# Patient Record
Sex: MALE | Race: BLACK OR AFRICAN AMERICAN | NOT HISPANIC OR LATINO | Employment: OTHER | URBAN - METROPOLITAN AREA
[De-identification: names, ages, dates, MRNs, and addresses within clinical notes are randomized per-mention and may not be internally consistent; named-entity substitution may affect disease eponyms.]

---

## 2017-01-18 ENCOUNTER — OFFICE VISIT (OUTPATIENT)
Dept: INTERNAL MEDICINE | Facility: CLINIC | Age: 67
End: 2017-01-18
Payer: MEDICARE

## 2017-01-18 ENCOUNTER — HOSPITAL ENCOUNTER (OUTPATIENT)
Dept: RADIOLOGY | Facility: HOSPITAL | Age: 67
Discharge: HOME | End: 2017-01-18
Attending: NURSE PRACTITIONER
Payer: MEDICARE

## 2017-01-18 VITALS
BODY MASS INDEX: 24.44 KG/M2 | TEMPERATURE: 97 F | WEIGHT: 195.56 LBS | SYSTOLIC BLOOD PRESSURE: 152 MMHG | DIASTOLIC BLOOD PRESSURE: 72 MMHG

## 2017-01-18 DIAGNOSIS — R05.9 COUGH: ICD-10-CM

## 2017-01-18 DIAGNOSIS — R06.2 WHEEZING: ICD-10-CM

## 2017-01-18 DIAGNOSIS — R63.0 DECREASED APPETITE: ICD-10-CM

## 2017-01-18 DIAGNOSIS — R21 RASH: ICD-10-CM

## 2017-01-18 DIAGNOSIS — R41.9 UNSPECIFIED SYMPTOMS AND SIGNS INVOLVING COGNITIVE FUNCTIONS AND AWARENESS: ICD-10-CM

## 2017-01-18 DIAGNOSIS — J45.20 MILD INTERMITTENT ASTHMA WITHOUT COMPLICATION: ICD-10-CM

## 2017-01-18 DIAGNOSIS — G47.00 INSOMNIA, UNSPECIFIED TYPE: ICD-10-CM

## 2017-01-18 DIAGNOSIS — R53.83 FATIGUE, UNSPECIFIED TYPE: ICD-10-CM

## 2017-01-18 DIAGNOSIS — R05.9 COUGH: Primary | ICD-10-CM

## 2017-01-18 DIAGNOSIS — J06.9 URI, ACUTE: ICD-10-CM

## 2017-01-18 PROCEDURE — 71020 XR CHEST PA AND LATERAL: CPT | Mod: TC,PO

## 2017-01-18 PROCEDURE — 99999 PR PBB SHADOW E&M-EST. PATIENT-LVL III: CPT | Mod: PBBFAC,,, | Performed by: NURSE PRACTITIONER

## 2017-01-18 PROCEDURE — 71020 XR CHEST PA AND LATERAL: CPT | Mod: 26,,, | Performed by: RADIOLOGY

## 2017-01-18 PROCEDURE — 99214 OFFICE O/P EST MOD 30 MIN: CPT | Mod: S$PBB,,, | Performed by: NURSE PRACTITIONER

## 2017-01-18 RX ORDER — METHYLPREDNISOLONE 4 MG/1
TABLET ORAL
Qty: 1 PACKAGE | Refills: 0 | Status: SHIPPED | OUTPATIENT
Start: 2017-01-18 | End: 2017-03-09

## 2017-01-18 RX ORDER — AZITHROMYCIN 250 MG/1
TABLET, FILM COATED ORAL
Qty: 6 TABLET | Refills: 0 | Status: SHIPPED | OUTPATIENT
Start: 2017-01-18 | End: 2017-03-09

## 2017-01-18 RX ORDER — TRAZODONE HYDROCHLORIDE 50 MG/1
50 TABLET ORAL NIGHTLY PRN
Qty: 15 TABLET | Refills: 0 | Status: SHIPPED | OUTPATIENT
Start: 2017-01-18 | End: 2017-03-09

## 2017-01-18 RX ORDER — IPRATROPIUM BROMIDE AND ALBUTEROL SULFATE 2.5; .5 MG/3ML; MG/3ML
3 SOLUTION RESPIRATORY (INHALATION)
Status: COMPLETED | OUTPATIENT
Start: 2017-01-18 | End: 2017-01-18

## 2017-01-18 RX ADMIN — IPRATROPIUM BROMIDE AND ALBUTEROL SULFATE 3 ML: 2.5; .5 SOLUTION RESPIRATORY (INHALATION) at 02:01

## 2017-01-18 NOTE — MR AVS SNAPSHOT
[facility] Internal Medicine  [address] [facility] [place] [address]  Phone: [phone]  Fax: [phone]                  Edin Green   2017 1:40 PM   Office Visit    Description:  Male : 1950   Provider:  LAZARA Elias   Department:  [facility] - Internal Medicine           Reason for Visit     Cough     Sinusitis           Diagnoses this Visit        Comments    Cough    -  Primary     Wheezing         Fatigue, unspecified type         Decreased appetite         Insomnia, unspecified type         Rash         Unspecified symptoms and signs involving cognitive functions and awareness         Mild intermittent asthma without complication                To Do List           Future Appointments        Provider Department Dept Phone    2017 4:30 PM LAB, SAME DAY [facility] [facility] [phone]    2017 10:30 AM Mere Fitzgerald MD [facility] Dermatology [phone]    2017 2:20 PM Carter Paris MD [facility] Internal Medicine [phone]    2017 8:40 AM LABORATORY, [facility] [phone]    2017 9:45 AM Sina Pa MD [facility] Ophthalmology [phone]      Goals (5 Years of Data)     None      Follow-Up and Disposition     Return in about 2 weeks (around 2017).       These Medications        Disp Refills Start End    methylPREDNISolone (MEDROL DOSEPACK) 4 mg tablet 1 Package 0 2017     use as directed    Pharmacy: [address] Ph #: [phone]       azithromycin (Z-JULIA) 250 MG tablet 6 tablet 0 2017     As per packet instructions    Pharmacy: [address] Ph #: [phone]       trazodone (DESYREL) 50 MG tablet 15 tablet 0 2017    Take 1 tablet (50 mg total) by mouth nightly as needed for Insomnia. - Oral    Pharmacy: [address]  ANTHONY CHACON  [address] [facility] #: [phone]         [facility]     [facility] Care [facility] - 24/7 Assistance  Registered nurses in the [facility] provide clinical advisement, health education, appointment booking, and other advisory services.  Call for this free service at [phone].             Medications           Message regarding Medications     Verify the changes and/or additions to your medication regime listed below are the same as discussed with your clinician today.  If any of these changes or additions are incorrect, please notify your healthcare provider.        START taking these NEW medications        Refills    methylPREDNISolone (MEDROL DOSEPACK) 4 mg tablet 0    Sig: use as directed    Class: Normal    azithromycin (Z-JULIA) 250 MG tablet 0    Sig: As per packet instructions    Class: Normal      These medications were administered today        Dose Freq    albuterol-ipratropium 2.5mg-0.5mg/3mL nebulizer solution 3 mL 3 mL Clinic/HOD 1 time    Sig: Take 3 mLs by nebulization one time.    Class: Normal    Route: Nebulization      CHANGE how you are taking these medications     Start Taking Instead of    trazodone (DESYREL) 50 MG tablet trazodone (DESYREL) 50 MG tablet    Dosage:  Take 1 tablet (50 mg total) by mouth nightly as needed for Insomnia. Dosage:  Take 1 tablet (50 mg total) by mouth every evening.    Reason for Change:  Reorder            Verify that the below list of medications is an accurate representation of the medications you are currently taking.  If none reported, the list may be blank. If incorrect, please contact your healthcare provider. Carry this list with you in case of emergency.           Current Medications     albuterol 90 mcg/actuation inhaler Inhale 2 puffs into the lungs every 6 (six) hours as needed for Wheezing.    amlodipine (NORVASC) 5 MG tablet TAKE ONE TABLET BY MOUTH ONCE DAILY    aspirin (ECOTRIN) 81 MG EC tablet Take 81 mg by  mouth once daily.    azelastine (ASTELIN) 137 mcg (0.1 %) nasal spray 2 sprays (274 mcg total) by Nasal route 2 (two) times daily.    losartan-hydrochlorothiazide 100-25 mg (HYZAAR) 100-25 mg per tablet TAKE ONE TABLET BY MOUTH ONCE DAILY    montelukast (SINGULAIR) 10 mg tablet TAKE ONE TABLET BY MOUTH ONCE DAILY IN THE EVENING    MULTIVIT-IRON-MIN-FOLIC ACID 3,500-18-0.4 UNIT-MG-MG ORAL CHEW Take by mouth.    pravastatin (PRAVACHOL) 40 MG tablet TAKE ONE TABLET BY MOUTH ONCE DAILY    sertraline (ZOLOFT) 100 MG tablet TAKE ONE TABLET BY MOUTH ONCE DAILY    tadalafil (CIALIS) 20 MG Tab Use one tablet every 36 hours    tamsulosin (FLOMAX) 0.4 mg Cp24 Take 0.4 mg by mouth once daily.    azithromycin (Z-JULIA) 250 MG tablet As per packet instructions    fluticasone-vilanterol (BREO ELLIPTA) 200-25 mcg/dose DsDv diskus inhaler Inhale 1 puff into the lungs once daily.    ibuprofen (ADVIL,MOTRIN) 200 MG tablet Take 200 mg by mouth every 6 (six) hours as needed for Pain.    methylPREDNISolone (MEDROL DOSEPACK) 4 mg tablet use as directed    trazodone (DESYREL) 50 MG tablet Take 1 tablet (50 mg total) by mouth nightly as needed for Insomnia.    VIAGRA 100 mg tablet TAKE AS DIRECTED           Clinical Reference Information           Vital Signs - Last Recorded  Most recent update: 1/18/2017  1:31 PM by Kenna Kowalski LPN    BP Temp Wt BMI       (!) 152/72 (BP Location: Right arm, Patient Position: Sitting, BP Method: Manual) 96.5 °F (35.8 °C) (Tympanic) 88.7 kg (195 lb 8.8 oz) 24.44 kg/m2       Blood Pressure          Most Recent Value    BP  (!)  152/72      Allergies as of 1/18/2017     Celebrex [Celecoxib]    Venom-wasp      Immunizations Administered on Date of Encounter - 1/18/2017     None      Orders Placed During Today's Visit     Future Labs/Procedures Expected by Expires    CBC auto differential  1/18/2017 3/19/2018    VARICELLA ZOSTER ANTIBODY, IGM  1/18/2017 3/19/2018    Vitamin B12  1/18/2017 4/18/2017    TSH   4/18/2017 1/18/2018    Basic metabolic panel  7/17/2017 1/18/2018      Administrations This Visit     albuterol-ipratropium 2.5mg-0.5mg/3mL nebulizer solution 3 mL     Admin Date Action Dose Route Administered By             01/18/2017 Given 3 mL Nebulization Kenna Kowalski LPN

## 2017-01-18 NOTE — PROGRESS NOTES
"Subjective:   Patient ID: Edin Green is a 66 y.o. male.    Chief Complaint:     HPI Comments: Pt. Presents today for several complaints today. First he reports mild coughing and wheezing for last several days. Cough is dry. No SOB, fever, or chills. Associated symptoms of post nasal drip and nasal stuffiness. Has hx of Asthma - f/u with pulm regularly.    Also c/o insomnia - cant fall or stay asleep. Requesting refill on his Trazodone today. Watches TV for hours at night.     Reports concern over weight loss - unintentionally. Has decreased appetite, low energy level, and fatigue over the last few months. Cant sleep at night, and takes frequent naps during the day. No palpitations or hair loss. No excess drying of skin. Requesting to have labs done today.     Next, he reports an "itchy" rash to right arm. States he just noticed it while being here in the clinic. Not painful. No new soaps, foods, meds, or detergents.     Next, requesting to move his appt up with derm for a skin lesion to left leg - concerned about Melanoma    Review of Systems   Constitutional: Positive for activity change, appetite change, fatigue and unexpected weight change. Negative for chills and fever.   HENT: Positive for congestion (nasal congestion) and postnasal drip. Negative for ear pain, rhinorrhea, sinus pressure, sneezing, sore throat, tinnitus and trouble swallowing.    Eyes: Negative for visual disturbance.   Respiratory: Positive for cough and chest tightness. Negative for shortness of breath and wheezing.    Cardiovascular: Negative for chest pain, palpitations and leg swelling.   Gastrointestinal: Negative for abdominal pain, diarrhea, nausea and vomiting.   Musculoskeletal: Negative for myalgias.   Skin: Positive for rash.   Neurological: Negative for dizziness, syncope, weakness, light-headedness, numbness and headaches.   Hematological: Negative for adenopathy.   Psychiatric/Behavioral: Positive for sleep disturbance. " Negative for agitation, behavioral problems, confusion, decreased concentration, dysphoric mood, hallucinations, self-injury and suicidal ideas. The patient is nervous/anxious (reports anxiety at times). The patient is not hyperactive.        Objective:      Physical Exam   Constitutional: He is oriented to person, place, and time. He appears well-developed and well-nourished. No distress.   HENT:   Head: Normocephalic and atraumatic.   Right Ear: Tympanic membrane, external ear and ear canal normal.   Left Ear: Tympanic membrane, external ear and ear canal normal.   Nose: Nose normal. Right sinus exhibits no maxillary sinus tenderness and no frontal sinus tenderness. Left sinus exhibits no maxillary sinus tenderness and no frontal sinus tenderness.   Mouth/Throat: Uvula is midline and mucous membranes are normal. Posterior oropharyngeal erythema (mild erythema) present. No oropharyngeal exudate or posterior oropharyngeal edema.   Neck: No thyromegaly present.   Cardiovascular: Normal rate, regular rhythm and normal heart sounds.    Pulmonary/Chest: Effort normal. No accessory muscle usage. No apnea, no tachypnea and no bradypnea. No respiratory distress. He has wheezes (exp wheezing ) in the right lower field. He has no rales. He exhibits no tenderness.   Musculoskeletal: Normal range of motion.   Lymphadenopathy:     He has no cervical adenopathy.   Neurological: He is alert and oriented to person, place, and time.   Skin: Skin is warm and dry. He is not diaphoretic.        Psychiatric: He has a normal mood and affect. His behavior is normal. Judgment and thought content normal.   Nursing note and vitals reviewed.      Assessment:       1. Cough    2. Wheezing    3. Fatigue, unspecified type    4. Decreased appetite    5. Insomnia, unspecified type    6. Rash    7. Unspecified symptoms and signs involving cognitive functions and awareness     8. Mild intermittent asthma without complication    9. URI, acute         Plan:   URI  Cough  Wheezing  Mild intermittent asthma without complication  -    In clinic today -  albuterol-ipratropium 2.5mg-0.5mg/3mL nebulizer solution 3 mL; Take 3 mLs by nebulization one time.  -     methylPREDNISolone (MEDROL DOSEPACK) 4 mg tablet; use as directed  Dispense: 1 Package; Refill: 0  -     azithromycin (Z-JULIA) 250 MG tablet; As per packet instructions  Dispense: 6 tablet; Refill: 0  -     Continue albuterol inhaler at home prn wheezing  -     Continue astelin nasal spray and Singulair at home  -     F/u with pulm as instructed  -     CXR    Fatigue, unspecified type  Decreased appetite/weight loss  Insomnia, unspecified type  -     CBC auto differential; Future; Expected date: 1/18/17  -     Basic metabolic panel; Future; Expected date: 7/17/17  -     TSH; Future; Expected date: 4/18/17  -     Vitamin B12; Future; Expected date: 1/18/17  -     CXR      Rash - likely contact dermatitis       - Varicella zoster IgM       - medrol dose pack as noted above     Other orders  - Refill on Trazodone per pt request -  trazodone (DESYREL) 50 MG tablet; Take 1 tablet (50 mg total) by mouth nightly as needed for Insomnia.  Dispense: 15 tablet; Refill: 0    F/u in 1-2 weeks regarding weight, appetite, and fatigue    Return in about 2 weeks (around 2/1/2017).

## 2017-01-19 ENCOUNTER — TELEPHONE (OUTPATIENT)
Dept: INTERNAL MEDICINE | Facility: CLINIC | Age: 67
End: 2017-01-19

## 2017-01-19 ENCOUNTER — LAB VISIT (OUTPATIENT)
Dept: LAB | Facility: HOSPITAL | Age: 67
End: 2017-01-19
Attending: NURSE PRACTITIONER
Payer: MEDICARE

## 2017-01-19 ENCOUNTER — OFFICE VISIT (OUTPATIENT)
Dept: DERMATOLOGY | Facility: CLINIC | Age: 67
End: 2017-01-19
Payer: MEDICARE

## 2017-01-19 DIAGNOSIS — I87.2 STASIS DERMATITIS OF BOTH LEGS: Primary | ICD-10-CM

## 2017-01-19 DIAGNOSIS — E05.90 HYPERTHYROIDISM: Primary | ICD-10-CM

## 2017-01-19 DIAGNOSIS — D64.9 ANEMIA, UNSPECIFIED TYPE: ICD-10-CM

## 2017-01-19 LAB
FERRITIN SERPL-MCNC: 83 NG/ML
IRON SERPL-MCNC: 37 UG/DL
RETICS/RBC NFR AUTO: 0.8 %
SATURATED IRON: 11 %
TOTAL IRON BINDING CAPACITY: 326 UG/DL
TRANSFERRIN SERPL-MCNC: 220 MG/DL
TRANSFERRIN SERPL-MCNC: 220 MG/DL

## 2017-01-19 PROCEDURE — 83540 ASSAY OF IRON: CPT

## 2017-01-19 PROCEDURE — 36415 COLL VENOUS BLD VENIPUNCTURE: CPT | Mod: PO

## 2017-01-19 PROCEDURE — 85045 AUTOMATED RETICULOCYTE COUNT: CPT

## 2017-01-19 PROCEDURE — 82728 ASSAY OF FERRITIN: CPT

## 2017-01-19 PROCEDURE — 99999 PR PBB SHADOW E&M-EST. PATIENT-LVL II: CPT | Mod: PBBFAC,,, | Performed by: DERMATOLOGY

## 2017-01-19 PROCEDURE — 99203 OFFICE O/P NEW LOW 30 MIN: CPT | Mod: S$PBB,,, | Performed by: DERMATOLOGY

## 2017-01-19 RX ORDER — TRIAMCINOLONE ACETONIDE 1 MG/G
OINTMENT TOPICAL
Qty: 80 G | Refills: 1 | Status: SHIPPED | OUTPATIENT
Start: 2017-01-19 | End: 2019-07-15

## 2017-01-19 NOTE — TELEPHONE ENCOUNTER
Called [facility], first available endo is in [place] on 04/07/17. He has appt with Dr Smith here in [place] on 04/04/17.

## 2017-01-19 NOTE — PATIENT INSTRUCTIONS
[facility] Instructions:  1) Recommend triamcinolone ointment once to twice daily as needed for itch of rash   2) Recommend daily compression stockings (15-20 mm Hg)  3) Recommend frequent leg elevation to increase venous return

## 2017-01-19 NOTE — TELEPHONE ENCOUNTER
----- Message from LAZARA Elias sent at 1/19/2017 12:31 PM CST -----  Regarding: add test to other orders  Please add a [place] thyroid scan to his previous test to be scheduled and notify pt. Orders placed for the scan. Thanks

## 2017-01-19 NOTE — TELEPHONE ENCOUNTER
Called pt to discuss lab results - needs referral to endocrinologist for hyperthyroidism and further labs for anemia. No answer. Left voice mail to return phone call to clinic.

## 2017-01-19 NOTE — TELEPHONE ENCOUNTER
----- Message from LAZARA Elias sent at 1/19/2017 10:19 AM CST -----  External referral placed for endocrinologist w/ Dr. Smith at [facility] for hyperthyroidism - please schedule and notify pt of appt.    Also needs labs for TIBC, ferritin, transferrin, and retic count w/ stool for occult blood scheduled for anemia - please schedule and notify pt.   thanks

## 2017-01-19 NOTE — TELEPHONE ENCOUNTER
----- Message from LAZARA Elias sent at 1/19/2017 10:19 AM CST -----  External referral placed for endocrinologist w/ Dr. Smith at [facility] for hyperthyroidism - please schedule and notify pt of appt.    Also needs labs for TIBC, ferritin, transferrin, and retic count w/ stool for occult blood scheduled for anemia - please schedule and notify pt.   thanks

## 2017-01-19 NOTE — TELEPHONE ENCOUNTER
Pt informed that Ms Holland has ordered lab and stool specimen. Pt agrees to go to lab after derm appt, instructed to then stop on first floor to  container for stool specimen. Trevon'd appt with Dr Smith for first available on 4/4/17 @  11:20 am.

## 2017-01-19 NOTE — TELEPHONE ENCOUNTER
Pt informed that first available in [place] is not until 04/07/17, so he should keep appt with Dr Smith on 04/04/17. Pt agrees.  Pt states there was no label in bag with container to collect stool specimen. Advised him when he brings specimen in, to stop on first floor and ask for Ms Patrick's nurse, she will give him label to put on container. Pt verbalizes understanding.

## 2017-01-19 NOTE — TELEPHONE ENCOUNTER
----- Message from LAZARA Elias sent at 1/19/2017 10:19 AM CST -----  External referral placed for endocrinologist w/ Dr. Smith at [facility] for hyperthyroidism - please schedule and notify pt of appt.    Also needs labs for TIBC, ferritin, transferrin, and retic count w/ stool for occult blood scheduled for anemia - please schedule and notify pt.   thanks

## 2017-01-19 NOTE — MR AVS SNAPSHOT
[facility] Dermatology  [address]  [place] [address]  Phone: [phone]  Fax: [phone]                  Edin Green   2017 10:30 AM   Office Visit    Description:  Male : 1950   Provider:  Mere Fitzgerald MD   Department:  [facility] - Dermatology           Reason for Visit     Spot           Diagnoses this Visit        Comments    Stasis dermatitis of both legs    -  Primary            To Do List           Future Appointments        Provider Department Dept Phone    2017 2:20 PM Carter Paris MD [facility] Internal Medicine [phone]    2017 8:40 AM LABORATORY, [facility] [facility] [phone]    2017 9:45 AM Sina Pa MD [facility] Ophthalmology [phone]    2017 10:40 AM Carter Paris MD [facility] Internal Medicine [phone]    2017 2:00 PM PULMONARY LAB, [facility]- Pulmonary Function cs [phone]      Goals (5 Years of Data)     None      Follow-Up and Disposition     Return if symptoms worsen or fail to improve.       These Medications        Disp Refills Start End    triamcinolone acetonide 0.1% (KENALOG) 0.1 % ointment 80 g 1 2017     AAA 1-2 times daily as needed for rash    Pharmacy: [address] #: [phone]         [facility]     [facility] On Call Nurse Care Line -  Assistance  Registered nurses in the [facility] provide clinical advisement, health education, appointment booking, and other advisory services.  Call for this free service at [phone].             Medications           Message regarding Medications     Verify the changes and/or additions to your medication regime listed below are the same as discussed with your clinician today.  If any of these changes or additions are incorrect, please notify your healthcare provider.        START taking these NEW medications        Refills    triamcinolone  acetonide 0.1% (KENALOG) 0.1 % ointment 1    Sig: AAA 1-2 times daily as needed for rash    Class: Normal           Verify that the below list of medications is an accurate representation of the medications you are currently taking.  If none reported, the list may be blank. If incorrect, please contact your healthcare provider. Carry this list with you in case of emergency.           Current Medications     albuterol 90 mcg/actuation inhaler Inhale 2 puffs into the lungs every 6 (six) hours as needed for Wheezing.    amlodipine (NORVASC) 5 MG tablet TAKE ONE TABLET BY MOUTH ONCE DAILY    aspirin (ECOTRIN) 81 MG EC tablet Take 81 mg by mouth once daily.    azelastine (ASTELIN) 137 mcg (0.1 %) nasal spray 2 sprays (274 mcg total) by Nasal route 2 (two) times daily.    azithromycin (Z-JULIA) 250 MG tablet As per packet instructions    ibuprofen (ADVIL,MOTRIN) 200 MG tablet Take 200 mg by mouth every 6 (six) hours as needed for Pain.    losartan-hydrochlorothiazide 100-25 mg (HYZAAR) 100-25 mg per tablet TAKE ONE TABLET BY MOUTH ONCE DAILY    methylPREDNISolone (MEDROL DOSEPACK) 4 mg tablet use as directed    montelukast (SINGULAIR) 10 mg tablet TAKE ONE TABLET BY MOUTH ONCE DAILY IN THE EVENING    MULTIVIT-IRON-MIN-FOLIC ACID 3,500-18-0.4 UNIT-MG-MG ORAL CHEW Take by mouth.    pravastatin (PRAVACHOL) 40 MG tablet TAKE ONE TABLET BY MOUTH ONCE DAILY    sertraline (ZOLOFT) 100 MG tablet TAKE ONE TABLET BY MOUTH ONCE DAILY    tadalafil (CIALIS) 20 MG Tab Use one tablet every 36 hours    tamsulosin (FLOMAX) 0.4 mg Cp24 Take 0.4 mg by mouth once daily.    trazodone (DESYREL) 50 MG tablet Take 1 tablet (50 mg total) by mouth nightly as needed for Insomnia.    fluticasone-vilanterol (BREO ELLIPTA) 200-25 mcg/dose DsDv diskus inhaler Inhale 1 puff into the lungs once daily.    triamcinolone acetonide 0.1% (KENALOG) 0.1 % ointment AAA 1-2 times daily as needed for rash    VIAGRA 100 mg tablet TAKE AS DIRECTED           Clinical  Reference Information           Allergies as of 1/19/2017     Celebrex [Celecoxib]    Venom-wasp      Immunizations Administered on Date of Encounter - 1/19/2017     None      Instructions    [facility] Instructions:  1) Recommend triamcinolone ointment once to twice daily as needed for itch of rash   2) Recommend daily compression stockings (15-20 mm Hg)  3) Recommend frequent leg elevation to increase venous return

## 2017-01-19 NOTE — TELEPHONE ENCOUNTER
S/w pt, informed him of thyroid scan date and instructed him of fasting 10 hrs prior to first scan on 02/06/17 at 8 am, taking a capsule, returning at 1pm for scan and uptake, returning nonfasting next morning at 8 am. Pt verbalized understanding.   Pt said his wife picked up container for stool specimen. Ask him if there was label for container, states he will look and see. Advised him if there is no label to call me back because if there is no label when he takes it to lab, they will throw it away.

## 2017-01-19 NOTE — TELEPHONE ENCOUNTER
----- Message from LAZARA Elias sent at 1/19/2017 10:19 AM CST -----  External referral placed for endocrinologist w/ Dr. Smith at [facility] for hyperthyroidism - please schedule and notify pt of appt.    Also needs labs for TIBC, ferritin, transferrin, and retic count w/ stool for occult blood scheduled for anemia - please schedule and notify pt.   thanks

## 2017-01-19 NOTE — TELEPHONE ENCOUNTER
Pt was notified of elevated TSH and mild anemia. Referral placed to endocrinologist for hyperthyroidism w/ a thyroid scan. Also needs anemia workup. Orders placed and msg sent to staff to schedule and notify pt of appt. He verbalizes an understanding.

## 2017-01-19 NOTE — TELEPHONE ENCOUNTER
S/w Ayaan in [place], states pt needs thyroid scan with uptake. He yvette'd him for 02/06 & 07. Pt arrives 8am on 02/06/17 fasting, takes one capsule, returns at 1pm for scan and uptake. Returns 8 am on 02/07 nonfasting.

## 2017-01-19 NOTE — PROGRESS NOTES
Subjective:       Patient ID:  Edin Green is a 66 y.o. male who presents for   Chief Complaint   Patient presents with    Spot     on legs x one month denies pain to sites     HPI Comments: History of Present Illness: The patient presents with chief complaint of hyperpigmentation.  Location: bilateral lower legs  Duration: several years, worsening x 1 month  Signs/Symptoms: hyperpigmentation, occasional pruritus    Prior treatments: none      Spot         Review of Systems   Constitutional: Negative for fever, chills, weight loss, fatigue, night sweats and malaise.   Gastrointestinal: Negative for indigestion.   Skin: Positive for itching and rash. Negative for daily sunscreen use and activity-related sunscreen use.   Hematologic/Lymphatic: Negative for adenopathy. Does not bruise/bleed easily.        Objective:    Physical Exam   Constitutional: He appears well-developed and well-nourished. No distress.   Neurological: He is alert and oriented to person, place, and time. He is not disoriented.   Psychiatric: He has a normal mood and affect.   Skin:   Areas Examined (abnormalities noted in diagram):   Head / Face Inspection Performed  Neck Inspection Performed  Chest / Axilla Inspection Performed  Abdomen Inspection Performed  Back Inspection Performed  RUE Inspected  LUE Inspection Performed  RLE Inspected  LLE Inspection Performed  Nails and Digits Inspection Performed              Assessment / Plan:        Stasis dermatitis of both legs  -     triamcinolone acetonide 0.1% (KENALOG) 0.1 % ointment; AAA 1-2 times daily as needed for rash  Dispense: 80 g; Refill: 1  -     Discussed diagnosis with patient and pathogenesis.  Recommend TAC oint BID prn pruritus of rash.  Recommend daily compression stockings and frequent leg elevation to increase venous return.            Return if symptoms worsen or fail to improve.

## 2017-01-20 ENCOUNTER — LAB VISIT (OUTPATIENT)
Dept: LAB | Facility: HOSPITAL | Age: 67
End: 2017-01-20
Attending: NURSE PRACTITIONER
Payer: MEDICARE

## 2017-01-20 DIAGNOSIS — D64.9 ANEMIA, UNSPECIFIED TYPE: ICD-10-CM

## 2017-01-20 LAB — OB PNL STL: NEGATIVE

## 2017-01-20 PROCEDURE — 82272 OCCULT BLD FECES 1-3 TESTS: CPT | Mod: PO

## 2017-01-23 ENCOUNTER — TELEPHONE (OUTPATIENT)
Dept: INTERNAL MEDICINE | Facility: CLINIC | Age: 67
End: 2017-01-23

## 2017-01-23 NOTE — TELEPHONE ENCOUNTER
Spoke with pt to let him know that the thyroid scan is scheduled for 1/31/17 at 8 am and 1 pm and 2/1/17 at 8 am. Will need to be fasting for 10 hours on 1/31/17. Pt verbalized understanding.

## 2017-01-26 ENCOUNTER — TELEPHONE (OUTPATIENT)
Dept: INTERNAL MEDICINE | Facility: CLINIC | Age: 67
End: 2017-01-26

## 2017-01-26 NOTE — TELEPHONE ENCOUNTER
Called pt to discuss stool results with patient and CBC. No answer. Left voicemail to return phone call

## 2017-01-31 ENCOUNTER — HOSPITAL ENCOUNTER (OUTPATIENT)
Dept: RADIOLOGY | Facility: HOSPITAL | Age: 67
Discharge: HOME | End: 2017-01-31
Attending: NURSE PRACTITIONER
Payer: MEDICARE

## 2017-01-31 DIAGNOSIS — E05.90 HYPERTHYROIDISM: ICD-10-CM

## 2017-01-31 PROCEDURE — 78014 THYROID IMAGING W/BLOOD FLOW: CPT | Mod: 26,,, | Performed by: RADIOLOGY

## 2017-02-01 ENCOUNTER — HOSPITAL ENCOUNTER (OUTPATIENT)
Dept: RADIOLOGY | Facility: HOSPITAL | Age: 67
Discharge: HOME | End: 2017-02-01
Attending: NURSE PRACTITIONER
Payer: MEDICARE

## 2017-02-01 PROCEDURE — 78014 THYROID IMAGING W/BLOOD FLOW: CPT | Mod: TC,PO

## 2017-02-02 ENCOUNTER — TELEPHONE (OUTPATIENT)
Dept: INTERNAL MEDICINE | Facility: CLINIC | Age: 67
End: 2017-02-02

## 2017-02-02 DIAGNOSIS — D64.9 ANEMIA, UNSPECIFIED TYPE: Primary | ICD-10-CM

## 2017-02-02 NOTE — TELEPHONE ENCOUNTER
----- Message from LAZARA Elias sent at 2/2/2017  1:27 PM CST -----  Cbc scheduled to re-check in 1 month on 3/1/17 - please schedule and notify pt

## 2017-02-09 ENCOUNTER — TELEPHONE (OUTPATIENT)
Dept: INTERNAL MEDICINE | Facility: CLINIC | Age: 67
End: 2017-02-09

## 2017-02-09 NOTE — TELEPHONE ENCOUNTER
----- Message from Twila Serrano sent at 2/9/2017  2:33 PM CST -----  Contact: pt  Pt called to inform Dr Paris that he should arrive at 2:45 pm. Pt can be reached at [phone]

## 2017-03-01 ENCOUNTER — LAB VISIT (OUTPATIENT)
Dept: LAB | Facility: HOSPITAL | Age: 67
End: 2017-03-01
Attending: NURSE PRACTITIONER
Payer: MEDICARE

## 2017-03-01 DIAGNOSIS — D64.9 ANEMIA, UNSPECIFIED TYPE: ICD-10-CM

## 2017-03-01 LAB
BASOPHILS # BLD AUTO: 0.02 K/UL
BASOPHILS NFR BLD: 0.4 %
DIFFERENTIAL METHOD: ABNORMAL
EOSINOPHIL # BLD AUTO: 0.4 K/UL
EOSINOPHIL NFR BLD: 8.2 %
ERYTHROCYTE [DISTWIDTH] IN BLOOD BY AUTOMATED COUNT: 14.9 %
HCT VFR BLD AUTO: 35.8 %
HGB BLD-MCNC: 11.6 G/DL
LYMPHOCYTES # BLD AUTO: 1.5 K/UL
LYMPHOCYTES NFR BLD: 30.7 %
MCH RBC QN AUTO: 25.5 PG
MCHC RBC AUTO-ENTMCNC: 32.4 %
MCV RBC AUTO: 79 FL
MONOCYTES # BLD AUTO: 0.5 K/UL
MONOCYTES NFR BLD: 9.8 %
NEUTROPHILS # BLD AUTO: 2.6 K/UL
NEUTROPHILS NFR BLD: 50.7 %
PLATELET # BLD AUTO: 283 K/UL
PMV BLD AUTO: 10.7 FL
RBC # BLD AUTO: 4.55 M/UL
WBC # BLD AUTO: 5.02 K/UL

## 2017-03-01 PROCEDURE — 85025 COMPLETE CBC W/AUTO DIFF WBC: CPT

## 2017-03-01 PROCEDURE — 36415 COLL VENOUS BLD VENIPUNCTURE: CPT | Mod: PO

## 2017-03-02 ENCOUNTER — TELEPHONE (OUTPATIENT)
Dept: INTERNAL MEDICINE | Facility: CLINIC | Age: 67
End: 2017-03-02

## 2017-03-02 NOTE — TELEPHONE ENCOUNTER
----- Message from LAZARA Elias sent at 3/2/2017  7:33 AM CST -----  Please notify his patient that his hemoglobin and hematocrit - following up on the anemia has not worsened.

## 2017-03-09 ENCOUNTER — LAB VISIT (OUTPATIENT)
Dept: LAB | Facility: HOSPITAL | Age: 67
End: 2017-03-09
Attending: FAMILY MEDICINE
Payer: MEDICARE

## 2017-03-09 ENCOUNTER — OFFICE VISIT (OUTPATIENT)
Dept: INTERNAL MEDICINE | Facility: CLINIC | Age: 67
End: 2017-03-09
Payer: MEDICARE

## 2017-03-09 VITALS
OXYGEN SATURATION: 98 % | SYSTOLIC BLOOD PRESSURE: 124 MMHG | WEIGHT: 198.88 LBS | HEART RATE: 84 BPM | DIASTOLIC BLOOD PRESSURE: 60 MMHG | BODY MASS INDEX: 24.73 KG/M2 | HEIGHT: 75 IN | TEMPERATURE: 98 F

## 2017-03-09 DIAGNOSIS — I10 ESSENTIAL HYPERTENSION: ICD-10-CM

## 2017-03-09 DIAGNOSIS — D64.9 ANEMIA, UNSPECIFIED TYPE: Primary | ICD-10-CM

## 2017-03-09 DIAGNOSIS — D64.9 ANEMIA, UNSPECIFIED TYPE: ICD-10-CM

## 2017-03-09 DIAGNOSIS — E05.00 GRAVES DISEASE: ICD-10-CM

## 2017-03-09 DIAGNOSIS — E05.90 HYPERTHYROIDISM: ICD-10-CM

## 2017-03-09 PROCEDURE — 36415 COLL VENOUS BLD VENIPUNCTURE: CPT | Mod: PO

## 2017-03-09 PROCEDURE — 99212 OFFICE O/P EST SF 10 MIN: CPT | Mod: PBBFAC,PO | Performed by: FAMILY MEDICINE

## 2017-03-09 PROCEDURE — 99999 PR PBB SHADOW E&M-EST. PATIENT-LVL II: CPT | Mod: PBBFAC,,, | Performed by: FAMILY MEDICINE

## 2017-03-09 PROCEDURE — 99214 OFFICE O/P EST MOD 30 MIN: CPT | Mod: S$PBB,,, | Performed by: FAMILY MEDICINE

## 2017-03-09 PROCEDURE — 85025 COMPLETE CBC W/AUTO DIFF WBC: CPT

## 2017-03-09 RX ORDER — MULTIVITAMIN
1 TABLET ORAL DAILY
COMMUNITY
End: 2021-07-01

## 2017-03-09 RX ORDER — METHIMAZOLE 10 MG/1
20 TABLET ORAL
COMMUNITY
Start: 2017-02-16 | End: 2017-05-25 | Stop reason: SDUPTHER

## 2017-03-10 LAB
BASOPHILS # BLD AUTO: 0.02 K/UL
BASOPHILS NFR BLD: 0.3 %
DIFFERENTIAL METHOD: ABNORMAL
EOSINOPHIL # BLD AUTO: 0.3 K/UL
EOSINOPHIL NFR BLD: 4.7 %
ERYTHROCYTE [DISTWIDTH] IN BLOOD BY AUTOMATED COUNT: 15 %
HCT VFR BLD AUTO: 38.8 %
HGB BLD-MCNC: 12.3 G/DL
LYMPHOCYTES # BLD AUTO: 1.9 K/UL
LYMPHOCYTES NFR BLD: 31.5 %
MCH RBC QN AUTO: 25.6 PG
MCHC RBC AUTO-ENTMCNC: 31.7 %
MCV RBC AUTO: 81 FL
MONOCYTES # BLD AUTO: 0.6 K/UL
MONOCYTES NFR BLD: 9.4 %
NEUTROPHILS # BLD AUTO: 3.2 K/UL
NEUTROPHILS NFR BLD: 53.9 %
PLATELET # BLD AUTO: 286 K/UL
PMV BLD AUTO: 11.1 FL
RBC # BLD AUTO: 4.8 M/UL
WBC # BLD AUTO: 5.93 K/UL

## 2017-03-20 NOTE — PROGRESS NOTES
Subjective:       Patient ID: Edin Green is a 66 y.o. male.    Chief Complaint: Multiple issues see below    HPId/wdplan w graves, anemia and htn  Feeling better overall  Past Medical History:   Diagnosis Date    Benign hematuria     Cataract OU    Colon polyp     dr strauss    Depression     ED (erectile dysfunction)     Graves disease     aydee    HTN (hypertension)     Hypercholesteremia     Hypertensive retinopathy of both eyes     Hypogonadism     dr quintero    LAURENCE (obstructive sleep apnea)      History reviewed. No pertinent surgical history.  Family History   Problem Relation Age of Onset    Hypertension      Heart defect Mother     Strabismus Neg Hx     Retinal detachment Neg Hx     Macular degeneration Neg Hx     Glaucoma Neg Hx     Blindness Neg Hx     Amblyopia Neg Hx      Social History     Social History    Marital status:      Spouse name: CHUCHO    Number of children: 3    Years of education: N/A     Social History Main Topics    Smoking status: Never Smoker    Smokeless tobacco: Never Used    Alcohol use 0.6 oz/week     1 Cans of beer per week    Drug use: No    Sexual activity: Yes     Partners: Female     Other Topics Concern    None     Social History Narrative       Review of Systems  no cp palpit  Objective:      Physical Exam   Constitutional: He appears well-developed and well-nourished.   Cardiovascular: Normal rate and regular rhythm.    Pulmonary/Chest: Effort normal and breath sounds normal.       Assessment:       1. Anemia, unspecified type    2. Hyperthyroidism    3. Graves disease    4. Essential hypertension      hemocc neg  Plan:     consider taper off zoloft when ready  F/u per lab  *d/wd anemia likely related to thyroid   Fu endocr as planned  Anemia, unspecified type  -     CBC auto differential; Future; Expected date: 3/9/17;eduardo or refer per     Hyperthyroidism    Graves disease    Essential hypertension    **

## 2017-03-26 ENCOUNTER — PATIENT MESSAGE (OUTPATIENT)
Dept: INTERNAL MEDICINE | Facility: CLINIC | Age: 67
End: 2017-03-26

## 2017-03-26 DIAGNOSIS — D64.9 ANEMIA, UNSPECIFIED TYPE: Primary | ICD-10-CM

## 2017-03-27 ENCOUNTER — PATIENT MESSAGE (OUTPATIENT)
Dept: INTERNAL MEDICINE | Facility: CLINIC | Age: 67
End: 2017-03-27

## 2017-03-28 ENCOUNTER — PATIENT MESSAGE (OUTPATIENT)
Dept: INTERNAL MEDICINE | Facility: CLINIC | Age: 67
End: 2017-03-28

## 2017-04-01 DIAGNOSIS — J20.9 ACUTE BRONCHITIS, UNSPECIFIED ORGANISM: ICD-10-CM

## 2017-04-01 RX ORDER — LOSARTAN POTASSIUM AND HYDROCHLOROTHIAZIDE 25; 100 MG/1; MG/1
TABLET ORAL
Qty: 30 TABLET | Refills: 11 | Status: SHIPPED | OUTPATIENT
Start: 2017-04-01 | End: 2017-11-02 | Stop reason: SDUPTHER

## 2017-04-01 RX ORDER — SERTRALINE HYDROCHLORIDE 100 MG/1
TABLET, FILM COATED ORAL
Qty: 30 TABLET | Refills: 11 | Status: SHIPPED | OUTPATIENT
Start: 2017-04-01 | End: 2018-12-18 | Stop reason: SDUPTHER

## 2017-04-03 RX ORDER — MONTELUKAST SODIUM 10 MG/1
TABLET ORAL
Qty: 30 TABLET | Refills: 11 | Status: SHIPPED | OUTPATIENT
Start: 2017-04-03 | End: 2018-04-16 | Stop reason: SDUPTHER

## 2017-05-19 ENCOUNTER — OFFICE VISIT (OUTPATIENT)
Dept: OPHTHALMOLOGY | Facility: CLINIC | Age: 67
End: 2017-05-19
Payer: MEDICARE

## 2017-05-19 DIAGNOSIS — H25.13 NUCLEAR SCLEROSIS, BILATERAL: Primary | ICD-10-CM

## 2017-05-19 DIAGNOSIS — H52.7 REFRACTIVE ERROR: ICD-10-CM

## 2017-05-19 PROCEDURE — 99212 OFFICE O/P EST SF 10 MIN: CPT | Mod: PBBFAC,PO | Performed by: OPHTHALMOLOGY

## 2017-05-19 PROCEDURE — 92012 INTRM OPH EXAM EST PATIENT: CPT | Mod: S$PBB,,, | Performed by: OPHTHALMOLOGY

## 2017-05-19 PROCEDURE — 99999 PR PBB SHADOW E&M-EST. PATIENT-LVL II: CPT | Mod: PBBFAC,,, | Performed by: OPHTHALMOLOGY

## 2017-05-19 PROCEDURE — 92015 DETERMINE REFRACTIVE STATE: CPT | Mod: ,,, | Performed by: OPHTHALMOLOGY

## 2017-05-19 NOTE — PROGRESS NOTES
SUBJECTIVE:   Edin Green is a 66 y.o. male   Corrected distance visual acuity was 20/25 +1 in the right eye and 20/30 in the left eye.   Chief Complaint   Patient presents with    Cataract     6 month recheck cataracts        HPI:  HPI     Cataract    Additional comments: 6 month recheck cataracts           Comments   Pt states no changes in his vision since his last visit. Got new glasses 4   months ago but not happy with the vision with them. Not using any drops.   No pain or irritation.    1. Hypertensive Retinopathy  2. Ns ou  3. RE  4. Borderline dm       Last edited by Noble Du on 5/19/2017 10:29 AM. (History)        Assessment /Plan :  1. Nuclear sclerosis, bilateral monitor for now   2. Refractive error bifocal rx       RTC in 1 year or prn any changes

## 2017-05-19 NOTE — MR AVS SNAPSHOT
[facility]  [address] [facility] [address] [facility] [place] [address]  Phone: [phone]  Fax: [phone]                  Edin Green   2017 9:45 AM   Office Visit    Description:  Male : 1950   Provider:  Sina Pa MD   Department:  [facility]           Reason for Visit     Cataract           Diagnoses this Visit        Comments    Nuclear sclerosis, bilateral    -  Primary     Refractive error                To Do List           Future Appointments        Provider Department Dept Phone    2017 10:40 AM Carter Paris MD [facility] - Internal Medicine [phone]    2017 2:00 PM PULMONARY LAB, [facility]- Pulmonary Function [facility] [phone]    2017 2:20 PM Elizabeth Lejeune, NP [facility] [phone]      Goals (5 Years of Data)     None      [facility] On [facility]     [facility] Nurse Care Line - / Assistance  Unless otherwise directed by your provider, please contact [facility], our nurse care line that is available for  assistance.     Registered nurses in the [facility] Center provide: appointment scheduling, clinical advisement, health education, and other advisory services.  Call: [phone] (toll free)               Medications           Message regarding Medications     Verify the changes and/or additions to your medication regime listed below are the same as discussed with your clinician today.  If any of these changes or additions are incorrect, please notify your healthcare provider.             Verify that the below list of medications is an accurate representation of the medications you are currently taking.  If none reported, the list may be blank. If incorrect, please contact your healthcare provider. Carry this list with you in case of emergency.           Current Medications     albuterol 90 mcg/actuation inhaler Inhale 2 puffs into the lungs every 6 (six) hours as needed for Wheezing.    amlodipine  (NORVASC) 5 MG tablet TAKE ONE TABLET BY MOUTH ONCE DAILY    aspirin (ECOTRIN) 81 MG EC tablet Take 81 mg by mouth once daily.    azelastine (ASTELIN) 137 mcg (0.1 %) nasal spray 2 sprays (274 mcg total) by Nasal route 2 (two) times daily.    fluticasone-vilanterol (BREO ELLIPTA) 200-25 mcg/dose DsDv diskus inhaler Inhale 1 puff into the lungs once daily.    ibuprofen (ADVIL,MOTRIN) 200 MG tablet Take 200 mg by mouth every 6 (six) hours as needed for Pain.    losartan-hydrochlorothiazide 100-25 mg (HYZAAR) 100-25 mg per tablet TAKE ONE TABLET BY MOUTH ONCE DAILY    methimazole (TAPAZOLE) 10 MG Tab Take 20 mg by mouth.    montelukast (SINGULAIR) 10 mg tablet TAKE ONE TABLET BY MOUTH ONCE DAILY IN THE EVENING    multivitamin (THERAGRAN) per tablet Take 1 tablet by mouth.    pravastatin (PRAVACHOL) 40 MG tablet TAKE ONE TABLET BY MOUTH ONCE DAILY    sertraline (ZOLOFT) 100 MG tablet TAKE ONE TABLET BY MOUTH ONCE DAILY    tamsulosin (FLOMAX) 0.4 mg Cp24 Take 0.4 mg by mouth once daily.    triamcinolone acetonide 0.1% (KENALOG) 0.1 % ointment AAA 1-2 times daily as needed for rash    VIAGRA 100 mg tablet TAKE AS DIRECTED           Clinical Reference Information           Allergies as of 5/19/2017     Celebrex [Celecoxib]    Venom-wasp      Immunizations Administered on Date of Encounter - 5/19/2017     None      Language Assistance Services     ATTENTION: Language assistance services are available, free of charge. Please call [phone].      ATENCIÓN: Si med chacon, tiene a mathew disposición servicios gratuitos de asistencia lingüística. Llame al [phone].     TESS Ý: N?u b?n nói Ti?ng Vi?t, có các d?ch v? h? tr? ngôn ng? mi?n phí dành cho b?n. G?i s? [phone].         [facility] complies with applicable Federal civil rights laws and does not discriminate on the basis of race, color, national origin, age, disability, or sex.

## 2017-05-22 ENCOUNTER — OFFICE VISIT (OUTPATIENT)
Dept: INTERNAL MEDICINE | Facility: CLINIC | Age: 67
End: 2017-05-22
Payer: MEDICARE

## 2017-05-22 ENCOUNTER — LAB VISIT (OUTPATIENT)
Dept: LAB | Facility: HOSPITAL | Age: 67
End: 2017-05-22
Attending: FAMILY MEDICINE
Payer: MEDICARE

## 2017-05-22 VITALS
DIASTOLIC BLOOD PRESSURE: 70 MMHG | HEART RATE: 79 BPM | SYSTOLIC BLOOD PRESSURE: 128 MMHG | OXYGEN SATURATION: 98 % | HEIGHT: 75 IN | WEIGHT: 205.94 LBS | TEMPERATURE: 98 F | BODY MASS INDEX: 25.61 KG/M2

## 2017-05-22 DIAGNOSIS — Z12.5 SCREENING FOR PROSTATE CANCER: ICD-10-CM

## 2017-05-22 DIAGNOSIS — D64.9 ANEMIA, UNSPECIFIED TYPE: ICD-10-CM

## 2017-05-22 DIAGNOSIS — I10 ESSENTIAL HYPERTENSION: ICD-10-CM

## 2017-05-22 DIAGNOSIS — E05.00 GRAVES DISEASE: ICD-10-CM

## 2017-05-22 DIAGNOSIS — E05.00 GRAVES DISEASE: Primary | ICD-10-CM

## 2017-05-22 LAB
ANION GAP SERPL CALC-SCNC: 9 MMOL/L
BASOPHILS # BLD AUTO: 0.02 K/UL
BASOPHILS NFR BLD: 0.4 %
BUN SERPL-MCNC: 16 MG/DL
CALCIUM SERPL-MCNC: 9.6 MG/DL
CHLORIDE SERPL-SCNC: 103 MMOL/L
CHOLEST/HDLC SERPL: 2.5 {RATIO}
CO2 SERPL-SCNC: 27 MMOL/L
COMPLEXED PSA SERPL-MCNC: 3.2 NG/ML
CREAT SERPL-MCNC: 1 MG/DL
DIFFERENTIAL METHOD: ABNORMAL
EOSINOPHIL # BLD AUTO: 0.4 K/UL
EOSINOPHIL NFR BLD: 7.7 %
ERYTHROCYTE [DISTWIDTH] IN BLOOD BY AUTOMATED COUNT: 14.2 %
EST. GFR  (AFRICAN AMERICAN): >60 ML/MIN/1.73 M^2
EST. GFR  (NON AFRICAN AMERICAN): >60 ML/MIN/1.73 M^2
GLUCOSE SERPL-MCNC: 107 MG/DL
HCT VFR BLD AUTO: 41.2 %
HDL/CHOLESTEROL RATIO: 40.8 %
HDLC SERPL-MCNC: 130 MG/DL
HDLC SERPL-MCNC: 53 MG/DL
HGB BLD-MCNC: 13.5 G/DL
LDLC SERPL CALC-MCNC: 67.8 MG/DL
LYMPHOCYTES # BLD AUTO: 1.6 K/UL
LYMPHOCYTES NFR BLD: 33.2 %
MCH RBC QN AUTO: 26.4 PG
MCHC RBC AUTO-ENTMCNC: 32.8 %
MCV RBC AUTO: 81 FL
MONOCYTES # BLD AUTO: 0.4 K/UL
MONOCYTES NFR BLD: 8.7 %
NEUTROPHILS # BLD AUTO: 2.4 K/UL
NEUTROPHILS NFR BLD: 49.8 %
NONHDLC SERPL-MCNC: 77 MG/DL
PLATELET # BLD AUTO: 251 K/UL
PMV BLD AUTO: 10.5 FL
POTASSIUM SERPL-SCNC: 3.7 MMOL/L
RBC # BLD AUTO: 5.12 M/UL
SODIUM SERPL-SCNC: 139 MMOL/L
T4 FREE SERPL-MCNC: 1.24 NG/DL
TRIGL SERPL-MCNC: 46 MG/DL
TSH SERPL DL<=0.005 MIU/L-ACNC: <0.01 UIU/ML
WBC # BLD AUTO: 4.82 K/UL

## 2017-05-22 PROCEDURE — 99214 OFFICE O/P EST MOD 30 MIN: CPT | Mod: S$PBB,,, | Performed by: FAMILY MEDICINE

## 2017-05-22 PROCEDURE — 36415 COLL VENOUS BLD VENIPUNCTURE: CPT | Mod: PO

## 2017-05-22 PROCEDURE — 80061 LIPID PANEL: CPT

## 2017-05-22 PROCEDURE — 84153 ASSAY OF PSA TOTAL: CPT

## 2017-05-22 PROCEDURE — 84439 ASSAY OF FREE THYROXINE: CPT

## 2017-05-22 PROCEDURE — 99999 PR PBB SHADOW E&M-EST. PATIENT-LVL III: CPT | Mod: PBBFAC,,, | Performed by: FAMILY MEDICINE

## 2017-05-22 PROCEDURE — 84443 ASSAY THYROID STIM HORMONE: CPT

## 2017-05-22 PROCEDURE — 80048 BASIC METABOLIC PNL TOTAL CA: CPT

## 2017-05-22 PROCEDURE — 85025 COMPLETE CBC W/AUTO DIFF WBC: CPT

## 2017-05-22 RX ORDER — SILDENAFIL 100 MG/1
TABLET, FILM COATED ORAL
Qty: 10 TABLET | Refills: 11 | Status: SHIPPED | OUTPATIENT
Start: 2017-05-22 | End: 2020-04-30

## 2017-05-22 RX ORDER — TADALAFIL 20 MG/1
TABLET ORAL
Qty: 10 TABLET | Refills: 11 | Status: SHIPPED | OUTPATIENT
Start: 2017-05-22 | End: 2020-05-18

## 2017-05-22 NOTE — PROGRESS NOTES
Subjective:       Patient ID: Edin Green is a 66 y.o. male.    Chief Complaint: Multiple issues see below    HPIf/u chronic issues prob list below: anemia thought related to Graves. Overdue endocrin. Interested estab here  Patient Active Problem List   Diagnosis    Hypertensive retinopathy of both eyes    Refractive error - Both Eyes    Nuclear sclerosis    HTN (hypertension)    Depression    Hypogonadism in male    ED (erectile dysfunction)    LAURENCE (obstructive sleep apnea)    Hypercholesteremia    Mild intermittent asthma without complication    Allergic rhinitis    Cortical senile cataract of both eyes    Posterior vitreous detachment of both eyes    Borderline diabetes mellitus    Hyperthyroidism    Graves disease     Past Medical History:   Diagnosis Date    Benign hematuria     Cataract OU    Colon polyp     dr strauss    Depression     ED (erectile dysfunction)     Graves disease     aydee    HTN (hypertension)     Hypercholesteremia     Hypertensive retinopathy of both eyes     Hypogonadism     dr quintero    LAURENCE (obstructive sleep apnea)      History reviewed. No pertinent surgical history.  Family History   Problem Relation Age of Onset    Hypertension      Heart defect Mother     Strabismus Neg Hx     Retinal detachment Neg Hx     Macular degeneration Neg Hx     Glaucoma Neg Hx     Blindness Neg Hx     Amblyopia Neg Hx      Social History     Social History    Marital status:      Spouse name: CHUCHO    Number of children: 3    Years of education: N/A     Social History Main Topics    Smoking status: Never Smoker    Smokeless tobacco: Never Used    Alcohol use 0.6 oz/week     1 Cans of beer per week    Drug use: No    Sexual activity: Yes     Partners: Female     Other Topics Concern    None     Social History Narrative    None       Review of Systems   Respiratory: Negative for shortness of breath.    Cardiovascular: Negative for chest pain.        Objective:      Physical Exam   Constitutional: He is oriented to person, place, and time. He appears well-developed and well-nourished.   HENT:   Head: Normocephalic and atraumatic.   Neck: Normal range of motion. Neck supple. Carotid bruit is not present.   Cardiovascular: Normal rate and regular rhythm.    Pulmonary/Chest: Effort normal and breath sounds normal.   Abdominal: Soft. Bowel sounds are normal. He exhibits no distension and no mass. There is no tenderness. There is no rebound and no guarding.   Lymphadenopathy:     He has no cervical adenopathy.   Neurological: He is alert and oriented to person, place, and time.   Skin: Skin is warm and dry.   Psychiatric: He has a normal mood and affect. His behavior is normal. Judgment normal.   Nursing note and vitals reviewed.      Assessment:       1. Graves disease    2. Essential hypertension    3. Anemia, unspecified type    4. Screening for prostate cancer        Plan:     f/u per lab   *Graves disease  -     TSH; Future; Expected date: 05/22/2017  -     Ambulatory referral to Endocrinology    Essential hypertension  -     Lipid panel; Future; Expected date: 05/22/2017  -     Basic metabolic panel; Future; Expected date: 05/22/2017    Anemia, unspecified type  -     CBC auto differential; Future; Expected date: 05/22/2017    Screening for prostate cancer  -     PSA, Screening; Future; Expected date: 05/22/2017    Other orders  -     sildenafil (VIAGRA) 100 MG tablet; TAKE AS DIRECTED  Dispense: 10 tablet; Refill: 11  -     tadalafil (CIALIS) 20 MG Tab; Use one tablet every 36 hours  Dispense: 10 tablet; Refill: 11    **

## 2017-05-23 ENCOUNTER — PATIENT MESSAGE (OUTPATIENT)
Dept: INTERNAL MEDICINE | Facility: CLINIC | Age: 67
End: 2017-05-23

## 2017-05-23 DIAGNOSIS — I10 ESSENTIAL HYPERTENSION: Primary | ICD-10-CM

## 2017-05-23 DIAGNOSIS — Z12.5 SCREENING FOR PROSTATE CANCER: ICD-10-CM

## 2017-05-25 ENCOUNTER — OFFICE VISIT (OUTPATIENT)
Dept: ENDOCRINOLOGY | Facility: CLINIC | Age: 67
End: 2017-05-25
Payer: MEDICARE

## 2017-05-25 VITALS
SYSTOLIC BLOOD PRESSURE: 122 MMHG | HEIGHT: 75 IN | DIASTOLIC BLOOD PRESSURE: 60 MMHG | HEART RATE: 71 BPM | WEIGHT: 209.88 LBS | BODY MASS INDEX: 26.1 KG/M2

## 2017-05-25 DIAGNOSIS — E05.00 GRAVES DISEASE: Primary | ICD-10-CM

## 2017-05-25 PROCEDURE — 99999 PR PBB SHADOW E&M-EST. PATIENT-LVL III: CPT | Mod: PBBFAC,,, | Performed by: INTERNAL MEDICINE

## 2017-05-25 PROCEDURE — 99204 OFFICE O/P NEW MOD 45 MIN: CPT | Mod: S$PBB,,, | Performed by: INTERNAL MEDICINE

## 2017-05-25 PROCEDURE — 99213 OFFICE O/P EST LOW 20 MIN: CPT | Mod: PBBFAC,PN | Performed by: INTERNAL MEDICINE

## 2017-05-25 RX ORDER — METHIMAZOLE 10 MG/1
20 TABLET ORAL DAILY
Qty: 60 TABLET | Refills: 3 | Status: SHIPPED | OUTPATIENT
Start: 2017-05-25 | End: 2018-01-16 | Stop reason: SDUPTHER

## 2017-05-25 NOTE — PROGRESS NOTES
Subjective:       Patient ID: Edin Green is a 66 y.o. male.    Chief Complaint: Graves' Disease    HPI    Consultation was requested by Dr. Carter Paris      Diagnosed: 1/2017- had weight loss and sweats    Previous radiology tests:  Thyroid ultrasound : No   NM uptake and scan: Yes - 1/2017 markedly increased uptake    Previous thyroid surgery: No      Thyroid symptoms:fatigue      Thyroid medications: methimazole 20mg      Takes medication appropriately: Yes    Review of Systems   Constitutional: Positive for diaphoresis and fatigue. Negative for appetite change, fever and unexpected weight change.   HENT: Negative for sore throat and trouble swallowing.    Eyes: Negative for visual disturbance.   Respiratory: Negative for shortness of breath and wheezing.    Cardiovascular: Negative for chest pain, palpitations and leg swelling.   Gastrointestinal: Positive for diarrhea. Negative for abdominal pain, nausea and vomiting.   Endocrine: Negative for cold intolerance, heat intolerance, polydipsia, polyphagia and polyuria.   Genitourinary: Negative for difficulty urinating and dysuria.   Musculoskeletal: Negative for arthralgias and joint swelling.   Skin: Negative for color change and rash.   Neurological: Negative for dizziness, tremors, numbness and headaches.   Hematological: Negative for adenopathy.   Psychiatric/Behavioral: Positive for sleep disturbance. Negative for confusion and dysphoric mood. The patient is not nervous/anxious.        Objective:      Physical Exam   Constitutional: He is oriented to person, place, and time. He appears well-developed and well-nourished. No distress.   HENT:   Head: Normocephalic and atraumatic.   Mouth/Throat: No oropharyngeal exudate.   Eyes: Conjunctivae and EOM are normal. Pupils are equal, round, and reactive to light. No scleral icterus.   + stare and lid lag   Neck: No tracheal deviation present. No thyromegaly present.   Cardiovascular: Normal rate, regular  rhythm, normal heart sounds and intact distal pulses.  Exam reveals no gallop and no friction rub.    No murmur heard.  Pulmonary/Chest: Effort normal and breath sounds normal. No respiratory distress. He has no wheezes. He has no rales.   Abdominal: Soft. Bowel sounds are normal. He exhibits no distension and no mass. There is no tenderness. There is no rebound and no guarding. No hernia.   Musculoskeletal: He exhibits no edema or deformity.   Lymphadenopathy:     He has no cervical adenopathy.   Neurological: He is alert and oriented to person, place, and time. He has normal reflexes. No cranial nerve deficit.   Skin: Skin is warm and dry. No rash noted. He is not diaphoretic. No erythema.   Psychiatric: He has a normal mood and affect. His behavior is normal.   Vitals reviewed.        Lab Review:   Lab Results   Component Value Date    TSH <0.010 (L) 05/22/2017    TSH <0.010 (L) 01/18/2017    TSH 0.943 03/24/2016     Lab Results   Component Value Date    FREET4 1.24 05/22/2017    FREET4 2.59 (H) 01/18/2017     No components found for: FREET3      Assessment:     1. Graves disease      Discussed pathophysiology, signs and symptoms, complications and management of disease.  I reviewed treatment options for hyperthyroidism including antithyroid medications(ATD), or radioactive iodine(I-131) treatment or surgery(surgery isusually reserved for severe disease or allergy to ATD or if reason not to get I 131).     Discussed risks of worsening Graves orpitopathy with I131 especially in smokers and association of smoking with Grave's disease    Discussed the chances of Graves remission after prolonged use(12 to 18 months or more) of ATD therapy(50% remission if thyroid antibodies are normal )    Counseled patient that if on ATD therapy should call if fever, sore throat, rash, anorexia, nausea or vomiitng.  Reviewed side effects of ATDs are rare (agranulocytosis and liver failure) but can be very serious.     Discussed  complications of radioactive iodine including nausea, local pain and thyroid storm(rare) and need for short term contact precautions      Pt would like to continue oral medications    Labs improving  Plan:   Graves disease    Other orders  -     methimazole (TAPAZOLE) 10 MG Tab; Take 2 tablets (20 mg total) by mouth once daily.  Dispense: 60 tablet; Refill: 3        Return in about 6 weeks (around 7/6/2017).  This note is unavailable for viewing online. Certain specialties, including Behavioral Health and Pain Management, are currently not included in the open progress note program. For notes unavailable online, you can request a copy of your medical record.         Thank you to Dr. Carter Paris for this consultation

## 2017-05-25 NOTE — LETTER
May 25, 2017      Carter Paris MD  [address]  [place] [address]           [facility]  [address]  [address]  [place] [address]  Phone: [phone]  Fax: [phone]          Patient: Edin Green   MR Number: [id number]   YOB: 1950   Date of Visit: 5/25/2017       Dear Dr. Carter Paris:    Thank you for referring Edin Green to me for evaluation. Attached you will find relevant portions of my assessment and plan of care.    If you have questions, please do not hesitate to call me. I look forward to following Edin Green along with you.    Sincerely,    Gabi Harmon MD    Enclosure  CC:  No Recipients    If you would like to receive this communication electronically, please contact [email or web address] or [phone] to request more information on [facility] access.    For providers and/or their staff who would like to refer a patient to [facility], please contact us through our one-stop-shop provider referral line, [facility], at [phone].    If you feel you have received this communication in error or would no longer like to receive these types of communications, please e-mail [email or web address]

## 2017-07-21 ENCOUNTER — PATIENT MESSAGE (OUTPATIENT)
Dept: INTERNAL MEDICINE | Facility: CLINIC | Age: 67
End: 2017-07-21

## 2017-07-21 DIAGNOSIS — R05.9 COUGH: Primary | ICD-10-CM

## 2017-07-21 DIAGNOSIS — J06.9 VIRAL URI WITH COUGH: ICD-10-CM

## 2017-07-21 RX ORDER — BENZONATATE 100 MG/1
100 CAPSULE ORAL 3 TIMES DAILY PRN
Qty: 30 CAPSULE | Refills: 0 | Status: SHIPPED | OUTPATIENT
Start: 2017-07-21 | End: 2017-07-31

## 2017-07-21 NOTE — PROGRESS NOTES
Subjective:      Patient ID: Edin Green is a 66 y.o. male.    Chief Complaint: No chief complaint on file.    HPI  Review of Systems  Objective:   There were no vitals taken for this visit.    Physical Exam    Assessment:     1. Viral URI with cough      Plan:   Viral URI with cough        Lab Frequency Next Occurrence   Lipid panel Once 05/14/2017   PSA, Screening Once 05/14/2017   Basic metabolic panel Once 05/14/2017   Hemoglobin A1c Once 05/14/2017   NM Thyroid Scan Only Once 01/19/2017   CBC auto differential Once 03/26/2017   Lipid panel Once 05/23/2018   PSA, Screening Once 05/23/2018   Basic metabolic panel Once 05/23/2018         No Follow-up on file.

## 2017-09-21 ENCOUNTER — OFFICE VISIT (OUTPATIENT)
Dept: PULMONOLOGY | Facility: CLINIC | Age: 67
End: 2017-09-21
Payer: MEDICARE

## 2017-09-21 ENCOUNTER — PROCEDURE VISIT (OUTPATIENT)
Dept: PULMONOLOGY | Facility: CLINIC | Age: 67
End: 2017-09-21
Payer: MEDICARE

## 2017-09-21 VITALS
SYSTOLIC BLOOD PRESSURE: 130 MMHG | WEIGHT: 221 LBS | RESPIRATION RATE: 20 BRPM | BODY MASS INDEX: 26.91 KG/M2 | HEART RATE: 68 BPM | DIASTOLIC BLOOD PRESSURE: 80 MMHG | OXYGEN SATURATION: 98 % | HEIGHT: 76 IN

## 2017-09-21 DIAGNOSIS — G47.33 OSA (OBSTRUCTIVE SLEEP APNEA): ICD-10-CM

## 2017-09-21 DIAGNOSIS — J45.909 UNCOMPLICATED ASTHMA, UNSPECIFIED ASTHMA SEVERITY: ICD-10-CM

## 2017-09-21 DIAGNOSIS — J45.20 MILD INTERMITTENT ASTHMA WITHOUT COMPLICATION: Primary | ICD-10-CM

## 2017-09-21 LAB
POST FEF 25 75: 1.45 L/S (ref 2.01–4)
POST FET 100: 9.94 S
POST FEV1 FVC: 68 %
POST FEV1: 2.45 L (ref 2.99–3.92)
POST FIF 50: 5.05 L/S
POST FVC: 3.58 L (ref 3.99–5.04)
POST PEF: 6.83 L/S (ref 7.67–10.63)
PRE FEF 25 75: 1.08 L/S (ref 2.01–4)
PRE FET 100: 11.67 S
PRE FEV1 FVC: 64 %
PRE FEV1: 2.34 L (ref 2.99–3.92)
PRE FIF 50: 3.52 L/S
PRE FVC: 3.65 L (ref 3.99–5.04)
PRE PEF: 7.44 L/S (ref 7.67–10.63)
PREDICTED FEV1 FVC: 76.99 % (ref 71.78–82.2)
PREDICTED FEV1: 3.46 L (ref 2.99–3.92)
PREDICTED FVC: 4.51 L (ref 3.99–5.04)
PROVOCATION PROTOCOL: ABNORMAL

## 2017-09-21 PROCEDURE — 3079F DIAST BP 80-89 MM HG: CPT | Mod: ,,, | Performed by: NURSE PRACTITIONER

## 2017-09-21 PROCEDURE — 99214 OFFICE O/P EST MOD 30 MIN: CPT | Mod: PBBFAC,PO | Performed by: NURSE PRACTITIONER

## 2017-09-21 PROCEDURE — 99214 OFFICE O/P EST MOD 30 MIN: CPT | Mod: 25,S$PBB,, | Performed by: NURSE PRACTITIONER

## 2017-09-21 PROCEDURE — 3075F SYST BP GE 130 - 139MM HG: CPT | Mod: ,,, | Performed by: NURSE PRACTITIONER

## 2017-09-21 PROCEDURE — 99999 PR PBB SHADOW E&M-EST. PATIENT-LVL IV: CPT | Mod: PBBFAC,,, | Performed by: NURSE PRACTITIONER

## 2017-09-21 PROCEDURE — 94060 EVALUATION OF WHEEZING: CPT | Mod: 26,S$PBB,, | Performed by: INTERNAL MEDICINE

## 2017-09-21 PROCEDURE — 1159F MED LIST DOCD IN RCRD: CPT | Mod: ,,, | Performed by: NURSE PRACTITIONER

## 2017-09-21 PROCEDURE — 94060 EVALUATION OF WHEEZING: CPT | Mod: PBBFAC,PO | Performed by: GENERAL PRACTICE

## 2017-09-21 RX ORDER — ALBUTEROL SULFATE 0.83 MG/ML
2.5 SOLUTION RESPIRATORY (INHALATION) EVERY 4 HOURS PRN
Qty: 2 BOX | Refills: 6 | Status: SHIPPED | OUTPATIENT
Start: 2017-09-21 | End: 2018-04-30 | Stop reason: SDUPTHER

## 2017-09-21 NOTE — PROGRESS NOTES
"Subjective:      Patient ID: Edin Green is a 66 y.o. male.    Chief Complaint: Asthma        Patient resists to the office today for evaluation of asthma and sleep apnea.  He is not wearing CPAP.  He is off BREO and uses albuterol less than twice a week.  He is requesting nebulizer to use as needed.  No fever, chills, hemoptysis.  [place] symptoms [place]  Patient Active Problem List:     Hypertensive retinopathy of both eyes     Refractive error - Both Eyes     Nuclear sclerosis     HTN (hypertension)     Depression     Hypogonadism in male     ED (erectile dysfunction)     LAURENCE (obstructive sleep apnea)     Hypercholesteremia     Mild intermittent asthma without complication     Allergic rhinitis     Cortical senile cataract of both eyes     Posterior vitreous detachment of both eyes     Borderline diabetes mellitus     Hyperthyroidism     Graves disease            /80 (BP Location: Right arm, Patient Position: Sitting)   Pulse 68   Resp 20   Ht 6' 3.6" (1.92 m)   Wt 100.2 kg (221 lb)   SpO2 98%   BMI 27.19 kg/m²   Body mass index is 27.19 kg/m².    Review of Systems   Constitutional: Negative for chills, activity change and fatigue.   HENT: Negative for postnasal drip and congestion.    Respiratory:        See HPI   Cardiovascular: Negative for chest pain and leg swelling.   Gastrointestinal: Negative for nausea, vomiting and abdominal distention.   Neurological: Negative for dizziness.   Hematological: Negative for adenopathy.   Psychiatric/Behavioral: Negative for confusion. The patient is not nervous/anxious.      Objective:      Physical Exam   Constitutional: He is oriented to person, place, and time. He appears well-developed and well-nourished.   HENT:   Head: Normocephalic and atraumatic.   Nose: Nose normal.   Mouth/Throat: Uvula is midline and oropharynx is clear and moist.   Neck: Trachea normal and normal range of motion. Neck supple. No thyroid mass " and no thyromegaly present.   Cardiovascular: Normal rate, regular rhythm and normal heart sounds.    Pulmonary/Chest: Effort normal and breath sounds normal. He has no wheezes. He has no rhonchi. He has no rales. Chest wall is not dull to percussion.   Abdominal: Soft. He exhibits no mass. There is no hepatosplenomegaly or splenomegaly. There is no tenderness.   Musculoskeletal: Normal range of motion. He exhibits no edema.   Neurological: He is alert and oriented to person, place, and time.   Skin: Skin is warm and dry.   Psychiatric: He has a normal mood and affect.     Personal Diagnostic Review  Spirometry stable    Assessment:       1. Mild intermittent asthma without complication    2. LAURENCE (obstructive sleep apnea)        Outpatient Encounter Prescriptions as of 9/21/2017   Medication Sig Dispense Refill    albuterol 90 mcg/actuation inhaler Inhale 2 puffs into the lungs every 6 (six) hours as needed for Wheezing. 1 Inhaler 0    amlodipine (NORVASC) 5 MG tablet TAKE ONE TABLET BY MOUTH ONCE DAILY 30 tablet 11    aspirin (ECOTRIN) 81 MG EC tablet Take 81 mg by mouth once daily.      ibuprofen (ADVIL,MOTRIN) 200 MG tablet Take 200 mg by mouth every 6 (six) hours as needed for Pain.      losartan-hydrochlorothiazide 100-25 mg (HYZAAR) 100-25 mg per tablet TAKE ONE TABLET BY MOUTH ONCE DAILY 30 tablet 11    methimazole (TAPAZOLE) 10 MG Tab Take 2 tablets (20 mg total) by mouth once daily. 60 tablet 3    montelukast (SINGULAIR) 10 mg tablet TAKE ONE TABLET BY MOUTH ONCE DAILY IN THE EVENING 30 tablet 11    pravastatin (PRAVACHOL) 40 MG tablet TAKE ONE TABLET BY MOUTH ONCE DAILY 30 tablet 11    sertraline (ZOLOFT) 100 MG tablet TAKE ONE TABLET BY MOUTH ONCE DAILY 30 tablet 11    sildenafil (VIAGRA) 100 MG tablet TAKE AS DIRECTED 10 tablet 11    tadalafil (CIALIS) 20 MG Tab Use one tablet every 36 hours 10 tablet 11    tamsulosin (FLOMAX) 0.4 mg Cp24 Take 0.4 mg by mouth once daily.      triamcinolone  "acetonide 0.1% (KENALOG) 0.1 % ointment AAA 1-2 times daily as needed for rash 80 g 1    albuterol (PROVENTIL) 2.5 mg /3 mL (0.083 %) nebulizer solution Take 3 mLs (2.5 mg total) by nebulization every 4 (four) hours as needed for Wheezing. 2 Box 6    azelastine (ASTELIN) 137 mcg (0.1 %) nasal spray 2 sprays (274 mcg total) by Nasal route 2 (two) times daily. 30 mL 11    fluticasone-vilanterol (BREO ELLIPTA) 200-25 mcg/dose DsDv diskus inhaler Inhale 1 puff into the lungs once daily. 1 each 11    multivitamin (THERAGRAN) per tablet Take 1 tablet by mouth.       No facility-administered encounter medications on file as of 9/21/2017.      Orders Placed This Encounter   Procedures    NEBULIZER FOR HOME USE     J45.20     Order Specific Question:   Height:     Answer:   6' 3.6" (1.92 m)     Order Specific Question:   Weight:     Answer:   100.2 kg (221 lb)     Order Specific Question:   Length of need (1-99 months):     Answer:   99    X-Ray Chest PA And Lateral     Standing Status:   Future     Standing Expiration Date:   9/21/2018    Spirometry with/without bronchodilator     Standing Status:   Future     Standing Expiration Date:   9/21/2018     Plan:   Nebulizer for home use.  Albuterol as needed.  Follow-up in one year with chest x-ray and spirometry or call earlier if any problems.     "

## 2017-09-26 ENCOUNTER — TELEPHONE (OUTPATIENT)
Dept: PULMONOLOGY | Facility: CLINIC | Age: 67
End: 2017-09-26

## 2017-09-29 DIAGNOSIS — J32.9 CHRONIC SINUSITIS WITH RECURRENT BRONCHITIS: ICD-10-CM

## 2017-09-29 DIAGNOSIS — J40 CHRONIC SINUSITIS WITH RECURRENT BRONCHITIS: ICD-10-CM

## 2017-09-29 NOTE — TELEPHONE ENCOUNTER
----- Message from Collette Villalta sent at 9/29/2017  3:06 PM CDT -----  Contact: patient  Calling to get refill oF Albuterol inhaler. Please call patient ASAP @ [phone]. Thanks, vivian Roberts [place] [id number] [address] [address]  Phone: [phone] Fax: [phone]

## 2017-10-02 RX ORDER — ALBUTEROL SULFATE 90 UG/1
2 AEROSOL, METERED RESPIRATORY (INHALATION) EVERY 6 HOURS PRN
Qty: 1 INHALER | Refills: 0 | Status: SHIPPED | OUTPATIENT
Start: 2017-10-02 | End: 2018-04-30 | Stop reason: SDUPTHER

## 2017-11-01 DIAGNOSIS — E78.00 HYPERCHOLESTEREMIA: ICD-10-CM

## 2017-11-02 RX ORDER — AMLODIPINE BESYLATE 5 MG/1
5 TABLET ORAL DAILY
Qty: 30 TABLET | Refills: 11 | Status: SHIPPED | OUTPATIENT
Start: 2017-11-02 | End: 2018-11-13 | Stop reason: SDUPTHER

## 2017-11-02 RX ORDER — LOSARTAN POTASSIUM AND HYDROCHLOROTHIAZIDE 25; 100 MG/1; MG/1
TABLET ORAL
Qty: 30 TABLET | Refills: 11 | Status: SHIPPED | OUTPATIENT
Start: 2017-11-02 | End: 2018-11-13 | Stop reason: SDUPTHER

## 2017-11-02 RX ORDER — PRAVASTATIN SODIUM 40 MG/1
40 TABLET ORAL DAILY
Qty: 30 TABLET | Refills: 11 | Status: SHIPPED | OUTPATIENT
Start: 2017-11-02 | End: 2018-11-13 | Stop reason: SDUPTHER

## 2017-11-27 ENCOUNTER — HOSPITAL ENCOUNTER (OUTPATIENT)
Dept: RADIOLOGY | Facility: HOSPITAL | Age: 67
Discharge: HOME | End: 2017-11-27
Attending: FAMILY MEDICINE
Payer: MEDICARE

## 2017-11-27 ENCOUNTER — OFFICE VISIT (OUTPATIENT)
Dept: INTERNAL MEDICINE | Facility: CLINIC | Age: 67
End: 2017-11-27
Payer: MEDICARE

## 2017-11-27 VITALS
BODY MASS INDEX: 28.15 KG/M2 | HEART RATE: 71 BPM | DIASTOLIC BLOOD PRESSURE: 74 MMHG | OXYGEN SATURATION: 98 % | WEIGHT: 226.44 LBS | HEIGHT: 75 IN | TEMPERATURE: 98 F | SYSTOLIC BLOOD PRESSURE: 166 MMHG

## 2017-11-27 DIAGNOSIS — M25.561 RECURRENT PAIN OF RIGHT KNEE: ICD-10-CM

## 2017-11-27 DIAGNOSIS — M54.9 BACK PAIN, UNSPECIFIED BACK LOCATION, UNSPECIFIED BACK PAIN LATERALITY, UNSPECIFIED CHRONICITY: ICD-10-CM

## 2017-11-27 DIAGNOSIS — M54.2 NECK PAIN: ICD-10-CM

## 2017-11-27 DIAGNOSIS — M25.561 RECURRENT PAIN OF RIGHT KNEE: Primary | ICD-10-CM

## 2017-11-27 PROCEDURE — 99214 OFFICE O/P EST MOD 30 MIN: CPT | Mod: S$PBB,,, | Performed by: FAMILY MEDICINE

## 2017-11-27 PROCEDURE — 73562 X-RAY EXAM OF KNEE 3: CPT | Mod: 26,RT,, | Performed by: RADIOLOGY

## 2017-11-27 PROCEDURE — G0008 ADMIN INFLUENZA VIRUS VAC: HCPCS | Mod: PBBFAC,PO

## 2017-11-27 PROCEDURE — 73560 X-RAY EXAM OF KNEE 1 OR 2: CPT | Mod: TC,PO,LT

## 2017-11-27 PROCEDURE — 99999 PR PBB SHADOW E&M-EST. PATIENT-LVL III: CPT | Mod: PBBFAC,,, | Performed by: FAMILY MEDICINE

## 2017-11-27 PROCEDURE — 99213 OFFICE O/P EST LOW 20 MIN: CPT | Mod: PBBFAC,25,PO | Performed by: FAMILY MEDICINE

## 2017-11-27 PROCEDURE — 73560 X-RAY EXAM OF KNEE 1 OR 2: CPT | Mod: 26,59,LT, | Performed by: RADIOLOGY

## 2017-11-27 RX ORDER — CYCLOBENZAPRINE HCL 10 MG
10 TABLET ORAL 3 TIMES DAILY
COMMUNITY
Start: 2017-11-16 | End: 2018-04-30
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
## 2017-11-29 ENCOUNTER — LAB VISIT (OUTPATIENT)
Dept: LAB | Facility: HOSPITAL | Age: 67
End: 2017-11-29
Attending: INTERNAL MEDICINE
Payer: MEDICARE

## 2017-11-29 ENCOUNTER — OFFICE VISIT (OUTPATIENT)
Dept: ENDOCRINOLOGY | Facility: CLINIC | Age: 67
End: 2017-11-29
Payer: MEDICARE

## 2017-11-29 VITALS
HEIGHT: 75 IN | SYSTOLIC BLOOD PRESSURE: 170 MMHG | BODY MASS INDEX: 28.01 KG/M2 | HEART RATE: 88 BPM | WEIGHT: 225.31 LBS | TEMPERATURE: 97 F | DIASTOLIC BLOOD PRESSURE: 84 MMHG

## 2017-11-29 DIAGNOSIS — E05.90 HYPERTHYROIDISM: ICD-10-CM

## 2017-11-29 DIAGNOSIS — E05.90 HYPERTHYROIDISM: Primary | ICD-10-CM

## 2017-11-29 LAB
ALBUMIN SERPL BCP-MCNC: 3.3 G/DL
ALP SERPL-CCNC: 130 U/L
ALT SERPL W/O P-5'-P-CCNC: 14 U/L
AST SERPL-CCNC: 19 U/L
BASOPHILS # BLD AUTO: 0.04 K/UL
BASOPHILS NFR BLD: 0.6 %
BILIRUB DIRECT SERPL-MCNC: 0.2 MG/DL
BILIRUB SERPL-MCNC: 0.4 MG/DL
DIFFERENTIAL METHOD: ABNORMAL
EOSINOPHIL # BLD AUTO: 0.4 K/UL
EOSINOPHIL NFR BLD: 6.7 %
ERYTHROCYTE [DISTWIDTH] IN BLOOD BY AUTOMATED COUNT: 13.6 %
HCT VFR BLD AUTO: 35.9 %
HGB BLD-MCNC: 11.7 G/DL
IMM GRANULOCYTES # BLD AUTO: 0.02 K/UL
IMM GRANULOCYTES NFR BLD AUTO: 0.3 %
LYMPHOCYTES # BLD AUTO: 1.8 K/UL
LYMPHOCYTES NFR BLD: 28.9 %
MCH RBC QN AUTO: 27.7 PG
MCHC RBC AUTO-ENTMCNC: 32.6 G/DL
MCV RBC AUTO: 85 FL
MONOCYTES # BLD AUTO: 0.5 K/UL
MONOCYTES NFR BLD: 8.8 %
NEUTROPHILS # BLD AUTO: 3.4 K/UL
NEUTROPHILS NFR BLD: 54.7 %
NRBC BLD-RTO: 0 /100 WBC
PLATELET # BLD AUTO: 296 K/UL
PMV BLD AUTO: 10.5 FL
PROT SERPL-MCNC: 8.4 G/DL
RBC # BLD AUTO: 4.23 M/UL
T3FREE SERPL-MCNC: 1.8 PG/ML
T4 FREE SERPL-MCNC: 0.64 NG/DL
THYROGLOB AB SERPL IA-ACNC: <4 IU/ML
THYROPEROXIDASE IGG SERPL-ACNC: <6 IU/ML
TSH SERPL DL<=0.005 MIU/L-ACNC: 14.11 UIU/ML
WBC # BLD AUTO: 6.16 K/UL

## 2017-11-29 PROCEDURE — 99213 OFFICE O/P EST LOW 20 MIN: CPT | Mod: PBBFAC,PO | Performed by: INTERNAL MEDICINE

## 2017-11-29 PROCEDURE — 99213 OFFICE O/P EST LOW 20 MIN: CPT | Mod: S$PBB,,, | Performed by: INTERNAL MEDICINE

## 2017-11-29 PROCEDURE — 84439 ASSAY OF FREE THYROXINE: CPT

## 2017-11-29 PROCEDURE — 84445 ASSAY OF TSI GLOBULIN: CPT

## 2017-11-29 PROCEDURE — 84481 FREE ASSAY (FT-3): CPT

## 2017-11-29 PROCEDURE — 86800 THYROGLOBULIN ANTIBODY: CPT

## 2017-11-29 PROCEDURE — 85025 COMPLETE CBC W/AUTO DIFF WBC: CPT

## 2017-11-29 PROCEDURE — 36415 COLL VENOUS BLD VENIPUNCTURE: CPT | Mod: PO

## 2017-11-29 PROCEDURE — 99999 PR PBB SHADOW E&M-EST. PATIENT-LVL III: CPT | Mod: PBBFAC,,, | Performed by: INTERNAL MEDICINE

## 2017-11-29 PROCEDURE — 80076 HEPATIC FUNCTION PANEL: CPT

## 2017-11-29 PROCEDURE — 86376 MICROSOMAL ANTIBODY EACH: CPT

## 2017-11-29 PROCEDURE — 84443 ASSAY THYROID STIM HORMONE: CPT
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
## 2017-11-30 ENCOUNTER — PATIENT MESSAGE (OUTPATIENT)
Dept: ENDOCRINOLOGY | Facility: CLINIC | Age: 67
End: 2017-11-30

## 2017-11-30 DIAGNOSIS — E05.90 HYPERTHYROIDISM: Primary | ICD-10-CM

## 2017-12-01 LAB — TSI SER-ACNC: 1.39 IU/L

## 2017-12-04 ENCOUNTER — TELEPHONE (OUTPATIENT)
Dept: ENDOCRINOLOGY | Facility: CLINIC | Age: 67
End: 2017-12-04

## 2017-12-04 ENCOUNTER — PATIENT MESSAGE (OUTPATIENT)
Dept: ENDOCRINOLOGY | Facility: CLINIC | Age: 67
End: 2017-12-04

## 2017-12-09 ENCOUNTER — OFFICE VISIT (OUTPATIENT)
Dept: URGENT CARE | Facility: CLINIC | Age: 67
End: 2017-12-09
Payer: MEDICARE

## 2017-12-09 VITALS
HEIGHT: 75 IN | WEIGHT: 224 LBS | TEMPERATURE: 99 F | DIASTOLIC BLOOD PRESSURE: 84 MMHG | HEART RATE: 78 BPM | OXYGEN SATURATION: 98 % | BODY MASS INDEX: 27.85 KG/M2 | SYSTOLIC BLOOD PRESSURE: 120 MMHG

## 2017-12-09 DIAGNOSIS — J06.9 VIRAL URI WITH COUGH: ICD-10-CM

## 2017-12-09 DIAGNOSIS — J45.21 MILD INTERMITTENT ASTHMA WITH EXACERBATION: Primary | ICD-10-CM

## 2017-12-09 PROCEDURE — 99999 PR PBB SHADOW E&M-EST. PATIENT-LVL III: CPT | Mod: PBBFAC,,, | Performed by: PHYSICIAN ASSISTANT

## 2017-12-09 PROCEDURE — 99214 OFFICE O/P EST MOD 30 MIN: CPT | Mod: S$PBB,,, | Performed by: PHYSICIAN ASSISTANT

## 2017-12-09 PROCEDURE — 99213 OFFICE O/P EST LOW 20 MIN: CPT | Mod: PBBFAC,PO | Performed by: PHYSICIAN ASSISTANT

## 2017-12-09 RX ORDER — BENZONATATE 200 MG/1
200 CAPSULE ORAL 3 TIMES DAILY PRN
Qty: 30 CAPSULE | Refills: 0 | Status: SHIPPED | OUTPATIENT
Start: 2017-12-09 | End: 2019-07-15

## 2017-12-09 RX ORDER — AZITHROMYCIN 250 MG/1
250 TABLET, FILM COATED ORAL DAILY
Qty: 6 TABLET | Refills: 0 | Status: SHIPPED | OUTPATIENT
Start: 2017-12-09 | End: 2018-04-30

## 2017-12-09 RX ORDER — METHYLPREDNISOLONE 4 MG/1
TABLET ORAL
Qty: 1 PACKAGE | Refills: 0 | Status: SHIPPED | OUTPATIENT
Start: 2017-12-09 | End: 2018-04-30 | Stop reason: SDUPTHER

## 2017-12-09 RX ORDER — GUAIFENESIN 1200 MG/1
1 TABLET, EXTENDED RELEASE ORAL 2 TIMES DAILY
COMMUNITY
Start: 2017-12-09 | End: 2017-12-19

## 2017-12-09 RX ORDER — PROMETHAZINE HYDROCHLORIDE AND DEXTROMETHORPHAN HYDROBROMIDE 6.25; 15 MG/5ML; MG/5ML
5 SYRUP ORAL EVERY 6 HOURS PRN
Qty: 120 ML | Refills: 0 | Status: SHIPPED | OUTPATIENT
Start: 2017-12-09 | End: 2019-07-15

## 2017-12-09 NOTE — PATIENT INSTRUCTIONS
"  Asthma (Adult)  Asthma is a disease where the medium and  small air passages within the lung go into spasm and restrict the flow of air. Inflammation and swelling of the airways cause further restriction. During an acute asthma attack, these factors cause difficulty breathing, wheezing, cough and chest tightness.    An asthma attack can be triggered by many things. Common triggers include infections such as the common cold, bronchitis, pneumonia. Irritants such as smoke or pollutants in the air, emotional upset, and exercise can also trigger an attack. In many adults with asthma, allergies to dust, mold, pollen and animal dander can cause an asthma attack. Skipping doses of daily asthma medicine can also bring on an asthma attack.  Asthma can be controlled using the proper medicines prescribed by your healthcare provider and avoiding exposure to known triggers including allergens and irritants.  Home care  · Take prescribed medicine exactly at the times advised. If you need medicine such as from a hand held inhaler or aerosol breathing machine more than every 4 hours, contact your healthcare provider or seek immediate medical attention. If prescribed an antibiotic or prednisone, take all of the medicine as prescribed, even if you are feeling better after a few days.  · Do not smoke. Avoid being exposed to the smoke of others.  · Some people with asthma have worsening of their symptoms when they take aspirin and non-steroidal or fever-reducing medicines like ibuprofen and naproxen. Talk to your healthcare provider if you think this may apply to you.  Follow-up care  Follow up with your healthcare provider, or as advised. Always bring all of your current medicines to any appointments with your healthcare provider. Also bring a complete list of medications even those not taken for asthma. If you do not already have one, talk to your healthcare provider about developing a personalized "Asthma Action Plan."  A " pneumococcal (pneumonia) vaccine and yearly flu shot (every fall) are recommended. Ask your doctor about this.  When to seek medical advice  Call your healthcare provider right away if any of these occur:   · Increased wheezing or shortness of breath  · Need to use your inhalers more often than usual without relief  · Fever of 100.4ºF (38ºC) or higher, or as directed by your healthcare provider  · Coughing up lots of dark-colored or bloody sputum (mucus)  · Chest pain with each breath  · If you use a peak flow meter as part of an Asthma Action Plan, and you are still in the yellow zone (50% to 80%) 15 minutes after using inhaler medicine.  Call 911  Call 911 if any of the following occur  · Trouble walking or talking because of shortness of breath  · If you use a peak flow meter as part of an Asthma Action Plan and you are still in the red zone (less than 50%) 15 minutes after using inhaler medicine  · Lips or fingernails turning gray or blue  Date Last Reviewed: 12/2/2015  © 8922-7012 [employer]. [address] [place] [address]. All rights reserved. This information is not intended as a substitute for professional medical care. Always follow your healthcare professional's instructions.        Rest  Drink plenty of clear fluids--at least 64 ounces of water/juice  Normal saline nasal wash (example Ocean spray) to irrigate sinuses and for congestion/runny nose  Flonase or Nasonex to decrease inflammation  Tylenol or Ibuprofen for fever, headache and body aches  Mucinex to help thin secretions and reduce congestion  Tessalon pearls to help with cough during daytime  Promethazine-D to help with cough at night  Cool mist humidifier/vaporizer  Warm salt water gargles for throat comfort  Chloraseptic spray or lozenges for throat comfort  Warm tea with honey  Practice good handwashing      The cough associated with bronchitis can last for a long time, even as long as 2 weeks. However please see your  PCP or go to ER if symptoms worsen, you develop fever, or have worsening shortness of breath.

## 2017-12-09 NOTE — PROGRESS NOTES
"Subjective:       Patient ID: Edin Green is a 66 y.o. male.    Chief Complaint: Cough    Cough   This is a new problem. The current episode started in the past 7 days (5 days). The problem has been gradually worsening. The cough is productive of sputum. Associated symptoms include chills, a fever (subjective), myalgias, nasal congestion, postnasal drip, rhinorrhea, shortness of breath and wheezing. Pertinent negatives include no chest pain, ear congestion, ear pain, headaches, rash or sore throat. Nothing aggravates the symptoms. Treatments tried: chloraseptic spray. The treatment provided no relief. His past medical history is significant for asthma (mild, well controlled).     Review of Systems   Constitutional: Positive for chills, fatigue and fever (subjective).   HENT: Positive for congestion, postnasal drip, rhinorrhea and sneezing. Negative for ear discharge, ear pain, sinus pressure and sore throat.    Eyes: Negative for pain and discharge.   Respiratory: Positive for cough, shortness of breath and wheezing. Negative for chest tightness.    Cardiovascular: Negative for chest pain and leg swelling.   Gastrointestinal: Negative for abdominal pain, nausea and vomiting.   Musculoskeletal: Positive for myalgias.   Skin: Negative for rash.   Neurological: Negative for headaches.       Objective:      /84   Pulse 78   Temp 98.6 °F (37 °C)   Ht 6' 3" (1.905 m)   Wt 101.6 kg (223 lb 15.8 oz)   SpO2 98%   BMI 28.00 kg/m²   Physical Exam   Constitutional: He is oriented to person, place, and time. He appears well-developed and well-nourished. No distress.   HENT:   Head: Normocephalic and atraumatic.   Right Ear: Tympanic membrane, external ear and ear canal normal.   Left Ear: Tympanic membrane, external ear and ear canal normal.   Nose: Nose normal. Right sinus exhibits no maxillary sinus tenderness and no frontal sinus tenderness. Left sinus exhibits no maxillary sinus tenderness and no frontal " sinus tenderness.   Mouth/Throat: Oropharynx is clear and moist. No oropharyngeal exudate or posterior oropharyngeal erythema. No tonsillar exudate.   Eyes: Conjunctivae and EOM are normal. Pupils are equal, round, and reactive to light.   Neck: Normal range of motion. Neck supple.   Cardiovascular: Normal rate, regular rhythm, normal heart sounds and intact distal pulses.  Exam reveals no gallop and no friction rub.    No murmur heard.  Pulmonary/Chest: Effort normal and breath sounds normal. No stridor. No respiratory distress. He has no wheezes. He has no rales. He exhibits no tenderness.   Few end expiratory wheezes   Lymphadenopathy:     He has no cervical adenopathy.   Neurological: He is alert and oriented to person, place, and time.   Skin: Skin is warm and dry. No rash noted. He is not diaphoretic.   Nursing note and vitals reviewed.      Assessment:       1. Mild intermittent asthma with exacerbation    2. Viral URI with cough        Plan:       Mild intermittent asthma with exacerbation  -     methylPREDNISolone (MEDROL DOSEPACK) 4 mg tablet; use as directed  Dispense: 1 Package; Refill: 0  -     azithromycin (ZITHROMAX Z-JULIA) 250 MG tablet; Take 1 tablet (250 mg total) by mouth once daily. Take pack as directed. Take 2 tablets on day 1 then 1 tablet daily until complete  Dispense: 6 tablet; Refill: 0    Viral URI with cough  -     benzonatate (TESSALON) 200 MG capsule; Take 1 capsule (200 mg total) by mouth 3 (three) times daily as needed for Cough.  Dispense: 30 capsule; Refill: 0  -     guaiFENesin 1,200 mg Ta12; Take 1 tablet by mouth 2 (two) times daily.  -     promethazine-dextromethorphan (PROMETHAZINE-DM) 6.25-15 mg/5 mL Syrp; Take 5 mLs by mouth every 6 (six) hours as needed (congestion).  Dispense: 120 mL; Refill: 0    Concern for SOB at times and increased wheezing. Advised use of albuterol, add on steroids, zpack, and treat other viral URI symptoms with mucinex and promethazine. Strong ER  warnings given for worsening SOB or new high fever.      Heather Trant PA-C [facility]

## 2018-01-03 ENCOUNTER — LAB VISIT (OUTPATIENT)
Dept: LAB | Facility: HOSPITAL | Age: 68
End: 2018-01-03
Attending: INTERNAL MEDICINE
Payer: MEDICARE

## 2018-01-03 DIAGNOSIS — E05.90 HYPERTHYROIDISM: ICD-10-CM

## 2018-01-03 LAB
T3FREE SERPL-MCNC: 1.6 PG/ML
T4 FREE SERPL-MCNC: 0.46 NG/DL
TSH SERPL DL<=0.005 MIU/L-ACNC: 23.12 UIU/ML

## 2018-01-03 PROCEDURE — 36415 COLL VENOUS BLD VENIPUNCTURE: CPT | Mod: PO

## 2018-01-03 PROCEDURE — 84443 ASSAY THYROID STIM HORMONE: CPT

## 2018-01-03 PROCEDURE — 84439 ASSAY OF FREE THYROXINE: CPT

## 2018-01-03 PROCEDURE — 84481 FREE ASSAY (FT-3): CPT

## 2018-01-04 ENCOUNTER — PATIENT MESSAGE (OUTPATIENT)
Dept: ENDOCRINOLOGY | Facility: CLINIC | Age: 68
End: 2018-01-04

## 2018-01-04 DIAGNOSIS — E05.90 HYPERTHYROIDISM: Primary | ICD-10-CM

## 2018-01-10 ENCOUNTER — OFFICE VISIT (OUTPATIENT)
Dept: URGENT CARE | Facility: CLINIC | Age: 68
End: 2018-01-10
Payer: MEDICARE

## 2018-01-10 VITALS
OXYGEN SATURATION: 99 % | BODY MASS INDEX: 28.18 KG/M2 | WEIGHT: 226.63 LBS | HEIGHT: 75 IN | SYSTOLIC BLOOD PRESSURE: 134 MMHG | HEART RATE: 88 BPM | TEMPERATURE: 99 F | DIASTOLIC BLOOD PRESSURE: 86 MMHG

## 2018-01-10 DIAGNOSIS — J45.909 ASTHMA, UNSPECIFIED ASTHMA SEVERITY, UNSPECIFIED WHETHER COMPLICATED, UNSPECIFIED WHETHER PERSISTENT: ICD-10-CM

## 2018-01-10 DIAGNOSIS — J32.9 SINUSITIS, UNSPECIFIED CHRONICITY, UNSPECIFIED LOCATION: Primary | ICD-10-CM

## 2018-01-10 PROCEDURE — 99215 OFFICE O/P EST HI 40 MIN: CPT | Mod: PBBFAC,PO | Performed by: NURSE PRACTITIONER

## 2018-01-10 PROCEDURE — 96372 THER/PROPH/DIAG INJ SC/IM: CPT | Mod: PBBFAC,PO

## 2018-01-10 PROCEDURE — 94640 AIRWAY INHALATION TREATMENT: CPT | Mod: PBBFAC,59,PO

## 2018-01-10 PROCEDURE — 99999 PR PBB SHADOW E&M-EST. PATIENT-LVL V: CPT | Mod: PBBFAC,,, | Performed by: NURSE PRACTITIONER

## 2018-01-10 PROCEDURE — 99214 OFFICE O/P EST MOD 30 MIN: CPT | Mod: S$GLB,,, | Performed by: NURSE PRACTITIONER

## 2018-01-10 RX ORDER — METHYLPREDNISOLONE ACETATE 80 MG/ML
80 INJECTION, SUSPENSION INTRA-ARTICULAR; INTRALESIONAL; INTRAMUSCULAR; SOFT TISSUE
Status: COMPLETED | OUTPATIENT
Start: 2018-01-10 | End: 2018-01-10

## 2018-01-10 RX ORDER — DOXYCYCLINE 100 MG/1
100 CAPSULE ORAL EVERY 12 HOURS
Qty: 20 CAPSULE | Refills: 0 | Status: SHIPPED | OUTPATIENT
Start: 2018-01-10 | End: 2018-01-17

## 2018-01-10 RX ORDER — IPRATROPIUM BROMIDE AND ALBUTEROL SULFATE 2.5; .5 MG/3ML; MG/3ML
3 SOLUTION RESPIRATORY (INHALATION)
Status: COMPLETED | OUTPATIENT
Start: 2018-01-10 | End: 2018-01-10

## 2018-01-10 RX ADMIN — METHYLPREDNISOLONE ACETATE 80 MG: 80 INJECTION, SUSPENSION INTRALESIONAL; INTRAMUSCULAR; INTRASYNOVIAL; SOFT TISSUE at 05:01

## 2018-01-10 RX ADMIN — IPRATROPIUM BROMIDE AND ALBUTEROL SULFATE 3 ML: 2.5; .5 SOLUTION RESPIRATORY (INHALATION) at 05:01

## 2018-01-10 NOTE — PROGRESS NOTES
After obtaining consent, and per orders of MIRIAN Olson NP , injection of SEE MAR  given by Lydia Gtz. Patient instructed to remain in clinic for 20 minutes afterwards, and to report any adverse reaction to me immediately.

## 2018-01-10 NOTE — PATIENT INSTRUCTIONS
Sinusitis (Antibiotic Treatment)    The sinuses are air-filled spaces within the bones of the face. They connect to the inside of the nose. Sinusitis is an inflammation of the tissue lining the sinus cavity. Sinus inflammation can occur during a cold. It can also be due to allergies to pollens and other particles in the air. Sinusitis can cause symptoms of sinus congestion and fullness. A sinus infection causes fever, headache and facial pain. There is often green or yellow drainage from the nose or into the back of the throat (post-nasal drip). You have been given antibiotics to treat this condition.  Home care:  · Take the full course of antibiotics as instructed. Do not stop taking them, even if you feel better.  · Drink plenty of water, hot tea, and other liquids. This may help thin mucus. It also may promote sinus drainage.  · Heat may help soothe painful areas of the face. Use a towel soaked in hot water. Or,  the shower and direct the hot spray onto your face. Using a vaporizer along with a menthol rub at night may also help.   · An expectorant containing guaifenesin may help thin the mucus and promote drainage from the sinuses.  · Over-the-counter decongestants may be used unless a similar medicine was prescribed. Nasal sprays work the fastest. Use one that contains phenylephrine or oxymetazoline. First blow the nose gently. Then use the spray. Do not use these medicines more often than directed on the label or symptoms may get worse. You may also use tablets containing pseudoephedrine. Avoid products that combine ingredients, because side effects may be increased. Read labels. You can also ask the pharmacist for help. (NOTE: Persons with high blood pressure should not use decongestants. They can raise blood pressure.)  · Over-the-counter antihistamines may help if allergies contributed to your sinusitis.    · Do not use nasal rinses or irrigation during an acute sinus infection, unless told to by  your health care provider. Rinsing may spread the infection to other sinuses.  · Use acetaminophen or ibuprofen to control pain, unless another pain medicine was prescribed. (If you have chronic liver or kidney disease or ever had a stomach ulcer, talk with your doctor before using these medicines. Aspirin should never be used in anyone under 18 years of age who is ill with a fever. It may cause severe liver damage.)  · Don't smoke. This can worsen symptoms.  Follow-up care  Follow up with your healthcare provider or our staff if you are not improving within the next week.  When to seek medical advice  Call your healthcare provider if any of these occur:  · Facial pain or headache becoming more severe  · Stiff neck  · Unusual drowsiness or confusion  · Swelling of the forehead or eyelids  · Vision problems, including blurred or double vision  · Fever of 100.4ºF (38ºC) or higher, or as directed by your healthcare provider  · Seizure  · Breathing problems  · Symptoms not resolving within 10 days  Date Last Reviewed: 4/13/2015  © 2366-7579 [facility]. [address] [place] [address], [place] [address]. All rights reserved. This information is not intended as a substitute for professional medical care. Always follow your healthcare professional's instructions.

## 2018-01-10 NOTE — PROGRESS NOTES
Subjective:       Patient ID: Edin Green is a 67 y.o. male.    Chief Complaint: Cough    Pt is a 67 year old male to clinic today with complaints of cough, congestion and sinus pressure that began 4-5 weeks ago. Pt was treated in UC with azithromycin and states no improvement.      Cough   This is a recurrent problem. The current episode started more than 1 month ago. The problem has been waxing and waning. The problem occurs every few minutes. The cough is productive of sputum. Associated symptoms include ear congestion, nasal congestion, postnasal drip, rhinorrhea and wheezing. Pertinent negatives include no chest pain, chills, ear pain, fever, headaches, heartburn, hemoptysis, myalgias, rash, sore throat, shortness of breath, sweats or weight loss. His past medical history is significant for asthma and bronchitis. There is no history of pneumonia.     Review of Systems   Constitutional: Negative for chills, diaphoresis, fatigue, fever and weight loss.   HENT: Positive for congestion, postnasal drip, rhinorrhea, sinus pain and sinus pressure. Negative for ear discharge, ear pain, sneezing, sore throat and trouble swallowing.    Eyes: Negative for pain.   Respiratory: Positive for cough and wheezing. Negative for hemoptysis, chest tightness and shortness of breath.    Cardiovascular: Negative for chest pain and palpitations.   Gastrointestinal: Negative for abdominal pain, diarrhea, heartburn, nausea and vomiting.   Genitourinary: Negative for dysuria.   Musculoskeletal: Negative for back pain, myalgias and neck pain.   Skin: Negative for rash.   Neurological: Negative for dizziness, light-headedness and headaches.       Objective:      Physical Exam   Constitutional: He is oriented to person, place, and time. He appears well-developed and well-nourished. No distress.   HENT:   Head: Normocephalic.   Right Ear: Tympanic membrane, external ear and ear canal normal. No tenderness. Tympanic membrane is not  bulging.   Left Ear: Tympanic membrane, external ear and ear canal normal. No tenderness. Tympanic membrane is not bulging.   Nose: Mucosal edema and rhinorrhea present. Right sinus exhibits maxillary sinus tenderness. Right sinus exhibits no frontal sinus tenderness. Left sinus exhibits maxillary sinus tenderness. Left sinus exhibits no frontal sinus tenderness.   Mouth/Throat: Uvula is midline, oropharynx is clear and moist and mucous membranes are normal. No oropharyngeal exudate, posterior oropharyngeal edema or posterior oropharyngeal erythema.   Eyes: Conjunctivae and EOM are normal. Pupils are equal, round, and reactive to light.   Neck: Normal range of motion. Neck supple.   Cardiovascular: Normal rate, regular rhythm, S1 normal, S2 normal and normal heart sounds.  Exam reveals no gallop and no friction rub.    No murmur heard.  Pulmonary/Chest: Effort normal. No accessory muscle usage. No apnea, no tachypnea and no bradypnea. No respiratory distress. He has no decreased breath sounds. He has wheezes in the right upper field, the right lower field, the left upper field and the left lower field. He has no rhonchi. He has no rales.   Lymphadenopathy:        Head (right side): No submental, no submandibular and no tonsillar adenopathy present.        Head (left side): No submental, no submandibular and no tonsillar adenopathy present.     He has no cervical adenopathy.   Neurological: He is alert and oriented to person, place, and time.   Skin: Skin is warm and dry. No rash noted. He is not diaphoretic.   Psychiatric: He has a normal mood and affect. His speech is normal and behavior is normal. Thought content normal.   Nursing note and vitals reviewed.      Assessment:       1. Sinusitis, unspecified chronicity, unspecified location    2. Asthma, unspecified asthma severity, unspecified whether complicated, unspecified whether persistent        Plan:   Sinusitis, unspecified chronicity, unspecified location  -      methylPREDNISolone acetate injection 80 mg; Inject 1 mL (80 mg total) into the muscle one time.  -     doxycycline (VIBRAMYCIN) 100 MG Cap; Take 1 capsule (100 mg total) by mouth every 12 (twelve) hours.  Dispense: 20 capsule; Refill: 0    Asthma, unspecified asthma severity, unspecified whether complicated, unspecified whether persistent  -     albuterol-ipratropium 2.5mg-0.5mg/3mL nebulizer solution 3 mL; Take 3 mLs by nebulization one time.  -     methylPREDNISolone acetate injection 80 mg; Inject 1 mL (80 mg total) into the muscle one time.      · Rest and increase fluids.   · May apply warm compresses as needed.   · Saline nasal spray or saline irrigation (Neti pot) to loosen nasal congestion.  · Flonase or Nasacort to reduce inflammation in the sinus cavities.  · Take antibiotics exactly as prescribed. Make sure to complete the entire course of antibiotics even if you start feeling better. This will prevent recurrence of your infection and bacterial resistance.   · Do not drive, drink alcohol, or take any other sedating medications or substances while taking cough syrup.   · Follow up with your primary care provider or with ENT if not improved within a few days or sooner for any new or worsening symptoms.   · Go to the ER for any fever that does not improve with Tylenol/Ibuprofen, neck stiffness, rash, severe headache, vision changes, shortness of breath, chest pain, severe facial pain or swelling, or for any other new and concerning symptoms.

## 2018-01-16 RX ORDER — METHIMAZOLE 10 MG/1
TABLET ORAL
Qty: 60 TABLET | Refills: 3 | Status: SHIPPED | OUTPATIENT
Start: 2018-01-16 | End: 2018-04-30

## 2018-01-18 ENCOUNTER — LAB VISIT (OUTPATIENT)
Dept: LAB | Facility: HOSPITAL | Age: 68
End: 2018-01-18
Attending: INTERNAL MEDICINE
Payer: MEDICARE

## 2018-01-18 DIAGNOSIS — E05.90 HYPERTHYROIDISM: ICD-10-CM

## 2018-01-18 PROCEDURE — 36415 COLL VENOUS BLD VENIPUNCTURE: CPT | Mod: PO

## 2018-01-18 PROCEDURE — 84481 FREE ASSAY (FT-3): CPT

## 2018-01-18 PROCEDURE — 84439 ASSAY OF FREE THYROXINE: CPT

## 2018-01-18 PROCEDURE — 84443 ASSAY THYROID STIM HORMONE: CPT

## 2018-01-19 LAB
T3FREE SERPL-MCNC: 2.4 PG/ML
T4 FREE SERPL-MCNC: 0.63 NG/DL
TSH SERPL DL<=0.005 MIU/L-ACNC: 21.19 UIU/ML

## 2018-01-23 DIAGNOSIS — E05.90 HYPERTHYROIDISM: Primary | ICD-10-CM

## 2018-01-23 RX ORDER — METHIMAZOLE 10 MG/1
20 TABLET ORAL DAILY
Qty: 60 TABLET | Refills: 3 | OUTPATIENT
Start: 2018-01-23

## 2018-01-23 NOTE — TELEPHONE ENCOUNTER
Pt advised the following, per MD orders:I did not refill his methimazole as his recent labs still show he is hypothyroid and he may be in remission.  Tell him to stop the methimazole and get labs one week prior to his upcoming appointment in February. Pt verbalized understanding.

## 2018-02-21 ENCOUNTER — LAB VISIT (OUTPATIENT)
Dept: LAB | Facility: HOSPITAL | Age: 68
End: 2018-02-21
Attending: INTERNAL MEDICINE
Payer: MEDICARE

## 2018-02-21 DIAGNOSIS — E05.90 HYPERTHYROIDISM: ICD-10-CM

## 2018-02-21 LAB
T3FREE SERPL-MCNC: 2.6 PG/ML
T4 FREE SERPL-MCNC: 1.12 NG/DL
TSH SERPL DL<=0.005 MIU/L-ACNC: 1.16 UIU/ML

## 2018-02-21 PROCEDURE — 84443 ASSAY THYROID STIM HORMONE: CPT

## 2018-02-21 PROCEDURE — 84439 ASSAY OF FREE THYROXINE: CPT

## 2018-02-21 PROCEDURE — 84481 FREE ASSAY (FT-3): CPT

## 2018-02-21 PROCEDURE — 36415 COLL VENOUS BLD VENIPUNCTURE: CPT | Mod: PO

## 2018-03-11 ENCOUNTER — PATIENT MESSAGE (OUTPATIENT)
Dept: ENDOCRINOLOGY | Facility: CLINIC | Age: 68
End: 2018-03-11

## 2018-03-12 ENCOUNTER — TELEPHONE (OUTPATIENT)
Dept: ENDOCRINOLOGY | Facility: CLINIC | Age: 68
End: 2018-03-12

## 2018-03-26 ENCOUNTER — TELEPHONE (OUTPATIENT)
Dept: ENDOCRINOLOGY | Facility: CLINIC | Age: 68
End: 2018-03-26

## 2018-04-16 DIAGNOSIS — J20.9 ACUTE BRONCHITIS, UNSPECIFIED ORGANISM: ICD-10-CM

## 2018-04-16 RX ORDER — MONTELUKAST SODIUM 10 MG/1
TABLET ORAL
Qty: 30 TABLET | Refills: 11 | Status: ON HOLD | OUTPATIENT
Start: 2018-04-16 | End: 2019-12-02

## 2018-04-30 ENCOUNTER — OFFICE VISIT (OUTPATIENT)
Dept: INTERNAL MEDICINE | Facility: CLINIC | Age: 68
End: 2018-04-30
Payer: MEDICARE

## 2018-04-30 VITALS
SYSTOLIC BLOOD PRESSURE: 128 MMHG | HEART RATE: 95 BPM | WEIGHT: 225.06 LBS | TEMPERATURE: 98 F | BODY MASS INDEX: 27.98 KG/M2 | HEIGHT: 75 IN | DIASTOLIC BLOOD PRESSURE: 66 MMHG | OXYGEN SATURATION: 97 %

## 2018-04-30 DIAGNOSIS — J20.9 ACUTE BRONCHITIS, UNSPECIFIED ORGANISM: Primary | ICD-10-CM

## 2018-04-30 DIAGNOSIS — D50.0 IRON DEFICIENCY ANEMIA DUE TO CHRONIC BLOOD LOSS: ICD-10-CM

## 2018-04-30 DIAGNOSIS — J45.21 MILD INTERMITTENT ASTHMA WITH EXACERBATION: ICD-10-CM

## 2018-04-30 DIAGNOSIS — K64.8 INTERNAL HEMORRHOID, BLEEDING: ICD-10-CM

## 2018-04-30 PROCEDURE — 3078F DIAST BP <80 MM HG: CPT | Mod: CPTII,S$GLB,, | Performed by: FAMILY MEDICINE

## 2018-04-30 PROCEDURE — 99214 OFFICE O/P EST MOD 30 MIN: CPT | Mod: S$GLB,,, | Performed by: FAMILY MEDICINE

## 2018-04-30 PROCEDURE — 3074F SYST BP LT 130 MM HG: CPT | Mod: CPTII,S$GLB,, | Performed by: FAMILY MEDICINE

## 2018-04-30 PROCEDURE — 99999 PR PBB SHADOW E&M-EST. PATIENT-LVL III: CPT | Mod: PBBFAC,,, | Performed by: FAMILY MEDICINE

## 2018-04-30 RX ORDER — POLYETHYLENE GLYCOL 3350 17 G/17G
POWDER, FOR SOLUTION ORAL
COMMUNITY
Start: 2018-03-16 | End: 2023-11-08

## 2018-04-30 RX ORDER — ALBUTEROL SULFATE 90 UG/1
2 AEROSOL, METERED RESPIRATORY (INHALATION) EVERY 6 HOURS PRN
Qty: 1 INHALER | Refills: 0 | Status: SHIPPED | OUTPATIENT
Start: 2018-04-30 | End: 2019-07-15

## 2018-04-30 RX ORDER — ALBUTEROL SULFATE 90 UG/1
AEROSOL, METERED RESPIRATORY (INHALATION)
COMMUNITY
Start: 2017-10-02 | End: 2018-04-30 | Stop reason: SDUPTHER

## 2018-04-30 RX ORDER — TRIAMCINOLONE ACETONIDE 40 MG/ML
40 INJECTION, SUSPENSION INTRA-ARTICULAR; INTRAMUSCULAR
Status: DISCONTINUED | OUTPATIENT
Start: 2018-04-30 | End: 2018-04-30

## 2018-04-30 RX ORDER — SODIUM, POTASSIUM,MAG SULFATES 17.5-3.13G
SOLUTION, RECONSTITUTED, ORAL ORAL
COMMUNITY
Start: 2018-03-15 | End: 2018-05-29

## 2018-04-30 RX ORDER — ALBUTEROL SULFATE 0.83 MG/ML
2.5 SOLUTION RESPIRATORY (INHALATION)
COMMUNITY
Start: 2017-09-21 | End: 2018-04-30

## 2018-04-30 RX ORDER — ALBUTEROL SULFATE 0.83 MG/ML
2.5 SOLUTION RESPIRATORY (INHALATION) EVERY 4 HOURS PRN
Qty: 2 BOX | Refills: 6 | Status: SHIPPED | OUTPATIENT
Start: 2018-04-30 | End: 2019-04-30

## 2018-04-30 RX ORDER — METHYLPREDNISOLONE 4 MG/1
TABLET ORAL
Qty: 1 PACKAGE | Refills: 0 | Status: SHIPPED | OUTPATIENT
Start: 2018-04-30 | End: 2018-05-29

## 2018-04-30 NOTE — PROGRESS NOTES
Subjective:       Patient ID: Edin Green is a 67 y.o. male.    Chief Complaint: dry cough    68 yo male here with cough, post-nasal drip and some pleuritic pain with coughing. He has used nyquil and dayquil without much relief. [place] has been an irritant in the past. No fever or chills. Energy level has been ok--actually improved since he started taking iron supplementation (h/o bleeding hemorrhoids requiring banding).        does not have any pertinent problems on file.  Past Medical History:   Diagnosis Date    Benign hematuria     Cataract OU    Colon polyp     dr strauss    Depression     ED (erectile dysfunction)     Graves disease     aydee    HTN (hypertension)     Hypercholesteremia     Hypertensive retinopathy of both eyes     Hypogonadism     dr quintero    LAURENCE (obstructive sleep apnea)      History reviewed. No pertinent surgical history.  Family History   Problem Relation Age of Onset    Hypertension      Heart defect Mother     Strabismus Neg Hx     Retinal detachment Neg Hx     Macular degeneration Neg Hx     Glaucoma Neg Hx     Blindness Neg Hx     Amblyopia Neg Hx      Social History     Social History    Marital status:      Spouse name: CHUCHO    Number of children: 3    Years of education: N/A     Occupational History    Not on file.     Social History Main Topics    Smoking status: Never Smoker    Smokeless tobacco: Never Used    Alcohol use 0.6 oz/week     1 Cans of beer per week    Drug use: No    Sexual activity: Yes     Partners: Female     Other Topics Concern    Not on file     Social History Narrative    Wears a seatbelt.     Review of Systems   Constitutional: Negative for fatigue and unexpected weight change.   HENT: Negative for dental problem and hearing loss.    Eyes: Negative for pain and visual disturbance.   Respiratory: Negative for shortness of breath and wheezing.    Cardiovascular:  Negative for chest pain and palpitations.   Gastrointestinal: Negative for abdominal pain, constipation and diarrhea.   Genitourinary: Negative for difficulty urinating and dysuria.   Musculoskeletal: Negative for joint swelling and myalgias.   Skin: Negative for rash and wound.   Neurological: Negative for light-headedness and headaches.       Objective:      Physical Exam   Constitutional: He is oriented to person, place, and time. He appears well-developed and well-nourished.   HENT:   Head: Normocephalic and atraumatic.   Nose: Nose normal.   Mouth/Throat: Oropharynx is clear and moist.   Eyes: Conjunctivae and EOM are normal. Pupils are equal, round, and reactive to light. No scleral icterus.   Cardiovascular: Normal rate, regular rhythm and normal heart sounds.    No murmur heard.  Pulmonary/Chest: Effort normal. No respiratory distress. He has wheezes. He has no rales.   Abdominal: Soft. Bowel sounds are normal.   Musculoskeletal: Normal range of motion. He exhibits no edema or deformity.   Neurological: He is alert and oriented to person, place, and time.   Normal gait.   No speech abnormality   Skin: Skin is warm and dry. No rash noted.   Psychiatric: He has a normal mood and affect. His behavior is normal. Judgment and thought content normal.   Nursing note and vitals reviewed.      Assessment:       1. Acute bronchitis, unspecified organism    2. Iron deficiency anemia due to chronic blood loss    3. Internal hemorrhoid, bleeding    4. Mild intermittent asthma with exacerbation        Plan:       Acute bronchitis, unspecified organism  Comments:  Medrol dose pack; albuterol inhaler. Continue SIngulair. Followed by pulmonary  Orders:  -     albuterol 90 mcg/actuation inhaler; Inhale 2 puffs into the lungs every 6 (six) hours as needed for Wheezing.  Dispense: 1 Inhaler; Refill: 0  -     methylPREDNISolone (MEDROL DOSEPACK) 4 mg tablet; use as directed  Dispense: 1 Package; Refill: 0  -     albuterol  (PROVENTIL) 2.5 mg /3 mL (0.083 %) nebulizer solution; Take 3 mLs (2.5 mg total) by nebulization every 4 (four) hours as needed for Wheezing.  Dispense: 2 Box; Refill: 6    Iron deficiency anemia due to chronic blood loss  Comments:  Continue iron supplements and GI f/u    Internal hemorrhoid, bleeding  Comments:  Additional banding procedure planned.     Mild intermittent asthma with exacerbation  -     methylPREDNISolone (MEDROL DOSEPACK) 4 mg tablet; use as directed  Dispense: 1 Package; Refill: 0    F/u with Dr. Paris for yearly physical in May.

## 2018-05-18 ENCOUNTER — TELEPHONE (OUTPATIENT)
Dept: INTERNAL MEDICINE | Facility: CLINIC | Age: 68
End: 2018-05-18

## 2018-05-18 NOTE — TELEPHONE ENCOUNTER
----- Message from Aziza Pierre sent at 5/18/2018  2:38 PM CDT -----  Contact: pt   Pt states that he need his lab reschedule. I tried to reschedule them for Wednesday May 23, 2018 but it telling me that it already been reschedule. Can you please get pt schedule for Wednesday to do his labs.     [phone]

## 2018-05-23 ENCOUNTER — LAB VISIT (OUTPATIENT)
Dept: LAB | Facility: HOSPITAL | Age: 68
End: 2018-05-23
Attending: FAMILY MEDICINE
Payer: MEDICARE

## 2018-05-23 DIAGNOSIS — I10 ESSENTIAL HYPERTENSION: ICD-10-CM

## 2018-05-23 DIAGNOSIS — Z12.5 SCREENING FOR PROSTATE CANCER: ICD-10-CM

## 2018-05-23 LAB
ANION GAP SERPL CALC-SCNC: 11 MMOL/L
BUN SERPL-MCNC: 9 MG/DL
CALCIUM SERPL-MCNC: 9.6 MG/DL
CHLORIDE SERPL-SCNC: 102 MMOL/L
CHOLEST SERPL-MCNC: 134 MG/DL
CHOLEST/HDLC SERPL: 2.7 {RATIO}
CO2 SERPL-SCNC: 27 MMOL/L
COMPLEXED PSA SERPL-MCNC: 5.1 NG/ML
CREAT SERPL-MCNC: 1.3 MG/DL
EST. GFR  (AFRICAN AMERICAN): >60 ML/MIN/1.73 M^2
EST. GFR  (NON AFRICAN AMERICAN): 56.1 ML/MIN/1.73 M^2
GLUCOSE SERPL-MCNC: 112 MG/DL
HDLC SERPL-MCNC: 50 MG/DL
HDLC SERPL: 37.3 %
LDLC SERPL CALC-MCNC: 73 MG/DL
NONHDLC SERPL-MCNC: 84 MG/DL
POTASSIUM SERPL-SCNC: 3.7 MMOL/L
SODIUM SERPL-SCNC: 140 MMOL/L
TRIGL SERPL-MCNC: 55 MG/DL

## 2018-05-23 PROCEDURE — 36415 COLL VENOUS BLD VENIPUNCTURE: CPT | Mod: PO

## 2018-05-23 PROCEDURE — 80048 BASIC METABOLIC PNL TOTAL CA: CPT

## 2018-05-23 PROCEDURE — 84153 ASSAY OF PSA TOTAL: CPT

## 2018-05-23 PROCEDURE — 80061 LIPID PANEL: CPT

## 2018-05-29 ENCOUNTER — PATIENT MESSAGE (OUTPATIENT)
Dept: INTERNAL MEDICINE | Facility: CLINIC | Age: 68
End: 2018-05-29

## 2018-05-29 ENCOUNTER — LAB VISIT (OUTPATIENT)
Dept: LAB | Facility: HOSPITAL | Age: 68
End: 2018-05-29
Attending: FAMILY MEDICINE
Payer: MEDICARE

## 2018-05-29 ENCOUNTER — OFFICE VISIT (OUTPATIENT)
Dept: INTERNAL MEDICINE | Facility: CLINIC | Age: 68
End: 2018-05-29
Payer: MEDICARE

## 2018-05-29 VITALS
HEART RATE: 67 BPM | HEIGHT: 75 IN | BODY MASS INDEX: 27.74 KG/M2 | TEMPERATURE: 97 F | OXYGEN SATURATION: 99 % | WEIGHT: 223.13 LBS | SYSTOLIC BLOOD PRESSURE: 136 MMHG | DIASTOLIC BLOOD PRESSURE: 68 MMHG

## 2018-05-29 DIAGNOSIS — R73.02 IGT (IMPAIRED GLUCOSE TOLERANCE): ICD-10-CM

## 2018-05-29 DIAGNOSIS — N41.9 PROSTATITIS, UNSPECIFIED PROSTATITIS TYPE: ICD-10-CM

## 2018-05-29 DIAGNOSIS — R97.20 ELEVATED PSA: ICD-10-CM

## 2018-05-29 DIAGNOSIS — I10 ESSENTIAL HYPERTENSION: ICD-10-CM

## 2018-05-29 DIAGNOSIS — E78.00 HYPERCHOLESTEREMIA: ICD-10-CM

## 2018-05-29 DIAGNOSIS — R82.90 ABNORMAL URINALYSIS: Primary | ICD-10-CM

## 2018-05-29 DIAGNOSIS — Z00.00 ROUTINE HEALTH MAINTENANCE: Primary | ICD-10-CM

## 2018-05-29 DIAGNOSIS — E05.00 GRAVES DISEASE: ICD-10-CM

## 2018-05-29 DIAGNOSIS — J45.909 ASTHMA, UNSPECIFIED ASTHMA SEVERITY, UNSPECIFIED WHETHER COMPLICATED, UNSPECIFIED WHETHER PERSISTENT: ICD-10-CM

## 2018-05-29 DIAGNOSIS — J30.1 ALLERGIC RHINITIS DUE TO POLLEN, UNSPECIFIED SEASONALITY: ICD-10-CM

## 2018-05-29 LAB
BACTERIA #/AREA URNS HPF: NORMAL /HPF
BILIRUB UR QL STRIP: NEGATIVE
CAOX CRY URNS QL MICRO: NORMAL
CLARITY UR: CLEAR
COLOR UR: YELLOW
GLUCOSE UR QL STRIP: NEGATIVE
HGB UR QL STRIP: NEGATIVE
HYALINE CASTS #/AREA URNS LPF: 0 /LPF
KETONES UR QL STRIP: ABNORMAL
LEUKOCYTE ESTERASE UR QL STRIP: NEGATIVE
MICROSCOPIC COMMENT: NORMAL
NITRITE UR QL STRIP: NEGATIVE
PH UR STRIP: 6 [PH] (ref 5–8)
PROT UR QL STRIP: ABNORMAL
RBC #/AREA URNS HPF: 0 /HPF (ref 0–4)
SP GR UR STRIP: >=1.03 (ref 1–1.03)
URN SPEC COLLECT METH UR: ABNORMAL
WBC #/AREA URNS HPF: 0 /HPF (ref 0–5)

## 2018-05-29 PROCEDURE — 81000 URINALYSIS NONAUTO W/SCOPE: CPT | Mod: PO

## 2018-05-29 PROCEDURE — 3075F SYST BP GE 130 - 139MM HG: CPT | Mod: CPTII,S$GLB,, | Performed by: FAMILY MEDICINE

## 2018-05-29 PROCEDURE — 3078F DIAST BP <80 MM HG: CPT | Mod: CPTII,S$GLB,, | Performed by: FAMILY MEDICINE

## 2018-05-29 PROCEDURE — 99397 PER PM REEVAL EST PAT 65+ YR: CPT | Mod: S$GLB,,, | Performed by: FAMILY MEDICINE

## 2018-05-29 PROCEDURE — 87086 URINE CULTURE/COLONY COUNT: CPT

## 2018-05-29 PROCEDURE — 99999 PR PBB SHADOW E&M-EST. PATIENT-LVL III: CPT | Mod: PBBFAC,,, | Performed by: FAMILY MEDICINE

## 2018-05-29 RX ORDER — CIPROFLOXACIN 500 MG/1
500 TABLET ORAL 2 TIMES DAILY
Qty: 60 TABLET | Refills: 0 | Status: SHIPPED | OUTPATIENT
Start: 2018-05-29 | End: 2018-09-17

## 2018-05-29 RX ORDER — FLUTICASONE PROPIONATE 50 MCG
2 SPRAY, SUSPENSION (ML) NASAL DAILY
Qty: 16 G | Refills: 2 | Status: SHIPPED | OUTPATIENT
Start: 2018-05-29 | End: 2019-07-15

## 2018-05-29 NOTE — PROGRESS NOTES
Subjective:       Patient ID: Edin Green is a 67 y.o. male.    Chief Complaint: here for physical examination and issues  below    HPIGrave dz hx now off meds per dr story  One month nasal elza and dry cough. No malaise fever sob  Hypertension: blood pressures at home normal. Tolerating medicine.   elev psa. No bph sympt  Hypercholesterolemia: controlled. Tolerating medicine.    Hypogon. Hx prev dr quintero no visit since 2016    Past Medical History:   Diagnosis Date    Asthma     Benign hematuria     Cataract OU    Colon polyp     dr strauss    Depression     ED (erectile dysfunction)     Graves disease     Dr story    HTN (hypertension)     Hypercholesteremia     Hypertensive retinopathy of both eyes     Hypogonadism         LAURENCE (obstructive sleep apnea)      History reviewed. No pertinent surgical history.  Family History   Problem Relation Age of Onset    Hypertension Unknown     Heart defect Mother     Strabismus Neg Hx     Retinal detachment Neg Hx     Macular degeneration Neg Hx     Glaucoma Neg Hx     Blindness Neg Hx     Amblyopia Neg Hx      Social History     Social History    Marital status:      Spouse name: CHUCHO    Number of children: 3    Years of education: N/A     Social History Main Topics    Smoking status: Never Smoker    Smokeless tobacco: Never Used    Alcohol use 0.6 oz/week     1 Cans of beer per week    Drug use: No    Sexual activity: Yes     Partners: Female     Other Topics Concern    None     Social History Narrative    Wears a seatbelt.       Review of Systems  Cardiovascular: no chest pain  Chest: no shortness of breath  Abd: no abd pain  Remainder review of systems negative    Objective:      Physical Exam   Constitutional: He is oriented to person, place, and time. He appears well-developed and well-nourished.   HENT:   Head: Normocephalic and atraumatic.   Right Ear: External ear normal.   Left Ear: External ear normal.   Nose: Nose  normal.   Mouth/Throat: Oropharynx is clear and moist.   Nl tms   Eyes: Conjunctivae and EOM are normal. Pupils are equal, round, and reactive to light. No scleral icterus.   Neck: Normal range of motion. Neck supple. Carotid bruit is not present.   Cardiovascular: Normal rate, regular rhythm and normal heart sounds.  Exam reveals no gallop and no friction rub.    No murmur heard.  Pulmonary/Chest: Effort normal. He has wheezes.   Abdominal: Soft. Bowel sounds are normal. He exhibits no distension and no mass. There is no hepatosplenomegaly. There is no tenderness. There is no rebound and no guarding.   Genitourinary:   Genitourinary Comments: kristofer mod enlarged prost boggy tender. No nodules   Musculoskeletal: Normal range of motion. He exhibits no tenderness.   Lymphadenopathy:     He has no cervical adenopathy.   Neurological: He is alert and oriented to person, place, and time. No cranial nerve deficit. Coordination normal.   Skin: Skin is warm and dry. No rash noted. No erythema.   Psychiatric: He has a normal mood and affect. His behavior is normal. Judgment and thought content normal.   Nursing note and vitals reviewed.      Assessment:       1. Routine health maintenance    2. Graves disease    3. Essential hypertension    4. Hypercholesteremia    5. Allergic rhinitis due to pollen, unspecified seasonality    6. IGT (impaired glucose tolerance)    7. Asthma, unspecified asthma severity, unspecified whether complicated, unspecified whether persistent    8. Elevated PSA    9. Prostatitis, unspecified prostatitis type        Plan:       **resume flonase and regular singulair;If no better 3-4 days followup sooner if any worsening or change in symptoms or lack of resolution  Psa. , fasting glucose, a1c one month  ua cult today(add:showed protein in urine. willreck with 4 week lab)    F/u per 4 week lab  Routine health maintenance    Graves disease    Essential hypertension    Hypercholesteremia    Allergic rhinitis  due to pollen, unspecified seasonality    IGT (impaired glucose tolerance)  -     Glucose, fasting; Future; Expected date: 05/29/2018  -     Hemoglobin A1c; Future; Expected date: 05/29/2018    Asthma, unspecified asthma severity, unspecified whether complicated, unspecified whether persistent    Elevated PSA  -     PSA, Screening; Future; Expected date: 05/29/2018    Prostatitis, unspecified prostatitis type  -     Urinalysis; Future; Expected date: 05/29/2018  -     Urine culture; Future; Expected date: 05/29/2018    Other orders  -     fluticasone (FLONASE) 50 mcg/actuation nasal spray; 2 sprays (100 mcg total) by Each Nare route once daily.  Dispense: 16 g; Refill: 2  -     ciprofloxacin HCl (CIPRO) 500 MG tablet; Take 1 tablet (500 mg total) by mouth 2 (two) times daily.  Dispense: 60 tablet; Refill: 0    *

## 2018-05-31 LAB — BACTERIA UR CULT: NORMAL

## 2018-06-29 ENCOUNTER — TELEPHONE (OUTPATIENT)
Dept: INTERNAL MEDICINE | Facility: CLINIC | Age: 68
End: 2018-06-29

## 2018-06-29 ENCOUNTER — LAB VISIT (OUTPATIENT)
Dept: LAB | Facility: HOSPITAL | Age: 68
End: 2018-06-29
Attending: FAMILY MEDICINE
Payer: MEDICARE

## 2018-06-29 ENCOUNTER — PATIENT OUTREACH (OUTPATIENT)
Dept: ADMINISTRATIVE | Facility: HOSPITAL | Age: 68
End: 2018-06-29

## 2018-06-29 DIAGNOSIS — R73.02 IGT (IMPAIRED GLUCOSE TOLERANCE): ICD-10-CM

## 2018-06-29 DIAGNOSIS — R97.20 ELEVATED PSA: ICD-10-CM

## 2018-06-29 LAB
COMPLEXED PSA SERPL-MCNC: 4.3 NG/ML
ESTIMATED AVG GLUCOSE: 128 MG/DL
GLUCOSE SERPL-MCNC: 108 MG/DL
HBA1C MFR BLD HPLC: 6.1 %

## 2018-06-29 PROCEDURE — 82947 ASSAY GLUCOSE BLOOD QUANT: CPT

## 2018-06-29 PROCEDURE — 36415 COLL VENOUS BLD VENIPUNCTURE: CPT | Mod: PO

## 2018-06-29 PROCEDURE — 84153 ASSAY OF PSA TOTAL: CPT

## 2018-06-29 PROCEDURE — 83036 HEMOGLOBIN GLYCOSYLATED A1C: CPT

## 2018-06-29 NOTE — TELEPHONE ENCOUNTER
----- Message from Pooja Kothari sent at 6/28/2018  4:26 PM CDT -----  Contact: Patient  Patient called to speak with the nurse; he stated that Dr. Paris to give him Breo and he thinks he is ready for that medication.    [address] [address]  Phone: [phone] Fax: [phone]    He can be contacted at [phone] cell.    Thanks,  Pooja

## 2018-07-03 RX ORDER — CIPROFLOXACIN 500 MG/1
TABLET ORAL
Qty: 60 TABLET | Refills: 0 | OUTPATIENT
Start: 2018-07-03

## 2018-07-06 NOTE — TELEPHONE ENCOUNTER
----- Message from Altaf Bland sent at 7/6/2018  4:13 PM CDT -----  Contact: Pt  Please give pt a call at [phone] he was returning the nurse call.

## 2018-07-06 NOTE — TELEPHONE ENCOUNTER
Spoke with pt, he is requesting a refill on Breo Ellipta. Message was sent to Dr. Paris on 6/29/18 with questions if pt was wanting medication for asthma and if he has used this medication before. Pt stated that ENT,  Dr. Sow prescribed it. He is taking Breo Ellipta 200/25 mcg 1 puff once daily. Requesting refill sent to [facility] on [address]. Advised pt that Dr. Paris is out today but will send message to him for approval.

## 2018-07-09 RX ORDER — FLUTICASONE FUROATE AND VILANTEROL 200; 25 UG/1; UG/1
1 POWDER RESPIRATORY (INHALATION) DAILY
Qty: 14 EACH | Refills: 1 | Status: SHIPPED | OUTPATIENT
Start: 2018-07-09 | End: 2018-09-24

## 2018-07-12 ENCOUNTER — LAB VISIT (OUTPATIENT)
Dept: LAB | Facility: HOSPITAL | Age: 68
End: 2018-07-12
Attending: FAMILY MEDICINE
Payer: MEDICARE

## 2018-07-12 ENCOUNTER — OFFICE VISIT (OUTPATIENT)
Dept: INTERNAL MEDICINE | Facility: CLINIC | Age: 68
End: 2018-07-12
Payer: MEDICARE

## 2018-07-12 VITALS
BODY MASS INDEX: 27.93 KG/M2 | DIASTOLIC BLOOD PRESSURE: 70 MMHG | TEMPERATURE: 96 F | WEIGHT: 224.63 LBS | HEIGHT: 75 IN | OXYGEN SATURATION: 98 % | HEART RATE: 67 BPM | SYSTOLIC BLOOD PRESSURE: 138 MMHG

## 2018-07-12 DIAGNOSIS — E05.90 HYPERTHYROIDISM: ICD-10-CM

## 2018-07-12 DIAGNOSIS — R80.9 PROTEINURIA, UNSPECIFIED TYPE: ICD-10-CM

## 2018-07-12 DIAGNOSIS — R73.02 IGT (IMPAIRED GLUCOSE TOLERANCE): ICD-10-CM

## 2018-07-12 DIAGNOSIS — R97.20 ELEVATED PSA: Primary | ICD-10-CM

## 2018-07-12 DIAGNOSIS — I10 ESSENTIAL HYPERTENSION: ICD-10-CM

## 2018-07-12 LAB
BILIRUB UR QL STRIP: NEGATIVE
CLARITY UR: CLEAR
COLOR UR: NORMAL
GLUCOSE UR QL STRIP: NEGATIVE
HGB UR QL STRIP: NEGATIVE
KETONES UR QL STRIP: NEGATIVE
LEUKOCYTE ESTERASE UR QL STRIP: NEGATIVE
NITRITE UR QL STRIP: NEGATIVE
PH UR STRIP: 7 [PH] (ref 5–8)
PROT UR QL STRIP: NEGATIVE
SP GR UR STRIP: 1.01 (ref 1–1.03)
URN SPEC COLLECT METH UR: NORMAL

## 2018-07-12 PROCEDURE — 99999 PR PBB SHADOW E&M-EST. PATIENT-LVL III: CPT | Mod: PBBFAC,,, | Performed by: FAMILY MEDICINE

## 2018-07-12 PROCEDURE — 3075F SYST BP GE 130 - 139MM HG: CPT | Mod: CPTII,S$GLB,, | Performed by: FAMILY MEDICINE

## 2018-07-12 PROCEDURE — 99214 OFFICE O/P EST MOD 30 MIN: CPT | Mod: S$GLB,,, | Performed by: FAMILY MEDICINE

## 2018-07-12 PROCEDURE — 81003 URINALYSIS AUTO W/O SCOPE: CPT | Mod: PO

## 2018-07-12 PROCEDURE — 3078F DIAST BP <80 MM HG: CPT | Mod: CPTII,S$GLB,, | Performed by: FAMILY MEDICINE

## 2018-07-19 ENCOUNTER — PATIENT MESSAGE (OUTPATIENT)
Dept: ADMINISTRATIVE | Facility: HOSPITAL | Age: 68
End: 2018-07-19

## 2018-07-20 ENCOUNTER — TELEPHONE (OUTPATIENT)
Dept: PULMONOLOGY | Facility: CLINIC | Age: 68
End: 2018-07-20

## 2018-07-23 NOTE — PROGRESS NOTES
Subjective:       Patient ID: Edin Green is a 67 y.o. male.    Chief Complaint: No chief complaint on file.    HPIelev psa down to 4.3  Hypertension: blood pressures at home normal. Tolerating medicine.   Hypothyroidism: nl tsh. Tolerating med. asympt    Past Medical History:   Diagnosis Date    Asthma     Benign hematuria     Cataract OU    Colon polyp     dr strauss    Depression     ED (erectile dysfunction)     Graves disease     story    HTN (hypertension)     Hypercholesteremia     Hypertensive retinopathy of both eyes     Hypogonadism     LAURENCE (obstructive sleep apnea)      History reviewed. No pertinent surgical history.  Family History   Problem Relation Age of Onset    Hypertension Unknown     Heart defect Mother     Strabismus Neg Hx     Retinal detachment Neg Hx     Macular degeneration Neg Hx     Glaucoma Neg Hx     Blindness Neg Hx     Amblyopia Neg Hx      Social History     Social History    Marital status:      Spouse name: CHUCHO    Number of children: 3    Years of education: N/A     Social History Main Topics    Smoking status: Never Smoker    Smokeless tobacco: Never Used    Alcohol use 0.6 oz/week     1 Cans of beer per week    Drug use: No    Sexual activity: Yes     Partners: Female     Other Topics Concern    None     Social History Narrative    Wears a seatbelt.         Review of Systems  Cardiovascular: no chest pain  Chest: no shortness of breath  Abd: no abd pain  Remainder review of systems negative    Objective:      Physical Exam   Constitutional: He appears well-nourished.   Cardiovascular: Normal rate.    Pulmonary/Chest: Effort normal.       Assessment:       1. Elevated PSA    2. Proteinuria, unspecified type    3. Hyperthyroidism    4. IGT (impaired glucose tolerance)    5. Essential hypertension        Plan:       *see priot note at f/u**    Elevated PSA  -     PSA, Screening; Future; Expected date: 07/12/2019    Proteinuria,  unspecified type  -     Urinalysis; Future; Expected date: 07/12/2018    Hyperthyroidism  -     TSH; Future; Expected date: 07/12/2019    IGT (impaired glucose tolerance)  -     Hemoglobin A1c; Future; Expected date: 07/12/2019    Essential hypertension  -     Lipid panel; Future; Expected date: 07/12/2019  -     Basic metabolic panel; Future; Expected date: 07/12/2019    Other orders  -     Cancel: Ambulatory Referral to Urology    plans to fu dr quintero for elev psa

## 2018-09-17 ENCOUNTER — LAB VISIT (OUTPATIENT)
Dept: LAB | Facility: HOSPITAL | Age: 68
End: 2018-09-17
Attending: UROLOGY
Payer: MEDICARE

## 2018-09-17 ENCOUNTER — OFFICE VISIT (OUTPATIENT)
Dept: UROLOGY | Facility: CLINIC | Age: 68
End: 2018-09-17
Payer: MEDICARE

## 2018-09-17 VITALS
HEART RATE: 68 BPM | WEIGHT: 220.56 LBS | BODY MASS INDEX: 27.42 KG/M2 | RESPIRATION RATE: 18 BRPM | DIASTOLIC BLOOD PRESSURE: 68 MMHG | HEIGHT: 75 IN | SYSTOLIC BLOOD PRESSURE: 142 MMHG

## 2018-09-17 DIAGNOSIS — R97.20 ELEVATED PSA: Primary | ICD-10-CM

## 2018-09-17 DIAGNOSIS — N41.9 PROSTATITIS, UNSPECIFIED PROSTATITIS TYPE: ICD-10-CM

## 2018-09-17 DIAGNOSIS — R97.20 ELEVATED PSA: ICD-10-CM

## 2018-09-17 LAB — COMPLEXED PSA SERPL-MCNC: 3.9 NG/ML

## 2018-09-17 PROCEDURE — 99213 OFFICE O/P EST LOW 20 MIN: CPT | Mod: PBBFAC | Performed by: UROLOGY

## 2018-09-17 PROCEDURE — 99204 OFFICE O/P NEW MOD 45 MIN: CPT | Mod: S$PBB,,, | Performed by: UROLOGY

## 2018-09-17 PROCEDURE — 84153 ASSAY OF PSA TOTAL: CPT

## 2018-09-17 PROCEDURE — 3078F DIAST BP <80 MM HG: CPT | Mod: CPTII,,, | Performed by: UROLOGY

## 2018-09-17 PROCEDURE — 3077F SYST BP >= 140 MM HG: CPT | Mod: CPTII,,, | Performed by: UROLOGY

## 2018-09-17 PROCEDURE — 99999 PR PBB SHADOW E&M-EST. PATIENT-LVL III: CPT | Mod: PBBFAC,,, | Performed by: UROLOGY

## 2018-09-17 PROCEDURE — 36415 COLL VENOUS BLD VENIPUNCTURE: CPT

## 2018-09-17 PROCEDURE — 1101F PT FALLS ASSESS-DOCD LE1/YR: CPT | Mod: CPTII,,, | Performed by: UROLOGY

## 2018-09-17 RX ORDER — LEVALBUTEROL INHALATION SOLUTION 1.25 MG/3ML
SOLUTION RESPIRATORY (INHALATION)
Refills: 0 | COMMUNITY
Start: 2018-09-03 | End: 2019-07-15

## 2018-09-17 NOTE — PROGRESS NOTES
"Chief Complaint: Elev PSA    HPI:   9/17/18: 68 yo man here to discuss elevated PSA.  Was seeing Dr. Sanchez.  I spoke to his Religion group last year.  No abd/pelvic pain and no exac/rel factors.  No hematuria.  No urolithiasis.  No urinary bother.  Cialis/viagra prn for ED. Normal sexual function.  Was told he had a "mushy" prostate and was given cipro by Dr. Paris.  No LUTS.    Allergies:  Celebrex [celecoxib] and Venom-wasp    Medications:  has a current medication list which includes the following prescription(s): albuterol, albuterol, amlodipine, aspirin, benzonatate, fluticasone-vilanterol, levalbuterol, losartan-hydrochlorothiazide 100-25 mg, montelukast, multivitamin, polyethylene glycol, pravastatin, tamsulosin, azelastine, fluticasone, promethazine-dextromethorphan, sertraline, sildenafil, tadalafil, and triamcinolone acetonide 0.1%.    Review of Systems:  General: No fever, chills, fatigability, or weight loss.  Skin: No rashes, itching, or changes in color or texture of skin.  Chest: Denies CHAMBERLAIN, cyanosis, wheezing, cough, and sputum production.  Abdomen: Appetite fine. No weight loss. Denies diarrhea, abdominal pain, hematemesis, or blood in stool.  Musculoskeletal: No joint stiffness or swelling. Denies back pain.  : As above.  All other review of systems negative.    PMH:   has a past medical history of Asthma, Benign hematuria, Cataract (OU), Colon polyp, Depression, ED (erectile dysfunction), Graves disease, HTN (hypertension), Hypercholesteremia, Hypertensive retinopathy of both eyes, Hypogonadism, and LAURENCE (obstructive sleep apnea).    PSH:   has no past surgical history on file.    FamHx: family history includes Heart defect in his mother; Hypertension in his unknown relative.    SocHx:  reports that  has never smoked. he has never used smokeless tobacco. He reports that he drinks about 0.6 oz of alcohol per week. He reports that he does not use drugs.      Physical Exam:  Vitals:    09/17/18 " 1049   BP: (!) 142/68   Pulse: 68   Resp: 18     General: A&Ox3, no apparent distress, no deformities  Neck: No masses, normal thyroid  Lungs: normal inspiration, no use of accessory muscles  Heart: normal pulse, no arrhythmias  Abdomen: Soft, NT, ND, no masses, no hernias, no hepatosplenomegaly  Lymphatic: Neck and groin nodes negative  Skin: The skin is warm and dry. No jaundice.  Ext: No c/c/e.  : Test desc ivan, no abnormalities of epididymus. Penis normal, with normal penile and scrotal skin. Meatus normal. Normal rectal tone, no hemorrhoids. Prost 40 gm no nodules or masses appreciated. SV not palpable. Perineum and anus normal.    Labs/Studies:   PSA    9/08: 1.7    7/14: 3.2    5/17: 3.2    5/18: 5.1    6/18: 4.3    Impression/Plan:   1. PSA up some from prior years no sustained upward trend.  PSA now/3/6 with RTC 6 mo.  2. No PDE-5 refills needed

## 2018-09-24 ENCOUNTER — OFFICE VISIT (OUTPATIENT)
Dept: PULMONOLOGY | Facility: CLINIC | Age: 68
End: 2018-09-24
Payer: MEDICARE

## 2018-09-24 ENCOUNTER — HOSPITAL ENCOUNTER (OUTPATIENT)
Dept: RADIOLOGY | Facility: HOSPITAL | Age: 68
Discharge: HOME | End: 2018-09-24
Attending: NURSE PRACTITIONER
Payer: MEDICARE

## 2018-09-24 ENCOUNTER — PROCEDURE VISIT (OUTPATIENT)
Dept: PULMONOLOGY | Facility: CLINIC | Age: 68
End: 2018-09-24
Payer: MEDICARE

## 2018-09-24 VITALS
HEART RATE: 76 BPM | OXYGEN SATURATION: 98 % | RESPIRATION RATE: 17 BRPM | SYSTOLIC BLOOD PRESSURE: 124 MMHG | DIASTOLIC BLOOD PRESSURE: 80 MMHG | WEIGHT: 218 LBS | HEIGHT: 75 IN | BODY MASS INDEX: 27.1 KG/M2

## 2018-09-24 DIAGNOSIS — J45.20 MILD INTERMITTENT ASTHMA WITHOUT COMPLICATION: ICD-10-CM

## 2018-09-24 DIAGNOSIS — G47.33 OSA (OBSTRUCTIVE SLEEP APNEA): ICD-10-CM

## 2018-09-24 DIAGNOSIS — J45.20 MILD INTERMITTENT ASTHMA WITHOUT COMPLICATION: Primary | ICD-10-CM

## 2018-09-24 LAB
BRPFT: ABNORMAL
FEF 25 75 CHG: 51.4 %
FEF 25 75 LLN: 0.9
FEF 25 75 POST REF: 74.2 %
FEF 25 75 PRE REF: 49 %
FEF 25 75 REF: 2.46
FET100 CHG: 4.9 %
FEV1 CHG: 21.9 %
FEV1 FVC CHG: 6.9 %
FEV1 FVC LLN: 63
FEV1 FVC POST REF: 95.4 %
FEV1 FVC PRE REF: 89.2 %
FEV1 FVC REF: 76
FEV1 LLN: 2.26
FEV1 POST REF: 81.1 %
FEV1 PRE REF: 66.5 %
FEV1 REF: 3.26
FEV6 CHG: 18.7 %
FEV6 LLN: 3.43
FEV6 POST REF: 77.4 %
FEV6 POST: 3.48 L (ref 3.43–5.57)
FEV6 PRE REF: 65.2 %
FEV6 PRE: 2.94 L (ref 3.43–5.57)
FEV6 REF: 4.5
FVC CHG: 14.1 %
FVC LLN: 3.13
FVC POST REF: 84.7 %
FVC PRE REF: 74.2 %
FVC REF: 4.32
MVV LLN: 128
MVV REF: 150
PEF CHG: 10.4 %
PEF LLN: 6.36
PEF POST REF: 88.8 %
PEF PRE REF: 80.4 %
PEF REF: 9.42
POST FEF 25 75: 1.83 L/S (ref 0.9–4.02)
POST FET 100: 11.05 SEC
POST FEV1 FVC: 72.37 % (ref 63.26–88.53)
POST FEV1: 2.65 L (ref 2.26–4.26)
POST FVC: 3.66 L (ref 3.13–5.51)
POST PEF: 8.36 L/S (ref 6.36–12.48)
PRE FEF 25 75: 1.21 L/S (ref 0.9–4.02)
PRE FET 100: 10.54 SEC
PRE FEV1 FVC: 67.71 % (ref 63.26–88.53)
PRE FEV1: 2.17 L (ref 2.26–4.26)
PRE FVC: 3.21 L (ref 3.13–5.51)
PRE PEF: 7.58 L/S (ref 6.36–12.48)

## 2018-09-24 PROCEDURE — 3074F SYST BP LT 130 MM HG: CPT | Mod: CPTII,,, | Performed by: NURSE PRACTITIONER

## 2018-09-24 PROCEDURE — 1101F PT FALLS ASSESS-DOCD LE1/YR: CPT | Mod: CPTII,,, | Performed by: NURSE PRACTITIONER

## 2018-09-24 PROCEDURE — 99214 OFFICE O/P EST MOD 30 MIN: CPT | Mod: 25,S$PBB,, | Performed by: NURSE PRACTITIONER

## 2018-09-24 PROCEDURE — 99999 PR PBB SHADOW E&M-EST. PATIENT-LVL IV: CPT | Mod: PBBFAC,,, | Performed by: NURSE PRACTITIONER

## 2018-09-24 PROCEDURE — 3079F DIAST BP 80-89 MM HG: CPT | Mod: CPTII,,, | Performed by: NURSE PRACTITIONER

## 2018-09-24 PROCEDURE — 71046 X-RAY EXAM CHEST 2 VIEWS: CPT | Mod: 26,,, | Performed by: RADIOLOGY

## 2018-09-24 PROCEDURE — 99214 OFFICE O/P EST MOD 30 MIN: CPT | Mod: PBBFAC,25,PO | Performed by: NURSE PRACTITIONER

## 2018-09-24 PROCEDURE — 71046 X-RAY EXAM CHEST 2 VIEWS: CPT | Mod: TC,FY,PO

## 2018-09-24 PROCEDURE — 94060 EVALUATION OF WHEEZING: CPT | Mod: PBBFAC,PO

## 2018-09-24 PROCEDURE — 94060 EVALUATION OF WHEEZING: CPT | Mod: 26,S$PBB,, | Performed by: INTERNAL MEDICINE

## 2018-09-24 RX ORDER — FLUTICASONE FUROATE AND VILANTEROL 100; 25 UG/1; UG/1
1 POWDER RESPIRATORY (INHALATION) DAILY
Qty: 1 EACH | Refills: 11 | Status: SHIPPED | OUTPATIENT
Start: 2018-09-24 | End: 2019-04-24

## 2018-09-24 NOTE — PROGRESS NOTES
"Subjective:      Patient ID: Edin Green is a 67 y.o. male.    Chief Complaint: Asthma    Patient with sleep apnea and asthma presents to the office today for 1 year follow-up.  Patient is concern, he had bronchitis 3 times of last year.  Patient is not taking his controller asthma medication.  Nebulizer was ordered on last visit, but patient did not receive through [facility] [place].  Increased asthma symptoms with high ozone and heat, and when he is [place] smoke [place].  Noncompliant with CPAP  He has Flonase, Singulair, Astelin for rhinitis    Patient Active Problem List:     Hypertensive retinopathy of both eyes     Refractive error - Both Eyes     Nuclear sclerosis     HTN (hypertension)     Depression     Hypogonadism in male     ED (erectile dysfunction)     LAURENCE (obstructive sleep apnea)     Hypercholesteremia     Mild intermittent asthma without complication     Allergic rhinitis     Cortical senile cataract of both eyes     Posterior vitreous detachment of both eyes     Hyperthyroidism     Graves disease     Iron deficiency anemia due to chronic blood loss     Internal hemorrhoid, bleeding     IGT (impaired glucose tolerance)     Asthma            /80   Pulse 76   Resp 17   Ht 6' 3" (1.905 m)   Wt 98.9 kg (218 lb)   SpO2 98%   BMI 27.25 kg/m²   Body mass index is 27.25 kg/m².    Review of Systems   Respiratory: Positive for cough and dyspnea on extertion.    All other systems reviewed and are negative.    Objective:      Physical Exam   Constitutional: He is oriented to person, place, and time. He appears well-developed and well-nourished.   HENT:   Head: Normocephalic and atraumatic.   Nose: Nose normal.   Mouth/Throat: Uvula is midline and oropharynx is clear and moist.   Neck: Trachea normal and normal range of motion. Neck supple. No thyroid mass and no thyromegaly present.   Cardiovascular: Normal rate, regular rhythm and normal heart sounds.   Pulmonary/Chest: Effort normal and breath " sounds normal. He has no wheezes. He has no rhonchi. He has no rales. Chest wall is not dull to percussion.   Abdominal: Soft. He exhibits no mass. There is no hepatosplenomegaly or splenomegaly. There is no tenderness.   Musculoskeletal: Normal range of motion. He exhibits no edema.   Neurological: He is alert and oriented to person, place, and time.   Skin: Skin is warm and dry.   Psychiatric: He has a normal mood and affect.     Personal Diagnostic Review  Today his baseline spirometry with FEV1 66% of predicted and obstruction.  After albuterol, he reversed to normal indicating that his asthma is not controlled. 21% increase in FEV1 post bronchodilator  Results for orders placed or performed in visit on 09/24/18   Spirometry with/without bronchodilator   Result Value Ref Range    Post FEV1 2.65 2.26 - 4.26 L    Post FEV1 FVC 72.37 63.26 - 88.53 %    Post FVC 3.66 3.13 - 5.51 L    Post PEF 8.36 6.36 - 12.48 L/s    FEV6 POST 3.48 3.43 - 5.57 L    Post FEF 25 75 1.83 0.90 - 4.02 L/s    Post  11.05 sec    Pre FEV1 2.17 (L) 2.26 - 4.26 L    Pre FEV1 FVC 67.71 63.26 - 88.53 %    Pre FVC 3.21 3.13 - 5.51 L    Pre PEF 7.58 6.36 - 12.48 L/s    FEV6 PRE 2.94 (L) 3.43 - 5.57 L    Pre FEF 25 75 1.21 0.90 - 4.02 L/s    Pre  10.54 sec    FVC Ref 4.32     FVC LLN 3.13     FVC Pre Ref 74.2 %    FVC Post Ref 84.7 %    FVC Chg 14.1 %    FEV1 Ref 3.26     FEV1 LLN 2.26     FEV1 Pre Ref 66.5 %    FEV1 Post Ref 81.1 %    FEV1 Chg 21.9 %    FEV1 FVC Ref 76     FEV1 FVC LLN 63     FEV1 FVC Pre Ref 89.2 %    FEV1 FVC Post Ref 95.4 %    FEV1 FVC Chg 6.9 %    FEV6 Ref 4.50     FEV6 LLN 3.43     FEV6 Pre Ref 65.2 %    FEV6 Post Ref 77.4 %    FEV6 Chg 18.7 %    FEF 25 75 Ref 2.46     FEF 25 75 LLN 0.90     FEF 25 75 Pre Ref 49.0 %    FEF 25 75 Post Ref 74.2 %    FEF 25 75 Chg 51.4 %    PEF Ref 9.42     PEF LLN 6.36     PEF Pre Ref 80.4 %    PEF Post Ref 88.8 %    PEF Chg 10.4 %    QCM959 Chg 4.9 %    MVV Ref 150     MVV LLN  128        Results for orders placed during the hospital encounter of 09/24/18   X-Ray Chest PA And Lateral    Narrative EXAMINATION:  XR CHEST PA AND LATERAL    CLINICAL HISTORY:  Mild intermittent asthma, uncomplicated    TECHNIQUE:  PA and lateral views of the chest were performed.    COMPARISON:  January 18, 2017    FINDINGS:  Cardiac silhouette and mediastinal contours are normal.  Lungs are clear.  Osseous structures are intact.      Impression No acute cardiopulmonary process.      Electronically signed by: David Costa MD  Date:    09/24/2018  Time:    09:35         Assessment:       1. Mild intermittent asthma without complication    2. LAURENCE (obstructive sleep apnea)        Outpatient Encounter Medications as of 9/24/2018   Medication Sig Dispense Refill    albuterol (PROVENTIL) 2.5 mg /3 mL (0.083 %) nebulizer solution Take 3 mLs (2.5 mg total) by nebulization every 4 (four) hours as needed for Wheezing. 2 Box 6    albuterol 90 mcg/actuation inhaler Inhale 2 puffs into the lungs every 6 (six) hours as needed for Wheezing. 1 Inhaler 0    amLODIPine (NORVASC) 5 MG tablet Take 1 tablet (5 mg total) by mouth once daily. 30 tablet 11    aspirin (ECOTRIN) 81 MG EC tablet Take 81 mg by mouth once daily.      azelastine (ASTELIN) 137 mcg (0.1 %) nasal spray 2 sprays (274 mcg total) by Nasal route 2 (two) times daily. 30 mL 11    benzonatate (TESSALON) 200 MG capsule Take 1 capsule (200 mg total) by mouth 3 (three) times daily as needed for Cough. 30 capsule 0    fluticasone (FLONASE) 50 mcg/actuation nasal spray 2 sprays (100 mcg total) by Each Nare route once daily. 16 g 2    levalbuterol (XOPENEX) 1.25 mg/3 mL nebulizer solution INHALE ONE VIAL EVERY 6 TO 8 HOURS AS NEEDED FOR WHEEZING  0    losartan-hydrochlorothiazide 100-25 mg (HYZAAR) 100-25 mg per tablet TAKE ONE TABLET BY MOUTH ONCE DAILY 30 tablet 11    montelukast (SINGULAIR) 10 mg tablet TAKE ONE TABLET BY MOUTH ONCE DAILY IN THE EVENING  "30 tablet 11    multivitamin (THERAGRAN) per tablet Take 1 tablet by mouth.      polyethylene glycol (GLYCOLAX) 17 gram/dose powder       pravastatin (PRAVACHOL) 40 MG tablet Take 1 tablet (40 mg total) by mouth once daily. 30 tablet 11    promethazine-dextromethorphan (PROMETHAZINE-DM) 6.25-15 mg/5 mL Syrp Take 5 mLs by mouth every 6 (six) hours as needed (congestion). 120 mL 0    sertraline (ZOLOFT) 100 MG tablet TAKE ONE TABLET BY MOUTH ONCE DAILY 30 tablet 11    sildenafil (VIAGRA) 100 MG tablet TAKE AS DIRECTED 10 tablet 11    tadalafil (CIALIS) 20 MG Tab Use one tablet every 36 hours 10 tablet 11    triamcinolone acetonide 0.1% (KENALOG) 0.1 % ointment AAA 1-2 times daily as needed for rash 80 g 1    [DISCONTINUED] fluticasone-vilanterol (BREO ELLIPTA) 200-25 mcg/dose DsDv diskus inhaler Inhale 1 puff into the lungs once daily. Controller 14 each 1    fluticasone-vilanterol (BREO ELLIPTA) 100-25 mcg/dose diskus inhaler Inhale 1 puff into the lungs once daily. 1 each 11    tamsulosin (FLOMAX) 0.4 mg Cp24 Take 0.4 mg by mouth once daily.       No facility-administered encounter medications on file as of 9/24/2018.      Orders Placed This Encounter   Procedures    NEBULIZER FOR HOME USE     Order Specific Question:   Height:     Answer:   6' 3" (1.905 m)     Order Specific Question:   Weight:     Answer:   98.9 kg (218 lb)     Order Specific Question:   Length of need (1-99 months):     Answer:   99     Plan:   Patient noncompliant with CPAP.  Noncompliant with asthma treatment.  Discussed his bronchitis is due to his allergic rhinitis and untreated asthma. Take BREO daily. [facility]. Obtain nebulizer.   Follow up in 3 weeks to review spirometry     "

## 2018-11-13 DIAGNOSIS — E78.00 HYPERCHOLESTEREMIA: ICD-10-CM

## 2018-11-13 RX ORDER — AMLODIPINE BESYLATE 5 MG/1
TABLET ORAL
Qty: 30 TABLET | Refills: 11 | Status: SHIPPED | OUTPATIENT
Start: 2018-11-13 | End: 2019-04-10 | Stop reason: SDUPTHER

## 2018-11-13 RX ORDER — LOSARTAN POTASSIUM AND HYDROCHLOROTHIAZIDE 25; 100 MG/1; MG/1
TABLET ORAL
Qty: 30 TABLET | Refills: 11 | Status: ON HOLD | OUTPATIENT
Start: 2018-11-13 | End: 2019-10-24

## 2018-11-13 RX ORDER — PRAVASTATIN SODIUM 40 MG/1
TABLET ORAL
Qty: 30 TABLET | Refills: 11 | Status: ON HOLD | OUTPATIENT
Start: 2018-11-13 | End: 2019-11-15 | Stop reason: SDUPTHER

## 2018-11-15 ENCOUNTER — OFFICE VISIT (OUTPATIENT)
Dept: DERMATOLOGY | Facility: CLINIC | Age: 68
End: 2018-11-15
Payer: MEDICARE

## 2018-11-15 DIAGNOSIS — B35.4 TINEA CORPORIS: Primary | ICD-10-CM

## 2018-11-15 PROCEDURE — 1101F PT FALLS ASSESS-DOCD LE1/YR: CPT | Mod: CPTII,S$GLB,, | Performed by: DERMATOLOGY

## 2018-11-15 PROCEDURE — 99214 OFFICE O/P EST MOD 30 MIN: CPT | Mod: S$GLB,,, | Performed by: DERMATOLOGY

## 2018-11-15 PROCEDURE — 99999 PR PBB SHADOW E&M-EST. PATIENT-LVL II: CPT | Mod: PBBFAC,,, | Performed by: DERMATOLOGY

## 2018-11-15 RX ORDER — MICONAZOLE NITRATE 2 %
POWDER (GRAM) TOPICAL
Qty: 85 G | Refills: 11 | Status: SHIPPED | OUTPATIENT
Start: 2018-11-15 | End: 2019-07-15

## 2018-11-15 RX ORDER — ECONAZOLE NITRATE 10 MG/G
CREAM TOPICAL
Qty: 30 G | Refills: 3 | Status: SHIPPED | OUTPATIENT
Start: 2018-11-15 | End: 2019-07-15

## 2018-11-15 NOTE — PROGRESS NOTES
Subjective:       Patient ID:  Edin Green is a 67 y.o. male who presents for   Chief Complaint   Patient presents with    Rash     genital area, left thigh, 3 weeks ago, itching, triamcinolone acetonide ointment, benadryl cream, cortisone      History of Present Illness: The patient presents with chief complaint of rash.  Location: genital area, left thigh   Duration: 3 weeks   Signs/Symptoms: itching   Prior treatments: triamcinolone acetonide ointment, benadryl cream, cortisone          Review of Systems   Constitutional: Negative for fever and chills.   Gastrointestinal: Negative for nausea and vomiting.   Skin: Positive for itching and rash. Negative for daily sunscreen use, activity-related sunscreen use and recent sunburn.   Hematologic/Lymphatic: Does not bruise/bleed easily.        Objective:    Physical Exam   Constitutional: He appears well-developed and well-nourished. No distress.   Neurological: He is alert and oriented to person, place, and time. He is not disoriented.   Psychiatric: He has a normal mood and affect.   Skin:   Areas Examined (abnormalities noted in diagram):   Head / Face Inspection Performed  Neck Inspection Performed  Genitals / Buttocks / Groin Inspection Performed  RUE Inspected  LUE Inspection Performed  RLE Inspected  LLE Inspection Performed  Nails and Digits Inspection Performed               Assessment / Plan:        Tinea corporis  -     econazole nitrate 1 % cream; AAA bid  Dispense: 30 g; Refill: 3  -     miconazole NITRATE 2 % (ZEASORB AF) 2 % top powder; Use two to three times daily to prevent rash  Dispense: 85 g; Refill: 11  -      Discussed dx, recommend above meds. Recommend keeping folds dry. AVS given.              Follow-up in about 3 months (around 2/15/2019).

## 2018-11-15 NOTE — PATIENT INSTRUCTIONS
Instructions for intertrigo:    · Use miconazole powder or purchase over-the-counter Zeasorb-AF powder and use two to three times a day as needed to absorb sweat and prevent rash from worsening  · Frequently air out folds while at home (use a hand-held blow dryer set to cool)

## 2018-12-09 ENCOUNTER — PATIENT MESSAGE (OUTPATIENT)
Dept: INTERNAL MEDICINE | Facility: CLINIC | Age: 68
End: 2018-12-09

## 2018-12-17 ENCOUNTER — LAB VISIT (OUTPATIENT)
Dept: LAB | Facility: HOSPITAL | Age: 68
End: 2018-12-17
Attending: UROLOGY
Payer: MEDICARE

## 2018-12-17 DIAGNOSIS — N41.9 PROSTATITIS, UNSPECIFIED PROSTATITIS TYPE: ICD-10-CM

## 2018-12-17 DIAGNOSIS — R97.20 ELEVATED PSA: ICD-10-CM

## 2018-12-17 LAB — COMPLEXED PSA SERPL-MCNC: 3.9 NG/ML

## 2018-12-17 PROCEDURE — 84153 ASSAY OF PSA TOTAL: CPT

## 2018-12-17 PROCEDURE — 36415 COLL VENOUS BLD VENIPUNCTURE: CPT

## 2018-12-18 ENCOUNTER — OFFICE VISIT (OUTPATIENT)
Dept: INTERNAL MEDICINE | Facility: CLINIC | Age: 68
End: 2018-12-18
Payer: MEDICARE

## 2018-12-18 VITALS
TEMPERATURE: 98 F | BODY MASS INDEX: 26.4 KG/M2 | DIASTOLIC BLOOD PRESSURE: 68 MMHG | SYSTOLIC BLOOD PRESSURE: 126 MMHG | WEIGHT: 212.31 LBS | HEART RATE: 96 BPM | HEIGHT: 75 IN | OXYGEN SATURATION: 97 %

## 2018-12-18 DIAGNOSIS — F32.A ANXIETY AND DEPRESSION: ICD-10-CM

## 2018-12-18 DIAGNOSIS — R10.9 LEFT FLANK PAIN: Primary | ICD-10-CM

## 2018-12-18 DIAGNOSIS — F41.9 ANXIETY AND DEPRESSION: ICD-10-CM

## 2018-12-18 PROCEDURE — 99214 OFFICE O/P EST MOD 30 MIN: CPT | Mod: S$GLB,,, | Performed by: FAMILY MEDICINE

## 2018-12-18 PROCEDURE — 3074F SYST BP LT 130 MM HG: CPT | Mod: CPTII,S$GLB,, | Performed by: FAMILY MEDICINE

## 2018-12-18 PROCEDURE — 3078F DIAST BP <80 MM HG: CPT | Mod: CPTII,S$GLB,, | Performed by: FAMILY MEDICINE

## 2018-12-18 PROCEDURE — 1101F PT FALLS ASSESS-DOCD LE1/YR: CPT | Mod: CPTII,S$GLB,, | Performed by: FAMILY MEDICINE

## 2018-12-18 PROCEDURE — 99999 PR PBB SHADOW E&M-EST. PATIENT-LVL III: CPT | Mod: PBBFAC,,, | Performed by: FAMILY MEDICINE

## 2018-12-18 RX ORDER — SERTRALINE HYDROCHLORIDE 100 MG/1
100 TABLET, FILM COATED ORAL DAILY
Qty: 30 TABLET | Refills: 1 | Status: SHIPPED | OUTPATIENT
Start: 2018-12-18 | End: 2019-03-27

## 2018-12-18 RX ORDER — PREDNISONE 50 MG/1
50 TABLET ORAL DAILY
Qty: 5 TABLET | Refills: 0 | Status: SHIPPED | OUTPATIENT
Start: 2018-12-18 | End: 2018-12-23

## 2018-12-18 NOTE — PROGRESS NOTES
"Subjective:   Patient ID: Edin Green is a 67 y.o. male.  Chief Complaint:  Anxiety and Flank Pain      PCP Dr. Paris.    Presents for evaluation of left flank pain and increased anxiety / depressive symptoms.    For mood, previously treated with Zoloft 100 mg daily.  Denies side effects.  Just discontinued medication when "mood was better".  Denies any specific trigger.  Does report increased worry.  Convince something is wrong with kidneys.  Has recently been evaluated by Urology and negative.  Also with some difficulty focusing, concentrating, and memory.  All more sudden in nature and coincides with increased overall anxiety.    Regarding left flank pain.  Approximately 2-4 weeks duration.  No trauma.  No other constitutional symptoms.  No change in urine.  Pain is worse with certain movements.  Pain is worse when dancing.  Labs June 2018 stable including A1c remaining in prediabetic/ impaired glucose tolerance range.      Flank Pain   This is a chronic problem. The current episode started 1 to 4 weeks ago. The problem occurs intermittently. The problem has been waxing and waning since onset. The pain is present in the sacro-iliac. The quality of the pain is described as aching. The pain does not radiate. The pain is at a severity of 5/10. The pain is moderate. The symptoms are aggravated by bending, position and twisting. Stiffness is present: None. Pertinent negatives include no abdominal pain, bladder incontinence, bowel incontinence, chest pain, dysuria, fever, headaches, leg pain, numbness, paresis, paresthesias, pelvic pain, perianal numbness, tingling, weakness or weight loss. He has tried nothing for the symptoms.     Review of Systems   Constitutional: Positive for fatigue. Negative for chills, fever and weight loss.   Respiratory: Negative for shortness of breath.    Cardiovascular: Negative for chest pain, palpitations and leg swelling.   Gastrointestinal: Negative for abdominal pain, bowel " "incontinence, constipation, diarrhea, nausea and vomiting.   Genitourinary: Positive for flank pain. Negative for bladder incontinence, decreased urine volume, difficulty urinating, discharge, dysuria, frequency, hematuria, pelvic pain, penile pain, penile swelling, scrotal swelling, testicular pain and urgency.   Musculoskeletal: Positive for back pain. Negative for arthralgias, gait problem, joint swelling, myalgias, neck pain and neck stiffness.   Skin: Negative for rash.   Neurological: Negative for dizziness, tingling, tremors, syncope, weakness, light-headedness, numbness, headaches and paresthesias.   Psychiatric/Behavioral: Positive for decreased concentration, dysphoric mood and sleep disturbance. Negative for agitation, behavioral problems, confusion, hallucinations, self-injury and suicidal ideas. The patient is nervous/anxious. The patient is not hyperactive.      Objective:   /68 (BP Location: Right arm, Patient Position: Sitting, BP Method: Large (Manual))   Pulse 96   Temp 97.6 °F (36.4 °C) (Oral)   Ht 6' 3" (1.905 m)   Wt 96.3 kg (212 lb 4.9 oz)   SpO2 97%   BMI 26.54 kg/m²     Physical Exam   Constitutional: He is oriented to person, place, and time. Vital signs are normal. He appears well-developed and well-nourished. No distress.   Eyes: No scleral icterus.   Neck: Normal range of motion and full passive range of motion without pain. Neck supple.   Cardiovascular: Normal rate, regular rhythm and normal heart sounds. Exam reveals no gallop and no friction rub.   No murmur heard.  Pulmonary/Chest: Effort normal and breath sounds normal. He has no wheezes. He has no rhonchi. He has no rales.   Abdominal: Soft. He exhibits no distension. There is no tenderness. There is no rebound, no guarding and no CVA tenderness.   Musculoskeletal: He exhibits no edema.        Lumbar back: He exhibits tenderness (Lateral muscle insertion site on iliac crest), bony tenderness (Iliac crest and sacroiliac " joint) and pain. He exhibits normal range of motion, no swelling, no edema, no deformity, no laceration and no spasm.   Neurological: He is alert and oriented to person, place, and time. He has normal strength and normal reflexes. No sensory deficit. He displays a negative Romberg sign. Coordination and gait normal.    Negative straight leg raise x2   Skin: Skin is warm, dry and intact. No bruising, no burn, no laceration and no rash noted.   Psychiatric: Judgment and thought content normal. His affect is not angry, not blunt, not labile and not inappropriate. His speech is tangential. His speech is not rapid and/or pressured, not delayed and not slurred. He is slowed and withdrawn. He is not agitated, not aggressive, not hyperactive, not actively hallucinating and not combative. Cognition and memory are normal. He exhibits a depressed mood. He is communicative. He is inattentive.   Nursing note and vitals reviewed.    Assessment:     1. Left flank pain R10.9 789.09   2. Anxiety and depression F41.9 300.00    F32.9 311     Plan:   Left flank pain  -     Urinalysis; Future; Expected date: 12/18/2018  -     predniSONE (DELTASONE) 50 MG Tab; Take 1 tablet (50 mg total) by mouth once daily. for 5 days  Dispense: 5 tablet; Refill: 0   patient education on iliac crest pain and sacroiliitis.    Doubt renal related.    Check urinalysis.  Treat as indicated.    Short course prednisone.    Ice or heat.    Avoid triggering activities.    If no significant improvement, physical therapy referral.    Anxiety and depression  -     sertraline (ZOLOFT) 100 MG tablet; Take 1 tablet (100 mg total) by mouth once daily.  Dispense: 30 tablet; Refill: 1    Majority of symptoms like psychosomatic based on worsening mood.    Restart Zoloft 100 mg daily.      Follow-up Dr. Paris 6 weeks.

## 2018-12-20 ENCOUNTER — CLINICAL SUPPORT (OUTPATIENT)
Dept: PULMONOLOGY | Facility: CLINIC | Age: 68
End: 2018-12-20
Payer: MEDICARE

## 2018-12-20 ENCOUNTER — OFFICE VISIT (OUTPATIENT)
Dept: PULMONOLOGY | Facility: CLINIC | Age: 68
End: 2018-12-20
Payer: MEDICARE

## 2018-12-20 VITALS
WEIGHT: 212 LBS | BODY MASS INDEX: 26.36 KG/M2 | SYSTOLIC BLOOD PRESSURE: 140 MMHG | RESPIRATION RATE: 17 BRPM | HEART RATE: 74 BPM | OXYGEN SATURATION: 99 % | HEIGHT: 75 IN | DIASTOLIC BLOOD PRESSURE: 78 MMHG

## 2018-12-20 DIAGNOSIS — G47.33 OSA (OBSTRUCTIVE SLEEP APNEA): ICD-10-CM

## 2018-12-20 DIAGNOSIS — J45.20 MILD INTERMITTENT ASTHMA WITHOUT COMPLICATION: ICD-10-CM

## 2018-12-20 DIAGNOSIS — J45.909 ASTHMA, UNSPECIFIED ASTHMA SEVERITY, UNSPECIFIED WHETHER COMPLICATED, UNSPECIFIED WHETHER PERSISTENT: Primary | ICD-10-CM

## 2018-12-20 DIAGNOSIS — J30.89 ALLERGIC RHINITIS DUE TO OTHER ALLERGIC TRIGGER, UNSPECIFIED SEASONALITY: ICD-10-CM

## 2018-12-20 PROCEDURE — 99214 OFFICE O/P EST MOD 30 MIN: CPT | Mod: 25,S$GLB,, | Performed by: NURSE PRACTITIONER

## 2018-12-20 PROCEDURE — 99999 PR PBB SHADOW E&M-EST. PATIENT-LVL IV: CPT | Mod: PBBFAC,,, | Performed by: NURSE PRACTITIONER

## 2018-12-20 PROCEDURE — 3078F DIAST BP <80 MM HG: CPT | Mod: CPTII,S$GLB,, | Performed by: NURSE PRACTITIONER

## 2018-12-20 PROCEDURE — 90662 IIV NO PRSV INCREASED AG IM: CPT | Mod: S$GLB,,, | Performed by: NURSE PRACTITIONER

## 2018-12-20 PROCEDURE — 94060 EVALUATION OF WHEEZING: CPT | Mod: S$GLB,,, | Performed by: INTERNAL MEDICINE

## 2018-12-20 PROCEDURE — 3077F SYST BP >= 140 MM HG: CPT | Mod: CPTII,S$GLB,, | Performed by: NURSE PRACTITIONER

## 2018-12-20 PROCEDURE — G0008 ADMIN INFLUENZA VIRUS VAC: HCPCS | Mod: S$GLB,,, | Performed by: NURSE PRACTITIONER

## 2018-12-20 PROCEDURE — 1101F PT FALLS ASSESS-DOCD LE1/YR: CPT | Mod: CPTII,S$GLB,, | Performed by: NURSE PRACTITIONER

## 2018-12-20 NOTE — PROGRESS NOTES
"Subjective:      Patient ID: Edin Green is a 67 y.o. male.    Chief Complaint: Asthma    HPI  Patient with asthma and allergic rhinitis presents to the office today for evaluation.  Patient last seen in September with increased asthma symptoms.  Breo was prescribed in nebulizer.  He states he has not had to use his nebulizer and due to lack of symptoms not taking Breo at this time.  No use of rescue inhaler.  He typically has more problems during heat and humidity and when he is around smoke in a casino.  He states if he takes of Benadryl before going to the casino, he typically does not have symptoms.  He has Flonase, Singulair, Astelin for rhinitis  Noncompliance with CPAP for sleep apnea.    Patient Active Problem List   Diagnosis    Hypertensive retinopathy of both eyes    Refractive error - Both Eyes    Nuclear sclerosis    HTN (hypertension)    Anxiety and depression    Hypogonadism in male    ED (erectile dysfunction)    LAURENCE (obstructive sleep apnea)    Hypercholesteremia    Mild intermittent asthma without complication    Allergic rhinitis    Cortical senile cataract of both eyes    Posterior vitreous detachment of both eyes    Hyperthyroidism    Graves disease    Iron deficiency anemia due to chronic blood loss    Internal hemorrhoid, bleeding    IGT (impaired glucose tolerance)    Asthma       BP (!) 140/78   Pulse 74   Resp 17   Ht 6' 3" (1.905 m)   Wt 96.2 kg (212 lb)   SpO2 99%   BMI 26.50 kg/m²   Body mass index is 26.5 kg/m².    Review of Systems   Constitutional: Negative.    HENT: Negative.    Respiratory: Negative.    Cardiovascular: Negative.    Musculoskeletal: Negative.    Gastrointestinal: Negative.    Neurological: Negative.    Psychiatric/Behavioral: Negative.      Objective:      Physical Exam   Constitutional: He is oriented to person, place, and time. He appears well-developed and well-nourished.   HENT:   Head: Normocephalic and atraumatic.   Mallampati " Score: III   Neck: Normal range of motion. Neck supple.   Cardiovascular: Normal rate and regular rhythm.   Pulmonary/Chest: Effort normal and breath sounds normal.   Abdominal: Soft.   Musculoskeletal: He exhibits no edema.   Neurological: He is alert and oriented to person, place, and time.   Skin: Skin is warm and dry.   Psychiatric: He has a normal mood and affect.     Personal Diagnostic Review  Improved. normal  Results for orders placed or performed in visit on 12/20/18   Spirometry with/without bronchodilator   Result Value Ref Range    Post FEV1 2.80 2.24 - 4.16 L    Post FEV1 FVC 72.74 63.22 - 87.14 %    Post FVC 3.85 3.10 - 5.48 L    Post PEF 7.80 6.32 - 12.42 L/s    FEV6 POST 3.69 3.41 - 5.53 L    Post FEF 25 75 1.91 0.89 - 4.76 L/s    Post  9.48 sec    Pre FEV1 2.62 2.24 - 4.16 L    Pre FEV1 FVC 65.83 63.22 - 87.14 %    Pre FVC 3.98 3.10 - 5.48 L    Pre PEF 8.25 6.32 - 12.42 L/s    FEV6 PRE 3.66 3.41 - 5.53 L    Pre FEF 25 75 1.25 0.89 - 4.76 L/s    Pre  10.59 sec    FVC Ref 4.28     FVC LLN 3.10     FVC Pre Ref 92.9 %    FVC Post Ref 89.9 %    FVC Chg -3.2 %    FEV1 Ref 3.23     FEV1 LLN 2.24     FEV1 Pre Ref 81.0 %    FEV1 Post Ref 86.6 %    FEV1 Chg 6.9 %    FEV1 FVC Ref 76     FEV1 FVC LLN 63     FEV1 FVC Pre Ref 86.7 %    FEV1 FVC Post Ref 95.8 %    FEV1 FVC Chg 10.5 %    FEV6 Ref 4.47     FEV6 LLN 3.41     FEV6 Pre Ref 81.8 %    FEV6 Post Ref 82.5 %    FEV6 Chg 0.9 %    FEF 25 75 Ref 2.44     FEF 25 75 LLN 0.89     FEF 25 75 Pre Ref 51.3 %    FEF 25 75 Post Ref 78.2 %    FEF 25 75 Chg 52.5 %    PEF Ref 9.37     PEF LLN 6.32     PEF Pre Ref 88.1 %    PEF Post Ref 83.3 %    PEF Chg -5.5 %    ZLO068 Chg -10.5 %    MVV Ref 149     MVV         Assessment:       1. Asthma, unspecified asthma severity, unspecified whether complicated, unspecified whether persistent    2. LAURENCE (obstructive sleep apnea)    3. Allergic rhinitis due to other allergic trigger, unspecified seasonality         Outpatient Encounter Medications as of 12/20/2018   Medication Sig Dispense Refill    albuterol (PROVENTIL) 2.5 mg /3 mL (0.083 %) nebulizer solution Take 3 mLs (2.5 mg total) by nebulization every 4 (four) hours as needed for Wheezing. 2 Box 6    albuterol 90 mcg/actuation inhaler Inhale 2 puffs into the lungs every 6 (six) hours as needed for Wheezing. 1 Inhaler 0    amLODIPine (NORVASC) 5 MG tablet TAKE ONE TABLET BY MOUTH ONCE DAILY 30 tablet 11    aspirin (ECOTRIN) 81 MG EC tablet Take 81 mg by mouth once daily.      benzonatate (TESSALON) 200 MG capsule Take 1 capsule (200 mg total) by mouth 3 (three) times daily as needed for Cough. 30 capsule 0    econazole nitrate 1 % cream AAA bid 30 g 3    fluticasone (FLONASE) 50 mcg/actuation nasal spray 2 sprays (100 mcg total) by Each Nare route once daily. 16 g 2    fluticasone-vilanterol (BREO ELLIPTA) 100-25 mcg/dose diskus inhaler Inhale 1 puff into the lungs once daily. 1 each 11    levalbuterol (XOPENEX) 1.25 mg/3 mL nebulizer solution INHALE ONE VIAL EVERY 6 TO 8 HOURS AS NEEDED FOR WHEEZING  0    losartan-hydrochlorothiazide 100-25 mg (HYZAAR) 100-25 mg per tablet TAKE ONE TABLET BY MOUTH ONCE DAILY 30 tablet 11    miconazole NITRATE 2 % (ZEASORB AF) 2 % top powder Use two to three times daily to prevent rash 85 g 11    montelukast (SINGULAIR) 10 mg tablet TAKE ONE TABLET BY MOUTH ONCE DAILY IN THE EVENING 30 tablet 11    multivitamin (THERAGRAN) per tablet Take 1 tablet by mouth.      polyethylene glycol (GLYCOLAX) 17 gram/dose powder       pravastatin (PRAVACHOL) 40 MG tablet TAKE ONE TABLET BY MOUTH ONCE DAILY 30 tablet 11    predniSONE (DELTASONE) 50 MG Tab Take 1 tablet (50 mg total) by mouth once daily. for 5 days 5 tablet 0    promethazine-dextromethorphan (PROMETHAZINE-DM) 6.25-15 mg/5 mL Syrp Take 5 mLs by mouth every 6 (six) hours as needed (congestion). 120 mL 0    sertraline (ZOLOFT) 100 MG tablet Take 1 tablet (100 mg total) by  mouth once daily. 30 tablet 1    sildenafil (VIAGRA) 100 MG tablet TAKE AS DIRECTED 10 tablet 11    triamcinolone acetonide 0.1% (KENALOG) 0.1 % ointment AAA 1-2 times daily as needed for rash 80 g 1    azelastine (ASTELIN) 137 mcg (0.1 %) nasal spray 2 sprays (274 mcg total) by Nasal route 2 (two) times daily. 30 mL 11    tadalafil (CIALIS) 20 MG Tab Use one tablet every 36 hours 10 tablet 11    tamsulosin (FLOMAX) 0.4 mg Cp24 Take 0.4 mg by mouth once daily.       No facility-administered encounter medications on file as of 12/20/2018.      Orders Placed This Encounter   Procedures    Spirometry with/without bronchodilator     Standing Status:   Future     Standing Expiration Date:   12/20/2019     Plan:   Albuterol if needed. If using on regular basis, start on BREO.   Call if any issues, otherwise follow up in one year.

## 2018-12-21 LAB
BRPFT: NORMAL
FEF 25 75 CHG: 52.5 %
FEF 25 75 LLN: 0.89
FEF 25 75 POST REF: 78.2 %
FEF 25 75 PRE REF: 51.3 %
FEF 25 75 REF: 2.44
FET100 CHG: -10.5 %
FEV1 CHG: 6.9 %
FEV1 FVC CHG: 10.5 %
FEV1 FVC LLN: 63
FEV1 FVC POST REF: 95.8 %
FEV1 FVC PRE REF: 86.7 %
FEV1 FVC REF: 76
FEV1 LLN: 2.24
FEV1 POST REF: 86.6 %
FEV1 PRE REF: 81 %
FEV1 REF: 3.23
FEV6 CHG: 0.9 %
FEV6 LLN: 3.41
FEV6 POST REF: 82.5 %
FEV6 POST: 3.69 L (ref 3.41–5.53)
FEV6 PRE REF: 81.8 %
FEV6 PRE: 3.66 L (ref 3.41–5.53)
FEV6 REF: 4.47
FVC CHG: -3.2 %
FVC LLN: 3.1
FVC POST REF: 89.9 %
FVC PRE REF: 92.9 %
FVC REF: 4.28
MVV LLN: 127
MVV REF: 149
PEF CHG: -5.5 %
PEF LLN: 6.32
PEF POST REF: 83.3 %
PEF PRE REF: 88.1 %
PEF REF: 9.37
POST FEF 25 75: 1.91 L/S (ref 0.89–4.76)
POST FET 100: 9.48 SEC
POST FEV1 FVC: 72.74 % (ref 63.22–87.14)
POST FEV1: 2.8 L (ref 2.24–4.16)
POST FVC: 3.85 L (ref 3.1–5.48)
POST PEF: 7.8 L/S (ref 6.32–12.42)
PRE FEF 25 75: 1.25 L/S (ref 0.89–4.76)
PRE FET 100: 10.59 SEC
PRE FEV1 FVC: 65.83 % (ref 63.22–87.14)
PRE FEV1: 2.62 L (ref 2.24–4.16)
PRE FVC: 3.98 L (ref 3.1–5.48)
PRE PEF: 8.25 L/S (ref 6.32–12.42)

## 2019-02-16 ENCOUNTER — NURSE TRIAGE (OUTPATIENT)
Dept: ADMINISTRATIVE | Facility: CLINIC | Age: 69
End: 2019-02-16

## 2019-02-16 NOTE — TELEPHONE ENCOUNTER
Reason for Disposition   Nursing judgment or information in reference    Protocols used: ST NO GUIDELINE AVAILABLE - SICK ADULT CALL-A-AH  spouse called re anx/dep. Seen in dec- put on med Zoloft. Spouses states she' guesses pt started taking' - stopped taking med as he didnt like way it made him feel. Pt Pacing, having to be forced to eat. No CP, no SOB. Lost 20 lbs since dec. pt denies SI/HI.  Stopped med about 10 days ago. Rec ED due to withdrawal sx from abruptly stopping antidepressant.

## 2019-03-04 ENCOUNTER — TELEPHONE (OUTPATIENT)
Dept: INTERNAL MEDICINE | Facility: CLINIC | Age: 69
End: 2019-03-04

## 2019-03-04 DIAGNOSIS — R73.02 IGT (IMPAIRED GLUCOSE TOLERANCE): Primary | ICD-10-CM

## 2019-03-04 NOTE — TELEPHONE ENCOUNTER
S/w pts wife. Pt was diagnosed with diabetes and the pts wife wanted to know if there is anything he needs to do before he sees you . Please advise

## 2019-03-04 NOTE — TELEPHONE ENCOUNTER
----- Message from Cassandra Alfaro sent at 3/4/2019 11:29 AM CST -----  Contact: wife  Pt was diagnosed diabetes at the behavioral center.  Would like an appt before the first available in May. Please call Darryl Green at [phone]

## 2019-03-06 ENCOUNTER — LAB VISIT (OUTPATIENT)
Dept: LAB | Facility: HOSPITAL | Age: 69
End: 2019-03-06
Attending: FAMILY MEDICINE
Payer: MEDICARE

## 2019-03-06 DIAGNOSIS — R73.02 IGT (IMPAIRED GLUCOSE TOLERANCE): ICD-10-CM

## 2019-03-06 LAB
ESTIMATED AVG GLUCOSE: 148 MG/DL
HBA1C MFR BLD HPLC: 6.8 %

## 2019-03-06 PROCEDURE — 36415 COLL VENOUS BLD VENIPUNCTURE: CPT

## 2019-03-06 PROCEDURE — 83036 HEMOGLOBIN GLYCOSYLATED A1C: CPT

## 2019-03-14 ENCOUNTER — PATIENT MESSAGE (OUTPATIENT)
Dept: INTERNAL MEDICINE | Facility: CLINIC | Age: 69
End: 2019-03-14

## 2019-03-18 ENCOUNTER — LAB VISIT (OUTPATIENT)
Dept: LAB | Facility: HOSPITAL | Age: 69
End: 2019-03-18
Attending: UROLOGY
Payer: MEDICARE

## 2019-03-18 DIAGNOSIS — R97.20 ELEVATED PSA: ICD-10-CM

## 2019-03-18 DIAGNOSIS — N41.9 PROSTATITIS, UNSPECIFIED PROSTATITIS TYPE: ICD-10-CM

## 2019-03-18 LAB — COMPLEXED PSA SERPL-MCNC: 5.7 NG/ML

## 2019-03-18 PROCEDURE — 84153 ASSAY OF PSA TOTAL: CPT

## 2019-03-18 PROCEDURE — 36415 COLL VENOUS BLD VENIPUNCTURE: CPT

## 2019-03-25 ENCOUNTER — OFFICE VISIT (OUTPATIENT)
Dept: UROLOGY | Facility: CLINIC | Age: 69
End: 2019-03-25
Payer: MEDICARE

## 2019-03-25 VITALS
HEART RATE: 88 BPM | WEIGHT: 212.06 LBS | SYSTOLIC BLOOD PRESSURE: 138 MMHG | DIASTOLIC BLOOD PRESSURE: 72 MMHG | BODY MASS INDEX: 26.37 KG/M2 | HEIGHT: 75 IN

## 2019-03-25 DIAGNOSIS — R97.20 ELEVATED PSA: Primary | ICD-10-CM

## 2019-03-25 DIAGNOSIS — R97.20 INCREASED PROSTATE SPECIFIC ANTIGEN (PSA) VELOCITY: ICD-10-CM

## 2019-03-25 PROCEDURE — 99214 OFFICE O/P EST MOD 30 MIN: CPT | Mod: S$GLB,,, | Performed by: UROLOGY

## 2019-03-25 PROCEDURE — 1101F PT FALLS ASSESS-DOCD LE1/YR: CPT | Mod: CPTII,S$GLB,, | Performed by: UROLOGY

## 2019-03-25 PROCEDURE — 99214 PR OFFICE/OUTPT VISIT, EST, LEVL IV, 30-39 MIN: ICD-10-PCS | Mod: S$GLB,,, | Performed by: UROLOGY

## 2019-03-25 PROCEDURE — 3075F SYST BP GE 130 - 139MM HG: CPT | Mod: CPTII,S$GLB,, | Performed by: UROLOGY

## 2019-03-25 PROCEDURE — 99999 PR PBB SHADOW E&M-EST. PATIENT-LVL III: CPT | Mod: PBBFAC,,, | Performed by: UROLOGY

## 2019-03-25 PROCEDURE — 99999 PR PBB SHADOW E&M-EST. PATIENT-LVL III: ICD-10-PCS | Mod: PBBFAC,,, | Performed by: UROLOGY

## 2019-03-25 PROCEDURE — 1101F PR PT FALLS ASSESS DOC 0-1 FALLS W/OUT INJ PAST YR: ICD-10-PCS | Mod: CPTII,S$GLB,, | Performed by: UROLOGY

## 2019-03-25 PROCEDURE — 3078F PR MOST RECENT DIASTOLIC BLOOD PRESSURE < 80 MM HG: ICD-10-PCS | Mod: CPTII,S$GLB,, | Performed by: UROLOGY

## 2019-03-25 PROCEDURE — 3078F DIAST BP <80 MM HG: CPT | Mod: CPTII,S$GLB,, | Performed by: UROLOGY

## 2019-03-25 PROCEDURE — 3075F PR MOST RECENT SYSTOLIC BLOOD PRESS GE 130-139MM HG: ICD-10-PCS | Mod: CPTII,S$GLB,, | Performed by: UROLOGY

## 2019-03-25 RX ORDER — CIPROFLOXACIN 500 MG/1
500 TABLET ORAL EVERY 12 HOURS
Qty: 3 TABLET | Refills: 0 | Status: SHIPPED | OUTPATIENT
Start: 2019-03-25 | End: 2019-07-15

## 2019-03-25 NOTE — PROGRESS NOTES
"Chief Complaint: Elev PSA    HPI:   3/25/19: PSA shows a sustained positive trend.  [facility] biopsy.  Reviewed history in detail.  9/17/18: 68 yo man here to discuss elevated PSA.  Was seeing Dr. Sanchez.  I spoke to his [employer] group last year.  No abd/pelvic pain and no exac/rel factors.  No hematuria.  No urolithiasis.  No urinary bother.  Cialis/viagra prn for ED. Normal sexual function.  Was told he had a "mushy" prostate and was given cipro by Dr. Paris.  No LUTS.    Allergies:  Celebrex [celecoxib]; Shrimp; Tomato; and Venom-wasp    Medications:  has a current medication list which includes the following prescription(s): albuterol, albuterol, amlodipine, aspirin, benzonatate, econazole nitrate, fluticasone, fluticasone-vilanterol, levalbuterol, losartan-hydrochlorothiazide 100-25 mg, miconazole nitrate 2 %, montelukast, multivitamin, polyethylene glycol, pravastatin, promethazine-dextromethorphan, sertraline, sildenafil, tamsulosin, triamcinolone acetonide 0.1%, azelastine, and tadalafil.    Review of Systems:  General: No fever, chills, fatigability, or weight loss.  Skin: No rashes, itching, or changes in color or texture of skin.  Chest: Denies CHAMBERLAIN, cyanosis, wheezing, cough, and sputum production.  Abdomen: Appetite fine. No weight loss. Denies diarrhea, abdominal pain, hematemesis, or blood in stool.  Musculoskeletal: No joint stiffness or swelling. Denies back pain.  : As above.  All other review of systems negative.    PMH:   has a past medical history of Asthma, Benign hematuria, Cataract (OU), Colon polyp, Depression, ED (erectile dysfunction), Graves disease, HTN (hypertension), Hypercholesteremia, Hypertensive retinopathy of both eyes, Hypogonadism, and LAURENCE (obstructive sleep apnea).    PSH:   has no past surgical history on file.    FamHx: family history includes Heart defect in his mother; Hypertension in his unknown relative.    SocHx:  reports that he has never smoked. He has never used smokeless " tobacco. He reports that he drinks about 0.6 oz of alcohol per week. He reports that he does not use drugs.      Physical Exam:  Vitals:    03/25/19 0957   BP: 138/72   Pulse: 88     General: A&Ox3, no apparent distress, no deformities  Neck: No masses, normal thyroid  Lungs: normal inspiration, no use of accessory muscles  Heart: normal pulse, no arrhythmias  Abdomen: Soft, NT, ND  Skin: The skin is warm and dry. No jaundice.  Ext: No c/c/e.  : 9/18: Test desc ivan, no abnormalities of epididymus. Penis normal, with normal penile and scrotal skin. Meatus normal. Normal rectal tone, no hemorrhoids. Prost 40 gm no nodules or masses appreciated. SV not palpable. Perineum and anus normal.    Labs/Studies:   PSA    9/08: 1.7    7/14: 3.2    5/17: 3.2    5/18: 5.1    6/18: 4.3    3/19: 6.8, 5.7    Impression/Plan:   1. PSA up some from prior years sustained upward trend.  Rec biopsy.  Cipro rx.  2. No PDE-5 refills needed

## 2019-03-27 ENCOUNTER — OFFICE VISIT (OUTPATIENT)
Dept: INTERNAL MEDICINE | Facility: CLINIC | Age: 69
End: 2019-03-27
Payer: MEDICARE

## 2019-03-27 VITALS
WEIGHT: 196 LBS | DIASTOLIC BLOOD PRESSURE: 74 MMHG | HEIGHT: 75 IN | OXYGEN SATURATION: 96 % | BODY MASS INDEX: 24.37 KG/M2 | HEART RATE: 84 BPM | TEMPERATURE: 98 F | SYSTOLIC BLOOD PRESSURE: 130 MMHG

## 2019-03-27 DIAGNOSIS — F32.A DEPRESSION, UNSPECIFIED DEPRESSION TYPE: ICD-10-CM

## 2019-03-27 DIAGNOSIS — I10 ESSENTIAL HYPERTENSION: ICD-10-CM

## 2019-03-27 DIAGNOSIS — E05.00 GRAVES DISEASE: ICD-10-CM

## 2019-03-27 DIAGNOSIS — G93.40 ENCEPHALOPATHY: ICD-10-CM

## 2019-03-27 DIAGNOSIS — E05.90 HYPERTHYROIDISM: ICD-10-CM

## 2019-03-27 DIAGNOSIS — R41.3 MEMORY LOSS: ICD-10-CM

## 2019-03-27 DIAGNOSIS — E11.9 TYPE 2 DIABETES MELLITUS WITHOUT COMPLICATION, WITHOUT LONG-TERM CURRENT USE OF INSULIN: Primary | ICD-10-CM

## 2019-03-27 DIAGNOSIS — E29.1 HYPOGONADISM IN MALE: ICD-10-CM

## 2019-03-27 PROCEDURE — 1101F PT FALLS ASSESS-DOCD LE1/YR: CPT | Mod: CPTII,S$GLB,, | Performed by: FAMILY MEDICINE

## 2019-03-27 PROCEDURE — 99215 OFFICE O/P EST HI 40 MIN: CPT | Mod: S$GLB,,, | Performed by: FAMILY MEDICINE

## 2019-03-27 PROCEDURE — 99215 PR OFFICE/OUTPT VISIT, EST, LEVL V, 40-54 MIN: ICD-10-PCS | Mod: S$GLB,,, | Performed by: FAMILY MEDICINE

## 2019-03-27 PROCEDURE — 99999 PR PBB SHADOW E&M-EST. PATIENT-LVL IV: ICD-10-PCS | Mod: PBBFAC,,, | Performed by: FAMILY MEDICINE

## 2019-03-27 PROCEDURE — 3078F PR MOST RECENT DIASTOLIC BLOOD PRESSURE < 80 MM HG: ICD-10-PCS | Mod: CPTII,S$GLB,, | Performed by: FAMILY MEDICINE

## 2019-03-27 PROCEDURE — 3044F PR MOST RECENT HEMOGLOBIN A1C LEVEL <7.0%: ICD-10-PCS | Mod: CPTII,S$GLB,, | Performed by: FAMILY MEDICINE

## 2019-03-27 PROCEDURE — 3044F HG A1C LEVEL LT 7.0%: CPT | Mod: CPTII,S$GLB,, | Performed by: FAMILY MEDICINE

## 2019-03-27 PROCEDURE — 3075F SYST BP GE 130 - 139MM HG: CPT | Mod: CPTII,S$GLB,, | Performed by: FAMILY MEDICINE

## 2019-03-27 PROCEDURE — 3078F DIAST BP <80 MM HG: CPT | Mod: CPTII,S$GLB,, | Performed by: FAMILY MEDICINE

## 2019-03-27 PROCEDURE — 1101F PR PT FALLS ASSESS DOC 0-1 FALLS W/OUT INJ PAST YR: ICD-10-PCS | Mod: CPTII,S$GLB,, | Performed by: FAMILY MEDICINE

## 2019-03-27 PROCEDURE — 3075F PR MOST RECENT SYSTOLIC BLOOD PRESS GE 130-139MM HG: ICD-10-PCS | Mod: CPTII,S$GLB,, | Performed by: FAMILY MEDICINE

## 2019-03-27 PROCEDURE — 99999 PR PBB SHADOW E&M-EST. PATIENT-LVL IV: CPT | Mod: PBBFAC,,, | Performed by: FAMILY MEDICINE

## 2019-03-27 RX ORDER — BUPROPION HYDROCHLORIDE 150 MG/1
150 TABLET, EXTENDED RELEASE ORAL 2 TIMES DAILY
COMMUNITY
Start: 2019-03-03 | End: 2020-05-18

## 2019-03-27 RX ORDER — MIRTAZAPINE 15 MG/1
TABLET, FILM COATED ORAL
COMMUNITY
Start: 2019-03-03 | End: 2019-07-15

## 2019-03-27 RX ORDER — OXCARBAZEPINE 150 MG/1
150 TABLET, FILM COATED ORAL 2 TIMES DAILY
COMMUNITY
Start: 2019-03-03 | End: 2023-10-26

## 2019-03-27 RX ORDER — RISPERIDONE 2 MG/1
1 TABLET, ORALLY DISINTEGRATING ORAL NIGHTLY
COMMUNITY
Start: 2019-03-03

## 2019-03-28 ENCOUNTER — PATIENT MESSAGE (OUTPATIENT)
Dept: INTERNAL MEDICINE | Facility: CLINIC | Age: 69
End: 2019-03-28

## 2019-03-28 ENCOUNTER — TELEPHONE (OUTPATIENT)
Dept: DIABETES | Facility: CLINIC | Age: 69
End: 2019-03-28

## 2019-03-28 NOTE — PROGRESS NOTES
Subjective:       Patient ID: Edin Geren is a 68 y.o. male.    Chief Complaint: Multiple issues see below    HPI here with wife in follow-up new diagnosis type 2 diabetes mellitus discussed; ate a lot more with new meds defers meds for now    Depression anxiety improved on new medication was admitted inpatient no recent suicidal ideation has follow-up with that new psychiatrist Dr. Chase Ridley he was in [facility] and once February 2018    During inpatient admission with titration of medications had 2 days of slurred speech per he and wife no other focal neural neurologic symptoms is resolved he asked about possible stroke    Hyperthyroid history normal TSH last year off methimazole overdue for endocrine follow-up in TSH; nonoch tsh suppressed via care everywhere 2/19    Secondary to patient's reported memory loss and slow motor activity he has been referred to neurologist for possible dementia Parkinson's workup no tremor; no headache    Hypertension: blood pressures at home normal. Tolerating medicine.       Elevated PSA prostate biopsy planned    Past Medical History:   Diagnosis Date    Asthma     Benign hematuria     Cataract OU    Colon polyp     dr strauss    Depression     ED (erectile dysfunction)     Graves disease     story    HTN (hypertension)     Hypercholesteremia     Hypertensive retinopathy of both eyes     Hypogonadism     LAURENCE (obstructive sleep apnea)     Type 2 diabetes mellitus      History reviewed. No pertinent surgical history.  Family History   Problem Relation Age of Onset    Hypertension Unknown     Heart defect Mother     Strabismus Neg Hx     Retinal detachment Neg Hx     Macular degeneration Neg Hx     Glaucoma Neg Hx     Blindness Neg Hx     Amblyopia Neg Hx      Social History     Socioeconomic History    Marital status:      Spouse name: CHUCHO    Number of children: 3    Years of education: None    Highest education level:  None   Occupational History    None   Social Needs    Financial resource strain: None    Food insecurity:     Worry: None     Inability: None    Transportation needs:     Medical: None     Non-medical: None   Tobacco Use    Smoking status: Never Smoker    Smokeless tobacco: Never Used   Substance and Sexual Activity    Alcohol use: Yes     Alcohol/week: 0.6 oz     Types: 1 Cans of beer per week    Drug use: No    Sexual activity: Yes     Partners: Female   Lifestyle    Physical activity:     Days per week: None     Minutes per session: None    Stress: None   Relationships    Social connections:     Talks on phone: None     Gets together: None     Attends Confucianist service: None     Active member of club or organization: None     Attends meetings of clubs or organizations: None     Relationship status: None    Intimate partner violence:     Fear of current or ex partner: None     Emotionally abused: None     Physically abused: None     Forced sexual activity: None   Other Topics Concern    None   Social History Narrative    Wears a seatbelt.       Review of Systems  Cardiovascular: no chest pain  Chest: no shortness of breath  Abd: no abd pain  Remainder review of systems negative    Objective:    eduardo wt verified  Physical Exam   Constitutional: He is oriented to person, place, and time. He appears well-developed and well-nourished.   HENT:   Head: Normocephalic and atraumatic.   Right Ear: External ear normal.   Left Ear: External ear normal.   Nose: Nose normal.   Mouth/Throat: Oropharynx is clear and moist.   Nl tms   Eyes: Pupils are equal, round, and reactive to light. Conjunctivae and EOM are normal. No scleral icterus.   Neck: Normal range of motion. Neck supple. Carotid bruit is not present.   Cardiovascular: Normal rate, regular rhythm and normal heart sounds. Exam reveals no gallop and no friction rub.   No murmur heard.  Pulmonary/Chest: Effort normal and breath sounds normal. He has no  wheezes.   Abdominal: Soft. Bowel sounds are normal. He exhibits no distension and no mass. There is no hepatosplenomegaly. There is no tenderness. There is no rebound and no guarding.   Musculoskeletal: Normal range of motion. He exhibits no tenderness.   Lymphadenopathy:     He has no cervical adenopathy.   Neurological: He is alert and oriented to person, place, and time. He displays normal reflexes. No cranial nerve deficit. He exhibits normal muscle tone. Coordination normal.   Skin: Skin is warm and dry. No rash noted. No erythema.   Psychiatric: He has a normal mood and affect. His behavior is normal. Judgment and thought content normal.   Nursing note and vitals reviewed.      Assessment:       1. Type 2 diabetes mellitus without complication, without long-term current use of insulin    2. Encephalopathy    3. Hyperthyroidism    4. Hypogonadism in male    5. Graves disease    6. Essential hypertension    7. Depression, unspecified depression type    8. Memory loss      wt loss  Plan:     monitor wt  **Type 2 diabetes mellitus without complication, without long-term current use of insulin  -     Hemoglobin A1c; Future; Expected date: 06/25/2019  -     Microalbumin/creatinine urine ratio; Future; Expected date: 06/25/2019  -     Comprehensive metabolic panel; Future; Expected date: 06/27/2019  -     Ambulatory Referral to Diabetes Education  -     TSH; Future; Expected date: 06/27/2019  -     Lipid panel; Future; Expected date: 06/25/2019    Encephalopathy  -     MRI Brain W WO Contrast; Future; Expected date: 03/27/2019    Hyperthyroidism    Hypogonadism in male    Graves disease    Essential hypertension    Depression, unspecified depression type    Memory loss    *    All lab 3 months and f/u  F/u dr story (or avail endocrine) asap (this week)  Cancel my July lab

## 2019-03-28 NOTE — TELEPHONE ENCOUNTER
Returned patient call twice no answer, informed  to give patient next availible appointment with RD.

## 2019-03-28 NOTE — TELEPHONE ENCOUNTER
----- Message from Laine Giles sent at 3/28/2019 12:09 PM CDT -----  Contact: pt  Type:  Patient Returning Call    Who Called:pt  Who Left Message for Patient:?  Does the patient know what this is regarding?:his appt with the dietician  Would the patient rather a call back or a response via [facility]? Call back  Best Call Back Number:[phone]  Additional Information: .

## 2019-04-09 ENCOUNTER — PATIENT MESSAGE (OUTPATIENT)
Dept: INTERNAL MEDICINE | Facility: CLINIC | Age: 69
End: 2019-04-09

## 2019-04-09 ENCOUNTER — TELEPHONE (OUTPATIENT)
Dept: RADIOLOGY | Facility: HOSPITAL | Age: 69
End: 2019-04-09

## 2019-04-09 ENCOUNTER — TELEPHONE (OUTPATIENT)
Dept: INTERNAL MEDICINE | Facility: CLINIC | Age: 69
End: 2019-04-09

## 2019-04-09 DIAGNOSIS — G93.40 ENCEPHALOPATHY: Primary | ICD-10-CM

## 2019-04-09 NOTE — TELEPHONE ENCOUNTER
----- Message from Roxane Ewing sent at 4/9/2019  8:53 AM CDT -----  Good Morning!    This patient is scheduled to have a radiology exam. We will need a recent creatinine for this patient (within the last 30 days). Please place STAT order and schedule patient 1 hour prior to radiology appointment. If you have any questions please contact Radiology at [phone].    Thank You,    Roxane BOLTON  Radiology Dept

## 2019-04-10 ENCOUNTER — HOSPITAL ENCOUNTER (OUTPATIENT)
Dept: RADIOLOGY | Facility: HOSPITAL | Age: 69
Discharge: HOME | End: 2019-04-10
Attending: FAMILY MEDICINE
Payer: MEDICARE

## 2019-04-10 DIAGNOSIS — G93.40 ENCEPHALOPATHY: ICD-10-CM

## 2019-04-10 PROCEDURE — A9585 GADOBUTROL INJECTION: HCPCS | Performed by: FAMILY MEDICINE

## 2019-04-10 PROCEDURE — 70553 MRI BRAIN STEM W/O & W/DYE: CPT | Mod: TC

## 2019-04-10 PROCEDURE — 70553 MRI BRAIN W WO CONTRAST: ICD-10-PCS | Mod: 26,,, | Performed by: RADIOLOGY

## 2019-04-10 PROCEDURE — 70553 MRI BRAIN STEM W/O & W/DYE: CPT | Mod: 26,,, | Performed by: RADIOLOGY

## 2019-04-10 PROCEDURE — 25500020 PHARM REV CODE 255: Performed by: FAMILY MEDICINE

## 2019-04-10 RX ORDER — AMLODIPINE BESYLATE 5 MG/1
5 TABLET ORAL DAILY
Qty: 30 TABLET | Refills: 11 | Status: SHIPPED | OUTPATIENT
Start: 2019-04-10 | End: 2019-07-15

## 2019-04-10 RX ORDER — GADOBUTROL 604.72 MG/ML
8.5 INJECTION INTRAVENOUS
Status: COMPLETED | OUTPATIENT
Start: 2019-04-10 | End: 2019-04-10

## 2019-04-10 RX ADMIN — GADOBUTROL 8.5 ML: 604.72 INJECTION INTRAVENOUS at 01:04

## 2019-04-15 ENCOUNTER — PATIENT MESSAGE (OUTPATIENT)
Dept: INTERNAL MEDICINE | Facility: CLINIC | Age: 69
End: 2019-04-15

## 2019-04-23 ENCOUNTER — PATIENT MESSAGE (OUTPATIENT)
Dept: INTERNAL MEDICINE | Facility: CLINIC | Age: 69
End: 2019-04-23

## 2019-04-26 ENCOUNTER — NURSE TRIAGE (OUTPATIENT)
Dept: ADMINISTRATIVE | Facility: CLINIC | Age: 69
End: 2019-04-26

## 2019-04-29 ENCOUNTER — OFFICE VISIT (OUTPATIENT)
Dept: UROLOGY | Facility: CLINIC | Age: 69
End: 2019-04-29
Payer: MEDICARE

## 2019-04-29 ENCOUNTER — CLINICAL SUPPORT (OUTPATIENT)
Dept: DIABETES | Facility: CLINIC | Age: 69
End: 2019-04-29
Payer: MEDICARE

## 2019-04-29 ENCOUNTER — TELEPHONE (OUTPATIENT)
Dept: DIABETES | Facility: CLINIC | Age: 69
End: 2019-04-29

## 2019-04-29 VITALS — HEIGHT: 75 IN | BODY MASS INDEX: 23.35 KG/M2 | WEIGHT: 187.81 LBS

## 2019-04-29 VITALS
BODY MASS INDEX: 23.74 KG/M2 | HEIGHT: 75 IN | HEART RATE: 72 BPM | DIASTOLIC BLOOD PRESSURE: 84 MMHG | SYSTOLIC BLOOD PRESSURE: 142 MMHG | WEIGHT: 190.94 LBS

## 2019-04-29 DIAGNOSIS — E11.9 DIABETES MELLITUS WITHOUT COMPLICATION: Primary | ICD-10-CM

## 2019-04-29 DIAGNOSIS — R97.20 ELEVATED PSA: Primary | ICD-10-CM

## 2019-04-29 LAB
BILIRUB SERPL-MCNC: NORMAL MG/DL
BLOOD URINE, POC: NORMAL
COLOR, POC UA: YELLOW
GLUCOSE UR QL STRIP: NORMAL
KETONES UR QL STRIP: NORMAL
LEUKOCYTE ESTERASE URINE, POC: NORMAL
NITRITE, POC UA: NORMAL
PH, POC UA: 6
PROTEIN, POC: NORMAL
SPECIFIC GRAVITY, POC UA: 1.01
UROBILINOGEN, POC UA: NORMAL

## 2019-04-29 PROCEDURE — 55700 PR BIOPSY OF PROSTATE,NEEDLE/PUNCH: CPT | Mod: S$GLB,,, | Performed by: UROLOGY

## 2019-04-29 PROCEDURE — 88305 TISSUE SPECIMEN TO PATHOLOGY, UROLOGY: ICD-10-PCS | Mod: 26,,, | Performed by: PATHOLOGY

## 2019-04-29 PROCEDURE — 99499 NO LOS: ICD-10-PCS | Mod: S$GLB,,, | Performed by: UROLOGY

## 2019-04-29 PROCEDURE — 88342 IMHCHEM/IMCYTCHM 1ST ANTB: CPT | Mod: 26,,, | Performed by: PATHOLOGY

## 2019-04-29 PROCEDURE — 76942 ECHO GUIDE FOR BIOPSY: CPT | Mod: 26,59,S$GLB, | Performed by: UROLOGY

## 2019-04-29 PROCEDURE — 55700 PR BIOPSY OF PROSTATE,NEEDLE/PUNCH: ICD-10-PCS | Mod: S$GLB,,, | Performed by: UROLOGY

## 2019-04-29 PROCEDURE — 88342 IMHCHEM/IMCYTCHM 1ST ANTB: CPT | Performed by: PATHOLOGY

## 2019-04-29 PROCEDURE — 99499 UNLISTED E&M SERVICE: CPT | Mod: S$GLB,,, | Performed by: UROLOGY

## 2019-04-29 PROCEDURE — 88305 TISSUE EXAM BY PATHOLOGIST: CPT | Mod: 26,,, | Performed by: PATHOLOGY

## 2019-04-29 PROCEDURE — 76872 PR US TRANSRECTAL: ICD-10-PCS | Mod: 26,S$GLB,, | Performed by: UROLOGY

## 2019-04-29 PROCEDURE — 99999 PR PBB SHADOW E&M-EST. PATIENT-LVL IV: ICD-10-PCS | Mod: PBBFAC,,, | Performed by: UROLOGY

## 2019-04-29 PROCEDURE — G0108 PR DIAB MANAGE TRN  PER INDIV: ICD-10-PCS | Mod: S$GLB,,, | Performed by: DIETITIAN, REGISTERED

## 2019-04-29 PROCEDURE — 99999 PR PBB SHADOW E&M-EST. PATIENT-LVL IV: CPT | Mod: PBBFAC,,, | Performed by: UROLOGY

## 2019-04-29 PROCEDURE — 76942 PR U/S GUIDANCE FOR NEEDLE GUIDANCE: ICD-10-PCS | Mod: 26,59,S$GLB, | Performed by: UROLOGY

## 2019-04-29 PROCEDURE — 88341 TISSUE SPECIMEN TO PATHOLOGY, UROLOGY: ICD-10-PCS | Mod: 26,,, | Performed by: PATHOLOGY

## 2019-04-29 PROCEDURE — 81002 URINALYSIS NONAUTO W/O SCOPE: CPT | Mod: S$GLB,,, | Performed by: UROLOGY

## 2019-04-29 PROCEDURE — G0108 DIAB MANAGE TRN  PER INDIV: HCPCS | Mod: S$GLB,,, | Performed by: DIETITIAN, REGISTERED

## 2019-04-29 PROCEDURE — 99999 PR PBB SHADOW E&M-EST. PATIENT-LVL IV: ICD-10-PCS | Mod: PBBFAC,,, | Performed by: DIETITIAN, REGISTERED

## 2019-04-29 PROCEDURE — 88305 TISSUE EXAM BY PATHOLOGIST: CPT | Performed by: PATHOLOGY

## 2019-04-29 PROCEDURE — 76872 US TRANSRECTAL: CPT | Mod: 26,S$GLB,, | Performed by: UROLOGY

## 2019-04-29 PROCEDURE — 88341 IMHCHEM/IMCYTCHM EA ADD ANTB: CPT | Mod: 26,,, | Performed by: PATHOLOGY

## 2019-04-29 PROCEDURE — 99999 PR PBB SHADOW E&M-EST. PATIENT-LVL IV: CPT | Mod: PBBFAC,,, | Performed by: DIETITIAN, REGISTERED

## 2019-04-29 PROCEDURE — 88342 TISSUE SPECIMEN TO PATHOLOGY, UROLOGY: ICD-10-PCS | Mod: 26,,, | Performed by: PATHOLOGY

## 2019-04-29 PROCEDURE — 81002 POCT URINE DIPSTICK WITHOUT MICROSCOPE: ICD-10-PCS | Mod: S$GLB,,, | Performed by: UROLOGY

## 2019-04-29 PROCEDURE — 88341 IMHCHEM/IMCYTCHM EA ADD ANTB: CPT | Mod: 59 | Performed by: PATHOLOGY

## 2019-04-29 RX ORDER — BUPROPION HYDROCHLORIDE 150 MG/1
TABLET, EXTENDED RELEASE ORAL
Status: CANCELLED | OUTPATIENT
Start: 2019-04-29

## 2019-04-29 NOTE — PROGRESS NOTES
Diabetes Education  Author: Priyanka Silver RD, CDE  Date: 4/29/2019    Diabetes Care Management Summary  Diabetes Education Record Assessment/Progress: Initial  Current Diabetes Risk Level: Low     Diabetes Type  Diabetes Type : Type II(Eye: Thierno 5/2017)    Diabetes History  Diabetes Diagnosis: 0-1 year(Dx 2mos ago)  Current Treatment: Diet, Exercise  Reviewed Problem List with Patient: Yes    Health Maintenance was reviewed today with patient. Discussed with patient importance of routine eye exams, foot exams/foot care, blood work (i.e.: A1c, microalbumin, and lipid), dental visits, yearly flu vaccine, and pneumonia vaccine as indicated by PCP. Patient verbalized understanding.     Health Maintenance Topics with due status: Not Due       Topic Last Completion Date    TETANUS VACCINE 06/18/2010    Colonoscopy 06/28/2018    Lipid Panel 02/18/2019    Hemoglobin A1c 03/06/2019    PROSTATE-SPECIFIC ANTIGEN 03/18/2019     Health Maintenance Due   Topic Date Due    Foot Exam  12/23/1959    Eye Exam  05/19/2018     Nutrition  Meal Planning: (Intake ~1804-9559 cals/d. Pt reducing carb, fat, sodium w/ prior excess from irregular meals, juices (switched to 50/50).  Weight decreased from 220 range 9/18. )  Meal Plan 24 Hour Recall - Breakfast: oatmeal (cooked, applesauce or banana, equal)   Meal Plan 24 Hour Recall - Lunch: leftover or none   Meal Plan 24 Hour Recall - Dinner: bkd fish/chix/hen/grd meat jaswant, brn rice or whole grain pasta or sweet potato and vegetable (salads or green beans or spinach or broccoli or peas)   Meal Plan 24 Hour Recall - Snack: nuts, fruit; thomas:  orange araceli or milk    Monitoring   Self Monitoring : Per recall, fst -117, 2hr pp 120-160s.   Blood Glucose Logs: No  In the last month, how often have you had a low blood sugar reaction?: never  Can you tell when your blood sugar is too high?: yes(polyuria, polyphagia)  How do you treat high blood sugar?: drinks water    Exercise    Frequency: Never    Current Diabetes Treatment   Current Treatment: Diet, Exercise    Social History  Preferred Learning Method: Face to Face  Primary Support: Self  Smoking Status: Never a Smoker  Alcohol Use: Never    PHQ-2 Total Score: 4 - Pt seeing therapist/psychiatrist w/ fu appt in June       DDS-2 Score  ( > 3 = SIGNIFICANT DISTRESS): 2.5       Barriers to Change  Barriers to Change: None  Learning Challenges : None    Readiness to Learn   Readiness to Learn : Eager    Cultural Influences  Cultural Influences: No    Diabetes Education Assessment/Progress  Diabetes Disease Process (diabetes disease process and treatment options): Discussion, Individual Session, Demonstrates Understanding/Competency(verbalizes/demonstrates), Written Materials Provided  Nutrition (Incorporating nutritional management into one's lifestyle): Discussion, Individual Session, Demonstrates Understanding/Competency (verbalizes/demonstrates), Written Materials Provided  Physical Activity (incorporating physical activity into one's lifestyle): Discussion, Individual Session, Demonstrates Understanding/Competency (verbalizes/demonstrates), Written Materials Provided  Medications (states correct name, dose, onset, peak, duration, side effects & timing of meds): Discussion, Individual Session, Demonstrates Understanding/Competency(verbalizes/demonstrates), Written Materials Provided  Monitoring (monitoring blood glucose/other parameters & using results): Discussion, Individual Session, Demonstrates Understanding/Competency (verbalizes/demonstrates), Written Materials Provided  Acute Complications (preventing, detecting, and treating acute complications): Discussion, Individual Session, Demonstrates Understanding/Competency (verbalizes/demonstrates), Written Materials Provided  Chronic Complications (preventing, detecting, and treating chronic complications): Discussion, Individual Session, Demonstrates Understanding/Competency  (verbalizes/demonstrates), Written Materials Provided  Clinical (diabetes, other pertinent medical history, and relevant comorbidities reviewed during visit): Discussion, Individual Session, Demonstrates Understanding/Competency (verbalizes/demonstrates)  Cognitive (knowledge of self-management skills, functional health literacy): Discussion, Individual Session, Demonstrates Understanding/Competency (verbalizes/demonstrates)  Psychosocial (emotional response to diabetes): Discussion, Individual Session, Demonstrates Understanding/Competency (verbalizes/demonstrates)  Diabetes Distress and Support Systems: Discussion, Individual Session, Demonstrates Understanding/Competency (verbalizes/demonstrates)  Behavioral (readiness for change, lifestyle practices, self-care behaviors): Discussion, Individual Session, Demonstrates Understanding/Competency (verbalizes/demonstrates)    Goals  Patient has selected/evaluated goals during today's session: Yes, selected  Healthy Eating: Set(use meal plan -carb portions/spacing)  Met Percentage : 75%  Start Date: 04/29/19  Target Date: 05/29/19  Monitoring: Set(test BG 2x/d  - fst and 2hr pp; return records to clinic)  Start Date: 04/29/19  Target Date: 05/29/19     Diabetes Care Plan/Intervention  Education Plan/Intervention: Individual Follow-Up DSMT Will coord annual eye exam.  Will admin foot screening next visit due time needed for counseling.    Diabetes Meal Plan  Restrictions: Low Fat, Low Sodium  Calories: 2000, 2200  Carbohydrate Per Meal: 45-60g  Carbohydrate Per Snack : 15-30g    Today's Self-Management Care Plan was developed with the patient's input and is based on barriers identified during today's assessment.    The long and short-term goals in the care plan were written with the patient/caregiver's input. The patient has agreed to work toward these goals to improve his overall diabetes control.      The patient received a copy of today's self-management plan and  verbalized understanding of the care plan, goals, and all of today's instructions.      The patient was encouraged to communicate with his physician and care team regarding his condition(s) and treatment.  I provided the patient with my contact information today and encouraged him to contact me via phone or patient portal as needed.     Education Units of Time   Time Spent: 60 min

## 2019-04-29 NOTE — LETTER
April 29, 2019        Carter Paris MD  [id number] [facility]  [address]  [place] [address]  Phone: [phone]  Fax: [phone]   Patient: Edin Green   MR Number: [id number]   YOB: 1950   Date of Visit: 4/29/2019       Dear Dr. Paris:    Thank you for referring Edin Green to me for evaluation. Below are the relevant portions of my assessment and plan of care.    If you have questions, please do not hesitate to call me. I look forward to following Edin along with you.    Sincerely,      Priyanka Silver, MEETA, CDE           CC  No Recipients

## 2019-04-29 NOTE — PROGRESS NOTES
"Chief Complaint: Elev PSA    HPI:   4/29/19: TRUS/Bx today 32 gm.  3/25/19: PSA shows a sustained positive trend.  Rec biopsy.  Reviewed history in detail.  9/17/18: 68 yo man here to discuss elevated PSA.  Was seeing Dr. Sanchez.  I spoke to his [employer] group last year.  No abd/pelvic pain and no exac/rel factors.  No hematuria.  No urolithiasis.  No urinary bother.  Cialis/viagra prn for ED. Normal sexual function.  Was told he had a "mushy" prostate and was given cipro by Dr. Paris.  No LUTS.    Allergies:  Celebrex [celecoxib]; Shrimp; Tomato; and Venom-wasp    Medications:  has a current medication list which includes the following prescription(s): albuterol, albuterol, amlodipine, aspirin, benzonatate, bupropion, ciprofloxacin hcl, econazole nitrate, fluticasone, levalbuterol, losartan-hydrochlorothiazide 100-25 mg, miconazole nitrate 2 %, mirtazapine, montelukast, multivitamin, oxcarbazepine, polyethylene glycol, pravastatin, promethazine-dextromethorphan, risperidone, sildenafil, triamcinolone acetonide 0.1%, and tadalafil.    Review of Systems:  General: No fever, chills, fatigability, or weight loss.  Skin: No rashes, itching, or changes in color or texture of skin.  Chest: Denies CHAMBERLAIN, cyanosis, wheezing, cough, and sputum production.  Abdomen: Appetite fine. No weight loss. Denies diarrhea, abdominal pain, hematemesis, or blood in stool.  Musculoskeletal: No joint stiffness or swelling. Denies back pain.  : As above.  All other review of systems negative.    PMH:   has a past medical history of Asthma, Benign hematuria, Cataract (OU), Colon polyp, Depression, ED (erectile dysfunction), Graves disease, HTN (hypertension), Hypercholesteremia, Hypertensive retinopathy of both eyes, Hypogonadism, LAURENCE (obstructive sleep apnea), and Type 2 diabetes mellitus.    PSH:   has no past surgical history on file.    FamHx: family history includes Heart defect in his mother; Hypertension in his unknown " relative.    SocHx:  reports that he has never smoked. He has never used smokeless tobacco. He reports that he drinks about 0.6 oz of alcohol per week. He reports that he does not use drugs.      Physical Exam:  Vitals:    04/29/19 0935   BP: (!) 142/84   Pulse: 72     General: A&Ox3, no apparent distress, no deformities  Neck: No masses, normal thyroid  Lungs: normal inspiration, no use of accessory muscles  Heart: normal pulse, no arrhythmias  Abdomen: Soft, NT, ND  Skin: The skin is warm and dry. No jaundice.  Ext: No c/c/e.  : 9/18: Test desc ivan, no abnormalities of epididymus. Penis normal, with normal penile and scrotal skin. Meatus normal. Normal rectal tone, no hemorrhoids. Prost 40 gm no nodules or masses appreciated. SV not palpable. Perineum and anus normal.    Labs/Studies:   PSA    9/08: 1.7    7/14: 3.2    5/17: 3.2    5/18: 5.1    6/18: 4.3    3/19: 6.8, 5.7    Procedure: (1) Transrectal Prostate Biopsy                      (2) Transrectal ultrasound of prostate                     (3) Ultrasound Guidance of Prostate Biopsy needles    Detail: After proper consents were obtained, the patient was prepped and draped in normal fashion in the left lateral decubitus position for TRUS/Bx.  5 ml of lidocaine jelly was instilled in the rectum.  The U/S with rectal probe was used to size the prostate.  A spinal needle was used and 5ml of 1% lidocaine was instilled on the side of the prostate at the base of the seminal vesicles.  Biopsy was then performed using an 18Ga biopsy needle directed at the base, mid and apex bilaterally for a total of 12 cores. Antibiotic prophylaxis was provided using oral ciprofloxacin.    Findings: The prostate is 47W, 31H, and 42L for volume of 32 grams, with no abnl apprec.    Impression/Plan:   1. RTC 10d to discuss results.

## 2019-04-29 NOTE — TELEPHONE ENCOUNTER
----- Message from Isacc Thompson sent at 4/29/2019 10:34 AM CDT -----  Contact: [phone]  Would like a nurse to contact him regarding his appointment, please contact him @ [phone]. Thanks

## 2019-04-29 NOTE — TELEPHONE ENCOUNTER
Returned patient call to confirm it is ok to come in now instead of 3pm for appt. Call ended well.

## 2019-04-30 RX ORDER — BUPROPION HYDROCHLORIDE 150 MG/1
150 TABLET, EXTENDED RELEASE ORAL DAILY
Qty: 120 TABLET | Refills: 0 | Status: CANCELLED | OUTPATIENT
Start: 2019-04-30

## 2019-05-16 ENCOUNTER — OFFICE VISIT (OUTPATIENT)
Dept: OPHTHALMOLOGY | Facility: CLINIC | Age: 69
End: 2019-05-16
Payer: MEDICARE

## 2019-05-16 DIAGNOSIS — H43.393 VISUAL FLOATERS, BILATERAL: ICD-10-CM

## 2019-05-16 DIAGNOSIS — H25.13 NUCLEAR SCLEROSIS, BILATERAL: ICD-10-CM

## 2019-05-16 DIAGNOSIS — H52.7 REFRACTIVE ERROR: ICD-10-CM

## 2019-05-16 DIAGNOSIS — E11.9 TYPE 2 DIABETES MELLITUS WITHOUT COMPLICATION, WITHOUT LONG-TERM CURRENT USE OF INSULIN: Primary | ICD-10-CM

## 2019-05-16 PROCEDURE — 99999 PR PBB SHADOW E&M-EST. PATIENT-LVL I: CPT | Mod: PBBFAC,,, | Performed by: OPHTHALMOLOGY

## 2019-05-16 PROCEDURE — 92014 COMPRE OPH EXAM EST PT 1/>: CPT | Mod: S$GLB,,, | Performed by: OPHTHALMOLOGY

## 2019-05-16 PROCEDURE — 99999 PR PBB SHADOW E&M-EST. PATIENT-LVL I: ICD-10-PCS | Mod: PBBFAC,,, | Performed by: OPHTHALMOLOGY

## 2019-05-16 PROCEDURE — 92014 PR EYE EXAM, EST PATIENT,COMPREHESV: ICD-10-PCS | Mod: S$GLB,,, | Performed by: OPHTHALMOLOGY

## 2019-05-16 PROCEDURE — 92015 PR REFRACTION: ICD-10-PCS | Mod: S$GLB,,, | Performed by: OPHTHALMOLOGY

## 2019-05-16 PROCEDURE — 92015 DETERMINE REFRACTIVE STATE: CPT | Mod: S$GLB,,, | Performed by: OPHTHALMOLOGY

## 2019-05-16 RX ORDER — METRONIDAZOLE 500 MG/1
TABLET ORAL
COMMUNITY
Start: 2019-04-09 | End: 2019-07-15

## 2019-05-16 NOTE — PROGRESS NOTES
SUBJECTIVE:   Edin Green is a 68 y.o. male   Corrected distance visual acuity was 20/25 +2 in the right eye and 20/25 -1 in the left eye.   Chief Complaint   Patient presents with    Annual Exam        HPI:  HPI     Pt here for routine eye exam. Pt states that his distance vision is fine,   but he is having more and more trouble reading finer print. No pain,   except for very intermittent sharp pain OS. The pain goes away very   quickly.     1. Hypertensive Retinopathy  2. Ns ou  3. RE  4. Borderline dm    Last edited by Mateo Winters, Patient Care Assistant on 5/16/2019 10:50   AM. (History)        Assessment /Plan :  1. Type 2 diabetes mellitus without complication, without long-term current use of insulin No diabetic retinopathy at this time. Reviewed diabetic eye precautions including avoiding tobacco use,  Good glucose control, and importance of regular follow up.      2. Refractive error    3. Nuclear sclerosis, bilateral  Patient does reports mild visual decline from incipient cataractous changes, but not sufficient to affect activities of daily living. I recommend monitoring visual status and follow up when visual symptoms worsen.     4. Visual floaters, bilateral - reassurance - any increase in sxs pt will call      Disp MR   RTC 1 year

## 2019-05-21 ENCOUNTER — LAB VISIT (OUTPATIENT)
Dept: LAB | Facility: HOSPITAL | Age: 69
End: 2019-05-21
Attending: UROLOGY
Payer: MEDICARE

## 2019-05-21 ENCOUNTER — OFFICE VISIT (OUTPATIENT)
Dept: UROLOGY | Facility: CLINIC | Age: 69
End: 2019-05-21
Payer: MEDICARE

## 2019-05-21 VITALS
HEIGHT: 75 IN | HEART RATE: 81 BPM | SYSTOLIC BLOOD PRESSURE: 128 MMHG | DIASTOLIC BLOOD PRESSURE: 68 MMHG | BODY MASS INDEX: 23.17 KG/M2 | WEIGHT: 186.31 LBS

## 2019-05-21 DIAGNOSIS — C61 PROSTATE CANCER: Primary | ICD-10-CM

## 2019-05-21 DIAGNOSIS — C61 PROSTATE CANCER: ICD-10-CM

## 2019-05-21 LAB
CREAT SERPL-MCNC: 1.3 MG/DL (ref 0.5–1.4)
EST. GFR  (AFRICAN AMERICAN): >60 ML/MIN/1.73 M^2
EST. GFR  (NON AFRICAN AMERICAN): 56 ML/MIN/1.73 M^2

## 2019-05-21 PROCEDURE — 36415 COLL VENOUS BLD VENIPUNCTURE: CPT

## 2019-05-21 PROCEDURE — 82565 ASSAY OF CREATININE: CPT

## 2019-05-21 PROCEDURE — 3074F SYST BP LT 130 MM HG: CPT | Mod: CPTII,S$GLB,, | Performed by: UROLOGY

## 2019-05-21 PROCEDURE — 1101F PR PT FALLS ASSESS DOC 0-1 FALLS W/OUT INJ PAST YR: ICD-10-PCS | Mod: CPTII,S$GLB,, | Performed by: UROLOGY

## 2019-05-21 PROCEDURE — 99999 PR PBB SHADOW E&M-EST. PATIENT-LVL IV: ICD-10-PCS | Mod: PBBFAC,,, | Performed by: UROLOGY

## 2019-05-21 PROCEDURE — 99999 PR PBB SHADOW E&M-EST. PATIENT-LVL IV: CPT | Mod: PBBFAC,,, | Performed by: UROLOGY

## 2019-05-21 PROCEDURE — 3078F PR MOST RECENT DIASTOLIC BLOOD PRESSURE < 80 MM HG: ICD-10-PCS | Mod: CPTII,S$GLB,, | Performed by: UROLOGY

## 2019-05-21 PROCEDURE — 3074F PR MOST RECENT SYSTOLIC BLOOD PRESSURE < 130 MM HG: ICD-10-PCS | Mod: CPTII,S$GLB,, | Performed by: UROLOGY

## 2019-05-21 PROCEDURE — 99214 PR OFFICE/OUTPT VISIT, EST, LEVL IV, 30-39 MIN: ICD-10-PCS | Mod: S$GLB,,, | Performed by: UROLOGY

## 2019-05-21 PROCEDURE — 3078F DIAST BP <80 MM HG: CPT | Mod: CPTII,S$GLB,, | Performed by: UROLOGY

## 2019-05-21 PROCEDURE — 99214 OFFICE O/P EST MOD 30 MIN: CPT | Mod: S$GLB,,, | Performed by: UROLOGY

## 2019-05-21 PROCEDURE — 1101F PT FALLS ASSESS-DOCD LE1/YR: CPT | Mod: CPTII,S$GLB,, | Performed by: UROLOGY

## 2019-05-21 NOTE — PROGRESS NOTES
"Chief Complaint: T1c CaP    HPI:   5/21/19: Bx shows Gl 3+4=7 in 1/2 of left mid, 15% of core.  Reviewed history in detail.    4/29/19: TRUS/Bx today 32 gm.  3/25/19: PSA shows a sustained positive trend.  Recc biopsy.  Reviewed history in detail.  9/17/18: 66 yo man here to discuss elevated PSA.  Was seeing Dr. Sanchez.  I spoke to his [employer] group last year.  No abd/pelvic pain and no exac/rel factors.  No hematuria.  No urolithiasis.  No urinary bother.  Cialis/viagra prn for ED. Normal sexual function.  Was told he had a "mushy" prostate and was given cipro by Dr. Paris.  No LUTS.    Allergies:  Celebrex [celecoxib]; Shrimp; Tomato; and Venom-wasp    Medications:  has a current medication list which includes the following prescription(s): albuterol, amlodipine, aspirin, benzonatate, bupropion, ciprofloxacin hcl, econazole nitrate, fluticasone propionate, levalbuterol, losartan-hydrochlorothiazide 100-25 mg, metronidazole, miconazole nitrate 2 %, mirtazapine, montelukast, multivitamin, oxcarbazepine, polyethylene glycol, pravastatin, promethazine-dextromethorphan, risperidone, sildenafil, triamcinolone acetonide 0.1%, and tadalafil.    Review of Systems:  General: No fever, chills, fatigability, or weight loss.  Skin: No rashes, itching, or changes in color or texture of skin.  Chest: Denies CHAMBERLAIN, cyanosis, wheezing, cough, and sputum production.  Abdomen: Appetite fine. No weight loss. Denies diarrhea, abdominal pain, hematemesis, or blood in stool.  Musculoskeletal: No joint stiffness or swelling. Denies back pain.  : As above.  All other review of systems negative.    PMH:   has a past medical history of Asthma, Benign hematuria, Cataract (OU), Colon polyp, Depression, ED (erectile dysfunction), Graves disease, HTN (hypertension), Hypercholesteremia, Hypertensive retinopathy of both eyes, Hypogonadism, LAURENCE (obstructive sleep apnea), and Type 2 diabetes mellitus.    PSH:   has no past surgical history on " file.    FamHx: family history includes Heart defect in his mother; Hypertension in his unknown relative.    SocHx:  reports that he has never smoked. He has never used smokeless tobacco. He reports that he drinks about 0.6 oz of alcohol per week. He reports that he does not use drugs.      Physical Exam:  Vitals:    05/21/19 1057   BP: 128/68   Pulse: 81     General: A&Ox3, no apparent distress, no deformities  Neck: No masses, normal thyroid  Lungs: normal inspiration, no use of accessory muscles  Heart: normal pulse, no arrhythmias  Abdomen: Soft, NT, ND  Skin: The skin is warm and dry. No jaundice.  Ext: No c/c/e.  : 9/18: Test desc ivan, no abnormalities of epididymus. Penis normal, with normal penile and scrotal skin. Meatus normal. Normal rectal tone, no hemorrhoids. Prost 40 gm no nodules or masses appreciated. SV not palpable. Perineum and anus normal.    Labs/Studies:   PSA    9/08: 1.7    7/14: 3.2    5/17: 3.2    5/18: 5.1    6/18: 4.3    3/19: 6.8, 5.7    Impression/Plan:   1. Discussed.  MRI/RTC 6 wks.

## 2019-05-30 ENCOUNTER — NUTRITION (OUTPATIENT)
Dept: DIABETES | Facility: CLINIC | Age: 69
End: 2019-05-30
Payer: MEDICARE

## 2019-05-30 VITALS — HEIGHT: 75 IN | WEIGHT: 184.31 LBS | BODY MASS INDEX: 22.92 KG/M2

## 2019-05-30 DIAGNOSIS — E11.9 DIABETES MELLITUS WITHOUT COMPLICATION: Primary | ICD-10-CM

## 2019-05-30 PROCEDURE — G0108 PR DIAB MANAGE TRN  PER INDIV: ICD-10-PCS | Mod: S$GLB,,, | Performed by: DIETITIAN, REGISTERED

## 2019-05-30 PROCEDURE — 99999 PR PBB SHADOW E&M-EST. PATIENT-LVL IV: CPT | Mod: PBBFAC,,, | Performed by: DIETITIAN, REGISTERED

## 2019-05-30 PROCEDURE — 99999 PR PBB SHADOW E&M-EST. PATIENT-LVL IV: ICD-10-PCS | Mod: PBBFAC,,, | Performed by: DIETITIAN, REGISTERED

## 2019-05-30 PROCEDURE — G0108 DIAB MANAGE TRN  PER INDIV: HCPCS | Mod: S$GLB,,, | Performed by: DIETITIAN, REGISTERED

## 2019-05-30 NOTE — PROGRESS NOTES
Diabetes Education  Author: Priyanka Silver RD, CDE  Date: 5/30/2019    Diabetes Care Management Summary  Diabetes Education Record Assessment/Progress: Comprehensive/Group  Current Diabetes Risk Level: Low     Diabetes Type  Diabetes Type : Type II    Diabetes History  Diabetes Diagnosis: 0-1 year  Current Treatment: Exercise, Diet  Reviewed Problem List with Patient: Yes    Health Maintenance was reviewed today with patient. Discussed with patient importance of routine eye exams, foot exams/foot care, blood work (i.e.: A1c, microalbumin, and lipid), dental visits, yearly flu vaccine, and pneumonia vaccine as indicated by PCP. Patient verbalized understanding.     Health Maintenance Topics with due status: Not Due       Topic Last Completion Date    TETANUS VACCINE 06/18/2010    Colonoscopy 06/28/2018    Influenza Vaccine 12/20/2018    Lipid Panel 02/18/2019    Hemoglobin A1c 03/06/2019    PROSTATE-SPECIFIC ANTIGEN 03/18/2019    Eye Exam 05/16/2019    Low Dose Statin 05/21/2019     Health Maintenance Due   Topic Date Due    Foot Exam  12/23/1959     Nutrition  Meal Planning: (Intake ~3791-5146 cals/d. No excess carb, fat, sodium. Weight decreased from 220 range 9/18. )  Meal Plan 24 Hour Recall - Breakfast: oatmeal (cooked, applesauce or banana, equal) OR eggs, toast (wheat)  Meal Plan 24 Hour Recall - Lunch: sandwich (wheat, ham-lean, no nitrates), bkd chips (18) -water  Meal Plan 24 Hour Recall - Dinner: bkd fish/chix/hen/grd meat jaswant/beef roast w/ small amt whole grain starch and nonstarchy veg (salad, broccoli) OR yesterday turkey sausage w/ okra and tomatoes, 1/2 c brn rice - water  Meal Plan 24 Hour Recall - Snack: nuts, fruit; thomas:  orange araceli 50/50 or milk or water    Monitoring   Self Monitoring : Per records, fst BG , 131; 2hr pp , 172.   Blood Glucose Logs: No  In the last month, how often have you had a low blood sugar reaction?: never    Exercise   Frequency: Never    Current Diabetes  Treatment   Current Treatment: Exercise, Diet    Social History  Preferred Learning Method: Face to Face  Primary Support: Self  Smoking Status: Never a Smoker  Alcohol Use: Never      Barriers to Change  Barriers to Change: None  Learning Challenges : None    Readiness to Learn   Readiness to Learn : Eager    Cultural Influences  Cultural Influences: No    Diabetes Education Assessment/Progress  Diabetes Disease Process (diabetes disease process and treatment options): Discussion, Individual Session, Demonstrates Understanding/Competency(verbalizes/demonstrates)  Nutrition (Incorporating nutritional management into one's lifestyle): Discussion, Individual Session, Demonstrates Understanding/Competency (verbalizes/demonstrates)  Physical Activity (incorporating physical activity into one's lifestyle): Discussion, Individual Session, Demonstrates Understanding/Competency (verbalizes/demonstrates)  Medications (states correct name, dose, onset, peak, duration, side effects & timing of meds): Discussion, Individual Session, Demonstrates Understanding/Competency(verbalizes/demonstrates), Written Materials Provided  Monitoring (monitoring blood glucose/other parameters & using results): Discussion, Individual Session, Demonstrates Understanding/Competency (verbalizes/demonstrates)  Acute Complications (preventing, detecting, and treating acute complications): Discussion, Individual Session, Demonstrates Understanding/Competency (verbalizes/demonstrates)  Chronic Complications (preventing, detecting, and treating chronic complications): Discussion, Individual Session, Demonstrates Understanding/Competency (verbalizes/demonstrates)  Clinical (diabetes, other pertinent medical history, and relevant comorbidities reviewed during visit): Discussion, Individual Session, Demonstrates Understanding/Competency (verbalizes/demonstrates)  Cognitive (knowledge of self-management skills, functional health literacy): Discussion,  Individual Session, Demonstrates Understanding/Competency (verbalizes/demonstrates)  Psychosocial (emotional response to diabetes): Not Covered/Deferred  Diabetes Distress and Support Systems: Not Covered/Deferred  Behavioral (readiness for change, lifestyle practices, self-care behaviors): Discussion, Individual Session, Demonstrates Understanding/Competency (verbalizes/demonstrates)   Protective Sensation (w/ 10 gram monofilament):  Right: Intact  Left: Intact    Visual Inspection:  Dry Skin -  Bilateral; thick, discolored nails need trimming    Pedal Pulses:   Right: Present  Left: Present    Posterior tibialis:   Right:Present  Left: Present    Goals  Patient has selected/evaluated goals during today's session: Yes, evaluated  Healthy Eating: Set(use meal plan -carb portions/spacing)  Met Percentage : 75%  Start Date: 05/30/19  Target Date: 08/29/19  Physical Activity: Set(Start walking program in neighborhood, spectrum. )  Start Date: 05/30/19  Target Date: 08/29/19  Monitoring: % Met(test BG 2x/d  - fst and 2hr pp; return records to clini)  Met Percentage : 100%  Start Date: 05/30/19  Target Date: 08/29/19     Diabetes Care Plan/Intervention  Education Plan/Intervention: Individual Follow-Up DSMT Encouraged progress. Will coord podiatry diabetes care due to calluses, very thick nails that pt cannot trim. Noted A1C repeat in June.    Diabetes Meal Plan  Restrictions: Low Fat, Low Sodium  Calories: 2000, 2200  Carbohydrate Per Meal: 45-60g  Carbohydrate Per Snack : 15-30g    Today's Self-Management Care Plan was developed with the patient's input and is based on barriers identified during today's assessment.    The long and short-term goals in the care plan were written with the patient/caregiver's input. The patient has agreed to work toward these goals to improve his overall diabetes control.      The patient received a copy of today's self-management plan and verbalized understanding of the care plan, goals,  and all of today's instructions.      The patient was encouraged to communicate with his physician and care team regarding his condition(s) and treatment.  I provided the patient with my contact information today and encouraged him to contact me via phone or patient portal as needed.     Education Units of Time   Time Spent: 30 min

## 2019-05-30 NOTE — LETTER
May 30, 2019        Carter Paris MD  [id number] [facility]  [address] [facility]  [place] [address]  Phone: [phone]  Fax: [phone]   Patient: Edin Green   MR Number: [id number]   YOB: 1950   Date of Visit: 5/30/2019       Dear Dr. Paris:    Thank you for referring Edin Green to me for evaluation. Below are the relevant portions of my assessment and plan of care.     If you have questions, please do not hesitate to call me. I look forward to following Edin along with you.    Sincerely,      Priyanka Silver, RD, CDE           CC  No Recipients

## 2019-06-10 ENCOUNTER — HOSPITAL ENCOUNTER (OUTPATIENT)
Dept: RADIOLOGY | Facility: HOSPITAL | Age: 69
Discharge: HOME | End: 2019-06-10
Attending: UROLOGY
Payer: MEDICARE

## 2019-06-10 DIAGNOSIS — C61 PROSTATE CANCER: ICD-10-CM

## 2019-06-10 PROCEDURE — A9585 GADOBUTROL INJECTION: HCPCS | Performed by: UROLOGY

## 2019-06-10 PROCEDURE — 72197 MRI PELVIS W/O & W/DYE: CPT | Mod: TC

## 2019-06-10 PROCEDURE — 72197 MRI PELVIS W/O & W/DYE: CPT | Mod: 26,,, | Performed by: RADIOLOGY

## 2019-06-10 PROCEDURE — 25500020 PHARM REV CODE 255: Performed by: UROLOGY

## 2019-06-10 PROCEDURE — 72197 MRI PROSTATE W W/O CONTRAST: ICD-10-PCS | Mod: 26,,, | Performed by: RADIOLOGY

## 2019-06-10 RX ORDER — GADOBUTROL 604.72 MG/ML
8 INJECTION INTRAVENOUS
Status: COMPLETED | OUTPATIENT
Start: 2019-06-10 | End: 2019-06-10

## 2019-06-10 RX ADMIN — GADOBUTROL 8 ML: 604.72 INJECTION INTRAVENOUS at 01:06

## 2019-06-18 ENCOUNTER — PATIENT MESSAGE (OUTPATIENT)
Dept: INTERNAL MEDICINE | Facility: CLINIC | Age: 69
End: 2019-06-18

## 2019-06-19 ENCOUNTER — OFFICE VISIT (OUTPATIENT)
Dept: PODIATRY | Facility: CLINIC | Age: 69
End: 2019-06-19
Payer: MEDICARE

## 2019-06-19 VITALS
DIASTOLIC BLOOD PRESSURE: 70 MMHG | HEART RATE: 80 BPM | BODY MASS INDEX: 22.78 KG/M2 | WEIGHT: 183.19 LBS | HEIGHT: 75 IN | SYSTOLIC BLOOD PRESSURE: 116 MMHG

## 2019-06-19 DIAGNOSIS — E11.9 ENCOUNTER FOR COMPREHENSIVE DIABETIC FOOT EXAMINATION, TYPE 2 DIABETES MELLITUS: Primary | ICD-10-CM

## 2019-06-19 DIAGNOSIS — B35.1 ONYCHOMYCOSIS: ICD-10-CM

## 2019-06-19 DIAGNOSIS — E11.9 TYPE 2 DIABETES MELLITUS WITHOUT COMPLICATION, WITHOUT LONG-TERM CURRENT USE OF INSULIN: ICD-10-CM

## 2019-06-19 PROCEDURE — 3078F DIAST BP <80 MM HG: CPT | Mod: CPTII,S$GLB,, | Performed by: PODIATRIST

## 2019-06-19 PROCEDURE — 99203 OFFICE O/P NEW LOW 30 MIN: CPT | Mod: S$GLB,,, | Performed by: PODIATRIST

## 2019-06-19 PROCEDURE — 1101F PT FALLS ASSESS-DOCD LE1/YR: CPT | Mod: CPTII,S$GLB,, | Performed by: PODIATRIST

## 2019-06-19 PROCEDURE — 99203 PR OFFICE/OUTPT VISIT, NEW, LEVL III, 30-44 MIN: ICD-10-PCS | Mod: S$GLB,,, | Performed by: PODIATRIST

## 2019-06-19 PROCEDURE — 99999 PR PBB SHADOW E&M-EST. PATIENT-LVL III: ICD-10-PCS | Mod: PBBFAC,,, | Performed by: PODIATRIST

## 2019-06-19 PROCEDURE — 3044F PR MOST RECENT HEMOGLOBIN A1C LEVEL <7.0%: ICD-10-PCS | Mod: CPTII,S$GLB,, | Performed by: PODIATRIST

## 2019-06-19 PROCEDURE — 99999 PR PBB SHADOW E&M-EST. PATIENT-LVL III: CPT | Mod: PBBFAC,,, | Performed by: PODIATRIST

## 2019-06-19 PROCEDURE — 3074F PR MOST RECENT SYSTOLIC BLOOD PRESSURE < 130 MM HG: ICD-10-PCS | Mod: CPTII,S$GLB,, | Performed by: PODIATRIST

## 2019-06-19 PROCEDURE — 1101F PR PT FALLS ASSESS DOC 0-1 FALLS W/OUT INJ PAST YR: ICD-10-PCS | Mod: CPTII,S$GLB,, | Performed by: PODIATRIST

## 2019-06-19 PROCEDURE — 3044F HG A1C LEVEL LT 7.0%: CPT | Mod: CPTII,S$GLB,, | Performed by: PODIATRIST

## 2019-06-19 PROCEDURE — 3078F PR MOST RECENT DIASTOLIC BLOOD PRESSURE < 80 MM HG: ICD-10-PCS | Mod: CPTII,S$GLB,, | Performed by: PODIATRIST

## 2019-06-19 PROCEDURE — 3074F SYST BP LT 130 MM HG: CPT | Mod: CPTII,S$GLB,, | Performed by: PODIATRIST

## 2019-06-19 RX ORDER — KETOCONAZOLE 20 MG/G
CREAM TOPICAL 2 TIMES DAILY
Qty: 30 G | Refills: 4 | Status: SHIPPED | OUTPATIENT
Start: 2019-06-19 | End: 2019-07-15

## 2019-06-19 NOTE — PROGRESS NOTES
Subjective:       Patient ID: Edin Green is a 68 y.o. male.    Chief Complaint: Diabetes Mellitus (PCP: Dr. Paris last seen on 3/27/19; pt reports mild tingling in feet. )      HPI: Edin Green presents to the office today, under referral by their Primary Care Provider, Carter Paris MD, for his annual diabetic foot assessment and risk evalution Patient is a DMII. Patient states no complications due to the disease state. This patient last saw his/her primary care provider on 3/27/19.  Patient is a newly diagnosed diabetic.  He is not currently on insulin.      Hemoglobin A1C   Date Value Ref Range Status   03/06/2019 6.8 (H) 4.0 - 5.6 % Final     Comment:     ADA Screening Guidelines:  5.7-6.4%  Consistent with prediabetes  >or=6.5%  Consistent with diabetes  High levels of fetal hemoglobin interfere with the HbA1C  assay. Heterozygous hemoglobin variants (HbS, HgC, etc)do  not significantly interfere with this assay.   However, presence of multiple variants may affect accuracy.     06/29/2018 6.1 (H) 4.0 - 5.6 % Final     Comment:     ADA Screening Guidelines:  5.7-6.4%  Consistent with prediabetes  >or=6.5%  Consistent with diabetes  High levels of fetal hemoglobin interfere with the HbA1C  assay. Heterozygous hemoglobin variants (HbS, HgC, etc)do  not significantly interfere with this assay.   However, presence of multiple variants may affect accuracy.     11/11/2016 6.2 4.5 - 6.2 % Final     Comment:     According to ADA guidelines, hemoglobin A1C <7.0% represents  optimal control in non-pregnant diabetic patients.  Different  metrics may apply to specific populations.   Standards of Medical Care in Diabetes - 2016.  For the purpose of screening for the presence of diabetes:  <5.7%     Consistent with the absence of diabetes  5.7-6.4%  Consistent with increasing risk for diabetes   (prediabetes)  >or=6.5%  Consistent with diabetes  Currently no consensus exists for use of hemoglobin A1C  for  diagnosis of diabetes for children.     .    Review of patient's allergies indicates:   Allergen Reactions    Celebrex [celecoxib]      Lip swelling      Shrimp     Tomato     Venom-wasp        Past Medical History:   Diagnosis Date    Asthma     Benign hematuria     Cataract OU    Colon polyp     dr strauss    Depression     ED (erectile dysfunction)     Graves disease     story    HTN (hypertension)     Hypercholesteremia     Hypertensive retinopathy of both eyes     Hypogonadism     LAURENCE (obstructive sleep apnea)     Type 2 diabetes mellitus        Family History   Problem Relation Age of Onset    Hypertension Unknown     Heart defect Mother     Strabismus Neg Hx     Retinal detachment Neg Hx     Macular degeneration Neg Hx     Glaucoma Neg Hx     Blindness Neg Hx     Amblyopia Neg Hx        Social History     Socioeconomic History    Marital status:      Spouse name: CHUCHO    Number of children: 3    Years of education: Not on file    Highest education level: Not on file   Occupational History    Not on file   Social Needs    Financial resource strain: Not on file    Food insecurity:     Worry: Not on file     Inability: Not on file    Transportation needs:     Medical: Not on file     Non-medical: Not on file   Tobacco Use    Smoking status: Never Smoker    Smokeless tobacco: Never Used   Substance and Sexual Activity    Alcohol use: Yes     Alcohol/week: 0.6 oz     Types: 1 Cans of beer per week    Drug use: No    Sexual activity: Yes     Partners: Female   Lifestyle    Physical activity:     Days per week: Not on file     Minutes per session: Not on file    Stress: Not on file   Relationships    Social connections:     Talks on phone: Not on file     Gets together: Not on file     Attends Sabianism service: Not on file     Active member of club or organization: Not on file     Attends meetings of clubs or organizations: Not on file     Relationship status: Not  "on file   Other Topics Concern    Not on file   Social History Narrative    Wears a seatbelt.       History reviewed. No pertinent surgical history.    Review of Systems   Constitutional: Negative for chills, fatigue and fever.   HENT: Negative for hearing loss.    Eyes: Negative for photophobia and visual disturbance.   Respiratory: Negative for cough, chest tightness, shortness of breath and wheezing.    Cardiovascular: Negative for chest pain and palpitations.   Gastrointestinal: Negative for constipation, diarrhea, nausea and vomiting.   Endocrine: Negative for cold intolerance and heat intolerance.   Genitourinary: Negative for flank pain.   Musculoskeletal: Negative for neck pain and neck stiffness.   Skin: Negative for wound.   Neurological: Negative for light-headedness and headaches.   Psychiatric/Behavioral: Negative for sleep disturbance.         Objective:   /70 (BP Location: Right arm, Patient Position: Sitting)   Pulse 80   Ht 6' 3" (1.905 m)   Wt 83.1 kg (183 lb 3.2 oz)   BMI 22.90 kg/m²     LOWER EXTREMITY PHYSICAL EXAMINATION    ORTHOPEDIC: Manual Muscle Testing is 5/5 in all planes on the left and right, without pains, with and without resistance. No pains to palpation of the medial or lateral ankle ligaments. No discomfort to palpation of the posterior tibial tendon, peroneal tendon, Achilles tendon or the anterior ankle tendons.  Rectus foot type is noted. No pain upon range of motion of the midfoot or hindfoot joints. No crepitus upon range of motion and midfoot or hindfoot joints. No ankle pathology is noted. Gait pattern is non-antalgic.    VASCULAR: Pulses are palpable to the B/L lower extremity. The right dorsalis pedis pulse is 2/4 and the posterior tibial pulse is 2/4. The left dorsalis pedis pulse is 2/4 and the posterior tibial pulse is 2/4. Hair growth is noted on the dorsal foot and digits. Proximal to distal, warm to warm. Capillary refill time is WNL at less than 3s.  " Telangiectasias and spider veins noted. No edema is noted to the lower extremity.    NEUROLOGY: Proprioception is intact. Sensation to light touch is intact. Protective sensation is intact via 5.07 [place] monofilament. Straight leg raise is negative. Negative Tinel's Sign and negative Valleix Sign. No neurological sensations with compression of the area of Batista's Nerve in the area of the Abductor Hallucis muscle belly. Upon palpation of the interspaces, there are no neurological sensations stated that radiate proximal or distal. Upon compression of the metatarsal heads from medial to lateral, no neurological sensations or symptoms are stated. Deep tendon reflexes to the lower extremity are WNL.    DERMATOLOGY: Skin is supple, moist, dry, intact, xerotic and hyperpigmented. On the left foot and the right foot, nails 1-5 are suggestive of onychomycotic changes. These nail plates are painful with shoe gear and ambulation, as per patient. These nail plates are painful to palpation and manipulation, are thickened, are dystrophic, chaulky in appearance and malodorous with substantial subungual debris. These nail plates are yellow to brown in appearance.     Assessment:     1. Encounter for comprehensive diabetic foot examination, type 2 diabetes mellitus    2. Type 2 diabetes mellitus without complication, without long-term current use of insulin    3. Onychomycosis        Plan:     Encounter for comprehensive diabetic foot examination, type 2 diabetes mellitus  Type 2 diabetes mellitus without complication, without long-term current use of insulin  I counseled the patient on his/her Diabetic Mellitus regarding today's clinical examination and objection findings. We did also discuss recent medication changes, pertinent labs and imaging evaluations and other medical consultation notes and progress notes. Greater than 50% of this visit was spent on counseling and coordination of care. Greater than 20 minutes of  this appt. was spent on education about the diabetic foot, in relation to PVD and/or neuropathy, and the prevention of limb loss.     Shoe gear is inspected and wear and proper fit/type. Patient is reminded of the importance of good nutrition and blood sugar control to help prevent podiatric complications of diabetes. Patient instructed on proper foot hygeine. We discussed wearing proper shoe gear, daily foot inspections, never walking without protective shoe gear, never putting sharp instruments to feet.  Patient  will continue to monitor the areas daily, inspect feet, wear protective shoe gear when ambulatory, moisturizer to maintain skin integrity.     Patient's DMI/DMII is managed by Primary Care Provider and/or Endocrinology Advanced Practice Provider. As per the most recent glycohemoglobin level, this patient is at goal.    Onychomycosis  Discussed the various treatment options concerning onychomycosis, these being over-the-counter topicals (efficacy is low in regards to cure), prescription strength topicals (better efficacy as compared to OTC variants, but only slightly so, and potentially costly), laser therapy (which is not covered by insurance companies), oral medications (patient is advised that there is a potential, though less than 5%, for the potential of adverse health and side effects; namely liver pathology) and doing nothing (frequent debridements) as onychomycosis is a cosmetic ailment, and has no potential for long-term deleterious effects on the health.    The onychomycotic nail plates, as outlined above, are sharply debrided with double action nail nipper, and/or with the assistance of a mechanical rotary wili, with removal of all offending nail and nail border(s), for reduction of pains. Nails are reduced in terms of length, width and girth with removal of subungual debris to facilitate pain free weight bearing and ambulation. The elongated nails as outlined in the objective portion of this  note, were trimmed to appropriate length, with a double action nail nipper, for alleviation/reduction of pains as well. Follow up in approx. 3-4 months.    Patient does not meet the qualifications for, or have the class findings necessary for routine foot care. There is no lack of or diminished sensation and he/she has no significant findings of PVD to the B/L LE. At any follow-up appointment concerning routine foot care, patient will be PROC-B, and will be required to pay for services.  This fact is explained to the patient and/or any family who is present at today's appt.       Protective Sensation (w/ 10 gram monofilament):  Right: Intact  Left: Intact    Visual Inspection:  Dry Skin -  Bilateral and Onychomycosis -  Bilateral    Pedal Pulses:   Right: Present  Left: Present    Posterior tibialis:   Right:Present  Left: Present              Future Appointments   Date Time Provider Department Center   7/3/2019  2:30 PM Kiel Ochoa IV, MD ON UROLOGY  Medical    7/8/2019  7:35 AM LABORATORY, [facility]   7/15/2019  1:00 PM Carter Paris MD [facility]   7/15/2019  2:00 PM AN[facility]AL [facility]   9/3/2019  9:30 AM Priyanka Silver RD, CDE [facility] DIABETE [facility]   12/2/2019 11:00 AM PULMONARY LAB, [facility]   12/2/2019 11:20 AM Elizabeth Lejeune, NP [facility]   5/18/2020 10:00 AM Sina Pa MD [facility]

## 2019-07-03 ENCOUNTER — OFFICE VISIT (OUTPATIENT)
Dept: UROLOGY | Facility: CLINIC | Age: 69
End: 2019-07-03
Payer: MEDICARE

## 2019-07-03 ENCOUNTER — TELEPHONE (OUTPATIENT)
Dept: UROLOGY | Facility: CLINIC | Age: 69
End: 2019-07-03

## 2019-07-03 VITALS
DIASTOLIC BLOOD PRESSURE: 60 MMHG | HEIGHT: 74 IN | WEIGHT: 178.81 LBS | BODY MASS INDEX: 22.95 KG/M2 | HEART RATE: 87 BPM | SYSTOLIC BLOOD PRESSURE: 122 MMHG

## 2019-07-03 DIAGNOSIS — C61 PROSTATE CANCER: Primary | ICD-10-CM

## 2019-07-03 LAB
BILIRUB SERPL-MCNC: NORMAL MG/DL
BLOOD URINE, POC: NORMAL
COLOR, POC UA: YELLOW
GLUCOSE UR QL STRIP: NORMAL
KETONES UR QL STRIP: NORMAL
LEUKOCYTE ESTERASE URINE, POC: NORMAL
NITRITE, POC UA: NORMAL
PH, POC UA: 6
PROTEIN, POC: NORMAL
SPECIFIC GRAVITY, POC UA: 1.02
UROBILINOGEN, POC UA: NORMAL

## 2019-07-03 PROCEDURE — 1101F PT FALLS ASSESS-DOCD LE1/YR: CPT | Mod: CPTII,S$GLB,, | Performed by: UROLOGY

## 2019-07-03 PROCEDURE — 99999 PR PBB SHADOW E&M-EST. PATIENT-LVL III: ICD-10-PCS | Mod: PBBFAC,,, | Performed by: UROLOGY

## 2019-07-03 PROCEDURE — 3078F DIAST BP <80 MM HG: CPT | Mod: CPTII,S$GLB,, | Performed by: UROLOGY

## 2019-07-03 PROCEDURE — 3074F SYST BP LT 130 MM HG: CPT | Mod: CPTII,S$GLB,, | Performed by: UROLOGY

## 2019-07-03 PROCEDURE — 99214 OFFICE O/P EST MOD 30 MIN: CPT | Mod: 25,S$GLB,, | Performed by: UROLOGY

## 2019-07-03 PROCEDURE — 3078F PR MOST RECENT DIASTOLIC BLOOD PRESSURE < 80 MM HG: ICD-10-PCS | Mod: CPTII,S$GLB,, | Performed by: UROLOGY

## 2019-07-03 PROCEDURE — 3074F PR MOST RECENT SYSTOLIC BLOOD PRESSURE < 130 MM HG: ICD-10-PCS | Mod: CPTII,S$GLB,, | Performed by: UROLOGY

## 2019-07-03 PROCEDURE — 99999 PR PBB SHADOW E&M-EST. PATIENT-LVL III: CPT | Mod: PBBFAC,,, | Performed by: UROLOGY

## 2019-07-03 PROCEDURE — 1101F PR PT FALLS ASSESS DOC 0-1 FALLS W/OUT INJ PAST YR: ICD-10-PCS | Mod: CPTII,S$GLB,, | Performed by: UROLOGY

## 2019-07-03 PROCEDURE — 81002 URINALYSIS NONAUTO W/O SCOPE: CPT | Mod: S$GLB,,, | Performed by: UROLOGY

## 2019-07-03 PROCEDURE — 81002 POCT URINE DIPSTICK WITHOUT MICROSCOPE: ICD-10-PCS | Mod: S$GLB,,, | Performed by: UROLOGY

## 2019-07-03 PROCEDURE — 99214 PR OFFICE/OUTPT VISIT, EST, LEVL IV, 30-39 MIN: ICD-10-PCS | Mod: 25,S$GLB,, | Performed by: UROLOGY

## 2019-07-03 NOTE — PROGRESS NOTES
"Chief Complaint: T1c CaP    HPI:   7/3/19: MRI is done.  PIRADs2 no obvious findings.  Reviewed history in detail.  5/21/19: Bx shows Gl 3+4=7 in 1/2 of left mid, 15% of core.  Reviewed history in detail.    4/29/19: TRUS/Bx today 32 gm.  3/25/19: PSA shows a sustained positive trend.  Recc biopsy.  Reviewed history in detail.  9/17/18: 68 yo man here to discuss elevated PSA.  Was seeing Dr. Sanchez.  I spoke to his [facility] group last year.  No abd/pelvic pain and no exac/rel factors.  No hematuria.  No urolithiasis.  No urinary bother.  Cialis/viagra prn for ED. Normal sexual function.  Was told he had a "mushy" prostate and was given cipro by Dr. Paris.  No LUTS.    Allergies:  Celebrex [celecoxib]; Shrimp; Tomato; and Venom-wasp    Medications:  has a current medication list which includes the following prescription(s): albuterol, amlodipine, aspirin, benzonatate, bupropion, econazole nitrate, fluticasone propionate, ketoconazole, losartan-hydrochlorothiazide 100-25 mg, miconazole nitrate 2 %, mirtazapine, montelukast, multivitamin, oxcarbazepine, polyethylene glycol, pravastatin, risperidone, triamcinolone acetonide 0.1%, ciprofloxacin hcl, levalbuterol, metronidazole, promethazine-dextromethorphan, sildenafil, and tadalafil.    Review of Systems:  General: No fever, chills, fatigability, or weight loss.  Skin: No rashes, itching, or changes in color or texture of skin.  Chest: Denies CHAMBERLAIN, cyanosis, wheezing, cough, and sputum production.  Abdomen: Appetite fine. No weight loss. Denies diarrhea, abdominal pain, hematemesis, or blood in stool.  Musculoskeletal: No joint stiffness or swelling. Denies back pain.  : As above.  All other review of systems negative.    PMH:   has a past medical history of Asthma, Benign hematuria, Cataract (OU), Colon polyp, Depression, ED (erectile dysfunction), Graves disease, HTN (hypertension), Hypercholesteremia, Hypertensive retinopathy of both eyes, Hypogonadism, LAURENCE " (obstructive sleep apnea), and Type 2 diabetes mellitus.    PSH:   has no past surgical history on file.    FamHx: family history includes Heart defect in his mother; Hypertension in his unknown relative.    SocHx:  reports that he has never smoked. He has never used smokeless tobacco. He reports that he drinks about 0.6 oz of alcohol per week. He reports that he does not use drugs.      Physical Exam:  Vitals:    07/03/19 1443   BP: 122/60   Pulse: 87     General: A&Ox3, no apparent distress, no deformities  Neck: No masses, normal thyroid  Lungs: normal inspiration, no use of accessory muscles  Heart: normal pulse, no arrhythmias  Abdomen: Soft, NT, ND  Skin: The skin is warm and dry. No jaundice.  Ext: No c/c/e.  : 9/18: Test desc ivan, no abnormalities of epididymus. Penis normal, with normal penile and scrotal skin. Meatus normal. Normal rectal tone, no hemorrhoids. Prost 40 gm no nodules or masses appreciated. SV not palpable. Perineum and anus normal.    Labs/Studies:   PSA    9/08: 1.7    7/14: 3.2    5/17: 3.2    5/18: 5.1    6/18: 4.3    3/19: 6.8, 5.7    Impression/Plan:   1. Discussed in detail.  Related RALP vs XRT.  I recc treatment, deferral is an option.  To Dr. Smith for his opinion.

## 2019-07-08 ENCOUNTER — LAB VISIT (OUTPATIENT)
Dept: LAB | Facility: HOSPITAL | Age: 69
End: 2019-07-08
Attending: FAMILY MEDICINE
Payer: MEDICARE

## 2019-07-08 DIAGNOSIS — R73.02 IGT (IMPAIRED GLUCOSE TOLERANCE): ICD-10-CM

## 2019-07-08 DIAGNOSIS — I10 ESSENTIAL HYPERTENSION: ICD-10-CM

## 2019-07-08 DIAGNOSIS — R97.20 ELEVATED PSA: ICD-10-CM

## 2019-07-08 DIAGNOSIS — E05.90 HYPERTHYROIDISM: ICD-10-CM

## 2019-07-08 LAB
ANION GAP SERPL CALC-SCNC: 7 MMOL/L (ref 8–16)
BUN SERPL-MCNC: 17 MG/DL (ref 8–23)
CALCIUM SERPL-MCNC: 9.5 MG/DL (ref 8.7–10.5)
CHLORIDE SERPL-SCNC: 102 MMOL/L (ref 95–110)
CHOLEST SERPL-MCNC: 135 MG/DL (ref 120–199)
CHOLEST/HDLC SERPL: 2.4 {RATIO} (ref 2–5)
CO2 SERPL-SCNC: 32 MMOL/L (ref 23–29)
COMPLEXED PSA SERPL-MCNC: 5 NG/ML (ref 0–4)
CREAT SERPL-MCNC: 1.2 MG/DL (ref 0.5–1.4)
EST. GFR  (AFRICAN AMERICAN): >60 ML/MIN/1.73 M^2
EST. GFR  (NON AFRICAN AMERICAN): >60 ML/MIN/1.73 M^2
ESTIMATED AVG GLUCOSE: 117 MG/DL (ref 68–131)
GLUCOSE SERPL-MCNC: 85 MG/DL (ref 70–110)
HBA1C MFR BLD HPLC: 5.7 % (ref 4–5.6)
HDLC SERPL-MCNC: 56 MG/DL (ref 40–75)
HDLC SERPL: 41.5 % (ref 20–50)
LDLC SERPL CALC-MCNC: 70.2 MG/DL (ref 63–159)
NONHDLC SERPL-MCNC: 79 MG/DL
POTASSIUM SERPL-SCNC: 3.5 MMOL/L (ref 3.5–5.1)
SODIUM SERPL-SCNC: 141 MMOL/L (ref 136–145)
T4 FREE SERPL-MCNC: 1.14 NG/DL (ref 0.71–1.51)
TRIGL SERPL-MCNC: 44 MG/DL (ref 30–150)
TSH SERPL DL<=0.005 MIU/L-ACNC: 0.01 UIU/ML (ref 0.4–4)

## 2019-07-08 PROCEDURE — 36415 COLL VENOUS BLD VENIPUNCTURE: CPT

## 2019-07-08 PROCEDURE — 80061 LIPID PANEL: CPT

## 2019-07-08 PROCEDURE — 84153 ASSAY OF PSA TOTAL: CPT

## 2019-07-08 PROCEDURE — 80048 BASIC METABOLIC PNL TOTAL CA: CPT

## 2019-07-08 PROCEDURE — 83036 HEMOGLOBIN GLYCOSYLATED A1C: CPT

## 2019-07-08 PROCEDURE — 84439 ASSAY OF FREE THYROXINE: CPT

## 2019-07-08 PROCEDURE — 84443 ASSAY THYROID STIM HORMONE: CPT

## 2019-07-15 ENCOUNTER — OFFICE VISIT (OUTPATIENT)
Dept: INTERNAL MEDICINE | Facility: CLINIC | Age: 69
End: 2019-07-15
Payer: MEDICARE

## 2019-07-15 ENCOUNTER — CLINICAL SUPPORT (OUTPATIENT)
Dept: CARDIOLOGY | Facility: CLINIC | Age: 69
End: 2019-07-15
Payer: MEDICARE

## 2019-07-15 VITALS
SYSTOLIC BLOOD PRESSURE: 116 MMHG | DIASTOLIC BLOOD PRESSURE: 82 MMHG | HEIGHT: 74 IN | WEIGHT: 173.94 LBS | OXYGEN SATURATION: 99 % | TEMPERATURE: 97 F | HEART RATE: 80 BPM | BODY MASS INDEX: 22.32 KG/M2

## 2019-07-15 VITALS
DIASTOLIC BLOOD PRESSURE: 62 MMHG | TEMPERATURE: 97 F | SYSTOLIC BLOOD PRESSURE: 96 MMHG | BODY MASS INDEX: 22.43 KG/M2 | HEIGHT: 74 IN | WEIGHT: 174.81 LBS

## 2019-07-15 DIAGNOSIS — I77.819 AORTIC ECTASIA: ICD-10-CM

## 2019-07-15 DIAGNOSIS — E11.9 TYPE 2 DIABETES MELLITUS WITHOUT COMPLICATION, WITHOUT LONG-TERM CURRENT USE OF INSULIN: ICD-10-CM

## 2019-07-15 DIAGNOSIS — H35.033 HYPERTENSIVE RETINOPATHY OF BOTH EYES: ICD-10-CM

## 2019-07-15 DIAGNOSIS — E05.00 GRAVES DISEASE: ICD-10-CM

## 2019-07-15 DIAGNOSIS — R00.0 TACHYCARDIA: ICD-10-CM

## 2019-07-15 DIAGNOSIS — I10 ESSENTIAL HYPERTENSION: ICD-10-CM

## 2019-07-15 DIAGNOSIS — E05.90 HYPERTHYROIDISM: ICD-10-CM

## 2019-07-15 DIAGNOSIS — F32.2 CURRENT SEVERE EPISODE OF MAJOR DEPRESSIVE DISORDER WITHOUT PSYCHOTIC FEATURES WITHOUT PRIOR EPISODE: ICD-10-CM

## 2019-07-15 DIAGNOSIS — E05.00 GRAVES DISEASE: Primary | ICD-10-CM

## 2019-07-15 DIAGNOSIS — K64.8 INTERNAL HEMORRHOID, BLEEDING: ICD-10-CM

## 2019-07-15 DIAGNOSIS — H52.7 REFRACTIVE ERROR: ICD-10-CM

## 2019-07-15 DIAGNOSIS — C61 PROSTATE CANCER: ICD-10-CM

## 2019-07-15 DIAGNOSIS — J30.89 ALLERGIC RHINITIS DUE TO OTHER ALLERGIC TRIGGER, UNSPECIFIED SEASONALITY: ICD-10-CM

## 2019-07-15 DIAGNOSIS — J45.909 ASTHMA, UNSPECIFIED ASTHMA SEVERITY, UNSPECIFIED WHETHER COMPLICATED, UNSPECIFIED WHETHER PERSISTENT: ICD-10-CM

## 2019-07-15 DIAGNOSIS — E29.1 HYPOGONADISM IN MALE: ICD-10-CM

## 2019-07-15 DIAGNOSIS — F41.9 ANXIETY AND DEPRESSION: ICD-10-CM

## 2019-07-15 DIAGNOSIS — R07.9 CHEST PAIN, UNSPECIFIED TYPE: ICD-10-CM

## 2019-07-15 DIAGNOSIS — G47.33 OSA (OBSTRUCTIVE SLEEP APNEA): ICD-10-CM

## 2019-07-15 DIAGNOSIS — J45.20 MILD INTERMITTENT ASTHMA WITHOUT COMPLICATION: ICD-10-CM

## 2019-07-15 DIAGNOSIS — N52.9 ERECTILE DYSFUNCTION, UNSPECIFIED ERECTILE DYSFUNCTION TYPE: ICD-10-CM

## 2019-07-15 DIAGNOSIS — Z00.00 ENCOUNTER FOR PREVENTIVE HEALTH EXAMINATION: Primary | ICD-10-CM

## 2019-07-15 DIAGNOSIS — F32.A ANXIETY AND DEPRESSION: ICD-10-CM

## 2019-07-15 DIAGNOSIS — E78.00 HYPERCHOLESTEREMIA: ICD-10-CM

## 2019-07-15 DIAGNOSIS — D50.0 IRON DEFICIENCY ANEMIA DUE TO CHRONIC BLOOD LOSS: ICD-10-CM

## 2019-07-15 DIAGNOSIS — H25.013 CORTICAL SENILE CATARACT OF BOTH EYES: ICD-10-CM

## 2019-07-15 DIAGNOSIS — H25.13 NUCLEAR SCLEROSIS OF BOTH EYES: ICD-10-CM

## 2019-07-15 DIAGNOSIS — H43.813 POSTERIOR VITREOUS DETACHMENT OF BOTH EYES: ICD-10-CM

## 2019-07-15 PROBLEM — R73.01 IFG (IMPAIRED FASTING GLUCOSE): Status: RESOLVED | Noted: 2017-02-16 | Resolved: 2019-07-15

## 2019-07-15 PROBLEM — R73.01 IFG (IMPAIRED FASTING GLUCOSE): Status: ACTIVE | Noted: 2017-02-16

## 2019-07-15 PROCEDURE — 99215 PR OFFICE/OUTPT VISIT, EST, LEVL V, 40-54 MIN: ICD-10-PCS | Mod: S$GLB,,, | Performed by: FAMILY MEDICINE

## 2019-07-15 PROCEDURE — 3044F PR MOST RECENT HEMOGLOBIN A1C LEVEL <7.0%: ICD-10-PCS | Mod: CPTII,S$GLB,, | Performed by: PHYSICIAN ASSISTANT

## 2019-07-15 PROCEDURE — 1101F PT FALLS ASSESS-DOCD LE1/YR: CPT | Mod: CPTII,S$GLB,, | Performed by: FAMILY MEDICINE

## 2019-07-15 PROCEDURE — 3079F DIAST BP 80-89 MM HG: CPT | Mod: CPTII,S$GLB,, | Performed by: PHYSICIAN ASSISTANT

## 2019-07-15 PROCEDURE — 1101F PR PT FALLS ASSESS DOC 0-1 FALLS W/OUT INJ PAST YR: ICD-10-PCS | Mod: CPTII,S$GLB,, | Performed by: FAMILY MEDICINE

## 2019-07-15 PROCEDURE — G0439 PR MEDICARE ANNUAL WELLNESS SUBSEQUENT VISIT: ICD-10-PCS | Mod: S$GLB,,, | Performed by: PHYSICIAN ASSISTANT

## 2019-07-15 PROCEDURE — 99215 OFFICE O/P EST HI 40 MIN: CPT | Mod: S$GLB,,, | Performed by: FAMILY MEDICINE

## 2019-07-15 PROCEDURE — 93010 EKG 12-LEAD: ICD-10-PCS | Mod: S$GLB,,, | Performed by: NUCLEAR MEDICINE

## 2019-07-15 PROCEDURE — 99999 PR PBB SHADOW E&M-EST. PATIENT-LVL III: ICD-10-PCS | Mod: PBBFAC,,, | Performed by: FAMILY MEDICINE

## 2019-07-15 PROCEDURE — 99999 PR PBB SHADOW E&M-EST. PATIENT-LVL III: CPT | Mod: PBBFAC,,, | Performed by: FAMILY MEDICINE

## 2019-07-15 PROCEDURE — 93010 ELECTROCARDIOGRAM REPORT: CPT | Mod: S$GLB,,, | Performed by: NUCLEAR MEDICINE

## 2019-07-15 PROCEDURE — 3044F PR MOST RECENT HEMOGLOBIN A1C LEVEL <7.0%: ICD-10-PCS | Mod: CPTII,S$GLB,, | Performed by: FAMILY MEDICINE

## 2019-07-15 PROCEDURE — 99999 PR PBB SHADOW E&M-EST. PATIENT-LVL V: CPT | Mod: PBBFAC,,, | Performed by: PHYSICIAN ASSISTANT

## 2019-07-15 PROCEDURE — 3078F DIAST BP <80 MM HG: CPT | Mod: CPTII,S$GLB,, | Performed by: FAMILY MEDICINE

## 2019-07-15 PROCEDURE — 3074F PR MOST RECENT SYSTOLIC BLOOD PRESSURE < 130 MM HG: ICD-10-PCS | Mod: CPTII,S$GLB,, | Performed by: PHYSICIAN ASSISTANT

## 2019-07-15 PROCEDURE — 99999 PR PBB SHADOW E&M-EST. PATIENT-LVL V: ICD-10-PCS | Mod: PBBFAC,,, | Performed by: PHYSICIAN ASSISTANT

## 2019-07-15 PROCEDURE — 3078F PR MOST RECENT DIASTOLIC BLOOD PRESSURE < 80 MM HG: ICD-10-PCS | Mod: CPTII,S$GLB,, | Performed by: FAMILY MEDICINE

## 2019-07-15 PROCEDURE — 3079F PR MOST RECENT DIASTOLIC BLOOD PRESSURE 80-89 MM HG: ICD-10-PCS | Mod: CPTII,S$GLB,, | Performed by: PHYSICIAN ASSISTANT

## 2019-07-15 PROCEDURE — 3044F HG A1C LEVEL LT 7.0%: CPT | Mod: CPTII,S$GLB,, | Performed by: FAMILY MEDICINE

## 2019-07-15 PROCEDURE — 93005 ELECTROCARDIOGRAM TRACING: CPT | Mod: S$GLB,,, | Performed by: FAMILY MEDICINE

## 2019-07-15 PROCEDURE — 3044F HG A1C LEVEL LT 7.0%: CPT | Mod: CPTII,S$GLB,, | Performed by: PHYSICIAN ASSISTANT

## 2019-07-15 PROCEDURE — 3074F SYST BP LT 130 MM HG: CPT | Mod: CPTII,S$GLB,, | Performed by: FAMILY MEDICINE

## 2019-07-15 PROCEDURE — 3074F PR MOST RECENT SYSTOLIC BLOOD PRESSURE < 130 MM HG: ICD-10-PCS | Mod: CPTII,S$GLB,, | Performed by: FAMILY MEDICINE

## 2019-07-15 PROCEDURE — 3074F SYST BP LT 130 MM HG: CPT | Mod: CPTII,S$GLB,, | Performed by: PHYSICIAN ASSISTANT

## 2019-07-15 PROCEDURE — 93005 EKG 12-LEAD: ICD-10-PCS | Mod: S$GLB,,, | Performed by: FAMILY MEDICINE

## 2019-07-15 PROCEDURE — G0439 PPPS, SUBSEQ VISIT: HCPCS | Mod: S$GLB,,, | Performed by: PHYSICIAN ASSISTANT

## 2019-07-15 RX ORDER — MIRTAZAPINE 7.5 MG/1
7.5 TABLET, FILM COATED ORAL NIGHTLY
Refills: 3 | COMMUNITY
Start: 2019-06-13 | End: 2021-07-01

## 2019-07-15 RX ORDER — AMLODIPINE BESYLATE 2.5 MG/1
2.5 TABLET ORAL DAILY
Qty: 30 TABLET | Refills: 5 | Status: SHIPPED | OUTPATIENT
Start: 2019-07-15 | End: 2020-04-30

## 2019-07-15 NOTE — PROGRESS NOTES
Subjective:       Patient ID: Edin Green is a . male.    Chief Complaint: Multiple issues see below    HPI here with wife in follow-up  diagnosis type 2 diabetes mellitus discussed; improved to 5.7; signif change in diet/carb counting    Weight loss: d/wd multifact. With dec tsh, depressed mood and change in diet.     Depression anxiety : he says Dr Ridley has asked him to change to a new psychiat. ; he doesn't have the name location with him(in car)        Hyperthyroid history normal TSH last year off methimazole overdue for endocrine follow-up in TSH; nonoch tsh suppressed via care everywhere 2/19; eduardo still low. Didn't f/u dr story as planned last visit    Secondary to patient's reported memory loss and slow motor activity  neurologist for possible dementia Parkinson's workup saw Dr Michaels in April and was told hold off on w/u for now until psychiatric issues stablized    Hypertension: blood pressurebit low today     Prost Ca recent dx via dr phelan. Has appt dr wheeler to discuss xrt    2 months occas ant chest pain nonexert no sob. Lasts from 1-60 minutes few times weeks    2 months qhs 1-2 weeks feels like heart goes fast but he is unsure if it elev hr vs chills. No fever         Review of Systems    Chest: no shortness of breath  Abd: no abd pain  Remainder review of systems negative    Objective:   Wife here  Physical Exam   Constitutional: He is oriented to person, place, and time. He appears well-developed and well-nourished.   Eyes: Pupils are equal, round, and reactive to light. Conjunctivae and EOM are normal. No scleral icterus.   Neck: Normal range of motion. Neck supple. Carotid bruit is not present.   Cardiovascular: Normal rate, regular rhythm and normal heart sounds. Exam reveals no gallop and no friction rub.   No murmur heard.  Pulmonary/Chest: Effort normal and breath sounds normal. He has no wheezes.   Abdominal: Soft. Bowel sounds are normal. He exhibits no distension and no mass. There  is no hepatosplenomegaly. There is no tenderness. There is no rebound and no guarding.   Musculoskeletal: Normal range of motion. He exhibits no tenderness.   Lymphadenopathy:     He has no cervical adenopathy.   Neurological: He is alert and oriented to person, place, and time. He displays normal reflexes. No cranial nerve deficit. He exhibits normal muscle tone. Coordination normal.   Skin: Skin is warm and dry. No rash noted. No erythema.   Psychiatric: He has a normal mood and affect. His behavior is normal. Judgment and thought content normal.   Nursing note and vitals reviewed.      Assessment:       1. Type 2 diabetes mellitus without complication, without long-term current use of insulin    2.    3. Hyperthyroidism    4. Hypogonadism in male    5. Graves disease    6. Essential hypertension    7. Depression, unspecified depression type    8. Memory loss      wt loss  suppres tsh  Tachycardia (?)  Chest pain  Prost cancer  Plan:     monitor wt  **F/u dr story soon  msg to dr story about getting in for fu. Soon. Also asked if ok to resume some dose methimazole  Graves disease    Essential hypertension    Hyperthyroidism    Hypogonadism in male    Type 2 diabetes mellitus without complication, without long-term current use of insulin    Hypercholesteremia    Asthma, unspecified asthma severity, unspecified whether complicated, unspecified whether persistent    Prostate cancer    Tachycardia  -     Holter monitor - 48 hour; Future  -     EKG 12-lead; Future; Expected date: 07/15/2019    Chest pain, unspecified type  -     Exercise stress echo; Future    Other orders  -     amLODIPine (NORVASC) 2.5 MG tablet; Take 1 tablet (2.5 mg total) by mouth once daily.  Dispense: 30 tablet; Refill: 5    f/u me two weeks: f/u on bp w dec norvasc, wt, make sure stress test and endocr f/u    # [facility] to give option [facility]    Continue the losartan/hctz    F/u urol /rad onc as planned    Defers digital  med for now      ADD: spoke w dr story. rec resume 5mg methimaz and they will get him in for f/u  erxd

## 2019-07-15 NOTE — PATIENT INSTRUCTIONS
Change amlodipine to 2.5 mg daily; ok to stop amlodipine for tomorrow then day after resume at 2.5 mg  Continue the losartan/hctz  Feel free to call Edith or Sybil to set up a new psychiatrist's appt at [facility] [phone]

## 2019-07-15 NOTE — Clinical Note
Mr Green is overdue for follow up. He has been off methimazole. His tsh is suppressed. Can you set up a close follow up and do you recommend methimazole and if so what dose?Christophe

## 2019-07-15 NOTE — PATIENT INSTRUCTIONS
Counseling and Referral of Other Preventative  (Italic type indicates deductible and co-insurance are waived)    Patient Name: Edin Green  Today's Date: 7/15/2019    Health Maintenance       Date Due Completion Date    Shingles Vaccine (2 of 3) 01/30/2012 12/5/2011    Influenza Vaccine 08/01/2019 12/20/2018    Override on 3/9/2017: Declined    Override on 12/4/2012: Done    Hemoglobin A1c 01/08/2020 7/8/2019    Eye Exam 05/16/2020 5/16/2019    TETANUS VACCINE 06/18/2020 6/18/2010 (Done)    Override on 6/18/2010: Done    Foot Exam 06/19/2020 6/19/2019    PROSTATE-SPECIFIC ANTIGEN 07/08/2020 7/8/2019    Override on 6/15/2016: Done (dr quintero)    Lipid Panel 07/08/2020 7/8/2019    Low Dose Statin 07/15/2020 7/15/2019    Colonoscopy 06/28/2023 6/28/2018    Override on 3/27/2018: Done (Repeat in 5 years)    Override on 1/31/2012: Done ([facility], repeat in 1 year)        No orders of the defined types were placed in this encounter.    The following information is provided to all patients.  This information is to help you find resources for any of the problems found today that may be affecting your health:                Living healthy guide: www.[facility].[place].gov      Understanding Diabetes: www.diabetes.org      Eating healthy: www.cdc.gov/healthyweight      CDC home safety checklist: www.cdc.gov/steadi/patient.html      Agency on Aging: www.goea.[place].gov      Alcoholics anonymous (AA): www.aa.org      Physical Activity: www.[place]in.nih.gov/vk4xuxt      Tobacco use: www.quitwithusla.org

## 2019-07-15 NOTE — PROGRESS NOTES
"Edin Green presented for a  Medicare AWV and comprehensive Health Risk Assessment today. The following components were reviewed and updated:    · Medical history  · Family History  · Social history  · Allergies and Current Medications  · Health Risk Assessment  · Health Maintenance  · Care Team     ** See Completed Assessments for Annual Wellness Visit within the encounter summary.**       The following assessments were completed:  · Living Situation  · CAGE  · Depression Screening  · Timed Get Up and Go  · Whisper Test  · Cognitive Function Screening  · Nutrition Screening  · ADL Screening  · PAQ Screening    Vitals:    07/15/19 1417   BP: 116/82   BP Location: Left arm   Patient Position: Sitting   BP Method: Medium (Manual)   Pulse: 80   Temp: 96.6 °F (35.9 °C)   TempSrc: Tympanic   SpO2: 99%   Weight: 78.9 kg (173 lb 15.1 oz)   Height: 6' 2" (1.88 m)     Body mass index is 22.33 kg/m².  Physical Exam   Constitutional: He is oriented to person, place, and time. Vital signs are normal. He appears well-developed and well-nourished. He is active. No distress.   HENT:   Head: Normocephalic and atraumatic.   Cardiovascular: Normal rate, regular rhythm and normal heart sounds. Exam reveals no gallop and no friction rub.   No murmur heard.  Pulmonary/Chest: Effort normal and breath sounds normal. No respiratory distress. He has no wheezes. He has no rales.   Neurological: He is alert and oriented to person, place, and time.   Skin: Skin is warm. He is not diaphoretic.   Psychiatric: He has a normal mood and affect. His behavior is normal. Judgment and thought content normal.   Nursing note and vitals reviewed.        Diagnoses and health risks identified today and associated recommendations/orders:    1. Encounter for preventive health examination  -completed today. Patient is due for shingles vaccine. Advised to get this at his pharmacy  -patient failed his cognitive exam and his depression exam. Pt scheduled for " follow up with his psychiatrist. See's Dr. Ridley.     2. Hypertensive retinopathy of both eyes  -Stable and controlled. Continue current treatment plan as previously prescribed with your ophthalmologist.     3. Refractive error - Both Eyes  -Stable and controlled. Continue current treatment plan as previously prescribed with your ophthalmologist.     4. Nuclear sclerosis of both eyes  -Stable and controlled. Continue current treatment plan as previously prescribed with your ophthalmologist.     5. Essential hypertension  -Stable and controlled on amlodipine 2.5mg and losartan-hctz 100-25. Continue current treatment plan as previously prescribed with your PCP.     6. Anxiety and depression  -currently on trileptal and risperidone. See's outside psychiatrist, Dr. Ridley.     7. Hypogonadism in male  -Stable and controlled. Continue current treatment plan as previously prescribed with your urologist, Dr. Ochoa.    8. Erectile dysfunction, unspecified erectile dysfunction type  -Stable and controlled. Continue current treatment plan as previously prescribed with your urologist, Dr. Ochoa.    9. LAURENCE (obstructive sleep apnea)  -has not used CPAP for years due to discomfort. Has had a 50lb weight loss. Sleeps well on sleep rx. Recommend follow up with pulmonologist for this.     10. Hypercholesteremia  Lab Results   Component Value Date    CHOL 135 07/08/2019    CHOL 134 05/23/2018    CHOL 130 05/22/2017     Lab Results   Component Value Date    HDL 56 07/08/2019    HDL 50 05/23/2018    HDL 53 05/22/2017     Lab Results   Component Value Date    LDLCALC 70.2 07/08/2019    LDLCALC 73.0 05/23/2018    LDLCALC 67.8 05/22/2017     Lab Results   Component Value Date    TRIG 44 07/08/2019    TRIG 55 05/23/2018    TRIG 46 05/22/2017     Lab Results   Component Value Date    CHOLHDL 41.5 07/08/2019    CHOLHDL 37.3 05/23/2018    CHOLHDL 40.8 05/22/2017     -Stable and controlled. Continue current treatment plan as previously  prescribed with your PCP.       11. Mild intermittent asthma without complication  -doesn't need inhaler  -Stable and controlled. Continue current treatment plan as previously prescribed with your PCP.     12. Allergic rhinitis due to other allergic trigger, unspecified seasonality  -Stable and controlled on singulair. Continue current treatment plan as previously prescribed with your PCP.       13. Cortical senile cataract of both eyes  -Stable and controlled. Continue current treatment plan as previously prescribed with your ophthalmologist.     14. Posterior vitreous detachment of both eyes  -Stable and controlled. Continue current treatment plan as previously prescribed with your ophthalmologist.     15. Hyperthyroidism  -Stable and controlled. Continue current treatment plan as previously prescribed with your endocrinologist, Dr. Harmon  Lab Results   Component Value Date    TSH 0.012 (L) 07/08/2019       16. Graves disease  -Stable and controlled. Continue current treatment plan as previously prescribed with your endocrinologist, Dr. Harmon    17. Iron deficiency anemia due to chronic blood loss  Lab Results   Component Value Date    WBC 6.16 11/29/2017    HGB 11.7 (L) 11/29/2017    HCT 35.9 (L) 11/29/2017    MCV 85 11/29/2017     11/29/2017   -Stable and controlled. Continue current treatment plan as previously prescribed with your PCP.       18. Internal hemorrhoid, bleeding  -resolved. Denies any bleeding at this time.     19. Type 2 diabetes mellitus without complication, without long-term current use of insulin  Lab Results   Component Value Date    HGBA1C 5.7 (H) 07/08/2019     20. Prostate cancer  -Stable and controlled. Continue current treatment plan as previously prescribed with your urologist, DR. Ochoa.   Lab Results   Component Value Date    PSA 5.0 (H) 07/08/2019    PSA 5.7 (H) 03/18/2019    PSA 3.9 12/17/2018       21. Current severe episode of major depressive disorder without psychotic  features without prior episode  -Dr. Ridley in [place]. Last seen 1-2 months ago. Scheduled for follow up next month.     22. Aortic ectasia  -noted today on cxr 1/18/2017    Provided Edin with a 5-10 year written screening schedule and personal prevention plan. Recommendations were developed using the USPSTF age appropriate recommendations. Education, counseling, and referrals were provided as needed. After Visit Summary printed and given to patient which includes a list of additional screenings\tests needed.    Follow up if symptoms worsen or fail to improve.    Margaret Cleaning PA-C  I offered to discuss end of life issues, including information on how to make advance directives that the patient could use to name someone who would make medical decisions on their behalf if they became too ill to make themselves.    ___Patient declined  _X_Patient is interested, I provided paper work and offered to discuss.

## 2019-07-18 ENCOUNTER — CLINICAL SUPPORT (OUTPATIENT)
Dept: CARDIOLOGY | Facility: CLINIC | Age: 69
End: 2019-07-18
Attending: FAMILY MEDICINE
Payer: MEDICARE

## 2019-07-18 DIAGNOSIS — R07.9 CHEST PAIN, UNSPECIFIED TYPE: ICD-10-CM

## 2019-07-18 LAB
DIASTOLIC DYSFUNCTION: NO
ESTIMATED PA SYSTOLIC PRESSURE: 26.43
RETIRED EF AND QEF - SEE NOTES: 60 (ref 55–65)

## 2019-07-18 PROCEDURE — 93321 EXERCISE STRESS ECHO: ICD-10-PCS | Mod: S$GLB,ICN,, | Performed by: INTERNAL MEDICINE

## 2019-07-18 PROCEDURE — 93351 STRESS TTE COMPLETE: CPT | Mod: S$GLB,ICN,, | Performed by: INTERNAL MEDICINE

## 2019-07-18 PROCEDURE — 93321 DOPPLER ECHO F-UP/LMTD STD: CPT | Mod: S$GLB,ICN,, | Performed by: INTERNAL MEDICINE

## 2019-07-18 PROCEDURE — 93351 EXERCISE STRESS ECHO: ICD-10-PCS | Mod: S$GLB,ICN,, | Performed by: INTERNAL MEDICINE

## 2019-07-22 ENCOUNTER — INITIAL CONSULT (OUTPATIENT)
Dept: RADIATION ONCOLOGY | Facility: CLINIC | Age: 69
End: 2019-07-22
Payer: MEDICARE

## 2019-07-22 ENCOUNTER — TELEPHONE (OUTPATIENT)
Dept: ENDOCRINOLOGY | Facility: CLINIC | Age: 69
End: 2019-07-22

## 2019-07-22 ENCOUNTER — TELEPHONE (OUTPATIENT)
Dept: INTERNAL MEDICINE | Facility: CLINIC | Age: 69
End: 2019-07-22

## 2019-07-22 VITALS
WEIGHT: 173.5 LBS | OXYGEN SATURATION: 98 % | HEART RATE: 68 BPM | SYSTOLIC BLOOD PRESSURE: 128 MMHG | TEMPERATURE: 97 F | HEIGHT: 74 IN | RESPIRATION RATE: 18 BRPM | BODY MASS INDEX: 22.27 KG/M2 | DIASTOLIC BLOOD PRESSURE: 80 MMHG

## 2019-07-22 DIAGNOSIS — C61 PROSTATE CANCER: Primary | ICD-10-CM

## 2019-07-22 PROCEDURE — 1101F PT FALLS ASSESS-DOCD LE1/YR: CPT | Mod: CPTII,S$GLB,, | Performed by: RADIOLOGY

## 2019-07-22 PROCEDURE — 3079F DIAST BP 80-89 MM HG: CPT | Mod: CPTII,S$GLB,, | Performed by: RADIOLOGY

## 2019-07-22 PROCEDURE — 99999 PR PBB SHADOW E&M-EST. PATIENT-LVL IV: CPT | Mod: PBBFAC,,, | Performed by: RADIOLOGY

## 2019-07-22 PROCEDURE — 3079F PR MOST RECENT DIASTOLIC BLOOD PRESSURE 80-89 MM HG: ICD-10-PCS | Mod: CPTII,S$GLB,, | Performed by: RADIOLOGY

## 2019-07-22 PROCEDURE — 99205 OFFICE O/P NEW HI 60 MIN: CPT | Mod: S$GLB,,, | Performed by: RADIOLOGY

## 2019-07-22 PROCEDURE — 1101F PR PT FALLS ASSESS DOC 0-1 FALLS W/OUT INJ PAST YR: ICD-10-PCS | Mod: CPTII,S$GLB,, | Performed by: RADIOLOGY

## 2019-07-22 PROCEDURE — 3074F SYST BP LT 130 MM HG: CPT | Mod: CPTII,S$GLB,, | Performed by: RADIOLOGY

## 2019-07-22 PROCEDURE — 99205 PR OFFICE/OUTPT VISIT, NEW, LEVL V, 60-74 MIN: ICD-10-PCS | Mod: S$GLB,,, | Performed by: RADIOLOGY

## 2019-07-22 PROCEDURE — 3074F PR MOST RECENT SYSTOLIC BLOOD PRESSURE < 130 MM HG: ICD-10-PCS | Mod: CPTII,S$GLB,, | Performed by: RADIOLOGY

## 2019-07-22 PROCEDURE — 99999 PR PBB SHADOW E&M-EST. PATIENT-LVL IV: ICD-10-PCS | Mod: PBBFAC,,, | Performed by: RADIOLOGY

## 2019-07-22 RX ORDER — METHIMAZOLE 5 MG/1
5 TABLET ORAL DAILY
Qty: 30 TABLET | Refills: 2 | Status: ON HOLD | OUTPATIENT
Start: 2019-07-22 | End: 2019-10-25 | Stop reason: SDUPTHER

## 2019-07-22 NOTE — TELEPHONE ENCOUNTER
Please notify him I spoke with dr baird and she recommends he rsume methimazole at 5mg daily and that her clinic would get him back in soon  Please ask that he call her clinic if they dont call by end of week

## 2019-07-22 NOTE — LETTER
July 22, 2019      [place]  [id number] [place] [address] Dr Faizan CRUZ [id number]            [facility]  [address]  [place] [address]  Phone: [phone]  Fax: [phone]          Patient: Edin Green   MR Number: [id number]   YOB: 1950   Date of Visit: 7/22/2019       Dear Dr. Kiel Ochoa IV:    Thank you for referring Edin Green to me for evaluation. Attached you will find relevant portions of my assessment and plan of care.    If you have questions, please do not hesitate to call me. I look forward to following Edin Green along with you.    Sincerely,    Kiel Simth II, MD    Enclosure  CC:  No Recipients    If you would like to receive this communication electronically, please contact [email or web address] or [phone] to request more information on [employer] access.    For providers and/or their staff who would like to refer a patient to [facility], please contact us through our one-stop-shop provider referral line, [facility] , at [phone].    If you feel you have received this communication in error or would no longer like to receive these types of communications, please e-mail [email or web address]

## 2019-07-22 NOTE — PROGRESS NOTES
[facility]  RADIATION ONCOLOGY CONSULTATION    Name: Edin Green  : 1950      Patient Referred To Radiation Oncology By:  [place]  [address] [place] [address]    DIAGNOSIS:  Favorable intermediate risk prostate cancer cIIB: cT1c cN0 cM0, PSA 5.7, Sonia 3+4, 1/12 cores    HISTORY OF PRESENT ILLNESS:  Edin Green is a pleasant 68 y.o. male who had a PSA below 2 several readings prior to .  His neck is reading in our system is 14-3.2.  It has been slowly rising to a high 3/18/19-5.7.  He saw Dr. Ochoa and had no prostate nodules on exam.  19 prostate biopsy showed 32 cc gland.  He had [place] 3 + 4 involving 1 core at the left mid, 15%.  There was no perineural invasion.  6/10/19 prostate MRI showed 28.7 cc volume.  This was PI-RADS 2.  He is followed by Dr. Paris and other providers for weight loss, depression, anxiety, hyperthyroidism, and recent memory loss and slow motor activity pending workup with neurology.  He sees an outside psychiatrist.  He has erectile dysfunction and receives PDE5 inhibitors but doesn't take them, although they do work.  His AUA score is 4/yeast, Reyna score 5.    REVIEW OF SYSTEMS: (Positive findings bold, otherwise negative)   Constitutional: fever, fatigue, weight loss  Eyes: blurred vision in the past 3 months, double vision   ENT: ear pain, new mouth lesions, jaw pain, difficulty swallowing, sore throat  Cardiovascular: chest pain on exertion, reflux, extremity swelling  Respiratory: shortness of breath, dyspnea, cough, hemoptysis.   GI: abdominal pain, diarrhea, constipation, blood in stool, painful bowel movements  : painful or burning urination, blood in urine  Musculoskeletal: new bone or joint pains  Neurologic: headache, seizure, focal numbness or tingling, balance changes, speech changes  Lymph: new or enlarged lymph nodes  Psychiatric: depression, anxiety    PRIOR  RADIATION HISTORY: None    PAST MEDICAL HISTORY:  Past Medical History:   Diagnosis Date    Anemia     Anxiety     Asthma     Benign hematuria     Cataract OU    Colon polyp     dr strauss    Depression     ED (erectile dysfunction)     Graves disease     story    HTN (hypertension)     Hypercholesteremia     Hypertensive retinopathy of both eyes     Hypogonadism     LAURENCE (obstructive sleep apnea)     Pneumonia     Prostate cancer 7/15/2019    Trouble in sleeping     Type 2 diabetes mellitus        PAST SURGICAL HISTORY:  No past surgical history on file.    ALLERGIES:   Review of patient's allergies indicates:   Allergen Reactions    Celebrex [celecoxib]      Lip swelling      Shrimp     Tomato     Venom-wasp        MEDICATIONS:    Current Outpatient Medications:     amLODIPine (NORVASC) 2.5 MG tablet, Take 1 tablet (2.5 mg total) by mouth once daily., Disp: 30 tablet, Rfl: 5    aspirin (ECOTRIN) 81 MG EC tablet, Take 81 mg by mouth once daily., Disp: , Rfl:     buPROPion (WELLBUTRIN SR) 150 MG TBSR 12 hr tablet, , Disp: , Rfl:     losartan-hydrochlorothiazide 100-25 mg (HYZAAR) 100-25 mg per tablet, TAKE ONE TABLET BY MOUTH ONCE DAILY, Disp: 30 tablet, Rfl: 11    mirtazapine (REMERON) 7.5 MG Tab, Take 7.5 mg by mouth every evening., Disp: , Rfl: 3    montelukast (SINGULAIR) 10 mg tablet, TAKE ONE TABLET BY MOUTH ONCE DAILY IN THE EVENING, Disp: 30 tablet, Rfl: 11    OXcarbazepine (TRILEPTAL) 150 MG Tab, , Disp: , Rfl:     pravastatin (PRAVACHOL) 40 MG tablet, TAKE ONE TABLET BY MOUTH ONCE DAILY, Disp: 30 tablet, Rfl: 11    risperiDONE (RISPERDAL M-TABS) 2 MG disintegrating tablet, , Disp: , Rfl:     sildenafil (VIAGRA) 100 MG tablet, TAKE AS DIRECTED, Disp: 10 tablet, Rfl: 11    multivitamin (THERAGRAN) per tablet, Take 1 tablet by mouth., Disp: , Rfl:     polyethylene glycol (GLYCOLAX) 17 gram/dose powder, , Disp: , Rfl:     tadalafil (CIALIS) 20 MG Tab, Use one tablet every 36  "hours, Disp: 10 tablet, Rfl: 11    SOCIAL HISTORY:  Social History     Socioeconomic History    Marital status:      Spouse name: CHUCHO    Number of children: 3    Years of education: Not on file    Highest education level: Not on file   Occupational History    Not on file   Social Needs    Financial resource strain: Not on file    Food insecurity:     Worry: Not on file     Inability: Not on file    Transportation needs:     Medical: Not on file     Non-medical: Not on file   Tobacco Use    Smoking status: Never Smoker    Smokeless tobacco: Never Used   Substance and Sexual Activity    Alcohol use: Yes     Alcohol/week: 0.6 oz     Types: 1 Cans of beer per week    Drug use: No    Sexual activity: Yes     Partners: Female   Lifestyle    Physical activity:     Days per week: Not on file     Minutes per session: Not on file    Stress: Not on file   Relationships    Social connections:     Talks on phone: Not on file     Gets together: Not on file     Attends [employer]ism service: Not on file     Active member of club or organization: Not on file     Attends meetings of clubs or organizations: Not on file     Relationship status: Not on file   Other Topics Concern    Not on file   Social History Narrative    Wears a seatbelt.       FAMILY HISTORY:  Family History   Problem Relation Age of Onset    Hypertension Unknown     Heart defect Mother     Strabismus Neg Hx     Retinal detachment Neg Hx     Macular degeneration Neg Hx     Glaucoma Neg Hx     Blindness Neg Hx     Amblyopia Neg Hx        PHYSICAL EXAMINATION:  Constitutional: well appearing, no acute distress, ECOG 0 - Fully Active  Vitals:    /80   Pulse 68   Temp 96.9 °F (36.1 °C)   Resp 18   Ht 6' 2" (1.88 m)   Wt 78.7 kg (173 lb 8 oz)   SpO2 98%   BMI 22.28 kg/m²   Neck: trachea midline  Lymphatic: no cervical, supraclavicular or inguinal adenopathy  Cardiovascular: regular rate, no murmurs, no edema of the upper or " lower extremities, radial pulse 2+  Respiratory: unlabored effort, clear to auscultation, no wheezes  Abdomen: soft, non-tender, no rigidity, no masses, no hepatomegaly  Rectal: good sphincter tone, non-tender, no blood,  :  uncircumcised phallus, smooth flat prostate, no nodules  Spine: non-tender to percussion cervical, thoracic and lumbosacral spine    IMAGING AND LABORATORY FINDINGS: As per HPI; images reviewed personally.    ASSESSMENT:  Favorable intermediate risk prostate cancer, small volume, PI-RADS 2    PLAN:  We discussed the role of radiation in the treatment favorable intermediate risk prostate cancer.  There are studies of active surveillance in intermediate risk prostate cancer showing reasonable outcomes.  He has favorable, small volume disease with a low PSA, PI-RADS 2 MRI therefore active surveillance is reasonable.  We also discussed treatment.  He has discussed surgery with Dr. Ochoa.  We discussed the different toxicity profile of surgery and radiation based on the protect trial.      His radiation options are external beam alone 5 weeks short course hypofractionation to the prostate and proximal seminal vesicles or brachytherapy alone.  I think brachytherapy alone is an option given his favorable intermediate risk disease, small volume, low PSA and PI-RADS 2 MRI.    Androgen deprivation could be combined with radiation, however there are studies showing he can worsen depression and he is currently seeing an outside psychiatrist with a history of depression and anxiety.  It is reasonable not to give hormone therapy either in the brachytherapy or external beam setting given the favorable, small volume disease with a low PSA, PI-RADS 2 MRI.    Cardiology workup is pending for tachycardia and possible palpitations.     He is a good candidate for spaceOAR since there is no definite extraprostatic extension or visible prostate lesion and he will need prostate fiducials if he chooses external  beam radiation.    He would like some time to consider his options but is leaning toward a treatment option rather than surveillance.  He will see Dr. Ochoa August 12 and I will see him the following day.    We discussed the techniques, toxicities and indications of radiation and I answered the patient's questions to their apparent satisfaction.    ARASH Smith M.D.  Radiation Oncology  [facility] [address] [place] [address]  Ph: [phone]  [email or web address]

## 2019-07-22 NOTE — TELEPHONE ENCOUNTER
----- Message from Gabi Harmon MD sent at 7/22/2019  9:12 AM CDT -----   Please call patient and tell him he is overdue for follow-up and get him in next available appointment  ----- Message -----  From: Carter Paris MD  Sent: 7/15/2019   1:32 PM  To: Gabi Harmon MD    Mr Green is overdue for follow up. He has been off methimazole. His tsh is suppressed. Can you set up a close follow up and do you recommend methimazole and if so what dose?    Thanks  Carter

## 2019-07-22 NOTE — TELEPHONE ENCOUNTER
I left a vm for the pt to contact the office regarding the providers feedback after speaking w/ . //lidia

## 2019-07-23 ENCOUNTER — CLINICAL SUPPORT (OUTPATIENT)
Dept: CARDIOLOGY | Facility: CLINIC | Age: 69
End: 2019-07-23
Attending: FAMILY MEDICINE
Payer: MEDICARE

## 2019-07-23 DIAGNOSIS — I77.89 ENLARGEMENT OF AORTIC ROOT: Primary | ICD-10-CM

## 2019-07-23 DIAGNOSIS — R00.0 TACHYCARDIA: ICD-10-CM

## 2019-07-23 DIAGNOSIS — I77.9 AORTIC DISEASE: ICD-10-CM

## 2019-07-23 PROCEDURE — 93224 HOLTER MONITOR - 48 HOUR: ICD-10-PCS | Mod: S$GLB,,, | Performed by: INTERNAL MEDICINE

## 2019-07-23 PROCEDURE — 93224 XTRNL ECG REC UP TO 48 HRS: CPT | Mod: S$GLB,,, | Performed by: INTERNAL MEDICINE

## 2019-07-24 ENCOUNTER — TELEPHONE (OUTPATIENT)
Dept: INTERNAL MEDICINE | Facility: CLINIC | Age: 69
End: 2019-07-24

## 2019-07-24 ENCOUNTER — TELEPHONE (OUTPATIENT)
Dept: HEMATOLOGY/ONCOLOGY | Facility: CLINIC | Age: 69
End: 2019-07-24

## 2019-07-24 NOTE — TELEPHONE ENCOUNTER
"Called patient to address distress screening score of 9/10. He said "I just put that." Normalized and validated distress in light of his situation. Asked him if there was anything specifically SW could help with--mentioned that he said he had insurance/financial concerns on the screening. Pt said he did need financial assistance, and we briefly discussed what is available. He was unclear on specifics and/or if he would be doing radiation. Asked him if he wanted to come in to discuss options or f/u in person at his next radiation appointment. He said he would have to get back to SW. He was provided with SW name and contact info to call regarding setting up an appointment to talk about his assistance needs. SW will plan to f/u when pt calls back and/or by his next appt with Dr. Smith.  "

## 2019-07-24 NOTE — TELEPHONE ENCOUNTER
Please see note in Kims box. She tried contacting him about stress echo result. Showed possible enlargement of base of aorta. CTA chest needs to be scheduled to help see if any enlargement or not

## 2019-07-24 NOTE — TELEPHONE ENCOUNTER
----- Message from Aziza Pierre sent at 7/24/2019 10:41 AM CDT -----  Contact: pt   Type:  Patient Returning Call    Who Called:pt   Who Left Message for Patient:Alyssa   Does the patient know what this is regarding?:  Would the patient rather a call back or a response via [facility]? Call back   Best Call Back Number:[phone]  Additional Information:

## 2019-07-26 ENCOUNTER — TELEPHONE (OUTPATIENT)
Dept: RADIOLOGY | Facility: HOSPITAL | Age: 69
End: 2019-07-26

## 2019-07-29 ENCOUNTER — HOSPITAL ENCOUNTER (OUTPATIENT)
Dept: RADIOLOGY | Facility: HOSPITAL | Age: 69
Discharge: HOME | End: 2019-07-29
Attending: FAMILY MEDICINE
Payer: MEDICARE

## 2019-07-29 DIAGNOSIS — I77.89 ENLARGEMENT OF AORTIC ROOT: ICD-10-CM

## 2019-07-29 DIAGNOSIS — I77.9 AORTIC DISEASE: ICD-10-CM

## 2019-07-29 PROCEDURE — 25500020 PHARM REV CODE 255: Performed by: FAMILY MEDICINE

## 2019-07-29 PROCEDURE — 71275 CTA CHEST NON CORONARY: ICD-10-PCS | Mod: 26,,, | Performed by: RADIOLOGY

## 2019-07-29 PROCEDURE — 71275 CT ANGIOGRAPHY CHEST: CPT | Mod: TC

## 2019-07-29 PROCEDURE — 71275 CT ANGIOGRAPHY CHEST: CPT | Mod: 26,,, | Performed by: RADIOLOGY

## 2019-07-29 RX ADMIN — IOHEXOL 100 ML: 350 INJECTION, SOLUTION INTRAVENOUS at 01:07

## 2019-07-30 ENCOUNTER — PATIENT MESSAGE (OUTPATIENT)
Dept: ADMINISTRATIVE | Facility: OTHER | Age: 69
End: 2019-07-30

## 2019-07-30 ENCOUNTER — OFFICE VISIT (OUTPATIENT)
Dept: INTERNAL MEDICINE | Facility: CLINIC | Age: 69
End: 2019-07-30
Payer: MEDICARE

## 2019-07-30 VITALS
WEIGHT: 175.94 LBS | TEMPERATURE: 97 F | BODY MASS INDEX: 22.58 KG/M2 | DIASTOLIC BLOOD PRESSURE: 62 MMHG | HEIGHT: 74 IN | SYSTOLIC BLOOD PRESSURE: 106 MMHG

## 2019-07-30 DIAGNOSIS — E05.90 HYPERTHYROIDISM: ICD-10-CM

## 2019-07-30 DIAGNOSIS — R91.1 LUNG NODULE: ICD-10-CM

## 2019-07-30 DIAGNOSIS — E11.9 TYPE 2 DIABETES MELLITUS WITHOUT COMPLICATION, WITHOUT LONG-TERM CURRENT USE OF INSULIN: Primary | ICD-10-CM

## 2019-07-30 DIAGNOSIS — I10 ESSENTIAL HYPERTENSION: ICD-10-CM

## 2019-07-30 DIAGNOSIS — C61 PROSTATE CANCER: ICD-10-CM

## 2019-07-30 DIAGNOSIS — E05.00 GRAVES DISEASE: ICD-10-CM

## 2019-07-30 PROCEDURE — 3044F PR MOST RECENT HEMOGLOBIN A1C LEVEL <7.0%: ICD-10-PCS | Mod: CPTII,S$GLB,, | Performed by: FAMILY MEDICINE

## 2019-07-30 PROCEDURE — 99999 PR PBB SHADOW E&M-EST. PATIENT-LVL III: CPT | Mod: PBBFAC,,, | Performed by: FAMILY MEDICINE

## 2019-07-30 PROCEDURE — 1101F PT FALLS ASSESS-DOCD LE1/YR: CPT | Mod: CPTII,S$GLB,, | Performed by: FAMILY MEDICINE

## 2019-07-30 PROCEDURE — 1101F PR PT FALLS ASSESS DOC 0-1 FALLS W/OUT INJ PAST YR: ICD-10-PCS | Mod: CPTII,S$GLB,, | Performed by: FAMILY MEDICINE

## 2019-07-30 PROCEDURE — 3074F SYST BP LT 130 MM HG: CPT | Mod: CPTII,S$GLB,, | Performed by: FAMILY MEDICINE

## 2019-07-30 PROCEDURE — 99999 PR PBB SHADOW E&M-EST. PATIENT-LVL III: ICD-10-PCS | Mod: PBBFAC,,, | Performed by: FAMILY MEDICINE

## 2019-07-30 PROCEDURE — 99214 PR OFFICE/OUTPT VISIT, EST, LEVL IV, 30-39 MIN: ICD-10-PCS | Mod: S$GLB,,, | Performed by: FAMILY MEDICINE

## 2019-07-30 PROCEDURE — 3078F DIAST BP <80 MM HG: CPT | Mod: CPTII,S$GLB,, | Performed by: FAMILY MEDICINE

## 2019-07-30 PROCEDURE — 3044F HG A1C LEVEL LT 7.0%: CPT | Mod: CPTII,S$GLB,, | Performed by: FAMILY MEDICINE

## 2019-07-30 PROCEDURE — 3078F PR MOST RECENT DIASTOLIC BLOOD PRESSURE < 80 MM HG: ICD-10-PCS | Mod: CPTII,S$GLB,, | Performed by: FAMILY MEDICINE

## 2019-07-30 PROCEDURE — 99214 OFFICE O/P EST MOD 30 MIN: CPT | Mod: S$GLB,,, | Performed by: FAMILY MEDICINE

## 2019-07-30 PROCEDURE — 3074F PR MOST RECENT SYSTOLIC BLOOD PRESSURE < 130 MM HG: ICD-10-PCS | Mod: CPTII,S$GLB,, | Performed by: FAMILY MEDICINE

## 2019-07-30 NOTE — PROGRESS NOTES
Subjective:       Patient ID: Edin Green is a . male.    Chief Complaint: Multiple issues see below    HPI here with wife in follow-up      Weight loss: d/wd multifact. With dec tsh, depressed mood and change in diet. Bit increased since last visit     type 2 diabetes mellitus discussed again; improved to 5.7; signif change in diet/carb counting        Depression anxiety : seeing psychiatrist        Hyperthyroid history normal TSH last year off methimazole overdue for endocrine follow-up in TSH; spoke with dr story and bridget given to resume methim. Which he has. F/u is planned. msg sent to dr story to help set up f/u    Secondary to patient's reported memory loss and slow motor activity  neurologist for possible dementia Parkinson's workup saw Dr Michaels in April and was told hold off on w/u for now until psychiatric issues stablized    Hypertension: blood pressure low normal. bp meds adjusted last time. No orthostasis sympt    Prost Ca recent dx via dr phelan. Had appt dr wheeler to discuss xrt and f/u planned as he /wife discuss options    No c/o cp palpitations. holter stress test ok x see below; no palpit while on monitor    Echo saw poss aort base dilat. cta ok x small nodule        Review of Systems    Chest: no shortness of breath  Abd: no abd pain  Remainder review of systems negative    Objective:   Wife here  Physical Exam   Constitutional: He is oriented to person, place, and time. He appears well-developed and well-nourished.   Eyes: Pupils are equal, round, and reactive to light. Conjunctivae and EOM are normal. No scleral icterus.   Neck: Normal range of motion. Neck supple. Carotid bruit is not present.   Cardiovascular: Normal rate, regular rhythm and normal heart sounds. Exam reveals no gallop and no friction rub.   No murmur heard.  Pulmonary/Chest: Effort normal and breath sounds normal. He has no wheezes.      Neurological: He is alert and oriented to person, place, and time. He displays  normal reflexes. No cranial nerve deficit. He exhibits normal muscle tone. Coordination normal.   Skin: Skin is warm and dry. No rash noted. No erythema.   Psychiatric: He has a normal mood and affect. His behavior is normal. Judgment and thought content normal.   Nursing note and vitals reviewed.      Assessment:       1. Type 2 diabetes mellitus without complication, without long-term current use of insulin    2.    3. Hyperthyroidism    4. Hypogonadism in male    5. Graves disease    6. Essential hypertension    7. Depression, unspecified depression type    8. Memory loss      wt loss  suppres tsh      Prost cancer  Plan:     monitor wt  **F/u dr story soon  msg to dr story about getting in for fu. Soon. Af/u me 4 weeks: f/u on bp , wt,  and endocr f/u            F/u urol /rad onc as planned    Ok  digital htn for now;defers digital dm for now      Ct chest one year    Lab and follow up after in 6 months  Type 2 diabetes mellitus without complication, without long-term current use of insulin  -     Hemoglobin A1c; Future; Expected date: 01/26/2020    Lung nodule  -     CT Chest Without Contrast; Future; Expected date: 07/30/2020    Essential hypertension  -     Hypertension Digital Medicine (HDMP) Enrollment Order    Graves disease    Hyperthyroidism    Prostate cancer    ntoify if recurr palpit or tachycard if so then event monitor  If recur chest pain ntify plan card

## 2019-07-31 ENCOUNTER — TELEPHONE (OUTPATIENT)
Dept: INTERNAL MEDICINE | Facility: CLINIC | Age: 69
End: 2019-07-31

## 2019-07-31 NOTE — TELEPHONE ENCOUNTER
----- Message from Carter Paris MD sent at 7/30/2019  3:55 PM CDT -----   Can you help Mr Green get into see Dr Harmon soon? They have had difficulty contacting him  ----- Message -----  From: Gabi Harmon MD  Sent: 7/30/2019  10:51 AM  To: Carter Paris MD    Just wanted to let you know that we have not been able to reach this patient to schedule a follow-up appointment but I see that he is scheduled to see you today.  If your nursing staff can help with scheduling that would be great.  We have not been able to reach him by phone

## 2019-08-05 ENCOUNTER — OFFICE VISIT (OUTPATIENT)
Dept: ENDOCRINOLOGY | Facility: CLINIC | Age: 69
End: 2019-08-05
Payer: MEDICARE

## 2019-08-05 VITALS
SYSTOLIC BLOOD PRESSURE: 100 MMHG | HEIGHT: 74 IN | HEART RATE: 63 BPM | WEIGHT: 174.63 LBS | BODY MASS INDEX: 22.41 KG/M2 | DIASTOLIC BLOOD PRESSURE: 56 MMHG

## 2019-08-05 DIAGNOSIS — R63.4 WEIGHT LOSS: ICD-10-CM

## 2019-08-05 DIAGNOSIS — E05.90 HYPERTHYROIDISM: Primary | ICD-10-CM

## 2019-08-05 PROCEDURE — 1101F PT FALLS ASSESS-DOCD LE1/YR: CPT | Mod: CPTII,S$GLB,, | Performed by: INTERNAL MEDICINE

## 2019-08-05 PROCEDURE — 99213 OFFICE O/P EST LOW 20 MIN: CPT | Mod: S$GLB,,, | Performed by: INTERNAL MEDICINE

## 2019-08-05 PROCEDURE — 3074F SYST BP LT 130 MM HG: CPT | Mod: CPTII,S$GLB,, | Performed by: INTERNAL MEDICINE

## 2019-08-05 PROCEDURE — 99999 PR PBB SHADOW E&M-EST. PATIENT-LVL III: CPT | Mod: PBBFAC,,, | Performed by: INTERNAL MEDICINE

## 2019-08-05 PROCEDURE — 3078F PR MOST RECENT DIASTOLIC BLOOD PRESSURE < 80 MM HG: ICD-10-PCS | Mod: CPTII,S$GLB,, | Performed by: INTERNAL MEDICINE

## 2019-08-05 PROCEDURE — 3078F DIAST BP <80 MM HG: CPT | Mod: CPTII,S$GLB,, | Performed by: INTERNAL MEDICINE

## 2019-08-05 PROCEDURE — 99999 PR PBB SHADOW E&M-EST. PATIENT-LVL III: ICD-10-PCS | Mod: PBBFAC,,, | Performed by: INTERNAL MEDICINE

## 2019-08-05 PROCEDURE — 3074F PR MOST RECENT SYSTOLIC BLOOD PRESSURE < 130 MM HG: ICD-10-PCS | Mod: CPTII,S$GLB,, | Performed by: INTERNAL MEDICINE

## 2019-08-05 PROCEDURE — 99213 PR OFFICE/OUTPT VISIT, EST, LEVL III, 20-29 MIN: ICD-10-PCS | Mod: S$GLB,,, | Performed by: INTERNAL MEDICINE

## 2019-08-05 PROCEDURE — 1101F PR PT FALLS ASSESS DOC 0-1 FALLS W/OUT INJ PAST YR: ICD-10-PCS | Mod: CPTII,S$GLB,, | Performed by: INTERNAL MEDICINE

## 2019-08-05 NOTE — PROGRESS NOTES
Patient ID: Edin Green is a 68 y.o. male.  Patient is here for follow up      Last time seen November 2017 and at that visit he was on 20 mg of methimazole and TSH was elevated so I reduced him to 10 mg but he was persistently hypothyroid so took him off of methimazole and checked labs and they were normal and wanted to see him back in 3 months but he failed to follow-up.  His PCP recently checked his thyroid labs and his TSH was suppressed.  I recommend starting him back on methimazole just 5 mg tablet.  It was difficult trying to reach patient to get in for a visit recently but his PCPs office was able to let him know on a recent visit to follow-up    Here with wife  Chief Complaint: Follow-up      HPI     Consultation was requested by Dr. Carter Paris      Diagnosed: 1/2017- had weight loss and sweats    Previous radiology tests:  Thyroid ultrasound : No   NM uptake and scan: Yes - 1/2017 markedly increased uptake    Previous thyroid surgery: No      Thyroid symptoms:  Has been having weight loss, also diagnosed with diabetes which is diet controlled currently    Denies palpitations or sweats  In review of records he has been having some memory loss as well and some anxiety and depression     Thyroid medications: methimazole 5 mg daily, just started about a week or so ago      Takes medication appropriately: Yes    Also recently diagnosed with prostate cancer and seeing Neurology, still deciding regarding what he wants to do in terms of treatment    I have reviewed the past medical, family and social history    Review of Systems   Constitutional: Positive for unexpected weight change. Negative for appetite change, diaphoresis, fatigue and fever.   HENT: Negative for sore throat and trouble swallowing.    Eyes: Negative for visual disturbance.   Respiratory: Negative for shortness of breath and wheezing.    Cardiovascular: Negative for chest pain, palpitations and leg swelling.   Gastrointestinal:  Negative for abdominal pain, diarrhea, nausea and vomiting.   Endocrine: Negative for cold intolerance, heat intolerance, polydipsia, polyphagia and polyuria.   Genitourinary: Negative for difficulty urinating and dysuria.   Musculoskeletal: Negative for arthralgias and joint swelling.   Skin: Negative for color change and rash.   Neurological: Negative for dizziness, tremors, numbness and headaches.   Hematological: Negative for adenopathy.   Psychiatric/Behavioral: Negative for confusion, dysphoric mood and sleep disturbance. The patient is not nervous/anxious.        Objective:      Physical Exam   Constitutional: He appears well-developed and well-nourished.   HENT:   Head: Normocephalic and atraumatic.   Eyes: Conjunctivae are normal.   Neck: No tracheal deviation present. No thyromegaly present.   Musculoskeletal: He exhibits no edema or deformity.   Lymphadenopathy:     He has no cervical adenopathy.   Neurological: He is alert. He displays no tremor.   No tremor   Skin: Skin is warm and dry. No rash noted. No erythema.   Psychiatric: He has a normal mood and affect. His speech is normal and behavior is normal. His mood appears not anxious.         Lab Review:   Clinical Support on 07/18/2019   Component Date Value    QEF 07/18/2019 60     Diastolic Dysfunction 07/18/2019 No     Est. PA Systolic Pressure 07/18/2019 26.43    Lab Visit on 07/08/2019   Component Date Value    Cholesterol 07/08/2019 135     Triglycerides 07/08/2019 44     HDL 07/08/2019 56     LDL Cholesterol 07/08/2019 70.2     Hdl/Cholesterol Ratio 07/08/2019 41.5     Total Cholesterol/HDL Ra* 07/08/2019 2.4     Non-HDL Cholesterol 07/08/2019 79     Sodium 07/08/2019 141     Potassium 07/08/2019 3.5     Chloride 07/08/2019 102     CO2 07/08/2019 32*    Glucose 07/08/2019 85     BUN, Bld 07/08/2019 17     Creatinine 07/08/2019 1.2     Calcium 07/08/2019 9.5     Anion Gap 07/08/2019 7*    eGFR if  07/08/2019  >60.0     eGFR if non African Amer* 07/08/2019 >60.0     Hemoglobin A1C 07/08/2019 5.7*    Estimated Avg Glucose 07/08/2019 117     PSA, SCREEN 07/08/2019 5.0*    TSH 07/08/2019 0.012*    Free T4 07/08/2019 1.14    Office Visit on 07/03/2019   Component Date Value    Color, UA 07/03/2019 yellow     Spec Grav UA 07/03/2019 1.020     pH, UA 07/03/2019 6     WBC, UA 07/03/2019 neg     Nitrite, UA 07/03/2019 neg     Protein 07/03/2019 trace     Glucose, UA 07/03/2019 neg     Ketones, UA 07/03/2019 neg     Urobilinogen, UA 07/03/2019 neg     Bilirubin 07/03/2019 neg     Blood, UA 07/03/2019 neg    Lab Visit on 05/21/2019   Component Date Value    Creatinine 05/21/2019 1.3     eGFR if African American 05/21/2019 >60     eGFR if non African Amer* 05/21/2019 56*   Office Visit on 04/29/2019   Component Date Value    Color, UA 04/29/2019 yellow     Spec Grav UA 04/29/2019 1.015     pH, UA 04/29/2019 6     WBC, UA 04/29/2019 neg     Nitrite, UA 04/29/2019 neg     Protein 04/29/2019 trace     Glucose, UA 04/29/2019 neg     Ketones, UA 04/29/2019 neg     Urobilinogen, UA 04/29/2019 neg     Bilirubin 04/29/2019 neg     Blood, UA 04/29/2019 neg    Lab Visit on 04/10/2019   Component Date Value    Creatinine 04/10/2019 1.2     eGFR if African American 04/10/2019 >60     eGFR if non African Amer* 04/10/2019 >60    Lab Visit on 03/27/2019   Component Date Value    Microalbum.,U,Random 03/27/2019 12.0     Creatinine, Random Ur 03/27/2019 275.0     Microalb Creat Ratio 03/27/2019 4.4    Lab Visit on 03/18/2019   Component Date Value    PSA, SCREEN 03/18/2019 5.7*   Lab Visit on 03/06/2019   Component Date Value    Hemoglobin A1C 03/06/2019 6.8*    Estimated Avg Glucose 03/06/2019 148*       Assessment:     1. Hyperthyroidism  T4, free    T3, free    TSH   2. Weight loss      continue methimazole which he recently started back on and will repeat labs in 4 weeks.  His weight loss is likely  multifactorial and could be related to the hyperthyroidism as well as HIS dealing with his multiple medical issues and anxiety and depression and his diet has improved with diagnosis of diabetes  Plan:   Hyperthyroidism  -     T4, free; Future; Expected date: 08/05/2019  -     T3, free; Future; Expected date: 08/05/2019  -     TSH; Future; Expected date: 08/05/2019    Weight loss          Follow up in about 3 months (around 11/5/2019).    Labs prior to appointment? not applicable     Disclaimer:  This note may have been partially prepared using voice recognition software and  it may have not been extensively proofed, as such there could be errors within the text such as sound alike errors.

## 2019-08-07 NOTE — PROGRESS NOTES
"[facility] [place]  RADIATION ONCOLOGY FOLLOW UP    Name: Edin Green : 1950     DIAGNOSIS:  Favorable intermediate risk prostate cancer cIIB: cT1c cN0 cM0, PSA 5.7, [place] 3+4, 1/12 cores  1. 3 PSA 5.7.   2. 19 prostate biopsy showed 32 cc gland.  He had [place] 3 + 4 involving 1 core at the left mid, 15%.  There was no perineural invasion.    3. 6/10/19 prostate MRI showed 28.7 cc volume.  This was PI-RADS 2.     CURRENT STATUS: Edin Green is a pleasant 68 y.o. male who presents today for follow-up.  He is following his primary physician for diabetes and multiple medical issues including a lung nodule.  He is also following Endocrinology for hyperthyroidism and weight loss.    PHYSICAL EXAM:   Constitutional: well appearing, no acute distress, ECOG 0 - Fully Active  Vitals:    /75   Pulse 70   Temp 97 °F (36.1 °C)   Resp 18   Ht 6' 2" (1.88 m)   Wt 79.5 kg (175 lb 3.2 oz)   SpO2 99%   BMI 22.49 kg/m²     Laboratory & X-Ray Findings: Per above.      ASSESSMENT:  Favorable intermediate risk prostate cancer    PLAN:  He has discussed options with Dr. Ochoa and myself today.  He wishes to proceed with brachytherapy without androgen deprivation and with spaceOAR.  The procedure is tentatively scheduled .  We reviewed the procedure in detail today including anticipated and possible acute and late toxicities.  I answered his questions to his apparent satisfaction.    ARASH Smith M.D.  Radiation Oncology  [facility]  [id number] [place] [address] [address] [place] [address]  Ph: [phone]  [email or web address]      "

## 2019-08-12 ENCOUNTER — TELEPHONE (OUTPATIENT)
Dept: UROLOGY | Facility: CLINIC | Age: 69
End: 2019-08-12

## 2019-08-12 ENCOUNTER — OFFICE VISIT (OUTPATIENT)
Dept: UROLOGY | Facility: CLINIC | Age: 69
End: 2019-08-12
Payer: MEDICARE

## 2019-08-12 VITALS
BODY MASS INDEX: 22.56 KG/M2 | HEIGHT: 74 IN | SYSTOLIC BLOOD PRESSURE: 136 MMHG | DIASTOLIC BLOOD PRESSURE: 74 MMHG | WEIGHT: 175.81 LBS

## 2019-08-12 DIAGNOSIS — C61 PROSTATE CANCER: Primary | ICD-10-CM

## 2019-08-12 PROCEDURE — 3078F PR MOST RECENT DIASTOLIC BLOOD PRESSURE < 80 MM HG: ICD-10-PCS | Mod: CPTII,S$GLB,, | Performed by: UROLOGY

## 2019-08-12 PROCEDURE — 99214 PR OFFICE/OUTPT VISIT, EST, LEVL IV, 30-39 MIN: ICD-10-PCS | Mod: 57,S$GLB,, | Performed by: UROLOGY

## 2019-08-12 PROCEDURE — 3075F PR MOST RECENT SYSTOLIC BLOOD PRESS GE 130-139MM HG: ICD-10-PCS | Mod: CPTII,S$GLB,, | Performed by: UROLOGY

## 2019-08-12 PROCEDURE — 1101F PR PT FALLS ASSESS DOC 0-1 FALLS W/OUT INJ PAST YR: ICD-10-PCS | Mod: CPTII,S$GLB,, | Performed by: UROLOGY

## 2019-08-12 PROCEDURE — 99999 PR PBB SHADOW E&M-EST. PATIENT-LVL IV: ICD-10-PCS | Mod: PBBFAC,,, | Performed by: UROLOGY

## 2019-08-12 PROCEDURE — 99999 PR PBB SHADOW E&M-EST. PATIENT-LVL IV: CPT | Mod: PBBFAC,,, | Performed by: UROLOGY

## 2019-08-12 PROCEDURE — 3078F DIAST BP <80 MM HG: CPT | Mod: CPTII,S$GLB,, | Performed by: UROLOGY

## 2019-08-12 PROCEDURE — 99214 OFFICE O/P EST MOD 30 MIN: CPT | Mod: 57,S$GLB,, | Performed by: UROLOGY

## 2019-08-12 PROCEDURE — 1101F PT FALLS ASSESS-DOCD LE1/YR: CPT | Mod: CPTII,S$GLB,, | Performed by: UROLOGY

## 2019-08-12 PROCEDURE — 3075F SYST BP GE 130 - 139MM HG: CPT | Mod: CPTII,S$GLB,, | Performed by: UROLOGY

## 2019-08-12 NOTE — H&P (VIEW-ONLY)
"Chief Complaint: T1c CaP    HPI:   8/12/19: Dr. Smith [facility] he have CMRT with brachy alone an option.  SpaceOAR but no ADT (depression).  Reviewed history in detail.   7/3/19: MRI is done.  PIRADs2 no obvious findings.  Reviewed history in detail.  5/21/19: Bx shows Gl 3+4=7 in 1/2 of left mid, 15% of core.  Reviewed history in detail.    4/29/19: TRUS/Bx today 32 gm.  3/25/19: PSA shows a sustained positive trend.  [facility] biopsy.  Reviewed history in detail.  9/17/18: 66 yo man here to discuss elevated PSA.  Was seeing Dr. Sanchez.  I spoke to his [employer] [facility] last year.  No abd/pelvic pain and no exac/rel factors.  No hematuria.  No urolithiasis.  No urinary bother.  Cialis/viagra prn for ED. Normal sexual function.  Was told he had a "mushy" prostate and was given cipro by Dr. Paris.  No LUTS.    Allergies:  Celebrex [celecoxib]; Shrimp; Tomato; and Venom-wasp    Medications:  has a current medication list which includes the following prescription(s): amlodipine, aspirin, bupropion, losartan-hydrochlorothiazide 100-25 mg, methimazole, mirtazapine, montelukast, multivitamin, oxcarbazepine, polyethylene glycol, pravastatin, risperidone, sildenafil, and tadalafil.    Review of Systems:  General: No fever, chills, fatigability, or weight loss.  Skin: No rashes, itching, or changes in color or texture of skin.  Chest: Denies CHAMBERLAIN, cyanosis, wheezing, cough, and sputum production.  Abdomen: Appetite fine. No weight loss. Denies diarrhea, abdominal pain, hematemesis, or blood in stool.  Musculoskeletal: No joint stiffness or swelling. Denies back pain.  : As above.  All other review of systems negative.    PMH:   has a past medical history of Anemia, Anxiety, Asthma, Benign hematuria, Cataract (OU), Colon polyp, Depression, ED (erectile dysfunction), Graves disease, HTN (hypertension), Hypercholesteremia, Hypertensive retinopathy of both eyes, Hypogonadism, LAURENCE (obstructive sleep apnea), Pneumonia, Prostate cancer " (7/15/2019), Trouble in sleeping, and Type 2 diabetes mellitus.    PSH:   has no past surgical history on file.    FamHx: family history includes Heart defect in his mother; Hypertension in his unknown relative.    SocHx:  reports that he has never smoked. He has never used smokeless tobacco. He reports that he drinks about 0.6 oz of alcohol per week. He reports that he does not use drugs.      Physical Exam:  Vitals:    08/12/19 1602   BP: 136/74     General: A&Ox3, no apparent distress, no deformities  Neck: No masses, normal thyroid  Lungs: normal inspiration, no use of accessory muscles  Heart: normal pulse, no arrhythmias  Abdomen: Soft, NT, ND  Skin: The skin is warm and dry. No jaundice.  Ext: No c/c/e.  : 9/18: Test desc ivan, no abnormalities of epididymus. Penis normal, with normal penile and scrotal skin. Meatus normal. Normal rectal tone, no hemorrhoids. Prost 40 gm no nodules or masses appreciated. SV not palpable. Perineum and anus normal.    Labs/Studies:   PSA    9/08: 1.7    7/14: 3.2    5/17: 3.2    5/18: 5.1    6/18: 4.3    3/19: 6.8, 5.7    Impression/Plan:   1. Discussed in detail again.  Pt elects brachytherapy with SpaceOAR, no XRT or ADT.  Risks/benefits of same reviewed, consents on chart.  Tentatively scheduled for 10Sep19.

## 2019-08-12 NOTE — PROGRESS NOTES
"Chief Complaint: T1c CaP    HPI:   8/12/19: Dr. Smith [facility] he have CMRT with brachy alone an option.  SpaceOAR but no ADT (depression).  Reviewed history in detail.   7/3/19: MRI is done.  PIRADs2 no obvious findings.  Reviewed history in detail.  5/21/19: Bx shows Gl 3+4=7 in 1/2 of left mid, 15% of core.  Reviewed history in detail.    4/29/19: TRUS/Bx today 32 gm.  3/25/19: PSA shows a sustained positive trend.  [facility] biopsy.  Reviewed history in detail.  9/17/18: 66 yo man here to discuss elevated PSA.  Was seeing Dr. Sanchez.  I spoke to his [employer] group last year.  No abd/pelvic pain and no exac/rel factors.  No hematuria.  No urolithiasis.  No urinary bother.  Cialis/viagra prn for ED. Normal sexual function.  Was told he had a "mushy" prostate and was given cipro by Dr. Paris.  No LUTS.    Allergies:  Celebrex [celecoxib]; Shrimp; Tomato; and Venom-wasp    Medications:  has a current medication list which includes the following prescription(s): amlodipine, aspirin, bupropion, losartan-hydrochlorothiazide 100-25 mg, methimazole, mirtazapine, montelukast, multivitamin, oxcarbazepine, polyethylene glycol, pravastatin, risperidone, sildenafil, and tadalafil.    Review of Systems:  General: No fever, chills, fatigability, or weight loss.  Skin: No rashes, itching, or changes in color or texture of skin.  Chest: Denies CHAMBERLAIN, cyanosis, wheezing, cough, and sputum production.  Abdomen: Appetite fine. No weight loss. Denies diarrhea, abdominal pain, hematemesis, or blood in stool.  Musculoskeletal: No joint stiffness or swelling. Denies back pain.  : As above.  All other review of systems negative.    PMH:   has a past medical history of Anemia, Anxiety, Asthma, Benign hematuria, Cataract (OU), Colon polyp, Depression, ED (erectile dysfunction), Graves disease, HTN (hypertension), Hypercholesteremia, Hypertensive retinopathy of both eyes, Hypogonadism, LAURENCE (obstructive sleep apnea), Pneumonia, Prostate cancer " (7/15/2019), Trouble in sleeping, and Type 2 diabetes mellitus.    PSH:   has no past surgical history on file.    FamHx: family history includes Heart defect in his mother; Hypertension in his unknown relative.    SocHx:  reports that he has never smoked. He has never used smokeless tobacco. He reports that he drinks about 0.6 oz of alcohol per week. He reports that he does not use drugs.      Physical Exam:  Vitals:    08/12/19 1602   BP: 136/74     General: A&Ox3, no apparent distress, no deformities  Neck: No masses, normal thyroid  Lungs: normal inspiration, no use of accessory muscles  Heart: normal pulse, no arrhythmias  Abdomen: Soft, NT, ND  Skin: The skin is warm and dry. No jaundice.  Ext: No c/c/e.  : 9/18: Test desc ivan, no abnormalities of epididymus. Penis normal, with normal penile and scrotal skin. Meatus normal. Normal rectal tone, no hemorrhoids. Prost 40 gm no nodules or masses appreciated. SV not palpable. Perineum and anus normal.    Labs/Studies:   PSA    9/08: 1.7    7/14: 3.2    5/17: 3.2    5/18: 5.1    6/18: 4.3    3/19: 6.8, 5.7    Impression/Plan:   1. Discussed in detail again.  Pt elects brachytherapy with SpaceOAR, no XRT or ADT.  Risks/benefits of same reviewed, consents on chart.  Tentatively scheduled for 10Sep19.

## 2019-08-13 ENCOUNTER — OFFICE VISIT (OUTPATIENT)
Dept: RADIATION ONCOLOGY | Facility: CLINIC | Age: 69
End: 2019-08-13
Payer: MEDICARE

## 2019-08-13 ENCOUNTER — HOSPITAL ENCOUNTER (OUTPATIENT)
Dept: RADIOLOGY | Facility: HOSPITAL | Age: 69
Discharge: HOME | End: 2019-08-13
Attending: UROLOGY
Payer: MEDICARE

## 2019-08-13 VITALS
BODY MASS INDEX: 22.48 KG/M2 | RESPIRATION RATE: 18 BRPM | SYSTOLIC BLOOD PRESSURE: 123 MMHG | HEIGHT: 74 IN | DIASTOLIC BLOOD PRESSURE: 75 MMHG | OXYGEN SATURATION: 99 % | WEIGHT: 175.19 LBS | HEART RATE: 70 BPM | TEMPERATURE: 97 F

## 2019-08-13 DIAGNOSIS — C61 PROSTATE CANCER: Primary | ICD-10-CM

## 2019-08-13 PROCEDURE — 71046 X-RAY EXAM CHEST 2 VIEWS: CPT | Mod: 26,,, | Performed by: RADIOLOGY

## 2019-08-13 PROCEDURE — 3074F SYST BP LT 130 MM HG: CPT | Mod: CPTII,S$GLB,, | Performed by: RADIOLOGY

## 2019-08-13 PROCEDURE — 99214 OFFICE O/P EST MOD 30 MIN: CPT | Mod: S$GLB,,, | Performed by: RADIOLOGY

## 2019-08-13 PROCEDURE — 1101F PR PT FALLS ASSESS DOC 0-1 FALLS W/OUT INJ PAST YR: ICD-10-PCS | Mod: CPTII,S$GLB,, | Performed by: RADIOLOGY

## 2019-08-13 PROCEDURE — 3074F PR MOST RECENT SYSTOLIC BLOOD PRESSURE < 130 MM HG: ICD-10-PCS | Mod: CPTII,S$GLB,, | Performed by: RADIOLOGY

## 2019-08-13 PROCEDURE — 99999 PR PBB SHADOW E&M-EST. PATIENT-LVL IV: ICD-10-PCS | Mod: PBBFAC,,, | Performed by: RADIOLOGY

## 2019-08-13 PROCEDURE — 71046 XR CHEST PA AND LATERAL: ICD-10-PCS | Mod: 26,,, | Performed by: RADIOLOGY

## 2019-08-13 PROCEDURE — 3078F DIAST BP <80 MM HG: CPT | Mod: CPTII,S$GLB,, | Performed by: RADIOLOGY

## 2019-08-13 PROCEDURE — 1101F PT FALLS ASSESS-DOCD LE1/YR: CPT | Mod: CPTII,S$GLB,, | Performed by: RADIOLOGY

## 2019-08-13 PROCEDURE — 71046 X-RAY EXAM CHEST 2 VIEWS: CPT | Mod: TC

## 2019-08-13 PROCEDURE — 99214 PR OFFICE/OUTPT VISIT, EST, LEVL IV, 30-39 MIN: ICD-10-PCS | Mod: S$GLB,,, | Performed by: RADIOLOGY

## 2019-08-13 PROCEDURE — 3078F PR MOST RECENT DIASTOLIC BLOOD PRESSURE < 80 MM HG: ICD-10-PCS | Mod: CPTII,S$GLB,, | Performed by: RADIOLOGY

## 2019-08-13 PROCEDURE — 99999 PR PBB SHADOW E&M-EST. PATIENT-LVL IV: CPT | Mod: PBBFAC,,, | Performed by: RADIOLOGY

## 2019-08-28 NOTE — PRE ADMISSION SCREENING
Pre op instructions reviewed with patient per phone:    To confirm, Your surgeon has instructed you:  Surgery is scheduled 09/10/19at per MD.      Please report [facility] main lobby by per MD.         INSTRUCTIONS IMPORTANT!!!  ¨ Do not eat, drink, or smoke after 12 midnight-including water. OK to brush teeth, no gum, candy or mints!    ¨ Take only these medicines with a small swallow of water-morning of surgery.  Bupropion, Trileptal, Pravastatin    Fleet enema morning of surgery prior to coming to hospital                                                                                     ____  Do not wear makeup, including mascara.  ____  No powder, lotions or creams to surgical area.  ____  Please remove all jewelry, including piercings and leave at home.  ____  No money or valuables needed. Please leave at home.  ____  Please bring identification and insurance information to hospital.  ____  If going home the same day, arrange for a ride home. You will not be able to   drive if Anesthesia was used.  ____  Children, under 12 years old, must remain in the waiting room with an adult.  They are not allowed in patient areas.  ____  Wear loose fitting clothing. Allow for dressings, bandages.  ____  Stop Aspirin, Ibuprofen, Motrin and Aleve at least 5-7 days before surgery, unless otherwise instructed by your doctor, or the nurse.   You MAY use Tylenol/acetaminophen until day of surgery.  ____  If you take diabetic medication, do not take am of surgery unless instructed by   Doctor.  ____ Stop taking any Fish Oil supplement or any Vitamins that contain Vitamin E at least 5 days prior to surgery.          Bathing Instructions-- The night before surgery and the morning prior to coming to the hospital:   -Do not shave the surgical area.   -Shower and wash your hair and body as usual with anti-bacterial  soap and shampoo.   -Rinse your hair and body completely.   -Use one packet of  hibiclens to wash the surgical site (using your hand) gently for 5 minutes.  Do not scrub you skin too hard.   -Do not use hibiclens on your head, face, or genitals.   -Do not wash with anti-bacterial soap after you use the hibiclens.   -Rinse your body thoroughly.   -Dry with clean, soft towel.  Do not use lotion, cream, deodorant, or powders on   the surgical site.    Use antibacterial soap in place of hibiclens if your surgery is on the head, face or genitals.         Surgical Site Infection    Prevention of surgical site infections:     -Keep incisions clean and dry.   -Do not soak/submerge incisions in water until completely healed.   -Do not apply lotions, powders, creams, or deodorants to site.   -Always make sure hands are cleaned with antibacterial soap/ alcohol-based   prior to touching the surgical site.  (This includes doctors, nurses, staff, and yourself.)    Signs and symptoms:   -Redness and pain around the area where you had surgery   -Drainage of cloudy fluid from your surgical wound   -Fever over 100.4  I have read or had read and explained to me, and understand the above information.

## 2019-09-03 ENCOUNTER — OFFICE VISIT (OUTPATIENT)
Dept: INTERNAL MEDICINE | Facility: CLINIC | Age: 69
End: 2019-09-03
Payer: MEDICARE

## 2019-09-03 ENCOUNTER — NUTRITION (OUTPATIENT)
Dept: DIABETES | Facility: CLINIC | Age: 69
End: 2019-09-03
Payer: MEDICARE

## 2019-09-03 ENCOUNTER — LAB VISIT (OUTPATIENT)
Dept: LAB | Facility: HOSPITAL | Age: 69
End: 2019-09-03
Attending: INTERNAL MEDICINE
Payer: MEDICARE

## 2019-09-03 VITALS
BODY MASS INDEX: 22.01 KG/M2 | SYSTOLIC BLOOD PRESSURE: 118 MMHG | HEART RATE: 75 BPM | HEIGHT: 74 IN | OXYGEN SATURATION: 98 % | WEIGHT: 171.5 LBS | TEMPERATURE: 97 F | DIASTOLIC BLOOD PRESSURE: 66 MMHG

## 2019-09-03 VITALS — HEIGHT: 74 IN | WEIGHT: 171.31 LBS | BODY MASS INDEX: 21.98 KG/M2

## 2019-09-03 DIAGNOSIS — E11.9 DIABETES MELLITUS WITHOUT COMPLICATION: Primary | ICD-10-CM

## 2019-09-03 DIAGNOSIS — D64.9 ANEMIA, UNSPECIFIED TYPE: ICD-10-CM

## 2019-09-03 DIAGNOSIS — E11.9 TYPE 2 DIABETES MELLITUS WITHOUT COMPLICATION, WITHOUT LONG-TERM CURRENT USE OF INSULIN: ICD-10-CM

## 2019-09-03 DIAGNOSIS — I10 ESSENTIAL HYPERTENSION: ICD-10-CM

## 2019-09-03 DIAGNOSIS — E05.90 HYPERTHYROIDISM: ICD-10-CM

## 2019-09-03 DIAGNOSIS — J45.20 MILD INTERMITTENT ASTHMA WITHOUT COMPLICATION: ICD-10-CM

## 2019-09-03 DIAGNOSIS — E05.00 GRAVES DISEASE: ICD-10-CM

## 2019-09-03 DIAGNOSIS — C61 PROSTATE CANCER: Primary | ICD-10-CM

## 2019-09-03 LAB
T3FREE SERPL-MCNC: 2.3 PG/ML (ref 2.3–4.2)
T4 FREE SERPL-MCNC: 0.91 NG/DL (ref 0.71–1.51)
TSH SERPL DL<=0.005 MIU/L-ACNC: 0.7 UIU/ML (ref 0.4–4)

## 2019-09-03 PROCEDURE — G0108 PR DIAB MANAGE TRN  PER INDIV: ICD-10-PCS | Mod: S$GLB,,, | Performed by: DIETITIAN, REGISTERED

## 2019-09-03 PROCEDURE — 3078F PR MOST RECENT DIASTOLIC BLOOD PRESSURE < 80 MM HG: ICD-10-PCS | Mod: CPTII,S$GLB,, | Performed by: FAMILY MEDICINE

## 2019-09-03 PROCEDURE — 99999 PR PBB SHADOW E&M-EST. PATIENT-LVL III: CPT | Mod: PBBFAC,,, | Performed by: DIETITIAN, REGISTERED

## 2019-09-03 PROCEDURE — 84481 FREE ASSAY (FT-3): CPT

## 2019-09-03 PROCEDURE — 99999 PR PBB SHADOW E&M-EST. PATIENT-LVL III: CPT | Mod: PBBFAC,,, | Performed by: FAMILY MEDICINE

## 2019-09-03 PROCEDURE — 99214 PR OFFICE/OUTPT VISIT, EST, LEVL IV, 30-39 MIN: ICD-10-PCS | Mod: S$GLB,,, | Performed by: FAMILY MEDICINE

## 2019-09-03 PROCEDURE — 99999 PR PBB SHADOW E&M-EST. PATIENT-LVL III: ICD-10-PCS | Mod: PBBFAC,,, | Performed by: DIETITIAN, REGISTERED

## 2019-09-03 PROCEDURE — 84443 ASSAY THYROID STIM HORMONE: CPT

## 2019-09-03 PROCEDURE — G0108 DIAB MANAGE TRN  PER INDIV: HCPCS | Mod: S$GLB,,, | Performed by: DIETITIAN, REGISTERED

## 2019-09-03 PROCEDURE — 3074F SYST BP LT 130 MM HG: CPT | Mod: CPTII,S$GLB,, | Performed by: FAMILY MEDICINE

## 2019-09-03 PROCEDURE — 1101F PR PT FALLS ASSESS DOC 0-1 FALLS W/OUT INJ PAST YR: ICD-10-PCS | Mod: CPTII,S$GLB,, | Performed by: FAMILY MEDICINE

## 2019-09-03 PROCEDURE — 99214 OFFICE O/P EST MOD 30 MIN: CPT | Mod: S$GLB,,, | Performed by: FAMILY MEDICINE

## 2019-09-03 PROCEDURE — 99999 PR PBB SHADOW E&M-EST. PATIENT-LVL III: ICD-10-PCS | Mod: PBBFAC,,, | Performed by: FAMILY MEDICINE

## 2019-09-03 PROCEDURE — 84439 ASSAY OF FREE THYROXINE: CPT

## 2019-09-03 PROCEDURE — 3044F PR MOST RECENT HEMOGLOBIN A1C LEVEL <7.0%: ICD-10-PCS | Mod: CPTII,S$GLB,, | Performed by: FAMILY MEDICINE

## 2019-09-03 PROCEDURE — 1101F PT FALLS ASSESS-DOCD LE1/YR: CPT | Mod: CPTII,S$GLB,, | Performed by: FAMILY MEDICINE

## 2019-09-03 PROCEDURE — 3044F HG A1C LEVEL LT 7.0%: CPT | Mod: CPTII,S$GLB,, | Performed by: FAMILY MEDICINE

## 2019-09-03 PROCEDURE — 3074F PR MOST RECENT SYSTOLIC BLOOD PRESSURE < 130 MM HG: ICD-10-PCS | Mod: CPTII,S$GLB,, | Performed by: FAMILY MEDICINE

## 2019-09-03 PROCEDURE — 3078F DIAST BP <80 MM HG: CPT | Mod: CPTII,S$GLB,, | Performed by: FAMILY MEDICINE

## 2019-09-03 RX ORDER — AMLODIPINE BESYLATE 5 MG/1
5 TABLET ORAL DAILY
Refills: 11 | Status: ON HOLD | COMMUNITY
Start: 2019-08-30 | End: 2020-02-10

## 2019-09-03 NOTE — PROGRESS NOTES
Diabetes Education  Author: Priyanka Silver RD, CDE  Date: 9/3/2019    Diabetes Care Management Summary  Diabetes Education Record Assessment/Progress: Post Program/Follow-up  Current Diabetes Risk Level: Low      Diabetes Type  Diabetes Type : Type II    Diabetes History  Diabetes Diagnosis: 0-1 year  Current Treatment: Diet, Exercise  Reviewed Problem List with Patient: Yes    Health Maintenance was reviewed today with patient. Discussed with patient importance of routine eye exams, foot exams/foot care, blood work (i.e.: A1c, microalbumin, and lipid), dental visits, yearly flu vaccine, and pneumonia vaccine as indicated by PCP. Patient verbalized understanding.     Health Maintenance Topics with due status: Not Due       Topic Last Completion Date    TETANUS VACCINE 06/18/2010    Colonoscopy 06/28/2018    Eye Exam 05/16/2019    Foot Exam 06/19/2019    PROSTATE-SPECIFIC ANTIGEN 07/08/2019    Lipid Panel 07/08/2019    Hemoglobin A1c 07/08/2019    Low Dose Statin 08/28/2019     There are no preventive care reminders to display for this patient.    Nutrition  Meal Planning: (Intake ~0747-2606 cals/d. No excess carb, fat, sodium. Pt reports irregular meal patterns; noted current BMI 22. Weight decreased from 220 range 9/18.)  Meal Plan 24 Hour Recall - Breakfast: oatmeal (cooked, applesauce or banana, equal) OR eggs, toast (wheat)  Meal Plan 24 Hour Recall - Lunch: sandwich (wheat, ham-lean, no nitrates), bkd chips (18) OR yesterday bkd chix, pasta -water  Meal Plan 24 Hour Recall - Dinner: yesterday skipped - water  Meal Plan 24 Hour Recall - Snack: nuts, bkd chips; thomas: orange araceli 50/50 (not daily) or milk or water    Monitoring   Self Monitoring : Per records, fst BG ; ac , 130.   Blood Glucose Logs: Yes  In the last month, how often have you had a low blood sugar reaction?: never    Exercise   Exercise Type: (pt stopped using stationary bike but plans to resume)  Frequency: Never    Current Diabetes  Treatment   Current Treatment: Diet, Exercise    Social History  Preferred Learning Method: Face to Face  Primary Support: Self  Smoking Status: Never a Smoker  Alcohol Use: Never     DDS-2 Score  ( > 3 = SIGNIFICANT DISTRESS): 2       Barriers to Change  Barriers to Change: None  Learning Challenges : None    Readiness to Learn   Readiness to Learn : Eager    Cultural Influences  Cultural Influences: No    Diabetes Education Assessment/Progress  Diabetes Disease Process (diabetes disease process and treatment options): Demonstrates Understanding/Competency(verbalizes/demonstrates)  Nutrition (Incorporating nutritional management into one's lifestyle): Discussion, Individual Session, Demonstrates Understanding/Competency (verbalizes/demonstrates), Written Materials Provided  Physical Activity (incorporating physical activity into one's lifestyle): Discussion, Individual Session, Demonstrates Understanding/Competency (verbalizes/demonstrates), Written Materials Provided  Medications (states correct name, dose, onset, peak, duration, side effects & timing of meds): Discussion, Individual Session, Demonstrates Understanding/Competency(verbalizes/demonstrates), Written Materials Provided  Monitoring (monitoring blood glucose/other parameters & using results): Discussion, Individual Session, Demonstrates Understanding/Competency (verbalizes/demonstrates), Written Materials Provided  Acute Complications (preventing, detecting, and treating acute complications): Demonstrates Understanding/Competency (verbalizes/demonstrates)  Chronic Complications (preventing, detecting, and treating chronic complications): Demonstrates Understanding/Competency (verbalizes/demonstrates)  Cognitive (knowledge of self-management skills, functional health literacy): Demonstrates Understanding/Competency (verbalizes/demonstrates)  Psychosocial (emotional response to diabetes): Demonstrates Understanding/Competency  (verbalizes/demonstrates)  Diabetes Distress and Support Systems: Discussion, Individual Session, Demonstrates Understanding/Competency (verbalizes/demonstrates)  Behavioral (readiness for change, lifestyle practices, self-care behaviors): Discussion, Individual Session, Demonstrates Understanding/Competency (verbalizes/demonstrates)    Goals  Patient has selected/evaluated goals during today's session: Yes, evaluated  Healthy Eating: Set(use meal plan -carb portions/spacing)  Met Percentage : 50%  Start Date: 09/03/19(Increase meals to 3 per day and snacks to 2 per day to maintain BG and weight stability; MR shake to support regular meals (examples written))  Target Date: 03/03/20  Physical Activity: Set(stationary bike, walking in neighborhood, consider gym membership )  Met Percentage : 0%  Start Date: 09/03/19  Target Date: 03/03/20  Monitoring: Set(test BG 2x/d  - fst and 2hr pp; return records to clinic)  Met Percentage : 75%  Start Date: 09/03/19  Target Date: 03/03/20    Diabetes Self-Management Support Plan  Exercise/Nutrition: join a gym  Review Status: Patient has selected and agrees to support plan.    Diabetes Care Plan/Intervention  Education Plan/Intervention: Individual Follow-Up DSMT(Emphasis on sustaining lifestyle changes long-term and assisted with goal-setting, diabetes support resources.  ) RTC 6mos, sooner prn or as referred.    Diabetes Meal Plan  Restrictions: Low Fat, Low Sodium  Calories: 2000, 2200  Carbohydrate Per Meal: 45-60g  Carbohydrate Per Snack : 15-30g    Today's Self-Management Care Plan was developed with the patient's input and is based on barriers identified during today's assessment.    The long and short-term goals in the care plan were written with the patient/caregiver's input. The patient has agreed to work toward these goals to improve his overall diabetes control.      The patient received a copy of today's self-management plan and verbalized understanding of the care  plan, goals, and all of today's instructions.      The patient was encouraged to communicate with his physician and care team regarding his condition(s) and treatment.  I provided the patient with my contact information today and encouraged him to contact me via phone or patient portal as needed.     Education Units of Time   Time Spent: 30 min

## 2019-09-03 NOTE — LETTER
September 3, 2019    Carter Paris MD  [id number] [address] [facility] [address]         Patient: Edin Green   MR Number: [id number]   YOB: 1950   Date of Visit: 9/3/2019     Dear Dr. Paris:    Thank you for referring Edin for diabetes self-management education and support. He has completed all components of our Diabetes Management Program and his Self-Management Support Plan. Below is a summary of his clinical outcomes and goal progress.    Patient Outcomes:    A1c Status:   Lab Results   Component Value Date    HGBA1C 5.7 (H) 07/08/2019    HGBA1C 6.8 (H) 03/06/2019     Goals  Patient has selected/evaluated goals during today's session: Yes, evaluated  Healthy Eating: Set(use meal plan -carb portions/spacing)  Met Percentage : 50%  Start Date: 09/03/19(Increase meals to 3 per day and snacks to 2 per day to maintain BG and weight stability; MR shake to support regular meals (examples written))  Target Date: 03/03/20  Physical Activity: Set(stationary bike, walking in neighborhood, consider gym membership )  Met Percentage : 0%  Start Date: 09/03/19  Target Date: 03/03/20  Monitoring: Set(test BG 2x/d  - fst and 2hr pp; return records to clinic)  Met Percentage : 75%  Start Date: 09/03/19  Target Date: 03/03/20    Diabetes Self-Management Support Plan  Exercise/Nutrition: join a gym  Review Status: Patient has selected and agrees to support plan.    Follow up:   · Edin to follow diabetes support plan indicated above  · Edin to attend medical appointments as scheduled  · Edin to update you on his DM education progress as needed      If you have questions, please do not hesitate to call me. I look forward to providing additional education and support 6mos, prn, as referred.    Sincerely,    Priyanka Silver, RD, CDE

## 2019-09-03 NOTE — PROGRESS NOTES
Subjective:       Patient ID: Edin Green is a 68 y.o. male.    Chief Complaint: Follow-up (1mo rtc) and Pre-op Exam    HPIhas seed implnt proc planned one week ;utd dr phelan and liam;needs preop clearnce  F/u wt loss stable  hyperthy and utd dr story    Past Medical History:   Diagnosis Date    Anemia     Anxiety     Asthma     Benign hematuria     Cataract OU    Colon polyp     dr strauss    Depression     ED (erectile dysfunction)     Graves disease     story    HTN (hypertension)     Hypercholesteremia     Hypertensive retinopathy of both eyes     Hypogonadism     LAURENCE (obstructive sleep apnea)     Pneumonia     Prostate cancer 7/15/2019    Trouble in sleeping     Type 2 diabetes mellitus      History reviewed. No pertinent surgical history.  Family History   Problem Relation Age of Onset    Hypertension Unknown     Heart defect Mother     Strabismus Neg Hx     Retinal detachment Neg Hx     Macular degeneration Neg Hx     Glaucoma Neg Hx     Blindness Neg Hx     Amblyopia Neg Hx      Social History     Socioeconomic History    Marital status:      Spouse name: CHUCHO    Number of children: 3    Years of education: Not on file    Highest education level: Not on file   Occupational History    Not on file   Social Needs    Financial resource strain: Not on file    Food insecurity:     Worry: Not on file     Inability: Not on file    Transportation needs:     Medical: Not on file     Non-medical: Not on file   Tobacco Use    Smoking status: Never Smoker    Smokeless tobacco: Never Used   Substance and Sexual Activity    Alcohol use: Yes     Alcohol/week: 0.6 oz     Types: 1 Cans of beer per week     Comment: No alcohol 72h prior to sx    Drug use: No    Sexual activity: Yes     Partners: Female   Lifestyle    Physical activity:     Days per week: Not on file     Minutes per session: Not on file    Stress: Not on file   Relationships    Social connections:      Talks on phone: Not on file     Gets together: Not on file     Attends [employer] service: Not on file     Active member of club or organization: Not on file     Attends meetings of clubs or organizations: Not on file     Relationship status: Not on file   Other Topics Concern    Not on file   Social History Narrative    Wears a seatbelt.       Review of Systems  Cardiovascular: no chest pain  Chest: no shortness of breath  Abd: no abd pain  Remainder review of systems negative    Objective:      Physical Exam   Constitutional: He is oriented to person, place, and time. He appears well-developed and well-nourished.   HENT:   Head: Normocephalic and atraumatic.   Right Ear: External ear normal.   Left Ear: External ear normal.   Nose: Nose normal.   Mouth/Throat: Oropharynx is clear and moist.   Nl tms   Eyes: Pupils are equal, round, and reactive to light. Conjunctivae and EOM are normal. No scleral icterus.   Neck: Normal range of motion. Neck supple. Carotid bruit is not present.   Cardiovascular: Normal rate, regular rhythm and normal heart sounds. Exam reveals no gallop and no friction rub.   No murmur heard.  Pulmonary/Chest: Effort normal and breath sounds normal. He has no wheezes.   Abdominal: Soft. Bowel sounds are normal. He exhibits no distension and no mass. There is no hepatosplenomegaly. There is no tenderness. There is no rebound and no guarding.   Musculoskeletal: Normal range of motion. He exhibits no tenderness.   Lymphadenopathy:     He has no cervical adenopathy.   Neurological: He is alert and oriented to person, place, and time. No cranial nerve deficit. Coordination normal.   Skin: Skin is warm and dry. No rash noted. No erythema.   Psychiatric: He has a normal mood and affect. His behavior is normal. Judgment and thought content normal.   Nursing note and vitals reviewed.    preop lb cxr ekg reviewed  Assessment:       1. Prostate cancer    2. Type 2 diabetes mellitus without  complication, without long-term current use of insulin    3. Hyperthyroidism    4. Essential hypertension    5. Graves disease    6. Mild intermittent asthma without complication        Plan:       *f/u afte rjkelsy lab  Monitor wt notify if dec  F/u urol, endocrin, psychiatry etc as sched**    F/u after jan lab  Please add cbc, b12 folate ferritin hemoglob electrophr  to todays lab  Clear for procedure  msg to dr phelan and liam staff    Prostate cancer    Type 2 diabetes mellitus without complication, without long-term current use of insulin    Hyperthyroidism    Essential hypertension    Graves disease    Mild intermittent asthma without complication    Anemia, unspecified type  -     CBC auto differential; Future; Expected date: 09/03/2019  -     Hemoglobin Electrophoresis,Hgb A2 Kevin.; Future; Expected date: 09/03/2019  -     Vitamin B12; Future; Expected date: 09/03/2019  -     Folate; Future; Expected date: 09/03/2019  -     Ferritin; Future; Expected date: 09/03/2019  -     Iron and TIBC; Future; Expected date: 09/03/2019

## 2019-09-09 ENCOUNTER — TELEPHONE (OUTPATIENT)
Dept: UROLOGY | Facility: CLINIC | Age: 69
End: 2019-09-09

## 2019-09-09 PROCEDURE — 77295 3-D RADIOTHERAPY PLAN: CPT | Mod: TC | Performed by: RADIOLOGY

## 2019-09-09 PROCEDURE — 77295 PR 3D RADIOTHERAPY PLAN: ICD-10-PCS | Mod: 26,,, | Performed by: RADIOLOGY

## 2019-09-09 PROCEDURE — 77295 3-D RADIOTHERAPY PLAN: CPT | Mod: 26,,, | Performed by: RADIOLOGY

## 2019-09-09 NOTE — TELEPHONE ENCOUNTER
Patient was contacted with surgery arrival time on tomorrow; instructed to report to [facility] at 9:00am.  Verbalized understanding.

## 2019-09-10 ENCOUNTER — ANESTHESIA EVENT (OUTPATIENT)
Dept: SURGERY | Facility: HOSPITAL | Age: 69
End: 2019-09-10
Payer: MEDICARE

## 2019-09-10 ENCOUNTER — HOSPITAL ENCOUNTER (OUTPATIENT)
Facility: HOSPITAL | Age: 69
Discharge: HOME | End: 2019-09-10
Attending: UROLOGY | Admitting: UROLOGY
Payer: MEDICARE

## 2019-09-10 ENCOUNTER — ANESTHESIA (OUTPATIENT)
Dept: SURGERY | Facility: HOSPITAL | Age: 69
End: 2019-09-10
Payer: MEDICARE

## 2019-09-10 DIAGNOSIS — C61 PROSTATE CANCER: ICD-10-CM

## 2019-09-10 LAB
POCT GLUCOSE: 70 MG/DL (ref 70–110)
POCT GLUCOSE: 86 MG/DL (ref 70–110)
POCT GLUCOSE: 95 MG/DL (ref 70–110)

## 2019-09-10 PROCEDURE — 55874 PR PLACEMENT, BIODEGR MATERIAL, TRANSPERINEAL, W/IMG GUID: ICD-10-PCS | Mod: 51,,, | Performed by: UROLOGY

## 2019-09-10 PROCEDURE — 55874 TPRNL PLMT BIODEGRDABL MATRL: CPT | Mod: 51,,, | Performed by: UROLOGY

## 2019-09-10 PROCEDURE — 71000033 HC RECOVERY, INTIAL HOUR: Performed by: UROLOGY

## 2019-09-10 PROCEDURE — C1715 BRACHYTHERAPY NEEDLE: HCPCS | Performed by: UROLOGY

## 2019-09-10 PROCEDURE — 77370 RADIATION PHYSICS CONSULT: CPT | Performed by: RADIOLOGY

## 2019-09-10 PROCEDURE — 76965 CHG SONO GUIDE RADIOELEMT APPLIC: ICD-10-PCS | Mod: 26,,, | Performed by: UROLOGY

## 2019-09-10 PROCEDURE — 77300 RADIATION THERAPY DOSE PLAN: CPT | Mod: TC | Performed by: RADIOLOGY

## 2019-09-10 PROCEDURE — 77778 APPLY INTERSTIT RADIAT COMPL: CPT | Mod: 26,,, | Performed by: RADIOLOGY

## 2019-09-10 PROCEDURE — 76000 FLUOROSCOPY <1 HR PHYS/QHP: CPT | Mod: 26,59,, | Performed by: UROLOGY

## 2019-09-10 PROCEDURE — 77470 PR  SPECIAL RADIATION TREATMENT: ICD-10-PCS | Mod: 26,59,, | Performed by: RADIOLOGY

## 2019-09-10 PROCEDURE — 63600175 PHARM REV CODE 636 W HCPCS: Performed by: NURSE ANESTHETIST, CERTIFIED REGISTERED

## 2019-09-10 PROCEDURE — 55875 PR PERCUT/NEEDLE INSERT,PROSTATE,RADIOISOT: ICD-10-PCS | Mod: ,,, | Performed by: UROLOGY

## 2019-09-10 PROCEDURE — 55875 TRANSPERI NEEDLE PLACE PROS: CPT | Mod: ,,, | Performed by: UROLOGY

## 2019-09-10 PROCEDURE — 37000008 HC ANESTHESIA 1ST 15 MINUTES: Performed by: UROLOGY

## 2019-09-10 PROCEDURE — C2640 BRACHYTX, STRANDED, P-103: HCPCS | Performed by: RADIOLOGY

## 2019-09-10 PROCEDURE — 77778 APPLY INTERSTIT RADIAT COMPL: CPT | Mod: TC | Performed by: RADIOLOGY

## 2019-09-10 PROCEDURE — 76965 ECHO GUIDANCE RADIOTHERAPY: CPT | Mod: 26,,, | Performed by: UROLOGY

## 2019-09-10 PROCEDURE — 36000707: Performed by: UROLOGY

## 2019-09-10 PROCEDURE — 77470 SPECIAL RADIATION TREATMENT: CPT | Mod: 59,TC | Performed by: RADIOLOGY

## 2019-09-10 PROCEDURE — 77470 SPECIAL RADIATION TREATMENT: CPT | Mod: 26,59,, | Performed by: RADIOLOGY

## 2019-09-10 PROCEDURE — 63600175 PHARM REV CODE 636 W HCPCS: Performed by: ANESTHESIOLOGY

## 2019-09-10 PROCEDURE — 71000016 HC POSTOP RECOV ADDL HR: Performed by: UROLOGY

## 2019-09-10 PROCEDURE — 77778 PR 77778 - APPLY INTERSTITIAL RADIATION COMPLEX: ICD-10-PCS | Mod: 26,,, | Performed by: RADIOLOGY

## 2019-09-10 PROCEDURE — 76000 PR  FLUOROSCOPE EXAMINATION: ICD-10-PCS | Mod: 26,59,, | Performed by: UROLOGY

## 2019-09-10 PROCEDURE — 77790 RADIATION HANDLING: CPT | Mod: TC | Performed by: RADIOLOGY

## 2019-09-10 PROCEDURE — 63600175 PHARM REV CODE 636 W HCPCS: Performed by: UROLOGY

## 2019-09-10 PROCEDURE — 71000015 HC POSTOP RECOV 1ST HR: Performed by: UROLOGY

## 2019-09-10 PROCEDURE — 36000706: Performed by: UROLOGY

## 2019-09-10 PROCEDURE — 37000009 HC ANESTHESIA EA ADD 15 MINS: Performed by: UROLOGY

## 2019-09-10 PROCEDURE — 27201423 OPTIME MED/SURG SUP & DEVICES STERILE SUPPLY: Performed by: UROLOGY

## 2019-09-10 PROCEDURE — 71000039 HC RECOVERY, EACH ADD'L HOUR: Performed by: UROLOGY

## 2019-09-10 PROCEDURE — 77263 PR  RADIATION THERAPY PLAN COMPLEX: ICD-10-PCS | Mod: ,,, | Performed by: RADIOLOGY

## 2019-09-10 PROCEDURE — 25000003 PHARM REV CODE 250: Performed by: NURSE ANESTHETIST, CERTIFIED REGISTERED

## 2019-09-10 PROCEDURE — 27200651 HC AIRWAY, LMA: Performed by: NURSE ANESTHETIST, CERTIFIED REGISTERED

## 2019-09-10 PROCEDURE — 77263 THER RADIOLOGY TX PLNG CPLX: CPT | Mod: ,,, | Performed by: RADIOLOGY

## 2019-09-10 DEVICE — IMPLANTABLE DEVICE: Type: IMPLANTABLE DEVICE | Site: PROSTATE | Status: FUNCTIONAL

## 2019-09-10 RX ORDER — LIDOCAINE HYDROCHLORIDE 10 MG/ML
INJECTION, SOLUTION EPIDURAL; INFILTRATION; INTRACAUDAL; PERINEURAL
Status: DISCONTINUED | OUTPATIENT
Start: 2019-09-10 | End: 2019-09-10

## 2019-09-10 RX ORDER — SODIUM CHLORIDE, SODIUM LACTATE, POTASSIUM CHLORIDE, CALCIUM CHLORIDE 600; 310; 30; 20 MG/100ML; MG/100ML; MG/100ML; MG/100ML
INJECTION, SOLUTION INTRAVENOUS CONTINUOUS PRN
Status: DISCONTINUED | OUTPATIENT
Start: 2019-09-10 | End: 2019-09-10

## 2019-09-10 RX ORDER — PROPOFOL 10 MG/ML
VIAL (ML) INTRAVENOUS
Status: DISCONTINUED | OUTPATIENT
Start: 2019-09-10 | End: 2019-09-10

## 2019-09-10 RX ORDER — CEFAZOLIN SODIUM 2 G/50ML
2 SOLUTION INTRAVENOUS
Status: DISCONTINUED | OUTPATIENT
Start: 2019-09-10 | End: 2020-02-10 | Stop reason: HOSPADM

## 2019-09-10 RX ORDER — ONDANSETRON 2 MG/ML
INJECTION INTRAMUSCULAR; INTRAVENOUS
Status: DISCONTINUED | OUTPATIENT
Start: 2019-09-10 | End: 2019-09-10

## 2019-09-10 RX ORDER — EPHEDRINE SULFATE 50 MG/ML
INJECTION, SOLUTION INTRAVENOUS
Status: DISCONTINUED | OUTPATIENT
Start: 2019-09-10 | End: 2019-09-10

## 2019-09-10 RX ORDER — FENTANYL CITRATE 50 UG/ML
INJECTION, SOLUTION INTRAMUSCULAR; INTRAVENOUS
Status: DISCONTINUED | OUTPATIENT
Start: 2019-09-10 | End: 2019-09-10

## 2019-09-10 RX ORDER — METHYLPREDNISOLONE 4 MG/1
TABLET ORAL
Qty: 21 TABLET | Refills: 0 | Status: SHIPPED | OUTPATIENT
Start: 2019-09-10 | End: 2019-12-02

## 2019-09-10 RX ORDER — HYDROMORPHONE HYDROCHLORIDE 2 MG/ML
0.2 INJECTION, SOLUTION INTRAMUSCULAR; INTRAVENOUS; SUBCUTANEOUS EVERY 5 MIN PRN
Status: DISCONTINUED | OUTPATIENT
Start: 2019-09-10 | End: 2020-02-10 | Stop reason: HOSPADM

## 2019-09-10 RX ORDER — ONDANSETRON 2 MG/ML
4 INJECTION INTRAMUSCULAR; INTRAVENOUS DAILY PRN
Status: DISCONTINUED | OUTPATIENT
Start: 2019-09-10 | End: 2020-02-10 | Stop reason: HOSPADM

## 2019-09-10 RX ORDER — GLYCOPYRROLATE 0.2 MG/ML
INJECTION INTRAMUSCULAR; INTRAVENOUS
Status: DISCONTINUED | OUTPATIENT
Start: 2019-09-10 | End: 2019-09-10

## 2019-09-10 RX ORDER — HYDROCODONE BITARTRATE AND ACETAMINOPHEN 5; 325 MG/1; MG/1
1 TABLET ORAL EVERY 4 HOURS PRN
Status: CANCELLED | OUTPATIENT
Start: 2019-09-10

## 2019-09-10 RX ORDER — HYDROCODONE BITARTRATE AND ACETAMINOPHEN 5; 325 MG/1; MG/1
1 TABLET ORAL
Status: DISCONTINUED | OUTPATIENT
Start: 2019-09-10 | End: 2020-02-10 | Stop reason: HOSPADM

## 2019-09-10 RX ORDER — HYDROCODONE BITARTRATE AND ACETAMINOPHEN 10; 325 MG/1; MG/1
1 TABLET ORAL EVERY 4 HOURS PRN
Status: CANCELLED | OUTPATIENT
Start: 2019-09-10

## 2019-09-10 RX ORDER — MIDAZOLAM HYDROCHLORIDE 1 MG/ML
INJECTION, SOLUTION INTRAMUSCULAR; INTRAVENOUS
Status: DISCONTINUED | OUTPATIENT
Start: 2019-09-10 | End: 2019-09-10

## 2019-09-10 RX ORDER — TAMSULOSIN HYDROCHLORIDE 0.4 MG/1
0.4 CAPSULE ORAL DAILY
Qty: 30 CAPSULE | Refills: 1 | Status: SHIPPED | OUTPATIENT
Start: 2019-09-10 | End: 2020-05-18

## 2019-09-10 RX ORDER — PHENYLEPHRINE HYDROCHLORIDE 10 MG/ML
INJECTION INTRAVENOUS
Status: DISCONTINUED | OUTPATIENT
Start: 2019-09-10 | End: 2019-09-10

## 2019-09-10 RX ORDER — OXYCODONE AND ACETAMINOPHEN 5; 325 MG/1; MG/1
1-2 TABLET ORAL EVERY 4 HOURS PRN
Qty: 30 TABLET | Refills: 0 | Status: SHIPPED | OUTPATIENT
Start: 2019-09-10 | End: 2020-05-18

## 2019-09-10 RX ORDER — SODIUM CHLORIDE 0.9 % (FLUSH) 0.9 %
10 SYRINGE (ML) INJECTION
Status: DISCONTINUED | OUTPATIENT
Start: 2019-09-10 | End: 2020-02-10 | Stop reason: HOSPADM

## 2019-09-10 RX ADMIN — FENTANYL CITRATE 100 MCG: 50 INJECTION, SOLUTION INTRAMUSCULAR; INTRAVENOUS at 10:09

## 2019-09-10 RX ADMIN — MIDAZOLAM 2 MG: 1 INJECTION INTRAMUSCULAR; INTRAVENOUS at 10:09

## 2019-09-10 RX ADMIN — LIDOCAINE HYDROCHLORIDE 50 MG: 10 INJECTION, SOLUTION EPIDURAL; INFILTRATION; INTRACAUDAL; PERINEURAL at 10:09

## 2019-09-10 RX ADMIN — PHENYLEPHRINE HYDROCHLORIDE 100 MCG: 10 INJECTION INTRAVENOUS at 11:09

## 2019-09-10 RX ADMIN — ONDANSETRON 4 MG: 2 INJECTION INTRAMUSCULAR; INTRAVENOUS at 12:09

## 2019-09-10 RX ADMIN — SODIUM CHLORIDE, SODIUM LACTATE, POTASSIUM CHLORIDE, AND CALCIUM CHLORIDE: 600; 310; 30; 20 INJECTION, SOLUTION INTRAVENOUS at 10:09

## 2019-09-10 RX ADMIN — EPHEDRINE SULFATE 10 MG: 50 INJECTION INTRAVENOUS at 10:09

## 2019-09-10 RX ADMIN — CEFAZOLIN SODIUM 2 G: 2 SOLUTION INTRAVENOUS at 10:09

## 2019-09-10 RX ADMIN — ROBINUL 0.2 MG: 0.2 INJECTION INTRAMUSCULAR; INTRAVENOUS at 10:09

## 2019-09-10 RX ADMIN — PROPOFOL 150 MG: 10 INJECTION, EMULSION INTRAVENOUS at 10:09

## 2019-09-10 RX ADMIN — EPHEDRINE SULFATE 10 MG: 50 INJECTION INTRAVENOUS at 11:09

## 2019-09-10 RX ADMIN — ONDANSETRON 4 MG: 2 INJECTION, SOLUTION INTRAMUSCULAR; INTRAVENOUS at 11:09

## 2019-09-10 NOTE — OR NURSING
Pt to CT via stretcher prior to transfer to discharge area. Pt tolerated CT without complaint. Pt transferred to discharge area and family called to BS.

## 2019-09-10 NOTE — ANESTHESIA PREPROCEDURE EVALUATION
09/10/2019  Edin Green is a 68 y.o., male.    Pre-op Assessment    I have reviewed the Patient Summary Reports.    I have reviewed the Nursing Notes.   I have reviewed the Medications.     Review of Systems  Anesthesia Hx:  No problems with previous Anesthesia Denies Hx of Anesthetic complications  Neg history of prior surgery. Denies Family Hx of Anesthesia complications.   Denies Personal Hx of Anesthesia complications.   Social:  Non-Smoker, No Alcohol Use    Hematology/Oncology:  Hematology Normal   Oncology Normal     EENT/Dental:EENT/Dental Normal   Cardiovascular:   Exercise tolerance: good Hypertension NYHA Classification I ECG has been reviewed. CONCLUSIONS     1 - Mildly to moderately enlarged aortic root.     2 - Concentric hypertrophy.     3 - No wall motion abnormalities.     4 - Normal left ventricular systolic function (EF 60-65%).     5 - Normal left ventricular diastolic function.     6 - Normal right ventricular systolic function .     7 - The estimated PA systolic pressure is 26 mmHg.     No evidence of stress induced myocardial ischemia.     This document has been electronically    SIGNED BY: Tru Andersen MD On: 07/18/2019 19:00   Pulmonary:   Asthma mild Sleep Apnea    Renal/:  Renal/ Normal     Hepatic/GI:  Hepatic/GI Normal    Musculoskeletal:  Musculoskeletal Normal    Neurological:  Neurology Normal    Endocrine:   Diabetes, well controlled Diet controlled   Dermatological:  Skin Normal    Psych:  Psychiatric Normal           Physical Exam  General:  Well nourished    Airway/Jaw/Neck:  Airway Findings: Mouth Opening: Normal Tongue: Normal  General Airway Assessment: Adult  Mallampati: II  TM Distance: 4 - 6 cm  Jaw/Neck Findings:  Neck ROM: Normal ROM      Dental:  Dental Findings: In tact   Chest/Lungs:  Chest/Lungs Findings: Clear to auscultation, Normal Respiratory Rate      Heart/Vascular:  Heart Findings: Rate: Normal  Rhythm: Regular Rhythm  Sounds: Normal        Mental Status:  Mental Status Findings:  Cooperative, Alert and Oriented         Anesthesia Plan  Type of Anesthesia, risks & benefits discussed:  Anesthesia Type:  general  Patient's Preference:   Intra-op Monitoring Plan: standard ASA monitors  Intra-op Monitoring Plan Comments:   Post Op Pain Control Plan: multimodal analgesia and per primary service following discharge from PACU  Post Op Pain Control Plan Comments:   Induction:   IV  Beta Blocker:  Patient is not currently on a Beta-Blocker (No further documentation required).       Informed Consent: Patient understands risks and agrees with Anesthesia plan.  Questions answered. Anesthesia consent signed with patient.  ASA Score: 2     Day of Surgery Review of History & Physical:  There are no significant changes.            Patient Active Problem List   Diagnosis    Hypertensive retinopathy of both eyes    Refractive error - Both Eyes    Nuclear sclerosis    HTN (hypertension)    Anxiety and depression    Hypogonadism in male    ED (erectile dysfunction)    LAURENCE (obstructive sleep apnea)    Hypercholesteremia    Mild intermittent asthma without complication    Allergic rhinitis    Cortical senile cataract of both eyes    Posterior vitreous detachment of both eyes    Hyperthyroidism    Graves disease    Iron deficiency anemia due to chronic blood loss    Internal hemorrhoid, bleeding    Asthma    Type 2 diabetes mellitus without complication    Prostate cancer    Current severe episode of major depressive disorder without psychotic features without prior episode    Aortic ectasia       Chemistry        Component Value Date/Time     08/13/2019 1450    K 3.6 08/13/2019 1450     08/13/2019 1450    CO2 31 (H) 08/13/2019 1450    BUN 16 08/13/2019 1450    CREATININE 1.2 08/13/2019 1450    GLU 90 08/13/2019 1450        Component Value  Date/Time    CALCIUM 9.4 08/13/2019 1450    ALKPHOS 130 11/29/2017 1246    AST 19 11/29/2017 1246    ALT 14 11/29/2017 1246    BILITOT 0.4 11/29/2017 1246    ESTGFRAFRICA >60.0 08/13/2019 1450    EGFRNONAA >60.0 08/13/2019 1450        Lab Results   Component Value Date    WBC 3.85 (L) 09/03/2019    HGB 12.7 (L) 09/03/2019    HCT 39.4 (L) 09/03/2019    MCV 89 09/03/2019     09/03/2019

## 2019-09-10 NOTE — DISCHARGE SUMMARY
Date of Discharge: 09/10/2019     Principle Diagnosis: Prostate Cancer    Secondary Diagnosis:  has a past medical history of Anemia, Anxiety, Asthma, Benign hematuria, Cataract (OU), Colon polyp, Depression, ED (erectile dysfunction), Graves disease, HTN (hypertension), Hypercholesteremia, Hypertensive retinopathy of both eyes, Hypogonadism, LAURENCE (obstructive sleep apnea), Pneumonia, Prostate cancer (7/15/2019), Trouble in sleeping, and Type 2 diabetes mellitus.    History of Present Illness: Pt was worked up in clinic and today's procedure was scheduled.  Please see H&P for full details.    Hospital Course: Pt presented on the day of surgery and after proper consents were obtained he was brought to the OR where his procedure was performed without difficulty.    Discharge Disposition: Home    Followup Plan:  1. Pt is provided with medications for pain and others as indicated.  2. RTC in 2 weeks

## 2019-09-10 NOTE — OP NOTE
Date of Procedure: 09/10/2019    PREOPERATIVE DIAGNOSIS:  Prostate Cancer.    POSTOPERATIVE DIAGNOSIS:  Same.    PROCEDURES:    1. Transperineal placement of needles or catheters into prostate for interstitial radioelement application (74895)  2. Transperineal Insertion of Periprostatic Gel (43203)  3. Ultrasonic guidance for interstitial radioelement application (10227)  4. Placement of indwelling hart catheter with subsequent removal at end of procedure  5. Fluoro < 1 hour    SURGEON:  Billy Ochoa IV, M.D.    Co-Surgeon: Valente Smith MD    Specimen: None    Prosthetics, Devices, Grafts: None    ANESTHESIA:  General endotracheal.    FINDINGS:  The patient has prostate cancer and desires brachytherapy.  87 seeds were placed.    PROCEDURE IN DETAIL:  After proper consents were obtained, the patient was brought to the Operating Room where he was prepped and draped in normal sterile fashion and provided with general endotracheal anesthesia in the high lithotomy position.  The brachytherapy ultrasound probe was assembled in the stepper system.  A 16Fr hart was placed in the bladder with 10ml water in the balloon, bladder drained, and then 120 ml water instilled with hart plugged.  Urojet was applied at the rectum.  Stepper unit was used to place the ultrasound in the rectum and the prostate was examined, and no masses or obvious abnormalities were appreciated, seminal vesicles were normal.  The system was aligned and prepared for brachytherapy seed placement.  The grid was used to insert brachytherapy seeds into the prostate via the perineum according to the kit and map provided by the radiation oncologist and physicist, and with their assistance.  Fluoroscopy was performed to capture seed position at the end of the case.      Space OAR hydrogel was prepared as described in the 's instructions for use.  US mode was changed to sagittal mode and the space between the prostate and rectum was identified.   "Under transrectal ultrasound guidance, the 15cm 18G needle was inserted through the rectourethralis muscle and the needle tip advanced into the perirectal fat inferior to the prostate all by using a transperineal approach and with side-fire transrectal ultrasound guidance.  The needle position was confirmed in both sagittal and axial fields.  Saline was used to dissect the space between the Denonvilliers' fascia and anterior rectal wall ("hydrodissection").    With the needle tip at mid gland the axial field was viewed to confirm the needle was not in the rectal wall (movement of the needle tip without corresponding movement of the rectal wall observed).  While maintaining the desired position, aspiration was done to ensure that the needle was not in vascular space.  The assembled SpaceOAR system was then attached to the 18G needle.  Under ultrasound guidance a smooth, continuous injection technique was used to dispense the SpaceOAR hydrogel into the space between the prostate and rectum.  The entire syringe contents (10ml total) were injected without stopping.  Optimal visualization of the needle during hydrogel administration was maintained at all times. No suspected penetration or compromise of the rectal wall occurred.    The ultrasound was set aside, catheter drained, and hart removed.  The perineum was cleaned and dressed with a bandage.  He tolerated this well and was awakened and transferred to Recovery in stable condition.    BLOOD LOSS:  none    BLOOD GIVEN:  None.    URINE OUTPUT:  None.    DRAINS:  None.    DISCHARGE DISPOSITION:  Home.    FOLLOWUP PLAN:  The patient will return for further consultation in coming weeks.          "

## 2019-09-10 NOTE — TRANSFER OF CARE
"Anesthesia Transfer of Care Note    Patient: Edin Green    Procedure(s) Performed: Procedure(s) (LRB):  Brachytherapy (N/A)  TRANSPERINEAL INSERTION OF PERIPROSTATIC GEL (N/A)    Patient location: PACU    Anesthesia Type: general    Transport from OR: Transported from OR on room air with adequate spontaneous ventilation    Post pain: adequate analgesia    Post assessment: no apparent anesthetic complications and tolerated procedure well    Post vital signs: stable    Level of consciousness: responds to stimulation    Nausea/Vomiting: no nausea/vomiting    Complications: none    Transfer of care protocol was followed      Last vitals:   Visit Vitals  /70 (BP Location: Right arm, Patient Position: Sitting)   Pulse 61   Temp 36.2 °C (97.1 °F) (Temporal)   Resp 18   Ht 6' 2" (1.88 m)   Wt 77.3 kg (170 lb 8.4 oz)   SpO2 98%   BMI 21.89 kg/m²     "

## 2019-09-10 NOTE — ANESTHESIA POSTPROCEDURE EVALUATION
Anesthesia Post Evaluation    Patient: Edin Green    Procedure(s) Performed: Procedure(s) (LRB):  Brachytherapy (N/A)  TRANSPERINEAL INSERTION OF PERIPROSTATIC GEL (N/A)    Final Anesthesia Type: general  Patient location during evaluation: PACU  Patient participation: Yes- Able to Participate  Level of consciousness: awake and alert  Post-procedure vital signs: reviewed and stable  Pain management: adequate  Airway patency: patent  PONV status at discharge: No PONV  Anesthetic complications: no      Cardiovascular status: hemodynamically stable  Respiratory status: spontaneous ventilation  Hydration status: euvolemic  Follow-up not needed.          Vitals Value Taken Time   /75 9/10/2019 12:10 PM   Temp 36.1 °C (97 °F) 9/10/2019 11:54 AM   Pulse 86 9/10/2019 12:13 PM   Resp 11 9/10/2019 12:13 PM   SpO2 100 % 9/10/2019 12:13 PM   Vitals shown include unvalidated device data.      No case tracking events are documented in the log.      Pain/Isabelle Score: Isabelle Score: 5 (9/10/2019 11:54 AM)

## 2019-09-11 PROCEDURE — 77318 BRACHYTX ISODOSE COMPLEX: CPT | Mod: TC | Performed by: RADIOLOGY

## 2019-09-11 PROCEDURE — 77318 BRACHYTX ISODOSE COMPLEX: CPT | Mod: 26,,, | Performed by: RADIOLOGY

## 2019-09-11 PROCEDURE — 77318 PR BRACYTHERAPY ISODOSE PLAN; COMPLEX: ICD-10-PCS | Mod: 26,,, | Performed by: RADIOLOGY

## 2019-09-12 VITALS
TEMPERATURE: 98 F | DIASTOLIC BLOOD PRESSURE: 67 MMHG | OXYGEN SATURATION: 96 % | BODY MASS INDEX: 21.88 KG/M2 | RESPIRATION RATE: 12 BRPM | WEIGHT: 170.5 LBS | HEART RATE: 76 BPM | SYSTOLIC BLOOD PRESSURE: 146 MMHG | HEIGHT: 74 IN

## 2019-09-13 DIAGNOSIS — D64.9 ANEMIA, UNSPECIFIED TYPE: Primary | ICD-10-CM

## 2019-09-24 ENCOUNTER — PATIENT MESSAGE (OUTPATIENT)
Dept: ENDOCRINOLOGY | Facility: CLINIC | Age: 69
End: 2019-09-24

## 2019-09-25 ENCOUNTER — OFFICE VISIT (OUTPATIENT)
Dept: UROLOGY | Facility: CLINIC | Age: 69
End: 2019-09-25
Payer: MEDICARE

## 2019-09-25 VITALS
WEIGHT: 170.44 LBS | HEIGHT: 74 IN | SYSTOLIC BLOOD PRESSURE: 122 MMHG | BODY MASS INDEX: 21.87 KG/M2 | DIASTOLIC BLOOD PRESSURE: 80 MMHG

## 2019-09-25 DIAGNOSIS — C61 PROSTATE CANCER: Primary | ICD-10-CM

## 2019-09-25 LAB
BILIRUB SERPL-MCNC: NORMAL MG/DL
BLOOD URINE, POC: 250
COLOR, POC UA: YELLOW
GLUCOSE UR QL STRIP: NORMAL
KETONES UR QL STRIP: NORMAL
LEUKOCYTE ESTERASE URINE, POC: NORMAL
NITRITE, POC UA: NORMAL
PH, POC UA: 5
PROTEIN, POC: NORMAL
SPECIFIC GRAVITY, POC UA: 1.02
UROBILINOGEN, POC UA: NORMAL

## 2019-09-25 PROCEDURE — 99999 PR PBB SHADOW E&M-EST. PATIENT-LVL III: CPT | Mod: PBBFAC,,, | Performed by: UROLOGY

## 2019-09-25 PROCEDURE — 99024 PR POST-OP FOLLOW-UP VISIT: ICD-10-PCS | Mod: S$GLB,,, | Performed by: UROLOGY

## 2019-09-25 PROCEDURE — 99999 PR PBB SHADOW E&M-EST. PATIENT-LVL III: ICD-10-PCS | Mod: PBBFAC,,, | Performed by: UROLOGY

## 2019-09-25 PROCEDURE — 99024 POSTOP FOLLOW-UP VISIT: CPT | Mod: S$GLB,,, | Performed by: UROLOGY

## 2019-09-25 PROCEDURE — 81002 URINALYSIS NONAUTO W/O SCOPE: CPT | Mod: S$GLB,,, | Performed by: UROLOGY

## 2019-09-25 PROCEDURE — 81002 POCT URINE DIPSTICK WITHOUT MICROSCOPE: ICD-10-PCS | Mod: S$GLB,,, | Performed by: UROLOGY

## 2019-09-25 NOTE — PROGRESS NOTES
"Chief Complaint: T1c CaP    HPI:   9/25/19: No problems from brachytherapy, urine clearing was bloody early on.  Flomax doesn't do much.  8/12/19: Dr. [facility] he have CMRT with brachy alone an option.  SpaceOAR but no ADT (depression).  Reviewed history in detail.   7/3/19: MRI is done.  PIRADs2 no obvious findings.  Reviewed history in detail.  5/21/19: Bx shows Gl 3+4=7 in 1/2 of left mid, 15% of core.  Reviewed history in detail.    4/29/19: TRUS/Bx today 32 gm.  3/25/19: PSA shows a sustained positive trend.  [facility] biopsy.  Reviewed history in detail.  9/17/18: 66 yo man here to discuss elevated PSA.  Was seeing Dr. Sanchez.  I spoke to his [employer] last year.  No abd/pelvic pain and no exac/rel factors.  No hematuria.  No urolithiasis.  No urinary bother.  Cialis/viagra prn for ED. Normal sexual function.  Was told he had a "mushy" prostate and was given cipro by Dr. Paris.  No LUTS.    Allergies:  Celebrex [celecoxib]; Shrimp; Tomato; and Venom-wasp    Medications:  has a current medication list which includes the following prescription(s): amlodipine, amlodipine, aspirin, bupropion, losartan-hydrochlorothiazide 100-25 mg, methimazole, methylprednisolone, mirtazapine, montelukast, multivitamin, oxcarbazepine, oxycodone-acetaminophen, polyethylene glycol, pravastatin, risperidone, sildenafil, sodium phosphates, tadalafil, and tamsulosin, and the following Facility-Administered Medications: cefazolin 2 g/50ml dextrose ivpb, hydrocodone-acetaminophen, hydromorphone (pf), ondansetron, sodium chloride 0.9%, and sodium chloride 0.9%.    Review of Systems:  General: No fever, chills, fatigability, or weight loss.  Skin: No rashes, itching, or changes in color or texture of skin.  Chest: Denies CHAMBERLAIN, cyanosis, wheezing, cough, and sputum production.  Abdomen: Appetite fine. No weight loss. Denies diarrhea, abdominal pain, hematemesis, or blood in stool.  Musculoskeletal: No joint stiffness or swelling. Denies " back pain.  : As above.  All other review of systems negative.    PMH:   has a past medical history of Anemia, Anxiety, Asthma, Benign hematuria, Cataract (OU), Colon polyp, Depression, ED (erectile dysfunction), Graves disease, HTN (hypertension), Hypercholesteremia, Hypertensive retinopathy of both eyes, Hypogonadism, LAURENCE (obstructive sleep apnea), Pneumonia, Prostate cancer (7/15/2019), Trouble in sleeping, and Type 2 diabetes mellitus.    PSH:   has a past surgical history that includes Brachytherapy (N/A, 9/10/2019); Radioactive seed implantation of prostate (N/A, 9/10/2019); and Ultrasound guidance (N/A, 9/10/2019).    FamHx: family history includes Heart defect in his mother; Hypertension in his unknown relative.    SocHx:  reports that he has never smoked. He has never used smokeless tobacco. He reports that he drinks about 1.0 standard drinks of alcohol per week. He reports that he does not use drugs.      Physical Exam:  Vitals:    09/25/19 1510   BP: 122/80     General: A&Ox3, no apparent distress, no deformities  Neck: No masses, normal thyroid  Lungs: normal inspiration, no use of accessory muscles  Heart: normal pulse, no arrhythmias  Abdomen: Soft, NT, ND  Skin: The skin is warm and dry. No jaundice.  Ext: No c/c/e.  : 9/18: Test desc ivan, no abnormalities of epididymus. Penis normal, with normal penile and scrotal skin. Meatus normal. Normal rectal tone, no hemorrhoids. Prost 40 gm no nodules or masses appreciated. SV not palpable. Perineum and anus normal.    Labs/Studies:   PSA    9/08: 1.7    7/14: 3.2    5/17: 3.2    5/18: 5.1    6/18: 4.3    3/19: 6.8, 5.7    Impression/Plan:   1. PSA/RTC 6 mo.

## 2019-10-07 NOTE — PROGRESS NOTES
[facility]  RADIATION ONCOLOGY FOLLOW UP    Name: Edin Green : 1950     DIAGNOSIS:  Favorable intermediate risk prostate cancer cIIB: cT1c cN0 cM0, PSA 5.7, Sonia 3+4, 1/12 cores  1. 3 PSA 5.7.   2. 19 prostate biopsy showed 32 cc gland.  He had Sobieski 3 + 4 involving 1 core at the left mid, 15%.  There was no perineural invasion.    3. 6/10/19 prostate MRI showed 28.7 cc volume.  This was PI-RADS 2.   4. 19 125 Gy definitive prostate palladium brachytherapy implant    CURRENT STATUS: Edin Green is a pleasant 68 y.o. male who presents today for follow-up.  He stopped Flomax.  His AUA is 7/mostly satisfied. He does have some small volume leakage with urgency which started since the seed implant.  He does not require a urinary pad.  He does not have any diarrhea but he does have occasional constipation and has MiraLax.  He does not take a stool softener.    PAST MEDICAL HISTORY:  Past Medical History:   Diagnosis Date    Anemia     Anxiety     Asthma     Benign hematuria     Cataract OU    Colon polyp     dr strauss    Depression     ED (erectile dysfunction)     Graves disease     story    HTN (hypertension)     Hypercholesteremia     Hypertensive retinopathy of both eyes     Hypogonadism     LAURENCE (obstructive sleep apnea)     Pneumonia     Prostate cancer 7/15/2019    Trouble in sleeping     Type 2 diabetes mellitus        PAST SURGICAL HISTORY:  Past Surgical History:   Procedure Laterality Date    BRACHYTHERAPY N/A 9/10/2019    Procedure: BRACHYTHERAPY;  Surgeon: Kiel Ochoa IV, MD;  Location: [facility] OR;  Service: Urology;  Laterality: N/A;    RADIOACTIVE SEED IMPLANTATION OF PROSTATE N/A 9/10/2019    Procedure: INSERTION, RADIOACTIVE SEED, PROSTATE;  Surgeon: Kiel Ochoa IV, MD;  Location: [facility] OR;  Service: Urology;  Laterality: N/A;    ULTRASOUND GUIDANCE N/A 9/10/2019    Procedure: ULTRASOUND GUIDANCE;  Surgeon:  "Kiel Ochoa IV, MD;  Location[facility] OR;  Service: Urology;  Laterality: N/A;       SOCIAL HISTORY:  Social History     Socioeconomic History    Marital status:      Spouse name: CHUCHO    Number of children: 3    Years of education: Not on file    Highest education level: Not on file   Occupational History    Not on file   Social Needs    Financial resource strain: Not on file    Food insecurity:     Worry: Not on file     Inability: Not on file    Transportation needs:     Medical: Not on file     Non-medical: Not on file   Tobacco Use    Smoking status: Never Smoker    Smokeless tobacco: Never Used   Substance and Sexual Activity    Alcohol use: Yes     Alcohol/week: 1.0 standard drinks     Types: 1 Cans of beer per week     Comment: No alcohol 72h prior to sx    Drug use: No    Sexual activity: Yes     Partners: Female   Lifestyle    Physical activity:     Days per week: Not on file     Minutes per session: Not on file    Stress: Not on file   Relationships    Social connections:     Talks on phone: Not on file     Gets together: Not on file     Attends L[employer]-day service: Not on file     Active member of club or organization: Not on file     Attends meetings of clubs or organizations: Not on file     Relationship status: Not on file   Other Topics Concern    Not on file   Social History Narrative    Wears a seatbelt.       FAMILY HISTORY:  Family History   Problem Relation Age of Onset    Hypertension Unknown     Heart defect Mother     Strabismus Neg Hx     Retinal detachment Neg Hx     Macular degeneration Neg Hx     Glaucoma Neg Hx     Blindness Neg Hx     Amblyopia Neg Hx        PHYSICAL EXAMINATION:  Constitutional/Vitals: well appearing, no acute distress, ECOG 0 - Fully Active, /70   Pulse 69   Temp 97 °F (36.1 °C)   Resp 18   Ht 6' 2" (1.88 m)   Wt 79.2 kg (174 lb 8 oz)   SpO2 98%   BMI 22.40 kg/m²     IMAGING AND LABORATORY FINDINGS: As per HPI; images, " labs and medical records reviewed personally in EPIC.    ASSESSMENT:  Mild post brachytherapy symptoms    PLAN:   1. Prostate cancer:  We discussed surveillance.  I will see him in 3 months with PSA.  He will see Dr. Ochoa in 6 months.  2. Urinary leakage:  This started after radiation.  It is probably a result of the seed implant from some swelling of the prostate with mild retention and some urge incontinence.  It is mild and he does not require pad use.  We will observe.  3. Increased AUA score:  This is slightly increased since radiation, but it is still low and he is pleased according to his questionnaire.  He has stopped Flomax since he did not feel much efficacy.  Given that he is on multiple medications, I recommend he keep off of the Flomax.    ARASH Smith M.D.  Radiation Oncology  [facility] [address] [place] [address]  Ph: [phone]  [email or web address]

## 2019-10-08 ENCOUNTER — OFFICE VISIT (OUTPATIENT)
Dept: RADIATION ONCOLOGY | Facility: CLINIC | Age: 69
End: 2019-10-08
Payer: MEDICARE

## 2019-10-08 VITALS
WEIGHT: 174.5 LBS | OXYGEN SATURATION: 98 % | TEMPERATURE: 97 F | RESPIRATION RATE: 18 BRPM | SYSTOLIC BLOOD PRESSURE: 117 MMHG | BODY MASS INDEX: 22.39 KG/M2 | HEART RATE: 69 BPM | HEIGHT: 74 IN | DIASTOLIC BLOOD PRESSURE: 70 MMHG

## 2019-10-08 DIAGNOSIS — C61 PROSTATE CANCER: Primary | ICD-10-CM

## 2019-10-08 PROCEDURE — 99214 PR OFFICE/OUTPT VISIT, EST, LEVL IV, 30-39 MIN: ICD-10-PCS | Mod: S$GLB,,, | Performed by: RADIOLOGY

## 2019-10-08 PROCEDURE — 3078F DIAST BP <80 MM HG: CPT | Mod: CPTII,S$GLB,, | Performed by: RADIOLOGY

## 2019-10-08 PROCEDURE — 3074F PR MOST RECENT SYSTOLIC BLOOD PRESSURE < 130 MM HG: ICD-10-PCS | Mod: CPTII,S$GLB,, | Performed by: RADIOLOGY

## 2019-10-08 PROCEDURE — 3074F SYST BP LT 130 MM HG: CPT | Mod: CPTII,S$GLB,, | Performed by: RADIOLOGY

## 2019-10-08 PROCEDURE — 1101F PT FALLS ASSESS-DOCD LE1/YR: CPT | Mod: CPTII,S$GLB,, | Performed by: RADIOLOGY

## 2019-10-08 PROCEDURE — 99999 PR PBB SHADOW E&M-EST. PATIENT-LVL IV: CPT | Mod: PBBFAC,,, | Performed by: RADIOLOGY

## 2019-10-08 PROCEDURE — 3078F PR MOST RECENT DIASTOLIC BLOOD PRESSURE < 80 MM HG: ICD-10-PCS | Mod: CPTII,S$GLB,, | Performed by: RADIOLOGY

## 2019-10-08 PROCEDURE — 1101F PR PT FALLS ASSESS DOC 0-1 FALLS W/OUT INJ PAST YR: ICD-10-PCS | Mod: CPTII,S$GLB,, | Performed by: RADIOLOGY

## 2019-10-08 PROCEDURE — 99214 OFFICE O/P EST MOD 30 MIN: CPT | Mod: S$GLB,,, | Performed by: RADIOLOGY

## 2019-10-08 PROCEDURE — 99999 PR PBB SHADOW E&M-EST. PATIENT-LVL IV: ICD-10-PCS | Mod: PBBFAC,,, | Performed by: RADIOLOGY

## 2019-10-24 ENCOUNTER — TELEPHONE (OUTPATIENT)
Dept: INTERNAL MEDICINE | Facility: CLINIC | Age: 69
End: 2019-10-24

## 2019-10-24 RX ORDER — LOSARTAN POTASSIUM 100 MG/1
100 TABLET ORAL DAILY
Qty: 90 TABLET | Refills: 4 | Status: SHIPPED | OUTPATIENT
Start: 2019-10-24 | End: 2020-04-30 | Stop reason: SDUPTHER

## 2019-10-24 RX ORDER — HYDROCHLOROTHIAZIDE 25 MG/1
25 TABLET ORAL DAILY
Qty: 90 TABLET | Refills: 4 | Status: SHIPPED | OUTPATIENT
Start: 2019-10-24 | End: 2020-04-30 | Stop reason: SDUPTHER

## 2019-10-24 NOTE — TELEPHONE ENCOUNTER
----- Message from Laury Sanchez sent at 10/24/2019  9:26 AM CDT -----  Contact: [facility]  Pt previously was getting combination pill for losartan and hctz. However it is on back order.Requesting two new rx's for both.    Pt uses:  [address] [address]  Phone: [phone] Fax: [phone]    ThanksLaury

## 2019-10-28 RX ORDER — METHIMAZOLE 5 MG/1
TABLET ORAL
Qty: 30 TABLET | Refills: 2 | Status: SHIPPED | OUTPATIENT
Start: 2019-10-28 | End: 2020-03-05 | Stop reason: SDUPTHER

## 2019-11-05 ENCOUNTER — LAB VISIT (OUTPATIENT)
Dept: LAB | Facility: HOSPITAL | Age: 69
End: 2019-11-05
Attending: INTERNAL MEDICINE
Payer: MEDICARE

## 2019-11-05 ENCOUNTER — OFFICE VISIT (OUTPATIENT)
Dept: ENDOCRINOLOGY | Facility: CLINIC | Age: 69
End: 2019-11-05
Payer: MEDICARE

## 2019-11-05 VITALS
WEIGHT: 170.19 LBS | TEMPERATURE: 98 F | BODY MASS INDEX: 21.85 KG/M2 | DIASTOLIC BLOOD PRESSURE: 77 MMHG | SYSTOLIC BLOOD PRESSURE: 117 MMHG | HEART RATE: 67 BPM | RESPIRATION RATE: 18 BRPM

## 2019-11-05 DIAGNOSIS — H04.201 EYE TEARING, RIGHT: ICD-10-CM

## 2019-11-05 DIAGNOSIS — E05.00 GRAVES DISEASE: ICD-10-CM

## 2019-11-05 DIAGNOSIS — E05.90 HYPERTHYROIDISM: ICD-10-CM

## 2019-11-05 DIAGNOSIS — E05.90 HYPERTHYROIDISM: Primary | ICD-10-CM

## 2019-11-05 DIAGNOSIS — R63.4 WEIGHT LOSS: ICD-10-CM

## 2019-11-05 LAB
ALBUMIN SERPL BCP-MCNC: 3.6 G/DL (ref 3.5–5.2)
ALP SERPL-CCNC: 72 U/L (ref 55–135)
ALT SERPL W/O P-5'-P-CCNC: 16 U/L (ref 10–44)
AST SERPL-CCNC: 12 U/L (ref 10–40)
BILIRUB DIRECT SERPL-MCNC: 0.3 MG/DL (ref 0.1–0.3)
BILIRUB SERPL-MCNC: 0.6 MG/DL (ref 0.1–1)
PROT SERPL-MCNC: 7.4 G/DL (ref 6–8.4)
T4 FREE SERPL-MCNC: 0.86 NG/DL (ref 0.71–1.51)
TSH SERPL DL<=0.005 MIU/L-ACNC: 0.64 UIU/ML (ref 0.4–4)

## 2019-11-05 PROCEDURE — 84439 ASSAY OF FREE THYROXINE: CPT

## 2019-11-05 PROCEDURE — 3078F DIAST BP <80 MM HG: CPT | Mod: CPTII,S$GLB,, | Performed by: INTERNAL MEDICINE

## 2019-11-05 PROCEDURE — 3078F PR MOST RECENT DIASTOLIC BLOOD PRESSURE < 80 MM HG: ICD-10-PCS | Mod: CPTII,S$GLB,, | Performed by: INTERNAL MEDICINE

## 2019-11-05 PROCEDURE — 99999 PR PBB SHADOW E&M-EST. PATIENT-LVL III: ICD-10-PCS | Mod: PBBFAC,,, | Performed by: INTERNAL MEDICINE

## 2019-11-05 PROCEDURE — 80076 HEPATIC FUNCTION PANEL: CPT

## 2019-11-05 PROCEDURE — 1101F PT FALLS ASSESS-DOCD LE1/YR: CPT | Mod: CPTII,S$GLB,, | Performed by: INTERNAL MEDICINE

## 2019-11-05 PROCEDURE — 3074F SYST BP LT 130 MM HG: CPT | Mod: CPTII,S$GLB,, | Performed by: INTERNAL MEDICINE

## 2019-11-05 PROCEDURE — 99214 PR OFFICE/OUTPT VISIT, EST, LEVL IV, 30-39 MIN: ICD-10-PCS | Mod: S$GLB,,, | Performed by: INTERNAL MEDICINE

## 2019-11-05 PROCEDURE — 99214 OFFICE O/P EST MOD 30 MIN: CPT | Mod: S$GLB,,, | Performed by: INTERNAL MEDICINE

## 2019-11-05 PROCEDURE — 84443 ASSAY THYROID STIM HORMONE: CPT

## 2019-11-05 PROCEDURE — 1101F PR PT FALLS ASSESS DOC 0-1 FALLS W/OUT INJ PAST YR: ICD-10-PCS | Mod: CPTII,S$GLB,, | Performed by: INTERNAL MEDICINE

## 2019-11-05 PROCEDURE — 3074F PR MOST RECENT SYSTOLIC BLOOD PRESSURE < 130 MM HG: ICD-10-PCS | Mod: CPTII,S$GLB,, | Performed by: INTERNAL MEDICINE

## 2019-11-05 PROCEDURE — 84445 ASSAY OF TSI GLOBULIN: CPT

## 2019-11-05 PROCEDURE — 99999 PR PBB SHADOW E&M-EST. PATIENT-LVL III: CPT | Mod: PBBFAC,,, | Performed by: INTERNAL MEDICINE

## 2019-11-05 RX ORDER — FLUTICASONE FUROATE AND VILANTEROL 100; 25 UG/1; UG/1
POWDER RESPIRATORY (INHALATION) DAILY PRN
Status: ON HOLD | COMMUNITY
End: 2019-12-02

## 2019-11-05 NOTE — PROGRESS NOTES
Patient ID: Edin Green is a 68 y.o. male.  Patient is here for follow up        Chief Complaint: Follow-up (3 month)      Here with wife  Chief Complaint: Follow-up      HPI     Consultation was requested by Dr. Carter Paris      Diagnosed: 1/2017- had weight loss and sweats, TSI antibody was mildly elevated  He was lost to follow-up after last being seen November 2017 and he was encouraged to follow up again and ice started seeing patient again September 2019.  He had been off of his methimazole and was restarted this year  due to suppressed TSH  Previous radiology tests:  Thyroid ultrasound : No   NM uptake and scan: Yes - 1/2017 markedly increased homogeneous uptake    Previous thyroid surgery: No      Thyroid symptoms:  He had been having weight loss, weight is stable since last visit however    also diagnosed with diabetes which is diet controlled currently    Denies palpitations or sweats  In review of records he has been having some memory loss as well and some anxiety and depression     Thyroid medications: methimazole 5 mg daily, just started about a week or so ago      Takes medication appropriately: Yes, in the past when he was on 20 mg of methimazole it caused him eventuality become hypothyroid, even when it was brought down to 10 mg    Also recently diagnosed with prostate cancer and he is status post brachytherapy without androgen deprivation has seen Dr. Kiel Smith and the urologist Dr. Ochoa       Has noticed some tearing out of his right eye and feels irritated    I have reviewed the past medical, family and social history    Review of Systems    Objective:      Physical Exam   Constitutional: He is oriented to person, place, and time. He appears well-developed and well-nourished. No distress.   HENT:   Head: Normocephalic and atraumatic.   Right Ear: External ear normal.   Left Ear: External ear normal.   Mouth/Throat: Oropharynx is clear and moist. No oropharyngeal exudate.   Eyes:  Pupils are equal, round, and reactive to light. Conjunctivae and EOM are normal. Right eye exhibits no discharge. Left eye exhibits no discharge. No scleral icterus.   Possibly mild right eye proptosis compared to left   Neck: No tracheal deviation present. No thyromegaly present.   Cardiovascular: Normal rate, regular rhythm, normal heart sounds and intact distal pulses. Exam reveals no gallop and no friction rub.   No murmur heard.  Pulmonary/Chest: Effort normal and breath sounds normal. No respiratory distress. He has no wheezes. He has no rales.   Abdominal: Soft. Bowel sounds are normal. He exhibits no distension and no mass. There is no tenderness. There is no rebound and no guarding. No hernia.   Musculoskeletal: He exhibits no edema or deformity.   Lymphadenopathy:     He has no cervical adenopathy.   Neurological: He is alert and oriented to person, place, and time. He has normal reflexes. No cranial nerve deficit.   Skin: Skin is warm and dry. No rash noted. He is not diaphoretic. No erythema.   Psychiatric: He has a normal mood and affect. His behavior is normal.   Vitals reviewed.        Lab Review:   Admission on 09/10/2019   Component Date Value    POCT Glucose 09/10/2019 95     POCT Glucose 09/10/2019 86     POCT Glucose 09/10/2019 70    Office Visit on 09/25/2019   Component Date Value    Color, UA 09/25/2019 yellow     Spec Grav UA 09/25/2019 1.020     pH, UA 09/25/2019 5     WBC, UA 09/25/2019 neg     Nitrite, UA 09/25/2019 neg     Protein 09/25/2019 trace     Glucose, UA 09/25/2019 neg     Ketones, UA 09/25/2019 neg     Urobilinogen, UA 09/25/2019 neg     Bilirubin 09/25/2019 neg     Blood, UA 09/25/2019 250    Lab Visit on 09/03/2019   Component Date Value    Iron 09/03/2019 65     Transferrin 09/03/2019 223     TIBC 09/03/2019 330     Saturated Iron 09/03/2019 20    Lab Visit on 09/03/2019   Component Date Value    WBC 09/03/2019 3.85*    RBC 09/03/2019 4.43*    Hemoglobin  09/03/2019 12.7*    Hematocrit 09/03/2019 39.4*    Mean Corpuscular Volume 09/03/2019 89     Mean Corpuscular Hemoglo* 09/03/2019 28.7     Mean Corpuscular Hemoglo* 09/03/2019 32.2     RDW 09/03/2019 13.4     Platelets 09/03/2019 197     MPV 09/03/2019 11.1     Immature Granulocytes 09/03/2019 0.3     Gran # (ANC) 09/03/2019 1.8     Immature Grans (Abs) 09/03/2019 0.01     Lymph # 09/03/2019 1.4     Mono # 09/03/2019 0.3     Eos # 09/03/2019 0.4     Baso # 09/03/2019 0.04     nRBC 09/03/2019 0     Gran% 09/03/2019 45.4     Lymph% 09/03/2019 36.4     Mono% 09/03/2019 7.8     Eosinophil% 09/03/2019 9.1*    Basophil% 09/03/2019 1.0     Differential Method 09/03/2019 Automated     Hgb A2 Quant 09/03/2019 2.9     Hemoglobin Bands 09/03/2019 Hb A and Hb A2     Hemoglobin Electrophores* 09/03/2019 Normal     Vitamin B-12 09/03/2019 427     Folate 09/03/2019 5.1     Ferritin 09/03/2019 99    Lab Visit on 09/03/2019   Component Date Value    Free T4 09/03/2019 0.91     T3, Free 09/03/2019 2.3     TSH 09/03/2019 0.703    Lab Visit on 08/13/2019   Component Date Value    Sodium 08/13/2019 140     Potassium 08/13/2019 3.6     Chloride 08/13/2019 102     CO2 08/13/2019 31*    Glucose 08/13/2019 90     BUN, Bld 08/13/2019 16     Creatinine 08/13/2019 1.2     Calcium 08/13/2019 9.4     Anion Gap 08/13/2019 7*    eGFR if African American 08/13/2019 >60.0     eGFR if non African Amer* 08/13/2019 >60.0     WBC 08/13/2019 3.38*    RBC 08/13/2019 4.31*    Hemoglobin 08/13/2019 12.3*    Hematocrit 08/13/2019 39.6*    Mean Corpuscular Volume 08/13/2019 92     Mean Corpuscular Hemoglo* 08/13/2019 28.5     Mean Corpuscular Hemoglo* 08/13/2019 31.1*    RDW 08/13/2019 13.5     Platelets 08/13/2019 206     MPV 08/13/2019 10.7     Immature Granulocytes 08/13/2019 0.3     Gran # (ANC) 08/13/2019 1.3*    Immature Grans (Abs) 08/13/2019 0.01     Lymph # 08/13/2019 1.4     Mono # 08/13/2019  0.3     Eos # 08/13/2019 0.4     Baso # 08/13/2019 0.02     nRBC 08/13/2019 0     Gran% 08/13/2019 38.8     Lymph% 08/13/2019 40.8     Mono% 08/13/2019 8.3     Eosinophil% 08/13/2019 11.2*    Basophil% 08/13/2019 0.6     Differential Method 08/13/2019 Automated    Clinical Support on 07/18/2019   Component Date Value    QEF 07/18/2019 60     Diastolic Dysfunction 07/18/2019 No     Est. PA Systolic Pressure 07/18/2019 26.43    Lab Visit on 07/08/2019   Component Date Value    Cholesterol 07/08/2019 135     Triglycerides 07/08/2019 44     HDL 07/08/2019 56     LDL Cholesterol 07/08/2019 70.2     Hdl/Cholesterol Ratio 07/08/2019 41.5     Total Cholesterol/HDL Ra* 07/08/2019 2.4     Non-HDL Cholesterol 07/08/2019 79     Sodium 07/08/2019 141     Potassium 07/08/2019 3.5     Chloride 07/08/2019 102     CO2 07/08/2019 32*    Glucose 07/08/2019 85     BUN, Bld 07/08/2019 17     Creatinine 07/08/2019 1.2     Calcium 07/08/2019 9.5     Anion Gap 07/08/2019 7*    eGFR if African American 07/08/2019 >60.0     eGFR if non African Amer* 07/08/2019 >60.0     Hemoglobin A1C 07/08/2019 5.7*    Estimated Avg Glucose 07/08/2019 117     PSA, SCREEN 07/08/2019 5.0*    TSH 07/08/2019 0.012*    Free T4 07/08/2019 1.14    Office Visit on 07/03/2019   Component Date Value    Color, UA 07/03/2019 yellow     Spec Grav UA 07/03/2019 1.020     pH, UA 07/03/2019 6     WBC, UA 07/03/2019 neg     Nitrite, UA 07/03/2019 neg     Protein 07/03/2019 trace     Glucose, UA 07/03/2019 neg     Ketones, UA 07/03/2019 neg     Urobilinogen, UA 07/03/2019 neg     Bilirubin 07/03/2019 neg     Blood, UA 07/03/2019 neg    Lab Visit on 05/21/2019   Component Date Value    Creatinine 05/21/2019 1.3     eGFR if African American 05/21/2019 >60     eGFR if non African Amer* 05/21/2019 56*   There may be more visits with results that are not included.       Assessment:     1. Hyperthyroidism  Hepatic function panel     TSH    Thyroid stimulating immunoglobulin    T4, free    Stable on methimazole, check labs today, emphasized the importance of remaining on medication   2. Graves disease     3. Weight loss      Seems to have stabilized.  Does at times have low appetite, says he skips meals, could be related to depression   4. Eye tearing, right      Possibly could be related to Graves orbitopathy, recommended artificial tears, see eye specialist if worsens      Plan:   Hyperthyroidism  Comments:  Stable on methimazole, check labs today, emphasized the importance of remaining on medication  Orders:  -     Hepatic function panel; Future; Expected date: 11/05/2019  -     TSH; Future; Expected date: 11/05/2019  -     Thyroid stimulating immunoglobulin; Future; Expected date: 11/05/2019  -     T4, free; Future; Expected date: 11/05/2019    Graves disease    Weight loss  Comments:  Seems to have stabilized.  Does at times have low appetite, says he skips meals, could be related to depression    Eye tearing, right  Comments:  Possibly could be related to Graves orbitopathy, recommended artificial tears, see eye specialist if worsens          Follow up in about 4 months (around 3/5/2020) for hyperthyroidism.    Labs prior to appointment? not applicable     Disclaimer:  This note may have been partially prepared using voice recognition software and  it may have not been extensively proofed, as such there could be errors within the text such as sound alike errors.

## 2019-11-08 LAB — TSI SER-ACNC: 1.24 IU/L

## 2019-11-15 DIAGNOSIS — E78.00 HYPERCHOLESTEREMIA: ICD-10-CM

## 2019-11-17 RX ORDER — PRAVASTATIN SODIUM 40 MG/1
TABLET ORAL
Qty: 30 TABLET | Refills: 11 | Status: SHIPPED | OUTPATIENT
Start: 2019-11-17 | End: 2020-04-30 | Stop reason: SDUPTHER

## 2019-12-02 ENCOUNTER — CLINICAL SUPPORT (OUTPATIENT)
Dept: PULMONOLOGY | Facility: CLINIC | Age: 69
End: 2019-12-02
Payer: MEDICARE

## 2019-12-02 ENCOUNTER — OFFICE VISIT (OUTPATIENT)
Dept: PULMONOLOGY | Facility: CLINIC | Age: 69
End: 2019-12-02
Payer: MEDICARE

## 2019-12-02 VITALS
HEIGHT: 72 IN | SYSTOLIC BLOOD PRESSURE: 142 MMHG | RESPIRATION RATE: 18 BRPM | WEIGHT: 179.69 LBS | DIASTOLIC BLOOD PRESSURE: 60 MMHG | HEART RATE: 76 BPM | BODY MASS INDEX: 24.34 KG/M2 | OXYGEN SATURATION: 98 %

## 2019-12-02 DIAGNOSIS — J45.909 ASTHMA, UNSPECIFIED ASTHMA SEVERITY, UNSPECIFIED WHETHER COMPLICATED, UNSPECIFIED WHETHER PERSISTENT: ICD-10-CM

## 2019-12-02 DIAGNOSIS — J45.20 MILD INTERMITTENT ASTHMA WITHOUT COMPLICATION: Primary | ICD-10-CM

## 2019-12-02 LAB
BRPFT: NORMAL
FEF 25 75 CHG: 71 %
FEF 25 75 LLN: 0.81
FEF 25 75 POST REF: 100.6 %
FEF 25 75 PRE REF: 58.8 %
FEF 25 75 REF: 2.24
FET100 CHG: -3.8 %
FEV1 CHG: 19 %
FEV1 FVC CHG: 12.8 %
FEV1 FVC LLN: 63
FEV1 FVC POST REF: 99.9 %
FEV1 FVC PRE REF: 88.6 %
FEV1 FVC REF: 76
FEV1 LLN: 2.02
FEV1 POST REF: 90.4 %
FEV1 PRE REF: 75.9 %
FEV1 REF: 2.93
FVC CHG: 5.6 %
FVC LLN: 2.78
FVC POST REF: 90.2 %
FVC PRE REF: 85.4 %
FVC REF: 3.85
PEF CHG: -0.5 %
PEF LLN: 5.75
PEF POST REF: 75.2 %
PEF PRE REF: 75.5 %
PEF REF: 8.56
POST FEF 25 75: 2.25 L/S (ref 0.81–3.67)
POST FET 100: 6.88 SEC
POST FEV1 FVC: 76.16 % (ref 63.36–89.12)
POST FEV1: 2.65 L (ref 2.02–3.84)
POST FVC: 3.47 L (ref 2.78–4.93)
POST PEF: 6.44 L/S (ref 5.75–11.37)
PRE FEF 25 75: 1.32 L/S (ref 0.81–3.67)
PRE FET 100: 7.15 SEC
PRE FEV1 FVC: 67.54 % (ref 63.36–89.12)
PRE FEV1: 2.22 L (ref 2.02–3.84)
PRE FVC: 3.29 L (ref 2.78–4.93)
PRE PEF: 6.47 L/S (ref 5.75–11.37)

## 2019-12-02 PROCEDURE — 3077F PR MOST RECENT SYSTOLIC BLOOD PRESSURE >= 140 MM HG: ICD-10-PCS | Mod: CPTII,S$GLB,, | Performed by: NURSE PRACTITIONER

## 2019-12-02 PROCEDURE — 99999 PR PBB SHADOW E&M-EST. PATIENT-LVL V: ICD-10-PCS | Mod: PBBFAC,,, | Performed by: NURSE PRACTITIONER

## 2019-12-02 PROCEDURE — 99999 PR PBB SHADOW E&M-EST. PATIENT-LVL V: CPT | Mod: PBBFAC,,, | Performed by: NURSE PRACTITIONER

## 2019-12-02 PROCEDURE — 3078F PR MOST RECENT DIASTOLIC BLOOD PRESSURE < 80 MM HG: ICD-10-PCS | Mod: CPTII,S$GLB,, | Performed by: NURSE PRACTITIONER

## 2019-12-02 PROCEDURE — 3077F SYST BP >= 140 MM HG: CPT | Mod: CPTII,S$GLB,, | Performed by: NURSE PRACTITIONER

## 2019-12-02 PROCEDURE — 94060 EVALUATION OF WHEEZING: CPT | Mod: S$GLB,,, | Performed by: INTERNAL MEDICINE

## 2019-12-02 PROCEDURE — 1159F PR MEDICATION LIST DOCUMENTED IN MEDICAL RECORD: ICD-10-PCS | Mod: S$GLB,,, | Performed by: NURSE PRACTITIONER

## 2019-12-02 PROCEDURE — 99213 OFFICE O/P EST LOW 20 MIN: CPT | Mod: 25,S$GLB,, | Performed by: NURSE PRACTITIONER

## 2019-12-02 PROCEDURE — 3078F DIAST BP <80 MM HG: CPT | Mod: CPTII,S$GLB,, | Performed by: NURSE PRACTITIONER

## 2019-12-02 PROCEDURE — 1159F MED LIST DOCD IN RCRD: CPT | Mod: S$GLB,,, | Performed by: NURSE PRACTITIONER

## 2019-12-02 PROCEDURE — 1101F PT FALLS ASSESS-DOCD LE1/YR: CPT | Mod: CPTII,S$GLB,, | Performed by: NURSE PRACTITIONER

## 2019-12-02 PROCEDURE — 1101F PR PT FALLS ASSESS DOC 0-1 FALLS W/OUT INJ PAST YR: ICD-10-PCS | Mod: CPTII,S$GLB,, | Performed by: NURSE PRACTITIONER

## 2019-12-02 PROCEDURE — 99213 PR OFFICE/OUTPT VISIT, EST, LEVL III, 20-29 MIN: ICD-10-PCS | Mod: 25,S$GLB,, | Performed by: NURSE PRACTITIONER

## 2019-12-02 PROCEDURE — 94060 PR EVAL OF BRONCHOSPASM: ICD-10-PCS | Mod: S$GLB,,, | Performed by: INTERNAL MEDICINE

## 2019-12-02 RX ORDER — FLUTICASONE FUROATE AND VILANTEROL 100; 25 UG/1; UG/1
1 POWDER RESPIRATORY (INHALATION) DAILY
Qty: 60 EACH | Refills: 12 | Status: CANCELLED | OUTPATIENT
Start: 2019-12-02

## 2019-12-02 RX ORDER — ALBUTEROL SULFATE 90 UG/1
2 AEROSOL, METERED RESPIRATORY (INHALATION) EVERY 4 HOURS PRN
Qty: 18 G | Refills: 3 | Status: SHIPPED | OUTPATIENT
Start: 2019-12-02 | End: 2020-05-18

## 2019-12-02 NOTE — PROGRESS NOTES
Answers for HPI/ROS submitted by the patient on 12/2/2019   Asthma  In the past 4 weeks, how much of the time did your asthma keep you from getting as much done at work, school, or at home?: none of the time  During the past 4 weeks, how often have you had shortness of breath?: not at all  During the past 4 weeks, how often did your asthma symptoms (Wheezing, coughing, shortness of breath, chest tightness or pain) wake you up at night or earlier that usual in the morning?: not at all  During the past 4 weeks, how often have you used your rescue inhaler or nebulizer medication (such as albuterol)?: not at all  How would you rate your asthma control during the past 4 weeks?: somewhat controlled   : 23  Subjective:      Patient ID: Edin Green is a 68 y.o. male.    Chief Complaint: Sleep Apnea and Asthma    HPI   patient presents to the office today for evaluation of asthma.  He states he has not taken any inhalers for asthma for over a year.  He does take Singulair but questioning if he can discontinue.  He has obstructive sleep apnea intolerant to CPAP.   No fever, chills, or hemoptysis. No pleuritic type chest pain. Breathing is stable as compared to 6 months ago.      Patient Active Problem List   Diagnosis    Hypertensive retinopathy of both eyes    Refractive error - Both Eyes    Nuclear sclerosis    HTN (hypertension)    Anxiety and depression    Hypogonadism in male    ED (erectile dysfunction)    LAURENCE (obstructive sleep apnea)    Hypercholesteremia    Mild intermittent asthma without complication    Allergic rhinitis    Cortical senile cataract of both eyes    Posterior vitreous detachment of both eyes    Hyperthyroidism    Graves disease    Iron deficiency anemia due to chronic blood loss    Internal hemorrhoid, bleeding    Asthma    Type 2 diabetes mellitus without complication    Prostate cancer    Current severe episode of major depressive disorder without psychotic features  "without prior episode    Aortic ectasia       BP (!) 142/60   Pulse 76   Resp 18   Ht 6' 0.05" (1.83 m)   Wt 81.5 kg (179 lb 10.8 oz)   SpO2 98%   BMI 24.34 kg/m²   Body mass index is 24.34 kg/m².    Review of Systems   Constitutional: Positive for weight loss.   HENT: Negative.    Respiratory: Negative.    Cardiovascular: Negative.    Musculoskeletal: Negative.    Gastrointestinal: Negative.    Neurological: Negative.    Psychiatric/Behavioral: Negative.      Objective:      Physical Exam   Constitutional: He is oriented to person, place, and time. He appears well-developed and well-nourished.   HENT:   Head: Normocephalic and atraumatic.   Neck: Normal range of motion. Neck supple.   Cardiovascular: Normal rate.   Pulmonary/Chest: Effort normal and breath sounds normal.   Abdominal: Soft.   Neurological: He is alert and oriented to person, place, and time.   Skin: Skin is warm and dry.   Psychiatric:   Flat affect     Personal Diagnostic Review  reviewed  Results for orders placed or performed in visit on 12/02/19   Spirometry with/without bronchodilator   Result Value Ref Range    Post FVC 3.47 2.78 - 4.93 L    Post FEV1 2.65 2.02 - 3.84 L    Post FEV1 FVC 76.16 63.36 - 89.12 %    Post FEF 25 75 2.25 0.81 - 3.67 L/s    Post PEF 6.44 5.75 - 11.37 L/s    Post  6.88 sec    Pre FVC 3.29 2.78 - 4.93 L    Pre FEV1 2.22 2.02 - 3.84 L    Pre FEV1 FVC 67.54 63.36 - 89.12 %    Pre FEF 25 75 1.32 0.81 - 3.67 L/s    Pre PEF 6.47 5.75 - 11.37 L/s    Pre  7.15 sec    FVC Ref 3.85     FVC LLN 2.78     FVC Pre Ref 85.4 %    FVC Post Ref 90.2 %    FVC Chg 5.6 %    FEV1 Ref 2.93     FEV1 LLN 2.02     FEV1 Pre Ref 75.9 %    FEV1 Post Ref 90.4 %    FEV1 Chg 19.0 %    FEV1 FVC Ref 76.24     FEV1 FVC LLN 63.36     FEV1 FVC Pre Ref 88.6 %    FEV1 FVC Post Ref 99.9 %    FEV1 FVC Chg 12.8 %    MFEF 75 25 REF 2.24     MFEF 75 25 LLN 0.81     MFEF 75 25 PRE REF 58.8 %    MFEF 75 25 POST .6 %    MFEF 75 25 CHG " 71.0 %    PEF Ref 8.56     PEF LLN 5.75     PEF Pre Ref 75.5 %    PEF Post Ref 75.2 %    PEF Chg -0.5 %    [id number] Chg -3.8 %    MVV Ref 135.12     MVV .85        Results for orders placed in visit on 08/12/19   X-Ray Chest PA And Lateral    Narrative EXAMINATION:  XR CHEST PA AND LATERAL    CLINICAL HISTORY:  pre-op; Malignant neoplasm of prostate    TECHNIQUE:  PA and lateral views of the chest were performed.    COMPARISON:  Chest x-ray from 09/24/2018.    FINDINGS:  No focal consolidation.  Descending thoracic aorta is tortuous.  Cardiomediastinal silhouette is not enlarged.  Trachea is midline.  Hilar structures appear normal.  Multilevel degenerative changes of the thoracic spine are noted.      Impression Stable chest radiograph.  No active disease.      Electronically signed by: Augustin Guerrero MD  Date:    08/13/2019  Time:    15:50         Assessment:       1. Mild intermittent asthma without complication        Facility-Administered Encounter Medications as of 12/2/2019   Medication Dose Route Frequency Provider Last Rate Last Dose    cefazolin (ANCEF) 2 gram in dextrose 5% 50 mL IVPB (premix)  2 g Intravenous On Call Procedure Kiel Ochoa IV, MD   2 g at 09/10/19 1037    HYDROcodone-acetaminophen 5-325 mg per tablet 1 tablet  1 tablet Oral Q3H PRN Sanjay Wilkes II, MD        hydromorphone (PF) injection 0.2 mg  0.2 mg Intravenous Q5 Min PRN Sanjay Wilkes II, MD        ondansetron injection 4 mg  4 mg Intravenous Daily PRN Sanjay Wilkes II, MD   4 mg at 09/10/19 1230    sodium chloride 0.9% flush 10 mL  10 mL Intravenous PRN Sanjay Wilkes II, MD        sodium chloride 0.9% flush 10 mL  10 mL Intravenous PRN Sanjay Wilkes II, MD         Outpatient Encounter Medications as of 12/2/2019   Medication Sig Dispense Refill    amLODIPine (NORVASC) 2.5 MG tablet Take 1 tablet (2.5 mg total) by mouth once daily. (Patient taking differently: Take 2.5 mg by mouth every evening. ) 30 tablet  5    aspirin (ECOTRIN) 81 MG EC tablet Take 81 mg by mouth once daily.      buPROPion (WELLBUTRIN SR) 150 MG TBSR 12 hr tablet Take 150 mg by mouth 2 (two) times daily.       hydroCHLOROthiazide (HYDRODIURIL) 25 MG tablet Take 1 tablet (25 mg total) by mouth once daily. 90 tablet 4    losartan (COZAAR) 100 MG tablet Take 1 tablet (100 mg total) by mouth once daily. 90 tablet 4    methIMAzole (TAPAZOLE) 5 MG Tab TAKE 1 TABLET BY MOUTH ONCE DAILY 30 tablet 2    mirtazapine (REMERON) 7.5 MG Tab Take 7.5 mg by mouth every evening.  3    multivitamin (THERAGRAN) per tablet Take 1 tablet by mouth once daily.       OXcarbazepine (TRILEPTAL) 150 MG Tab Take 150 mg by mouth 2 (two) times daily.       oxyCODONE-acetaminophen (PERCOCET) 5-325 mg per tablet Take 1-2 tablets by mouth every 4 (four) hours as needed for Pain. 30 tablet 0    polyethylene glycol (GLYCOLAX) 17 gram/dose powder       pravastatin (PRAVACHOL) 40 MG tablet TAKE 1 TABLET BY MOUTH ONCE DAILY 30 tablet 11    risperiDONE (RISPERDAL M-TABS) 2 MG disintegrating tablet Take 2 mg by mouth nightly.       sildenafil (VIAGRA) 100 MG tablet TAKE AS DIRECTED (Patient taking differently: Take 100 mg by mouth as needed. TAKE AS DIRECTED) 10 tablet 11    sodium phosphates (DISPOSABLE ENEMA) 19-7 gram/118 mL Enem Place 1 enema rectally once.      tamsulosin (FLOMAX) 0.4 mg Cap Take 1 capsule (0.4 mg total) by mouth once daily. 30 capsule 1    [DISCONTINUED] fluticasone furoate-vilanterol (BREO) 100-25 mcg/dose diskus inhaler Inhale into the lungs daily as needed.      [DISCONTINUED] methylPREDNISolone (MEDROL DOSEPACK) 4 mg tablet use as directed 21 tablet 0    [DISCONTINUED] montelukast (SINGULAIR) 10 mg tablet TAKE ONE TABLET BY MOUTH ONCE DAILY IN THE EVENING 30 tablet 11    albuterol (VENTOLIN HFA) 90 mcg/actuation inhaler Inhale 2 puffs into the lungs every 4 (four) hours as needed for Wheezing. 18 g 3    amLODIPine (NORVASC) 5 MG tablet Take 5  mg by mouth once daily.  11    tadalafil (CIALIS) 20 MG Tab Use one tablet every 36 hours 10 tablet 11    [DISCONTINUED] losartan-hydrochlorothiazide 100-25 mg (HYZAAR) 100-25 mg per tablet TAKE ONE TABLET BY MOUTH ONCE DAILY (Patient taking differently: TAKE ONE TABLET BY MOUTH NIGHTLY) 30 tablet 11    [DISCONTINUED] methIMAzole (TAPAZOLE) 5 MG Tab Take 1 tablet (5 mg total) by mouth once daily. 30 tablet 2    [DISCONTINUED] pravastatin (PRAVACHOL) 40 MG tablet TAKE ONE TABLET BY MOUTH ONCE DAILY 30 tablet 11     No orders of the defined types were placed in this encounter.    Plan:      asthma stable.  Lack of symptoms at this time.  Follow-up when needed.  Albuterol to take if needed.   he may discontinue Singulair.  Start back taking of cough returns.  Discontinue Breo  Problem List Items Addressed This Visit        Pulmonary    Asthma - Primary    Relevant Medications    albuterol (VENTOLIN HFA) 90 mcg/actuation inhaler

## 2019-12-16 ENCOUNTER — OFFICE VISIT (OUTPATIENT)
Dept: PODIATRY | Facility: CLINIC | Age: 69
End: 2019-12-16
Payer: MEDICARE

## 2019-12-16 VITALS
HEART RATE: 71 BPM | SYSTOLIC BLOOD PRESSURE: 133 MMHG | BODY MASS INDEX: 24.05 KG/M2 | HEIGHT: 72 IN | DIASTOLIC BLOOD PRESSURE: 72 MMHG | WEIGHT: 177.56 LBS

## 2019-12-16 DIAGNOSIS — L85.3 XEROSIS CUTIS: ICD-10-CM

## 2019-12-16 DIAGNOSIS — E11.8 TYPE 2 DIABETES MELLITUS WITH COMPLICATION: Primary | ICD-10-CM

## 2019-12-16 DIAGNOSIS — B35.1 ONYCHOMYCOSIS: ICD-10-CM

## 2019-12-16 PROCEDURE — 99213 OFFICE O/P EST LOW 20 MIN: CPT | Mod: S$GLB,,, | Performed by: PODIATRIST

## 2019-12-16 PROCEDURE — 99999 PR PBB SHADOW E&M-EST. PATIENT-LVL III: ICD-10-PCS | Mod: PBBFAC,,, | Performed by: PODIATRIST

## 2019-12-16 PROCEDURE — 99999 PR PBB SHADOW E&M-EST. PATIENT-LVL III: CPT | Mod: PBBFAC,,, | Performed by: PODIATRIST

## 2019-12-16 PROCEDURE — 1126F PR PAIN SEVERITY QUANTIFIED, NO PAIN PRESENT: ICD-10-PCS | Mod: S$GLB,,, | Performed by: PODIATRIST

## 2019-12-16 PROCEDURE — 3075F SYST BP GE 130 - 139MM HG: CPT | Mod: CPTII,S$GLB,, | Performed by: PODIATRIST

## 2019-12-16 PROCEDURE — 1159F PR MEDICATION LIST DOCUMENTED IN MEDICAL RECORD: ICD-10-PCS | Mod: S$GLB,,, | Performed by: PODIATRIST

## 2019-12-16 PROCEDURE — 1101F PT FALLS ASSESS-DOCD LE1/YR: CPT | Mod: CPTII,S$GLB,, | Performed by: PODIATRIST

## 2019-12-16 PROCEDURE — 99213 PR OFFICE/OUTPT VISIT, EST, LEVL III, 20-29 MIN: ICD-10-PCS | Mod: S$GLB,,, | Performed by: PODIATRIST

## 2019-12-16 PROCEDURE — 1101F PR PT FALLS ASSESS DOC 0-1 FALLS W/OUT INJ PAST YR: ICD-10-PCS | Mod: CPTII,S$GLB,, | Performed by: PODIATRIST

## 2019-12-16 PROCEDURE — 3044F PR MOST RECENT HEMOGLOBIN A1C LEVEL <7.0%: ICD-10-PCS | Mod: CPTII,S$GLB,, | Performed by: PODIATRIST

## 2019-12-16 PROCEDURE — 3044F HG A1C LEVEL LT 7.0%: CPT | Mod: CPTII,S$GLB,, | Performed by: PODIATRIST

## 2019-12-16 PROCEDURE — 3078F PR MOST RECENT DIASTOLIC BLOOD PRESSURE < 80 MM HG: ICD-10-PCS | Mod: CPTII,S$GLB,, | Performed by: PODIATRIST

## 2019-12-16 PROCEDURE — 1126F AMNT PAIN NOTED NONE PRSNT: CPT | Mod: S$GLB,,, | Performed by: PODIATRIST

## 2019-12-16 PROCEDURE — 1159F MED LIST DOCD IN RCRD: CPT | Mod: S$GLB,,, | Performed by: PODIATRIST

## 2019-12-16 PROCEDURE — 3078F DIAST BP <80 MM HG: CPT | Mod: CPTII,S$GLB,, | Performed by: PODIATRIST

## 2019-12-16 PROCEDURE — 3075F PR MOST RECENT SYSTOLIC BLOOD PRESS GE 130-139MM HG: ICD-10-PCS | Mod: CPTII,S$GLB,, | Performed by: PODIATRIST

## 2019-12-16 RX ORDER — AMMONIUM LACTATE 12 G/100G
LOTION TOPICAL 3 TIMES DAILY
Qty: 225 G | Refills: 4 | Status: SHIPPED | OUTPATIENT
Start: 2019-12-16 | End: 2020-01-15

## 2019-12-16 NOTE — PROGRESS NOTES
Subjective:       Patient ID: Edin Green is a 68 y.o. male.    Chief Complaint: Routine Foot Care (pt rates pain 0/10, tennis w/socks, diabetic pt, PCP Dr. Paris)      HPI: Edin Green presents to the office today for non-covered foot care; trimming/debridement of elongated/dystrophic/onychomycotic nails and/or debridement/paring of calluses. Patient a DMII w/o peripheral angiopathy or peripheral vascular disease.  Patient does not have arterial insufficiency of the lower extremity or idiopathic peripheral neuropathy. Carter Paris MD is this patient's primary care provider.  Patient last saw his primary care provider 09/03/2019.    Hemoglobin A1C   Date Value Ref Range Status   07/08/2019 5.7 (H) 4.0 - 5.6 % Final     Comment:     ADA Screening Guidelines:  5.7-6.4%  Consistent with prediabetes  >or=6.5%  Consistent with diabetes  High levels of fetal hemoglobin interfere with the HbA1C  assay. Heterozygous hemoglobin variants (HbS, HgC, etc)do  not significantly interfere with this assay.   However, presence of multiple variants may affect accuracy.     03/06/2019 6.8 (H) 4.0 - 5.6 % Final     Comment:     ADA Screening Guidelines:  5.7-6.4%  Consistent with prediabetes  >or=6.5%  Consistent with diabetes  High levels of fetal hemoglobin interfere with the HbA1C  assay. Heterozygous hemoglobin variants (HbS, HgC, etc)do  not significantly interfere with this assay.   However, presence of multiple variants may affect accuracy.     06/29/2018 6.1 (H) 4.0 - 5.6 % Final     Comment:     ADA Screening Guidelines:  5.7-6.4%  Consistent with prediabetes  >or=6.5%  Consistent with diabetes  High levels of fetal hemoglobin interfere with the HbA1C  assay. Heterozygous hemoglobin variants (HbS, HgC, etc)do  not significantly interfere with this assay.   However, presence of multiple variants may affect accuracy.     .    Review of patient's allergies indicates:   Allergen Reactions    Celebrex  [celecoxib]      Lip swelling      Shrimp Itching     States reaction is with frozen shrimp    Tomato Itching    Venom-wasp Swelling       Past Medical History:   Diagnosis Date    Anemia     Anxiety     Asthma     Benign hematuria     Cataract OU    Colon polyp     dr strauss    Depression     ED (erectile dysfunction)     Graves disease     story    HTN (hypertension)     Hypercholesteremia     Hypertensive retinopathy of both eyes     Hypogonadism     LAURENCE (obstructive sleep apnea)     Pneumonia     Prostate cancer 7/15/2019    Trouble in sleeping     Type 2 diabetes mellitus        Family History   Problem Relation Age of Onset    Hypertension Unknown     Heart defect Mother     Strabismus Neg Hx     Retinal detachment Neg Hx     Macular degeneration Neg Hx     Glaucoma Neg Hx     Blindness Neg Hx     Amblyopia Neg Hx        Social History     Socioeconomic History    Marital status:      Spouse name: CHUCHO    Number of children: 3    Years of education: Not on file    Highest education level: Not on file   Occupational History    Not on file   Social Needs    Financial resource strain: Not on file    Food insecurity:     Worry: Not on file     Inability: Not on file    Transportation needs:     Medical: Not on file     Non-medical: Not on file   Tobacco Use    Smoking status: Never Smoker    Smokeless tobacco: Never Used   Substance and Sexual Activity    Alcohol use: Yes     Alcohol/week: 1.0 standard drinks     Types: 1 Cans of beer per week     Comment: No alcohol 72h prior to sx    Drug use: No    Sexual activity: Yes     Partners: Female   Lifestyle    Physical activity:     Days per week: Not on file     Minutes per session: Not on file    Stress: Not on file   Relationships    Social connections:     Talks on phone: Not on file     Gets together: Not on file     Attends Jew service: Not on file     Active member of club or organization: Not on  file     Attends meetings of clubs or organizations: Not on file     Relationship status: Not on file   Other Topics Concern    Not on file   Social History Narrative    Wears a seatbelt.       Past Surgical History:   Procedure Laterality Date    BRACHYTHERAPY N/A 9/10/2019    Procedure: BRACHYTHERAPY;  Surgeon: Kiel Ochoa IV, MD;  Location: [facility];  Service: Urology;  Laterality: N/A;    RADIOACTIVE SEED IMPLANTATION OF PROSTATE N/A 9/10/2019    Procedure: INSERTION, RADIOACTIVE SEED, PROSTATE;  Surgeon: Kiel Ochoa IV, MD;  Location: [facility] OR;  Service: Urology;  Laterality: N/A;    ULTRASOUND GUIDANCE N/A 9/10/2019    Procedure: ULTRASOUND GUIDANCE;  Surgeon: Kiel Ochoa IV, MD;  Location: [facility] OR;  Service: Urology;  Laterality: N/A;       Review of Systems   Constitutional: Negative for chills, fatigue and fever.   HENT: Negative for hearing loss.    Eyes: Negative for photophobia and visual disturbance.   Respiratory: Negative for cough, chest tightness, shortness of breath and wheezing.    Cardiovascular: Negative for chest pain and palpitations.   Gastrointestinal: Negative for constipation, diarrhea, nausea and vomiting.   Endocrine: Negative for cold intolerance and heat intolerance.   Genitourinary: Negative for flank pain.   Musculoskeletal: Negative for gait problem, neck pain and neck stiffness.   Skin: Positive for wound.   Neurological: Negative for light-headedness and headaches.   Psychiatric/Behavioral: Negative for sleep disturbance.          Objective:   /72 (BP Location: Right arm, Patient Position: Sitting, BP Method: Medium (Automatic))   Pulse 71   Ht 6' (1.829 m)   Wt 80.6 kg (177 lb 9.3 oz)   BMI 24.08 kg/m²     Physical Exam  LOWER EXTREMITY PHYSICAL EXAMINATION    NEUROLOGY: Protective sensation is intact via 5.07 [place] Kamran monofilament. Proprioception is intact. Sensation to light touch is intact.     VASCULAR: On the left and right foot, the  dorsalis pedis pulse is 1/4 and the posterior tibial pulse is 1/4. Capillary refill time is less than 3 seconds. Hair growth is sparse, but present on the dorsum of the foot and at the digits. No rubor is present. Proximal to distal temperature is warm to warm.    DERMATOLOGY: There are nail changes which are consistent with onychomycosis on the left and right foot. On the left, nails 1, 2, 3, 4 and 5 are thickened, are dystrophic, with yellow discoloration, and with malodorous subungual debris. On the right, nails 1, 2, 3, 4 and 5 are thickened, are dystrophic, with yellow discoloration, and with malodorous subungual debris. These thickened and dystrophic nails are painful to touch and palpation. The remaining nail plates are elongated, and are not suggestive of onychomycosis.  Substantial xerosis and hyperkeratosis is noted to the bilateral lower extremity.  No overt calluses noted.    ORTHOPEDIC: Manual Muscle Testing is 5/5 in all planes on the left and right, without pains, with and without resistance.  Pes planus foot type is noted. Equinus contractures noted. Gait pattern is non-antalgic.    Assessment:     1. Type 2 diabetes mellitus with complication    2. Onychomycosis    3. Xerosis cutis        Plan:     Type 2 diabetes mellitus with complication  I counseled the patient on his/her Diabetic Mellitus regarding today's clinical examination and objection findings. We did also discuss recent medication changes, pertinent labs and imaging evaluations and other medical consultation notes and progress notes. Greater than 50% of this visit was spent on counseling and coordination of care. Greater than 20 minutes of this appt. was spent on education about the diabetic foot, in relation to PVD and/or neuropathy, and the prevention of limb loss.     Shoe gear is inspected and wear and proper fit/type. Patient is reminded of the importance of good nutrition and blood sugar control to help prevent podiatric  complications of diabetes. Patient instructed on proper foot hygeine. We discussed wearing proper shoe gear, daily foot inspections, never walking without protective shoe gear, never putting sharp instruments to feet.  Patient  will continue to monitor the areas daily, inspect feet, wear protective shoe gear when ambulatory, moisturizer to maintain skin integrity.     Patient's DMI/DMII is managed by Primary Care Provider and/or Endocrinology Advanced Practice Provider. As per the most recent glycohemoglobin level, this patient is at goal.    Onychomycosis  Onychomycotic nail plates, as outlined above, are sharply debrided with double action nail nipper, and/or with the assistance of a mechanical rotary wili for reduction of pains. Nails are reduced in terms of length, width and girth with removal of subungual debris to facilitate pain free weight bearing and ambulation. Elongated nails as outlined in the objective portion of this note, were trimmed to appropriate length for alleviation/reduction of pains as well. Follow up in approx. 9-12 weeks.    Patient does not meet the qualifications for, or have the class findings necessary for routine foot care. There is no lack of or diminished sensation and he/she has no significant findings of PVD to the B/L LE. At any follow-up appointment concerning routine foot care, patient will be PROC-B, and will be required to pay for services.  This fact is explained to the patient and/or any family who is present at today's appt.    Xerosis cutis  -     ammonium lactate (LAC-HYDRIN) 12 % lotion; Apply topically 3 (three) times daily.  Dispense: 225 g; Refill: 4            Future Appointments   Date Time Provider Department Center   1/8/2020  2:00 PM [facility] LAB [facility] LAB DS [facility]   1/9/2020 10:40 AM Carter Paris MD [facility]  [place]   1/17/2020  2:00 PM Kiel Smith II, MD [facility] RAD ONC [facility]   1/30/2020 10:00 AM LABORATORY, [facility] LAB [facility]   3/5/2020  1:00 PM  Gabi Harmon MD [facility] ENDO [facility]   3/25/2020  9:40 AM LABORATORY, [facility] LAB [place]   4/6/2020  9:30 AM Kiel Ochoa IV, MD ON UROLOGY  [facility]   5/18/2020 10:00 AM Sina Pa MD [facility] OPHTHAL [place]   6/11/2020  1:30 PM Alonso Luis DPM ON POD [facility]   7/30/2020  1:10 PM [facility] CT1 LIMIT 500 LBS [facility] CT SCAN [facility]

## 2019-12-17 ENCOUNTER — PATIENT MESSAGE (OUTPATIENT)
Dept: ENDOCRINOLOGY | Facility: CLINIC | Age: 69
End: 2019-12-17

## 2019-12-30 ENCOUNTER — PATIENT MESSAGE (OUTPATIENT)
Dept: INTERNAL MEDICINE | Facility: CLINIC | Age: 69
End: 2019-12-30

## 2020-01-17 ENCOUNTER — OFFICE VISIT (OUTPATIENT)
Dept: RADIATION ONCOLOGY | Facility: CLINIC | Age: 70
End: 2020-01-17
Payer: MEDICARE

## 2020-01-17 ENCOUNTER — LAB VISIT (OUTPATIENT)
Dept: LAB | Facility: HOSPITAL | Age: 70
End: 2020-01-17
Attending: RADIOLOGY
Payer: MEDICARE

## 2020-01-17 VITALS
HEIGHT: 72 IN | HEART RATE: 70 BPM | TEMPERATURE: 97 F | DIASTOLIC BLOOD PRESSURE: 75 MMHG | BODY MASS INDEX: 25.04 KG/M2 | SYSTOLIC BLOOD PRESSURE: 114 MMHG | OXYGEN SATURATION: 98 % | RESPIRATION RATE: 18 BRPM | WEIGHT: 184.88 LBS

## 2020-01-17 DIAGNOSIS — C61 PROSTATE CANCER: ICD-10-CM

## 2020-01-17 DIAGNOSIS — C61 PROSTATE CANCER: Primary | ICD-10-CM

## 2020-01-17 PROCEDURE — 1126F PR PAIN SEVERITY QUANTIFIED, NO PAIN PRESENT: ICD-10-PCS | Mod: S$GLB,,, | Performed by: RADIOLOGY

## 2020-01-17 PROCEDURE — 3074F PR MOST RECENT SYSTOLIC BLOOD PRESSURE < 130 MM HG: ICD-10-PCS | Mod: CPTII,S$GLB,, | Performed by: RADIOLOGY

## 2020-01-17 PROCEDURE — 99999 PR PBB SHADOW E&M-EST. PATIENT-LVL IV: ICD-10-PCS | Mod: PBBFAC,,, | Performed by: RADIOLOGY

## 2020-01-17 PROCEDURE — 84153 ASSAY OF PSA TOTAL: CPT

## 2020-01-17 PROCEDURE — 1159F MED LIST DOCD IN RCRD: CPT | Mod: S$GLB,,, | Performed by: RADIOLOGY

## 2020-01-17 PROCEDURE — 3078F PR MOST RECENT DIASTOLIC BLOOD PRESSURE < 80 MM HG: ICD-10-PCS | Mod: CPTII,S$GLB,, | Performed by: RADIOLOGY

## 2020-01-17 PROCEDURE — 1126F AMNT PAIN NOTED NONE PRSNT: CPT | Mod: S$GLB,,, | Performed by: RADIOLOGY

## 2020-01-17 PROCEDURE — 99213 PR OFFICE/OUTPT VISIT, EST, LEVL III, 20-29 MIN: ICD-10-PCS | Mod: S$GLB,,, | Performed by: RADIOLOGY

## 2020-01-17 PROCEDURE — 99999 PR PBB SHADOW E&M-EST. PATIENT-LVL IV: CPT | Mod: PBBFAC,,, | Performed by: RADIOLOGY

## 2020-01-17 PROCEDURE — 1159F PR MEDICATION LIST DOCUMENTED IN MEDICAL RECORD: ICD-10-PCS | Mod: S$GLB,,, | Performed by: RADIOLOGY

## 2020-01-17 PROCEDURE — 3078F DIAST BP <80 MM HG: CPT | Mod: CPTII,S$GLB,, | Performed by: RADIOLOGY

## 2020-01-17 PROCEDURE — 99213 OFFICE O/P EST LOW 20 MIN: CPT | Mod: S$GLB,,, | Performed by: RADIOLOGY

## 2020-01-17 PROCEDURE — 36415 COLL VENOUS BLD VENIPUNCTURE: CPT

## 2020-01-17 PROCEDURE — 1101F PT FALLS ASSESS-DOCD LE1/YR: CPT | Mod: CPTII,S$GLB,, | Performed by: RADIOLOGY

## 2020-01-17 PROCEDURE — 3074F SYST BP LT 130 MM HG: CPT | Mod: CPTII,S$GLB,, | Performed by: RADIOLOGY

## 2020-01-17 PROCEDURE — 1101F PR PT FALLS ASSESS DOC 0-1 FALLS W/OUT INJ PAST YR: ICD-10-PCS | Mod: CPTII,S$GLB,, | Performed by: RADIOLOGY

## 2020-01-17 NOTE — PROGRESS NOTES
[facility]  RADIATION ONCOLOGY FOLLOW UP    Name: Edin Green : 1950     DIAGNOSIS:  Favorable intermediate risk prostate cancer cIIB: cT1c cN0 cM0, PSA 5.7, Sonia 3+4, / cores  1. 3 PSA 5.7.   2. 19 prostate biopsy showed 32 cc gland.  He had Linn 3 + 4 involving 1 core at the left mid, 15%.  There was no perineural invasion.    3. 6/10/19 prostate MRI showed 28.7 cc volume.  This was PI-RADS 2.   4. 19 125 Gy definitive prostate palladium brachytherapy implant  5. PSA 0.6     CURRENT STATUS: Edin Green is a pleasant 69 y.o. male who presents today for follow-up.  PSA was 0.6. He is on Flomax 0.4mg daily.  He denies any bowel problems. He denies hematuria or dysuria. His leakage has improved.    PAST MEDICAL HISTORY:  Past Medical History:   Diagnosis Date    Anemia     Anxiety     Asthma     Benign hematuria     Cataract OU    Colon polyp     dr strauss    Depression     ED (erectile dysfunction)     Graves disease     story    HTN (hypertension)     Hypercholesteremia     Hypertensive retinopathy of both eyes     Hypogonadism     LAURENCE (obstructive sleep apnea)     Pneumonia     Prostate cancer 7/15/2019    Trouble in sleeping     Type 2 diabetes mellitus        PAST SURGICAL HISTORY:  Past Surgical History:   Procedure Laterality Date    BRACHYTHERAPY N/A 9/10/2019    Procedure: BRACHYTHERAPY;  Surgeon: Kiel Ochoa IV, MD;  Location: [facility] OR;  Service: Urology;  Laterality: N/A;    RADIOACTIVE SEED IMPLANTATION OF PROSTATE N/A 9/10/2019    Procedure: INSERTION, RADIOACTIVE SEED, PROSTATE;  Surgeon: Kiel Ochoa IV, MD;  Location: [facility] OR;  Service: Urology;  Laterality: N/A;    ULTRASOUND GUIDANCE N/A 9/10/2019    Procedure: ULTRASOUND GUIDANCE;  Surgeon: Kiel Ochoa IV, MD;  Location: [facility] OR;  Service: Urology;  Laterality: N/A;       SOCIAL HISTORY:  Social History     Socioeconomic History    Marital  status:      Spouse name: CHUCHO    Number of children: 3    Years of education: Not on file    Highest education level: Not on file   Occupational History    Not on file   Social Needs    Financial resource strain: Not on file    Food insecurity:     Worry: Not on file     Inability: Not on file    Transportation needs:     Medical: Not on file     Non-medical: Not on file   Tobacco Use    Smoking status: Never Smoker    Smokeless tobacco: Never Used   Substance and Sexual Activity    Alcohol use: Yes     Alcohol/week: 1.0 standard drinks     Types: 1 Cans of beer per week     Comment: No alcohol 72h prior to sx    Drug use: No    Sexual activity: Yes     Partners: Female   Lifestyle    Physical activity:     Days per week: Not on file     Minutes per session: Not on file    Stress: Not on file   Relationships    Social connections:     Talks on phone: Not on file     Gets together: Not on file     Attends Yarsani service: Not on file     Active member of club or organization: Not on file     Attends meetings of clubs or organizations: Not on file     Relationship status: Not on file   Other Topics Concern    Not on file   Social History Narrative    Wears a seatbelt.       FAMILY HISTORY:  Family History   Problem Relation Age of Onset    Hypertension Unknown     Heart defect Mother     Strabismus Neg Hx     Retinal detachment Neg Hx     Macular degeneration Neg Hx     Glaucoma Neg Hx     Blindness Neg Hx     Amblyopia Neg Hx        PHYSICAL EXAMINATION:  Constitutional/Vitals: well appearing, no acute distress, ECOG 0 - Fully Active, /75   Pulse 70   Temp 97.4 °F (36.3 °C)   Resp 18   Ht 6' (1.829 m)   Wt 83.9 kg (184 lb 14.4 oz)   SpO2 98%   BMI 25.08 kg/m²     IMAGING AND LABORATORY FINDINGS: As per HPI; images, labs and medical records reviewed personally in EPIC.    ASSESSMENT: good PSA response and improved toxicity    PLAN: Mr. Green is improving and has an  apropriate PSA response at 0.6. We will continue to monitor and he will follow with Dr. Quiroz and Dr. Ochoa.    Joby Quiroz III  Radiation Oncology  [facility] [address] [place] [address]  Ph: [phone]

## 2020-01-18 LAB — COMPLEXED PSA SERPL-MCNC: 0.6 NG/ML (ref 0–4)

## 2020-01-27 ENCOUNTER — TELEPHONE (OUTPATIENT)
Dept: INTERNAL MEDICINE | Facility: CLINIC | Age: 70
End: 2020-01-27

## 2020-01-27 NOTE — TELEPHONE ENCOUNTER
----- Message from Janeth Mcclendon sent at 1/27/2020  1:18 PM CST -----  Contact: Patient  Patient is requesting lab orders, please call him back at [phone]

## 2020-01-27 NOTE — TELEPHONE ENCOUNTER
----- Message from Laury Sanchez sent at 1/27/2020  3:00 PM CST -----  Contact: patient   Type:  Patient Returning Call    Who Called:Edin Green   Who Left Message for Patient:unsure  Does the patient know what this is regarding?:unsure  Would the patient rather a call back or a response via [facility]? call  Best Call Back Number:[phone]  Additional Information: n/a

## 2020-01-27 NOTE — TELEPHONE ENCOUNTER
----- Message from Laury Sanchez sent at 1/27/2020  3:00 PM CST -----  Contact: patient   Type:  Patient Returning Call    Who Called:Edin Green   Who Left Message for Patient:unsure  Does the patient know what this is regarding?:unsure  Would the patient rather a call back or a response via [facility]? call  Best Call Back Number:[phone]  Additional Information: n/a

## 2020-01-28 ENCOUNTER — TELEPHONE (OUTPATIENT)
Dept: INTERNAL MEDICINE | Facility: CLINIC | Age: 70
End: 2020-01-28

## 2020-01-28 NOTE — TELEPHONE ENCOUNTER
I tried to contact the pt regarding his appt for lab work but there was no answer.So I left a vm message to give the office a call.//LB

## 2020-01-30 ENCOUNTER — LAB VISIT (OUTPATIENT)
Dept: LAB | Facility: HOSPITAL | Age: 70
End: 2020-01-30
Attending: FAMILY MEDICINE
Payer: MEDICARE

## 2020-01-30 DIAGNOSIS — D64.9 ANEMIA, UNSPECIFIED TYPE: ICD-10-CM

## 2020-01-30 DIAGNOSIS — E11.9 TYPE 2 DIABETES MELLITUS WITHOUT COMPLICATION, WITHOUT LONG-TERM CURRENT USE OF INSULIN: ICD-10-CM

## 2020-01-30 LAB
BASOPHILS # BLD AUTO: 0.05 K/UL (ref 0–0.2)
BASOPHILS NFR BLD: 1.4 % (ref 0–1.9)
DIFFERENTIAL METHOD: ABNORMAL
EOSINOPHIL # BLD AUTO: 0.3 K/UL (ref 0–0.5)
EOSINOPHIL NFR BLD: 7.2 % (ref 0–8)
ERYTHROCYTE [DISTWIDTH] IN BLOOD BY AUTOMATED COUNT: 13 % (ref 11.5–14.5)
ESTIMATED AVG GLUCOSE: 120 MG/DL (ref 68–131)
HBA1C MFR BLD HPLC: 5.8 % (ref 4–5.6)
HCT VFR BLD AUTO: 40.3 % (ref 40–54)
HGB BLD-MCNC: 12.7 G/DL (ref 14–18)
IMM GRANULOCYTES # BLD AUTO: 0.01 K/UL (ref 0–0.04)
IMM GRANULOCYTES NFR BLD AUTO: 0.3 % (ref 0–0.5)
LYMPHOCYTES # BLD AUTO: 1.1 K/UL (ref 1–4.8)
LYMPHOCYTES NFR BLD: 32.4 % (ref 18–48)
MCH RBC QN AUTO: 29.2 PG (ref 27–31)
MCHC RBC AUTO-ENTMCNC: 31.5 G/DL (ref 32–36)
MCV RBC AUTO: 93 FL (ref 82–98)
MONOCYTES # BLD AUTO: 0.3 K/UL (ref 0.3–1)
MONOCYTES NFR BLD: 9.8 % (ref 4–15)
NEUTROPHILS # BLD AUTO: 1.7 K/UL (ref 1.8–7.7)
NEUTROPHILS NFR BLD: 48.9 % (ref 38–73)
NRBC BLD-RTO: 0 /100 WBC
PLATELET # BLD AUTO: 215 K/UL (ref 150–350)
PMV BLD AUTO: 11 FL (ref 9.2–12.9)
RBC # BLD AUTO: 4.35 M/UL (ref 4.6–6.2)
WBC # BLD AUTO: 3.46 K/UL (ref 3.9–12.7)

## 2020-01-30 PROCEDURE — 36415 COLL VENOUS BLD VENIPUNCTURE: CPT

## 2020-01-30 PROCEDURE — 85025 COMPLETE CBC W/AUTO DIFF WBC: CPT

## 2020-01-30 PROCEDURE — 83036 HEMOGLOBIN GLYCOSYLATED A1C: CPT

## 2020-02-10 RX ORDER — AMLODIPINE BESYLATE 5 MG/1
TABLET ORAL
Qty: 90 TABLET | Refills: 4 | Status: SHIPPED | OUTPATIENT
Start: 2020-02-10 | End: 2020-04-30 | Stop reason: SDUPTHER

## 2020-03-03 ENCOUNTER — PATIENT OUTREACH (OUTPATIENT)
Dept: ADMINISTRATIVE | Facility: OTHER | Age: 70
End: 2020-03-03

## 2020-03-05 ENCOUNTER — LAB VISIT (OUTPATIENT)
Dept: LAB | Facility: HOSPITAL | Age: 70
End: 2020-03-05
Attending: INTERNAL MEDICINE
Payer: MEDICARE

## 2020-03-05 ENCOUNTER — OFFICE VISIT (OUTPATIENT)
Dept: ENDOCRINOLOGY | Facility: CLINIC | Age: 70
End: 2020-03-05
Payer: MEDICARE

## 2020-03-05 VITALS
RESPIRATION RATE: 18 BRPM | WEIGHT: 184.31 LBS | SYSTOLIC BLOOD PRESSURE: 120 MMHG | HEIGHT: 74 IN | DIASTOLIC BLOOD PRESSURE: 64 MMHG | HEART RATE: 78 BPM | BODY MASS INDEX: 23.65 KG/M2

## 2020-03-05 DIAGNOSIS — E05.90 HYPERTHYROIDISM: Primary | ICD-10-CM

## 2020-03-05 DIAGNOSIS — E05.90 HYPERTHYROIDISM: ICD-10-CM

## 2020-03-05 DIAGNOSIS — F32.A DEPRESSION, UNSPECIFIED DEPRESSION TYPE: ICD-10-CM

## 2020-03-05 PROCEDURE — 1101F PT FALLS ASSESS-DOCD LE1/YR: CPT | Mod: HCNC,CPTII,S$GLB, | Performed by: INTERNAL MEDICINE

## 2020-03-05 PROCEDURE — 1126F PR PAIN SEVERITY QUANTIFIED, NO PAIN PRESENT: ICD-10-PCS | Mod: HCNC,S$GLB,, | Performed by: INTERNAL MEDICINE

## 2020-03-05 PROCEDURE — 84443 ASSAY THYROID STIM HORMONE: CPT | Mod: HCNC

## 2020-03-05 PROCEDURE — 1159F PR MEDICATION LIST DOCUMENTED IN MEDICAL RECORD: ICD-10-PCS | Mod: HCNC,S$GLB,, | Performed by: INTERNAL MEDICINE

## 2020-03-05 PROCEDURE — 84439 ASSAY OF FREE THYROXINE: CPT | Mod: HCNC

## 2020-03-05 PROCEDURE — 99213 PR OFFICE/OUTPT VISIT, EST, LEVL III, 20-29 MIN: ICD-10-PCS | Mod: HCNC,S$GLB,, | Performed by: INTERNAL MEDICINE

## 2020-03-05 PROCEDURE — 36415 COLL VENOUS BLD VENIPUNCTURE: CPT | Mod: HCNC

## 2020-03-05 PROCEDURE — 99499 RISK ADDL DX/OHS AUDIT: ICD-10-PCS | Mod: S$PBB,,, | Performed by: INTERNAL MEDICINE

## 2020-03-05 PROCEDURE — 1126F AMNT PAIN NOTED NONE PRSNT: CPT | Mod: HCNC,S$GLB,, | Performed by: INTERNAL MEDICINE

## 2020-03-05 PROCEDURE — 3078F PR MOST RECENT DIASTOLIC BLOOD PRESSURE < 80 MM HG: ICD-10-PCS | Mod: HCNC,CPTII,S$GLB, | Performed by: INTERNAL MEDICINE

## 2020-03-05 PROCEDURE — 99213 OFFICE O/P EST LOW 20 MIN: CPT | Mod: HCNC,S$GLB,, | Performed by: INTERNAL MEDICINE

## 2020-03-05 PROCEDURE — 1159F MED LIST DOCD IN RCRD: CPT | Mod: HCNC,S$GLB,, | Performed by: INTERNAL MEDICINE

## 2020-03-05 PROCEDURE — 99499 UNLISTED E&M SERVICE: CPT | Mod: S$PBB,,, | Performed by: INTERNAL MEDICINE

## 2020-03-05 PROCEDURE — 3078F DIAST BP <80 MM HG: CPT | Mod: HCNC,CPTII,S$GLB, | Performed by: INTERNAL MEDICINE

## 2020-03-05 PROCEDURE — 99999 PR PBB SHADOW E&M-EST. PATIENT-LVL IV: ICD-10-PCS | Mod: PBBFAC,HCNC,, | Performed by: INTERNAL MEDICINE

## 2020-03-05 PROCEDURE — 99999 PR PBB SHADOW E&M-EST. PATIENT-LVL IV: CPT | Mod: PBBFAC,HCNC,, | Performed by: INTERNAL MEDICINE

## 2020-03-05 PROCEDURE — 3074F SYST BP LT 130 MM HG: CPT | Mod: HCNC,CPTII,S$GLB, | Performed by: INTERNAL MEDICINE

## 2020-03-05 PROCEDURE — 1101F PR PT FALLS ASSESS DOC 0-1 FALLS W/OUT INJ PAST YR: ICD-10-PCS | Mod: HCNC,CPTII,S$GLB, | Performed by: INTERNAL MEDICINE

## 2020-03-05 PROCEDURE — 3074F PR MOST RECENT SYSTOLIC BLOOD PRESSURE < 130 MM HG: ICD-10-PCS | Mod: HCNC,CPTII,S$GLB, | Performed by: INTERNAL MEDICINE

## 2020-03-05 RX ORDER — METHIMAZOLE 5 MG/1
5 TABLET ORAL DAILY
Qty: 90 TABLET | Refills: 2 | Status: SHIPPED | OUTPATIENT
Start: 2020-03-05 | End: 2020-11-30

## 2020-03-05 NOTE — PROGRESS NOTES
Patient ID: Edin Green is a 69 y.o. male.  Patient is here for follow up        Chief Complaint: Follow-up      Here with wife  Chief Complaint: Follow-up      HPI     Consultation was requested by Dr. Carter Paris      Diagnosed: 1/2017- had weight loss and sweats, TSI antibody was mildly elevated  He was lost to follow-up after last being seen November 2017 and he was encouraged to follow up again and ice started seeing patient again September 2019.  He had been off of his methimazole and was restarted this year  due to suppressed TSH  Previous radiology tests:  Thyroid ultrasound : No   NM uptake and scan: Yes - 1/2017 markedly increased homogeneous uptake    Previous thyroid surgery: No      Thyroid symptoms:  He had been having weight loss, weight is stable since last visit however    also diagnosed with diabetes which is diet controlled currently    Denies palpitations or sweats  In review of records he has been having some memory loss as well and some anxiety and depression     Thyroid medications: methimazole 5 mg daily,-fortunately patient states he stop taking his medication a month ago and when I asked why he just said because he is taking too many medications and he seems to have it a depressed mood, he admits to having depression and seeing a counselor outside of [facility], on medication, currently denies any suicidal ideation      Takes medication appropriately:  Stop taking a month ago     in the past when he was on 20 mg of methimazole it caused him eventuality become hypothyroid, even when it was brought down to 10 mg    Alsodiagnosed with prostate cancer and he is status post brachytherapy without androgen deprivation has seen Dr. Kiel Smith and the urologist Dr. Ochoa         I have reviewed the past medical, family and social history    Review of Systems   Constitutional: Negative for appetite change, diaphoresis, fatigue, fever and unexpected weight change.   HENT: Negative for sore  throat and trouble swallowing.    Eyes: Negative for visual disturbance.   Respiratory: Negative for shortness of breath and wheezing.    Cardiovascular: Negative for chest pain, palpitations and leg swelling.   Gastrointestinal: Negative for abdominal pain, diarrhea, nausea and vomiting.   Endocrine: Negative for cold intolerance, heat intolerance, polydipsia, polyphagia and polyuria.   Genitourinary: Negative for difficulty urinating and dysuria.   Musculoskeletal: Negative for arthralgias and joint swelling.   Skin: Negative for color change and rash.   Neurological: Negative for dizziness, tremors, numbness and headaches.   Hematological: Negative for adenopathy.   Psychiatric/Behavioral: Positive for dysphoric mood. Negative for confusion and sleep disturbance. The patient is not nervous/anxious.        Objective:      Physical Exam   Constitutional: He appears well-developed and well-nourished.   HENT:   Head: Normocephalic and atraumatic.   Eyes: Conjunctivae are normal.   Neck: No tracheal deviation present. No thyromegaly present.   Musculoskeletal: He exhibits no edema.   Lymphadenopathy:     He has no cervical adenopathy.   Neurological: He is alert. He displays no tremor.   Skin: Skin is warm and dry. No rash noted. No erythema.   Psychiatric: His speech is normal. His mood appears not anxious.   Dysphoric mood and flat affect         Lab Review:   Lab Visit on 01/30/2020   Component Date Value    Hemoglobin A1C 01/30/2020 5.8*    Estimated Avg Glucose 01/30/2020 120     WBC 01/30/2020 3.46*    RBC 01/30/2020 4.35*    Hemoglobin 01/30/2020 12.7*    Hematocrit 01/30/2020 40.3     Mean Corpuscular Volume 01/30/2020 93     Mean Corpuscular Hemoglo* 01/30/2020 29.2     Mean Corpuscular Hemoglo* 01/30/2020 31.5*    RDW 01/30/2020 13.0     Platelets 01/30/2020 215     MPV 01/30/2020 11.0     Immature Granulocytes 01/30/2020 0.3     Gran # (ANC) 01/30/2020 1.7*    Immature Grans (Abs) 01/30/2020  0.01     Lymph # 01/30/2020 1.1     Mono # 01/30/2020 0.3     Eos # 01/30/2020 0.3     Baso # 01/30/2020 0.05     nRBC 01/30/2020 0     Gran% 01/30/2020 48.9     Lymph% 01/30/2020 32.4     Mono% 01/30/2020 9.8     Eosinophil% 01/30/2020 7.2     Basophil% 01/30/2020 1.4     Differential Method 01/30/2020 Automated    Lab Visit on 01/17/2020   Component Date Value    PSA DIAGNOSTIC 01/17/2020 0.60    Clinical Support on 12/02/2019   Component Date Value    Interpretation 12/02/2019 Baseline spirometry is suggestive of a mild reversible expiratory airflow obstruction.   Compared to previous test of December 20, 2018; there has been a significant decline in spirometry. Clinical correlation is recommended.     Post FVC 12/02/2019 3.47     Post FEV1 12/02/2019 2.65     Post FEV1 FVC 12/02/2019 76.16     Post FEF 25 75 12/02/2019 2.25     Post PEF 12/02/2019 6.44     Post  12/02/2019 6.88     Pre FVC 12/02/2019 3.29     Pre FEV1 12/02/2019 2.22     Pre FEV1 FVC 12/02/2019 67.54     Pre FEF 25 75 12/02/2019 1.32     Pre PEF 12/02/2019 6.47     Pre  12/02/2019 7.15     FVC Ref 12/02/2019 3.85     FVC LLN 12/02/2019 2.78     FVC Pre Ref 12/02/2019 85.4     FVC Post Ref 12/02/2019 90.2     FVC Chg 12/02/2019 5.6     FEV1 Ref 12/02/2019 2.93     FEV1 LLN 12/02/2019 2.02     FEV1 Pre Ref 12/02/2019 75.9     FEV1 Post Ref 12/02/2019 90.4     FEV1 Chg 12/02/2019 19.0     FEV1 FVC Ref 12/02/2019 76     FEV1 FVC LLN 12/02/2019 63     FEV1 FVC Pre Ref 12/02/2019 88.6     FEV1 FVC Post Ref 12/02/2019 99.9     FEV1 FVC Chg 12/02/2019 12.8     FEF 25 75 Ref 12/02/2019 2.24     FEF 25 75 LLN 12/02/2019 0.81     FEF 25 75 Pre Ref 12/02/2019 58.8     FEF 25 75 Post Ref 12/02/2019 100.6     FEF 25 75 Chg 12/02/2019 71.0     PEF Ref 12/02/2019 8.56     PEF N 12/02/2019 5.75     PEF Pre Ref 12/02/2019 75.5     PEF Post Ref 12/02/2019 75.2     PEF Chg 12/02/2019 -0.5     RED488  Chg 12/02/2019 -3.8    Lab Visit on 11/05/2019   Component Date Value    Total Protein 11/05/2019 7.4     Albumin 11/05/2019 3.6     Total Bilirubin 11/05/2019 0.6     Bilirubin, Direct 11/05/2019 0.3     AST 11/05/2019 12     ALT 11/05/2019 16     Alkaline Phosphatase 11/05/2019 72     TSH 11/05/2019 0.640     Thyroid-Stim IG [facility]ita* 11/05/2019 1.24*    Free T4 11/05/2019 0.86    Office Visit on 09/25/2019   Component Date Value    Color, UA 09/25/2019 yellow     Spec Grav UA 09/25/2019 1.020     pH, UA 09/25/2019 5     WBC, UA 09/25/2019 neg     Nitrite, UA 09/25/2019 neg     Protein 09/25/2019 trace     Glucose, UA 09/25/2019 neg     Ketones, UA 09/25/2019 neg     Urobilinogen, UA 09/25/2019 neg     Bilirubin 09/25/2019 neg     Blood, UA 09/25/2019 250    Admission on 09/10/2019, Discharged on 09/10/2019   Component Date Value    POCT Glucose 09/10/2019 95     POCT Glucose 09/10/2019 86     POCT Glucose 09/10/2019 70        Assessment:     1. Hyperthyroidism  TSH    T4, free   2. Depression, unspecified depression type      patient stop taking his medication a month ago, emphasized the importance of compliance with medication to avoid complications of hyperthyroidism including arrhythmia which can lead to hospitalization and bone loss, he seems depressed, denies suicidal ideation, he states he has an upcoming appointment with his counselor, I will refill his methimazole with 90 day tablets, he assured me he will start back on the medication, will document his blood work now though which I expect to show some hyperthyroidism possibly  Plan:   Hyperthyroidism  -     TSH; Future; Expected date: 03/05/2020  -     T4, free; Future; Expected date: 03/05/2020    Depression, unspecified depression type    Other orders  -     methIMAzole (TAPAZOLE) 5 MG Tab; Take 1 tablet (5 mg total) by mouth once daily.  Dispense: 90 tablet; Refill: 2          Follow up in about 4 months (around 7/5/2020) for  hyperthyroidism.    Labs prior to appointment? not applicable     Disclaimer:  This note may have been partially prepared using voice recognition software and  it may have not been extensively proofed, as such there could be errors within the text such as sound alike errors.

## 2020-03-06 LAB
T4 FREE SERPL-MCNC: 0.99 NG/DL (ref 0.71–1.51)
TSH SERPL DL<=0.005 MIU/L-ACNC: 0.92 UIU/ML (ref 0.4–4)

## 2020-03-19 ENCOUNTER — TELEPHONE (OUTPATIENT)
Dept: ADMINISTRATIVE | Facility: HOSPITAL | Age: 70
End: 2020-03-19

## 2020-03-19 NOTE — TELEPHONE ENCOUNTER
Contacted patient to reschedule Annual visit with Dr Carter Paris. Left voicemail for patient to call back to reschedule appointment due to provider not being in the office on 03/26/2020 .PDaughmelissa

## 2020-03-20 ENCOUNTER — TELEPHONE (OUTPATIENT)
Dept: ADMINISTRATIVE | Facility: HOSPITAL | Age: 70
End: 2020-03-20

## 2020-03-20 NOTE — TELEPHONE ENCOUNTER
Contacted patient to reschedule visit with Dr Carter Paris on 03/26/2020. Left voicemail for patient to call back to reschedule appointment.PDaugherty

## 2020-03-25 ENCOUNTER — LAB VISIT (OUTPATIENT)
Dept: LAB | Facility: HOSPITAL | Age: 70
End: 2020-03-25
Attending: UROLOGY
Payer: MEDICARE

## 2020-03-25 DIAGNOSIS — C61 PROSTATE CANCER: ICD-10-CM

## 2020-03-25 LAB — COMPLEXED PSA SERPL-MCNC: 0.52 NG/ML (ref 0–4)

## 2020-03-25 PROCEDURE — 84153 ASSAY OF PSA TOTAL: CPT | Mod: HCNC

## 2020-03-25 PROCEDURE — 36415 COLL VENOUS BLD VENIPUNCTURE: CPT | Mod: HCNC

## 2020-04-27 RX ORDER — METHIMAZOLE 5 MG/1
5 TABLET ORAL DAILY
Qty: 90 TABLET | Refills: 2 | Status: CANCELLED | OUTPATIENT
Start: 2020-04-27

## 2020-04-27 RX ORDER — AMLODIPINE BESYLATE 2.5 MG/1
2.5 TABLET ORAL DAILY
Qty: 90 TABLET | Refills: 4 | Status: CANCELLED | OUTPATIENT
Start: 2020-04-27

## 2020-04-27 RX ORDER — OXCARBAZEPINE 150 MG/1
150 TABLET, FILM COATED ORAL 2 TIMES DAILY
Status: CANCELLED | OUTPATIENT
Start: 2020-04-27

## 2020-04-27 RX ORDER — MIRTAZAPINE 7.5 MG/1
7.5 TABLET, FILM COATED ORAL NIGHTLY
Refills: 3 | Status: CANCELLED | OUTPATIENT
Start: 2020-04-27

## 2020-04-27 RX ORDER — BUPROPION HYDROCHLORIDE 150 MG/1
150 TABLET, EXTENDED RELEASE ORAL 2 TIMES DAILY
Status: CANCELLED | OUTPATIENT
Start: 2020-04-27

## 2020-04-27 RX ORDER — HYDROCHLOROTHIAZIDE 25 MG/1
25 TABLET ORAL DAILY
Qty: 90 TABLET | Refills: 4 | Status: CANCELLED | OUTPATIENT
Start: 2020-04-27

## 2020-04-27 RX ORDER — LOSARTAN POTASSIUM 100 MG/1
100 TABLET ORAL DAILY
Qty: 90 TABLET | Refills: 4 | Status: CANCELLED | OUTPATIENT
Start: 2020-04-27 | End: 2021-04-27

## 2020-04-27 NOTE — TELEPHONE ENCOUNTER
Please let him know trileptal and other psychiatric meds should be refilled by his psychiatrist  The methimazole via dr story  Please clarify if on amlod 2.5 mg or 5 mg. Both are on medcard?

## 2020-04-28 NOTE — TELEPHONE ENCOUNTER
I tried to reach the pt regarding his recent Rx refill requests due to needing to verify medication dosage per  . There was no answer so, I left a vm asking that he call back. //kah

## 2020-04-29 NOTE — TELEPHONE ENCOUNTER
I called again to verify medication dosage there was no answer . I left a vm asking that he contact staff. //kah

## 2020-04-30 DIAGNOSIS — E78.00 HYPERCHOLESTEREMIA: ICD-10-CM

## 2020-04-30 RX ORDER — TAMSULOSIN HYDROCHLORIDE 0.4 MG/1
0.4 CAPSULE ORAL DAILY
Qty: 30 CAPSULE | Refills: 1 | OUTPATIENT
Start: 2020-04-30 | End: 2021-04-30

## 2020-04-30 RX ORDER — BUPROPION HYDROCHLORIDE 150 MG/1
150 TABLET, EXTENDED RELEASE ORAL 2 TIMES DAILY
OUTPATIENT
Start: 2020-04-30

## 2020-04-30 RX ORDER — MIRTAZAPINE 7.5 MG/1
7.5 TABLET, FILM COATED ORAL NIGHTLY
Refills: 3 | OUTPATIENT
Start: 2020-04-30

## 2020-04-30 RX ORDER — PRAVASTATIN SODIUM 40 MG/1
40 TABLET ORAL DAILY
Qty: 30 TABLET | Refills: 11 | Status: SHIPPED | OUTPATIENT
Start: 2020-04-30 | End: 2021-07-01 | Stop reason: SDUPTHER

## 2020-04-30 RX ORDER — HYDROCHLOROTHIAZIDE 25 MG/1
25 TABLET ORAL DAILY
Qty: 90 TABLET | Refills: 4 | Status: SHIPPED | OUTPATIENT
Start: 2020-04-30 | End: 2021-05-20 | Stop reason: SDUPTHER

## 2020-04-30 RX ORDER — METHIMAZOLE 5 MG/1
5 TABLET ORAL DAILY
Qty: 90 TABLET | Refills: 2 | OUTPATIENT
Start: 2020-04-30

## 2020-04-30 RX ORDER — SILDENAFIL 100 MG/1
100 TABLET, FILM COATED ORAL
OUTPATIENT
Start: 2020-04-30

## 2020-04-30 RX ORDER — AMLODIPINE BESYLATE 5 MG/1
5 TABLET ORAL DAILY
Qty: 90 TABLET | Refills: 4 | Status: SHIPPED | OUTPATIENT
Start: 2020-04-30 | End: 2021-05-21

## 2020-04-30 RX ORDER — POLYETHYLENE GLYCOL 3350 17 G/17G
POWDER, FOR SOLUTION ORAL
OUTPATIENT
Start: 2020-04-30

## 2020-04-30 RX ORDER — LOSARTAN POTASSIUM 100 MG/1
100 TABLET ORAL DAILY
Qty: 90 TABLET | Refills: 4 | Status: SHIPPED | OUTPATIENT
Start: 2020-04-30 | End: 2021-05-20 | Stop reason: SDUPTHER

## 2020-04-30 RX ORDER — OXYCODONE AND ACETAMINOPHEN 5; 325 MG/1; MG/1
1-2 TABLET ORAL EVERY 4 HOURS PRN
Qty: 30 TABLET | Refills: 0 | OUTPATIENT
Start: 2020-04-30

## 2020-04-30 RX ORDER — TADALAFIL 20 MG/1
TABLET ORAL
Qty: 10 TABLET | Refills: 11 | OUTPATIENT
Start: 2020-04-30 | End: 2022-08-06

## 2020-04-30 NOTE — TELEPHONE ENCOUNTER
----- Message from Cassandra Alfaro sent at 4/30/2020  1:38 PM CDT -----  Contact: pt  Returning a call to Alyssa

## 2020-05-01 RX ORDER — BUPROPION HYDROCHLORIDE 300 MG/1
TABLET ORAL
COMMUNITY
Start: 2020-04-24 | End: 2023-10-26

## 2020-05-17 ENCOUNTER — PATIENT OUTREACH (OUTPATIENT)
Dept: ADMINISTRATIVE | Facility: OTHER | Age: 70
End: 2020-05-17

## 2020-05-18 ENCOUNTER — OFFICE VISIT (OUTPATIENT)
Dept: INTERNAL MEDICINE | Facility: CLINIC | Age: 70
End: 2020-05-18
Payer: MEDICARE

## 2020-05-18 ENCOUNTER — OFFICE VISIT (OUTPATIENT)
Dept: OPHTHALMOLOGY | Facility: CLINIC | Age: 70
End: 2020-05-18
Payer: MEDICARE

## 2020-05-18 VITALS
WEIGHT: 185.19 LBS | BODY MASS INDEX: 24.54 KG/M2 | TEMPERATURE: 98 F | OXYGEN SATURATION: 95 % | HEART RATE: 74 BPM | HEIGHT: 73 IN | DIASTOLIC BLOOD PRESSURE: 66 MMHG | RESPIRATION RATE: 16 BRPM | SYSTOLIC BLOOD PRESSURE: 106 MMHG

## 2020-05-18 DIAGNOSIS — I10 ESSENTIAL HYPERTENSION: ICD-10-CM

## 2020-05-18 DIAGNOSIS — H26.9 CORTICAL CATARACT OF BOTH EYES: ICD-10-CM

## 2020-05-18 DIAGNOSIS — E11.8 TYPE 2 DIABETES MELLITUS WITH COMPLICATION: ICD-10-CM

## 2020-05-18 DIAGNOSIS — H25.13 NUCLEAR SCLEROSIS, BILATERAL: Primary | ICD-10-CM

## 2020-05-18 DIAGNOSIS — H02.402 PTOSIS OF EYELID, LEFT: ICD-10-CM

## 2020-05-18 DIAGNOSIS — R73.03 BORDERLINE DIABETES: ICD-10-CM

## 2020-05-18 DIAGNOSIS — F41.9 ANXIETY AND DEPRESSION: ICD-10-CM

## 2020-05-18 DIAGNOSIS — F32.2 CURRENT SEVERE EPISODE OF MAJOR DEPRESSIVE DISORDER WITHOUT PSYCHOTIC FEATURES WITHOUT PRIOR EPISODE: ICD-10-CM

## 2020-05-18 DIAGNOSIS — I77.9 AORTIC DISEASE: ICD-10-CM

## 2020-05-18 DIAGNOSIS — C61 PROSTATE CANCER: Primary | ICD-10-CM

## 2020-05-18 DIAGNOSIS — E05.90 HYPERTHYROIDISM: ICD-10-CM

## 2020-05-18 DIAGNOSIS — F32.A ANXIETY AND DEPRESSION: ICD-10-CM

## 2020-05-18 DIAGNOSIS — Z86.2 HISTORY OF ANEMIA: ICD-10-CM

## 2020-05-18 PROBLEM — I77.819 AORTIC ECTASIA: Status: RESOLVED | Noted: 2019-07-15 | Resolved: 2020-05-18

## 2020-05-18 PROBLEM — K64.8 INTERNAL HEMORRHOID, BLEEDING: Status: RESOLVED | Noted: 2018-04-30 | Resolved: 2020-05-18

## 2020-05-18 PROBLEM — D50.0 IRON DEFICIENCY ANEMIA DUE TO CHRONIC BLOOD LOSS: Status: RESOLVED | Noted: 2018-04-30 | Resolved: 2020-05-18

## 2020-05-18 PROCEDURE — 99214 PR OFFICE/OUTPT VISIT, EST, LEVL IV, 30-39 MIN: ICD-10-PCS | Mod: S$PBB,HCNC,, | Performed by: FAMILY MEDICINE

## 2020-05-18 PROCEDURE — 92014 COMPRE OPH EXAM EST PT 1/>: CPT | Mod: HCNC,S$GLB,, | Performed by: OPHTHALMOLOGY

## 2020-05-18 PROCEDURE — 99999 PR PBB SHADOW E&M-EST. PATIENT-LVL II: CPT | Mod: PBBFAC,HCNC,, | Performed by: OPHTHALMOLOGY

## 2020-05-18 PROCEDURE — 99499 UNLISTED E&M SERVICE: CPT | Mod: S$GLB,,, | Performed by: FAMILY MEDICINE

## 2020-05-18 PROCEDURE — 99999 PR PBB SHADOW E&M-EST. PATIENT-LVL III: ICD-10-PCS | Mod: PBBFAC,HCNC,, | Performed by: FAMILY MEDICINE

## 2020-05-18 PROCEDURE — 92014 PR EYE EXAM, EST PATIENT,COMPREHESV: ICD-10-PCS | Mod: HCNC,S$GLB,, | Performed by: OPHTHALMOLOGY

## 2020-05-18 PROCEDURE — 99214 OFFICE O/P EST MOD 30 MIN: CPT | Mod: S$PBB,HCNC,, | Performed by: FAMILY MEDICINE

## 2020-05-18 PROCEDURE — 99999 PR PBB SHADOW E&M-EST. PATIENT-LVL II: ICD-10-PCS | Mod: PBBFAC,HCNC,, | Performed by: OPHTHALMOLOGY

## 2020-05-18 PROCEDURE — 99499 RISK ADDL DX/OHS AUDIT: ICD-10-PCS | Mod: S$GLB,,, | Performed by: FAMILY MEDICINE

## 2020-05-18 PROCEDURE — 99999 PR PBB SHADOW E&M-EST. PATIENT-LVL III: CPT | Mod: PBBFAC,HCNC,, | Performed by: FAMILY MEDICINE

## 2020-05-18 NOTE — PROGRESS NOTES
Subjective:       Patient ID: Edin Green is a . male.    Chief Complaint: Multiple issues see below    HPI Multiple issues see below\        type 2 diabetes mellitus discussed again; stable        Depression anxiety : seeing psychiatrist; dr garcia; mood ok per him        Graves utd dr story tsh ok    He is holding off f/u neurol for poss parkinsons/dementia. [place] to be mainly psychiatric related . See prior notes    Hypertension: blood pressure low normal. No home bps.  No orthostasis sympt    Prost Ca recent dx via dr phelan. Had appt dr wheeler to discuss xrt and f/u planned as he /wife discuss options    No c/o cp palpitations. holter stress test ok x see below; no palpit while on monitor    Echo saw poss aort base dilat. cta ok x small nodule        Review of Systems    Chest: no shortness of breath  Abd: no abd pain  Remainder review of systems negative    Objective:     Physical Exam   Constitutional: He is oriented to person, place, and time. He appears well-developed and well-nourished.   Eyes: Pupils are equal, round, and reactive to light. Conjunctivae and EOM are normal. No scleral icterus.   Neck: Normal range of motion. Neck supple. Carotid bruit is not present.   Cardiovascular: Normal rate, regular rhythm and normal heart sounds. Exam reveals no gallop and no friction rub.   No murmur heard.  Pulmonary/Chest: Effort normal and breath sounds normal. He has no wheezes.      Neurological: He is alert and oriented to person, place, and time. He displays normal reflexes. No cranial nerve deficit. He exhibits normal muscle tone. Coordination normal.   Skin: Skin is warm and dry. No rash noted. No erythema.   Psychiatric: He has a normal mood and affect. His behavior is normal. Judgment and thought content normal.   Nursing note and vitals reviewed.      Assessment:       1. Type 2 diabetes mellitus without complication, without long-term current use of insulin    2.    3. Hyperthyroidism    4.  Hypogonadism in male    5. Graves disease    6. Essential hypertension    7. Depression, unspecified depression type              Prost cancer  Plan:     Resume pravastatin  **F/u dr story when due          F/u urol /rad onc whn due    Retry digital dm htn      Ct chest one year (sched July)    Lab and follow up after in 6 months  If recur chest pain ntify plan card      Note : he is interested in decreasing meds. D/wd wouldn't start with d/c pravastatin which he did (no side eff) ; resume statin. Consider decrease bp med and speak to psych about decreasing some of his meds

## 2020-05-18 NOTE — PROGRESS NOTES
SUBJECTIVE  Edin Green is 69 y.o. male  Corrected distance visual acuity was 20/25 +2 in the right eye and 20/25 +2 in the left eye.   Chief Complaint   Patient presents with    Cataract     1 year eye exam          HPI     Cataract      Additional comments: 1 year eye exam              Comments     Pt states over the past few months his eyes have been watering, not using   any tears. Wasn't sure if one of his medications, methimazole, is causing   the watering. No ocular pain. Updated his glasses last year but he still   have trouble seeing the small words on tv.     1. Hypertensive Retinopathy  2. Ns ou  3. RE  4. Borderline dm          Last edited by Noble Du on 5/18/2020 10:55 AM. (History)         Assessment /Plan :  1. Nuclear sclerosis, bilateral monitor for now   2. Cortical cataract of both eyes monitor for now   3. Ptosis of eyelid, left monitor for now   4. Borderline diabetes No diabetic retinopathy at this time. Reviewed diabetic eye precautions including avoiding tobacco use,  Good glucose control, and importance of regular follow up.        RTC in 1 year or prn any changes

## 2020-06-09 ENCOUNTER — OFFICE VISIT (OUTPATIENT)
Dept: PODIATRY | Facility: CLINIC | Age: 70
End: 2020-06-09
Payer: MEDICARE

## 2020-06-09 VITALS
HEIGHT: 73 IN | DIASTOLIC BLOOD PRESSURE: 74 MMHG | BODY MASS INDEX: 24.54 KG/M2 | RESPIRATION RATE: 17 BRPM | SYSTOLIC BLOOD PRESSURE: 133 MMHG | WEIGHT: 185.19 LBS | HEART RATE: 85 BPM

## 2020-06-09 DIAGNOSIS — E11.8 TYPE 2 DIABETES MELLITUS WITH COMPLICATION: Primary | ICD-10-CM

## 2020-06-09 DIAGNOSIS — B35.1 ONYCHOMYCOSIS: ICD-10-CM

## 2020-06-09 PROCEDURE — 99499 UNLISTED E&M SERVICE: CPT | Mod: S$GLB,,, | Performed by: PODIATRIST

## 2020-06-09 PROCEDURE — 99999 PR PBB SHADOW E&M-EST. PATIENT-LVL III: CPT | Mod: PBBFAC,,, | Performed by: PODIATRIST

## 2020-06-09 PROCEDURE — 17999 UNLISTD PX SKN MUC MEMB SUBQ: CPT | Mod: CSM,,, | Performed by: PODIATRIST

## 2020-06-09 PROCEDURE — 99999 PR PBB SHADOW E&M-EST. PATIENT-LVL III: ICD-10-PCS | Mod: PBBFAC,,, | Performed by: PODIATRIST

## 2020-06-09 PROCEDURE — 99499 UNLISTED E&M SERVICE: CPT | Mod: ,,, | Performed by: PODIATRIST

## 2020-06-09 PROCEDURE — 99499 RISK ADDL DX/OHS AUDIT: ICD-10-PCS | Mod: S$GLB,,, | Performed by: PODIATRIST

## 2020-06-09 PROCEDURE — 17999 PR NON-COVERED FOOT CARE: ICD-10-PCS | Mod: CSM,,, | Performed by: PODIATRIST

## 2020-06-09 PROCEDURE — 99213 OFFICE O/P EST LOW 20 MIN: CPT | Mod: PBBFAC | Performed by: PODIATRIST

## 2020-06-09 NOTE — PROGRESS NOTES
Subjective:       Patient ID: Edin Green is a 69 y.o. male.    Chief Complaint: Routine Foot Care (nail cut down, wears kimmie marcus, PCP Dr. Corrales)      HPI: Edin Green presents to the office today for non-covered foot care; trimming/debridement of elongated/dystrophic/onychomycotic nails and/or debridement/paring of calluses. Patient is aDMII w/o peripheral angiopathy or peripheral vascular disease.  Patient does not have arterial insufficiency of the lower extremity or idiopathic peripheral neuropathy. Carter Paris MD is this patient's primary care provider.    Hemoglobin A1C   Date Value Ref Range Status   01/30/2020 5.8 (H) 4.0 - 5.6 % Final     Comment:     ADA Screening Guidelines:  5.7-6.4%  Consistent with prediabetes  >or=6.5%  Consistent with diabetes  High levels of fetal hemoglobin interfere with the HbA1C  assay. Heterozygous hemoglobin variants (HbS, HgC, etc)do  not significantly interfere with this assay.   However, presence of multiple variants may affect accuracy.     07/08/2019 5.7 (H) 4.0 - 5.6 % Final     Comment:     ADA Screening Guidelines:  5.7-6.4%  Consistent with prediabetes  >or=6.5%  Consistent with diabetes  High levels of fetal hemoglobin interfere with the HbA1C  assay. Heterozygous hemoglobin variants (HbS, HgC, etc)do  not significantly interfere with this assay.   However, presence of multiple variants may affect accuracy.     03/06/2019 6.8 (H) 4.0 - 5.6 % Final     Comment:     ADA Screening Guidelines:  5.7-6.4%  Consistent with prediabetes  >or=6.5%  Consistent with diabetes  High levels of fetal hemoglobin interfere with the HbA1C  assay. Heterozygous hemoglobin variants (HbS, HgC, etc)do  not significantly interfere with this assay.   However, presence of multiple variants may affect accuracy.     .    Review of patient's allergies indicates:   Allergen Reactions    Celebrex [celecoxib]      Lip swelling      Shrimp Itching     States reaction  is with frozen shrimp    Tomato Itching    Venom-wasp Swelling       Past Medical History:   Diagnosis Date    Anemia     Anxiety     Asthma     Benign hematuria     Cataract OU    Colon polyp     dr ahumadareen    Depression     dr garcia psychiatrist in     ED (erectile dysfunction)     Graves disease     story    HTN (hypertension)     Hypercholesteremia     Hypertensive retinopathy of both eyes     Hypogonadism     LAURENCE (obstructive sleep apnea)     Pneumonia     Prostate cancer 7/15/2019    Trouble in sleeping     Type 2 diabetes mellitus        Family History   Problem Relation Age of Onset    Hypertension Unknown     Heart defect Mother     Strabismus Neg Hx     Retinal detachment Neg Hx     Macular degeneration Neg Hx     Glaucoma Neg Hx     Blindness Neg Hx     Amblyopia Neg Hx        Social History     Socioeconomic History    Marital status:      Spouse name: CHUCHO    Number of children: 3    Years of education: Not on file    Highest education level: Not on file   Occupational History    Not on file   Social Needs    Financial resource strain: Not on file    Food insecurity:     Worry: Not on file     Inability: Not on file    Transportation needs:     Medical: Not on file     Non-medical: Not on file   Tobacco Use    Smoking status: Never Smoker    Smokeless tobacco: Never Used   Substance and Sexual Activity    Alcohol use: Yes     Alcohol/week: 1.0 standard drinks     Types: 1 Cans of beer per week     Comment: No alcohol 72h prior to sx    Drug use: No    Sexual activity: Yes     Partners: Female   Lifestyle    Physical activity:     Days per week: Not on file     Minutes per session: Not on file    Stress: Not on file   Relationships    Social connections:     Talks on phone: Not on file     Gets together: Not on file     Attends Rastafarian service: Not on file     Active member of club or organization: Not on file     Attends meetings of clubs or  "organizations: Not on file     Relationship status: Not on file   Other Topics Concern    Not on file   Social History Narrative    Wears a seatbelt.       Past Surgical History:   Procedure Laterality Date    BRACHYTHERAPY N/A 9/10/2019    Procedure: BRACHYTHERAPY;  Surgeon: Kiel Ochoa IV, MD;  Location: [facility];  Service: Urology;  Laterality: N/A;    RADIOACTIVE SEED IMPLANTATION OF PROSTATE N/A 9/10/2019    Procedure: INSERTION, RADIOACTIVE SEED, PROSTATE;  Surgeon: Kiel Ochoa IV, MD;  Location: [facility] OR;  Service: Urology;  Laterality: N/A;    ULTRASOUND GUIDANCE N/A 9/10/2019    Procedure: ULTRASOUND GUIDANCE;  Surgeon: Kiel Ochoa IV, MD;  Location: [facility] OR;  Service: Urology;  Laterality: N/A;       Review of Systems   Constitutional: Negative for chills, fatigue and fever.   HENT: Negative for hearing loss.    Eyes: Negative for photophobia and visual disturbance.   Respiratory: Negative for cough, chest tightness, shortness of breath and wheezing.    Cardiovascular: Negative for chest pain, palpitations and leg swelling.   Gastrointestinal: Negative for constipation, diarrhea, nausea and vomiting.   Endocrine: Negative for cold intolerance and heat intolerance.   Genitourinary: Negative for flank pain.   Musculoskeletal: Negative for neck pain and neck stiffness.   Skin: Negative for wound.   Neurological: Negative for light-headedness, numbness and headaches.   Psychiatric/Behavioral: Negative for sleep disturbance.          Objective:   /74   Pulse 85   Resp 17   Ht 6' 1" (1.854 m)   Wt 84 kg (185 lb 3 oz)   BMI 24.43 kg/m²     Physical Exam  LOWER EXTREMITY PHYSICAL EXAMINATION    NEUROLOGY: Protective sensation is intact via 5.07 [place] Kamran monofilament. Proprioception is intact. Sensation to light touch is intact.     VASCULAR: On the left and right foot, the dorsalis pedis pulse is 1/4 and the posterior tibial pulse is 1/4. Capillary refill time is less than 3 " seconds. Hair growth is noted on the dorsum of the foot and at the digits. No rubor is present. Proximal to distal temperature is warm to warm.     DERMATOLOGY: There are nail changes which are consistent with onychomycosis on the left and right foot. On the left, nails 1, 2, 3, 4 and 5 are thickened, are dystrophic, with yellow discoloration, and with malodorous subungual debris. On the right, nails 1, 2, 3, 4 and 5 are thickened, are dystrophic, with yellow discoloration, and with malodorous subungual debris. These thickened and dystrophic nails are painful to touch and palpation. The remaining nail plates are elongated, and are not suggestive of onychomycosis.  Moderate xerosis is noted.    Assessment:     1. Type 2 diabetes mellitus with complication    2. Onychomycosis        Plan:     Type 2 diabetes mellitus with complication    Onychomycosis      The onychomycotic nail plates, as outlined above, are sharply debrided with double action nail nipper, and/or with the assistance of a mechanical rotary wili, with removal of all offending nail and nail border(s), for reduction of pains. Nails are reduced in terms of length, width and girth with removal of subungual debris to facilitate pain free weight bearing and ambulation. The elongated nails as outlined in the objective portion of this note, were trimmed to appropriate length, with a double action nail nipper, for alleviation/reduction of pains as well. Follow up in approx. 3-4 months.    Patient does not meet the qualifications for, or have the class findings necessary for routine foot care. There is no lack of or diminished sensation and he/she has no significant findings of PVD to the B/L LE. At any follow-up appointment concerning routine foot care, patient will be PROC-B, and will be required to pay for services.  This fact is explained to the patient and/or any family who is present at today's appt.          Future Appointments   Date Time Provider  Department Center   7/13/2020  1:00 PM Gabi Harmon MD [facility] ENDO [facility]   7/30/2020  1:10 PM [facility] CT1 LIMIT 500 LBS [facility] CT SCAN [facility]   8/17/2020  1:00 PM Joby Quiroz III, MD [facility] RAD ONC [facility]   11/23/2020  7:40 AM LABORATORY, [facility] LAB [facility]   11/23/2020  7:40 AM SPECIMEN, [facility] SPECLAB [facility]   11/30/2020 11:40 AM Carter Paris MD [facility] IM [facility]   12/9/2020  1:30 PM Alonso Luis DPM [facility]LC POD [facility]   5/17/2021 10:30 AM [facility]

## 2020-07-09 ENCOUNTER — PATIENT OUTREACH (OUTPATIENT)
Dept: ADMINISTRATIVE | Facility: OTHER | Age: 70
End: 2020-07-09

## 2020-07-10 ENCOUNTER — TELEPHONE (OUTPATIENT)
Dept: ENDOCRINOLOGY | Facility: CLINIC | Age: 70
End: 2020-07-10

## 2020-07-10 ENCOUNTER — TELEPHONE (OUTPATIENT)
Dept: RADIATION ONCOLOGY | Facility: CLINIC | Age: 70
End: 2020-07-10

## 2020-07-13 ENCOUNTER — TELEPHONE (OUTPATIENT)
Dept: ENDOCRINOLOGY | Facility: CLINIC | Age: 70
End: 2020-07-13

## 2020-07-13 NOTE — TELEPHONE ENCOUNTER
Calling pt r/t Dr. Feliz reschedule no answer at either number left message at both  Also calling emergency contact number [phone]  Left message to return call

## 2020-07-30 ENCOUNTER — HOSPITAL ENCOUNTER (OUTPATIENT)
Dept: RADIOLOGY | Facility: HOSPITAL | Age: 70
Discharge: HOME | End: 2020-07-30
Attending: FAMILY MEDICINE
Payer: MEDICARE

## 2020-07-30 ENCOUNTER — TELEPHONE (OUTPATIENT)
Dept: INTERNAL MEDICINE | Facility: CLINIC | Age: 70
End: 2020-07-30

## 2020-07-30 DIAGNOSIS — R91.1 LUNG NODULE: ICD-10-CM

## 2020-07-30 PROCEDURE — 71250 CT THORAX DX C-: CPT | Mod: TC,HCNC

## 2020-07-30 PROCEDURE — 71250 CT CHEST WITHOUT CONTRAST: ICD-10-PCS | Mod: 26,HCNC,, | Performed by: RADIOLOGY

## 2020-07-30 PROCEDURE — 71250 CT THORAX DX C-: CPT | Mod: 26,HCNC,, | Performed by: RADIOLOGY

## 2020-08-20 ENCOUNTER — LAB VISIT (OUTPATIENT)
Dept: LAB | Facility: HOSPITAL | Age: 70
End: 2020-08-20
Attending: RADIOLOGY
Payer: MEDICARE

## 2020-08-20 ENCOUNTER — OFFICE VISIT (OUTPATIENT)
Dept: RADIATION ONCOLOGY | Facility: CLINIC | Age: 70
End: 2020-08-20
Payer: MEDICARE

## 2020-08-20 VITALS
SYSTOLIC BLOOD PRESSURE: 116 MMHG | BODY MASS INDEX: 25.07 KG/M2 | DIASTOLIC BLOOD PRESSURE: 75 MMHG | WEIGHT: 195.38 LBS | HEIGHT: 74 IN | HEART RATE: 76 BPM | OXYGEN SATURATION: 97 % | TEMPERATURE: 97 F | RESPIRATION RATE: 18 BRPM

## 2020-08-20 DIAGNOSIS — C61 PROSTATE CANCER: Primary | ICD-10-CM

## 2020-08-20 DIAGNOSIS — C61 PROSTATE CANCER: ICD-10-CM

## 2020-08-20 PROCEDURE — 99999 PR PBB SHADOW E&M-EST. PATIENT-LVL IV: CPT | Mod: PBBFAC,HCNC,, | Performed by: RADIOLOGY

## 2020-08-20 PROCEDURE — 99213 OFFICE O/P EST LOW 20 MIN: CPT | Mod: HCNC,S$GLB,, | Performed by: RADIOLOGY

## 2020-08-20 PROCEDURE — 84153 ASSAY OF PSA TOTAL: CPT | Mod: HCNC

## 2020-08-20 PROCEDURE — 99213 PR OFFICE/OUTPT VISIT, EST, LEVL III, 20-29 MIN: ICD-10-PCS | Mod: HCNC,S$GLB,, | Performed by: RADIOLOGY

## 2020-08-20 PROCEDURE — 1159F MED LIST DOCD IN RCRD: CPT | Mod: HCNC,S$GLB,, | Performed by: RADIOLOGY

## 2020-08-20 PROCEDURE — 1126F PR PAIN SEVERITY QUANTIFIED, NO PAIN PRESENT: ICD-10-PCS | Mod: HCNC,S$GLB,, | Performed by: RADIOLOGY

## 2020-08-20 PROCEDURE — 3008F PR BODY MASS INDEX (BMI) DOCUMENTED: ICD-10-PCS | Mod: HCNC,CPTII,S$GLB, | Performed by: RADIOLOGY

## 2020-08-20 PROCEDURE — 3074F SYST BP LT 130 MM HG: CPT | Mod: HCNC,CPTII,S$GLB, | Performed by: RADIOLOGY

## 2020-08-20 PROCEDURE — 1101F PT FALLS ASSESS-DOCD LE1/YR: CPT | Mod: HCNC,CPTII,S$GLB, | Performed by: RADIOLOGY

## 2020-08-20 PROCEDURE — 3074F PR MOST RECENT SYSTOLIC BLOOD PRESSURE < 130 MM HG: ICD-10-PCS | Mod: HCNC,CPTII,S$GLB, | Performed by: RADIOLOGY

## 2020-08-20 PROCEDURE — 3078F DIAST BP <80 MM HG: CPT | Mod: HCNC,CPTII,S$GLB, | Performed by: RADIOLOGY

## 2020-08-20 PROCEDURE — 1126F AMNT PAIN NOTED NONE PRSNT: CPT | Mod: HCNC,S$GLB,, | Performed by: RADIOLOGY

## 2020-08-20 PROCEDURE — 3008F BODY MASS INDEX DOCD: CPT | Mod: HCNC,CPTII,S$GLB, | Performed by: RADIOLOGY

## 2020-08-20 PROCEDURE — 3078F PR MOST RECENT DIASTOLIC BLOOD PRESSURE < 80 MM HG: ICD-10-PCS | Mod: HCNC,CPTII,S$GLB, | Performed by: RADIOLOGY

## 2020-08-20 PROCEDURE — 36415 COLL VENOUS BLD VENIPUNCTURE: CPT | Mod: HCNC

## 2020-08-20 PROCEDURE — 99999 PR PBB SHADOW E&M-EST. PATIENT-LVL IV: ICD-10-PCS | Mod: PBBFAC,HCNC,, | Performed by: RADIOLOGY

## 2020-08-20 PROCEDURE — 1101F PR PT FALLS ASSESS DOC 0-1 FALLS W/OUT INJ PAST YR: ICD-10-PCS | Mod: HCNC,CPTII,S$GLB, | Performed by: RADIOLOGY

## 2020-08-20 PROCEDURE — 1159F PR MEDICATION LIST DOCUMENTED IN MEDICAL RECORD: ICD-10-PCS | Mod: HCNC,S$GLB,, | Performed by: RADIOLOGY

## 2020-08-20 NOTE — PROGRESS NOTES
"[facility] [place]  RADIATION ONCOLOGY FOLLOW UP    Name: Edin Green : 1950     DIAGNOSIS:  Favorable intermediate risk prostate cancer cIIB: cT1c cN0 cM0, PSA 5.7, Slater 3+4, / cores  1. 3/18/19 PSA 5.7.   2. 19 prostate biopsy showed 32 cc gland.  He had Slater 3 + 4 involving 1 core at the left mid, 15%.  There was no perineural invasion.    3. 6/10/19 prostate MRI showed 28.7 cc volume.  This was PI-RADS 2.   4. 19 125 Gy definitive prostate palladium brachytherapy implant  5. PSA 0.6     CURRENT STATUS: Edin Green is a pleasant 69 y.o. male who presents today for follow-up.    Most recent PSA was 0.52 on 3/25/20  He denies any bowel problems.   He denies hematuria or dysuria.   His leakage is stable.  He is no longer taking any bladder medications.    PHYSICAL EXAMINATION:  Constitutional/Vitals: well appearing, no acute distress, ECOG 0 - Fully Active, /75   Pulse 76   Temp 97 °F (36.1 °C)   Resp 18   Ht 6' 2" (1.88 m)   Wt 88.6 kg (195 lb 6.4 oz)   SpO2 97%   BMI 25.09 kg/m²     IMAGING AND LABORATORY FINDINGS: As per HPI; images, labs and medical records reviewed personally in EPIC.    PSA   3/18/19 - 5.7  20 - 0.6  3/25/20 - 0.52    ASSESSMENT: no evidence of disease nearly 1 year from completion of brachytherapy    PLAN: Mr. Green has continued evidence of no PSA rise and good biochemical control. He should get back in with urology for continued follow up. Since he currently does not have an appointment scheduled with them, he can see me back in 6 months with PSA. It has been a while since last PSA, so we will order one today.    I spent approximately 15 minutes reviewing the available records and evaluating the patient, out of which over 50% of the time was spent face to face with the patient in counseling and coordinating this patient's care.    Joby Quiroz III  Radiation Oncology  [facility]  [id number] [place] [address] " [place] [address]  Ph: [phone]

## 2020-08-21 LAB — COMPLEXED PSA SERPL-MCNC: 0.26 NG/ML (ref 0–4)

## 2020-08-26 ENCOUNTER — PATIENT OUTREACH (OUTPATIENT)
Dept: ADMINISTRATIVE | Facility: OTHER | Age: 70
End: 2020-08-26

## 2020-08-26 NOTE — PROGRESS NOTES
Chart Reviewed  Care Everywhere updated  Immunizations reconciled  Health Maintenance updated  a1c scheduled for 11/23

## 2020-08-31 ENCOUNTER — LAB VISIT (OUTPATIENT)
Dept: LAB | Facility: HOSPITAL | Age: 70
End: 2020-08-31
Attending: INTERNAL MEDICINE
Payer: MEDICARE

## 2020-08-31 ENCOUNTER — OFFICE VISIT (OUTPATIENT)
Dept: ENDOCRINOLOGY | Facility: CLINIC | Age: 70
End: 2020-08-31
Payer: MEDICARE

## 2020-08-31 VITALS
HEART RATE: 73 BPM | WEIGHT: 194.88 LBS | HEIGHT: 74 IN | SYSTOLIC BLOOD PRESSURE: 126 MMHG | BODY MASS INDEX: 25.01 KG/M2 | TEMPERATURE: 98 F | DIASTOLIC BLOOD PRESSURE: 82 MMHG

## 2020-08-31 DIAGNOSIS — E05.90 HYPERTHYROIDISM: ICD-10-CM

## 2020-08-31 DIAGNOSIS — E05.90 HYPERTHYROIDISM: Primary | ICD-10-CM

## 2020-08-31 PROCEDURE — 99999 PR PBB SHADOW E&M-EST. PATIENT-LVL IV: CPT | Mod: PBBFAC,HCNC,, | Performed by: INTERNAL MEDICINE

## 2020-08-31 PROCEDURE — 1126F PR PAIN SEVERITY QUANTIFIED, NO PAIN PRESENT: ICD-10-PCS | Mod: HCNC,S$GLB,, | Performed by: INTERNAL MEDICINE

## 2020-08-31 PROCEDURE — 1126F AMNT PAIN NOTED NONE PRSNT: CPT | Mod: HCNC,S$GLB,, | Performed by: INTERNAL MEDICINE

## 2020-08-31 PROCEDURE — 1101F PR PT FALLS ASSESS DOC 0-1 FALLS W/OUT INJ PAST YR: ICD-10-PCS | Mod: HCNC,CPTII,S$GLB, | Performed by: INTERNAL MEDICINE

## 2020-08-31 PROCEDURE — 99499 RISK ADDL DX/OHS AUDIT: ICD-10-PCS | Mod: S$GLB,,, | Performed by: INTERNAL MEDICINE

## 2020-08-31 PROCEDURE — 3008F PR BODY MASS INDEX (BMI) DOCUMENTED: ICD-10-PCS | Mod: HCNC,CPTII,S$GLB, | Performed by: INTERNAL MEDICINE

## 2020-08-31 PROCEDURE — 3074F SYST BP LT 130 MM HG: CPT | Mod: HCNC,CPTII,S$GLB, | Performed by: INTERNAL MEDICINE

## 2020-08-31 PROCEDURE — 3074F PR MOST RECENT SYSTOLIC BLOOD PRESSURE < 130 MM HG: ICD-10-PCS | Mod: HCNC,CPTII,S$GLB, | Performed by: INTERNAL MEDICINE

## 2020-08-31 PROCEDURE — 84439 ASSAY OF FREE THYROXINE: CPT | Mod: HCNC

## 2020-08-31 PROCEDURE — 1159F PR MEDICATION LIST DOCUMENTED IN MEDICAL RECORD: ICD-10-PCS | Mod: HCNC,S$GLB,, | Performed by: INTERNAL MEDICINE

## 2020-08-31 PROCEDURE — 99499 UNLISTED E&M SERVICE: CPT | Mod: S$GLB,,, | Performed by: INTERNAL MEDICINE

## 2020-08-31 PROCEDURE — 99213 OFFICE O/P EST LOW 20 MIN: CPT | Mod: HCNC,S$GLB,, | Performed by: INTERNAL MEDICINE

## 2020-08-31 PROCEDURE — 85025 COMPLETE CBC W/AUTO DIFF WBC: CPT | Mod: HCNC

## 2020-08-31 PROCEDURE — 36415 COLL VENOUS BLD VENIPUNCTURE: CPT | Mod: HCNC

## 2020-08-31 PROCEDURE — 1101F PT FALLS ASSESS-DOCD LE1/YR: CPT | Mod: HCNC,CPTII,S$GLB, | Performed by: INTERNAL MEDICINE

## 2020-08-31 PROCEDURE — 80076 HEPATIC FUNCTION PANEL: CPT | Mod: HCNC

## 2020-08-31 PROCEDURE — 99213 PR OFFICE/OUTPT VISIT, EST, LEVL III, 20-29 MIN: ICD-10-PCS | Mod: HCNC,S$GLB,, | Performed by: INTERNAL MEDICINE

## 2020-08-31 PROCEDURE — 99999 PR PBB SHADOW E&M-EST. PATIENT-LVL IV: ICD-10-PCS | Mod: PBBFAC,HCNC,, | Performed by: INTERNAL MEDICINE

## 2020-08-31 PROCEDURE — 3079F DIAST BP 80-89 MM HG: CPT | Mod: HCNC,CPTII,S$GLB, | Performed by: INTERNAL MEDICINE

## 2020-08-31 PROCEDURE — 3079F PR MOST RECENT DIASTOLIC BLOOD PRESSURE 80-89 MM HG: ICD-10-PCS | Mod: HCNC,CPTII,S$GLB, | Performed by: INTERNAL MEDICINE

## 2020-08-31 PROCEDURE — 1159F MED LIST DOCD IN RCRD: CPT | Mod: HCNC,S$GLB,, | Performed by: INTERNAL MEDICINE

## 2020-08-31 PROCEDURE — 3008F BODY MASS INDEX DOCD: CPT | Mod: HCNC,CPTII,S$GLB, | Performed by: INTERNAL MEDICINE

## 2020-08-31 PROCEDURE — 84443 ASSAY THYROID STIM HORMONE: CPT | Mod: HCNC

## 2020-08-31 NOTE — PROGRESS NOTES
Patient ID: Edin Green is a 69 y.o. male.  Patient is here for follow up        Chief Complaint: Hyperthyroidism      Here with wife  Chief Complaint: Follow-up      HPI     Consultation was requested by Dr. Carter Paris      Diagnosed: 1/2017- had weight loss and sweats, TSI antibody was mildly elevated  He was lost to follow-up after last being seen November 2017 and he was encouraged to follow up again and ice started seeing patient again September 2019.  He had been off of his methimazole and was restarted this year  due to suppressed TSH  Previous radiology tests:  Thyroid ultrasound : No   NM uptake and scan: Yes - 1/2017 markedly increased homogeneous uptake    Previous thyroid surgery: No      Thyroid symptoms:  He had been having weight loss, weight is increased, a little tired    also diagnosed with diabetes which is diet controlled currently    Denies palpitations or sweats  In review of records he has been having some memory loss as well and some anxiety and depression     Thyroid medications: methimazole 5 mg daily      Takes medication appropriately:  Yes     in the past when he was on 20 mg of methimazole it caused him eventuality become hypothyroid, even when it was brought down to 10 mg    Also diagnosed with prostate cancer and he is status post brachytherapy without androgen deprivation has seen Dr. Kiel Smith and the urologist and reports a good response to treatment Dr. Ochoa         I have reviewed the past medical, family and social history    Review of Systems   Constitutional: Negative for appetite change, diaphoresis, fatigue, fever and unexpected weight change.   HENT: Negative for sore throat and trouble swallowing.    Eyes: Negative for visual disturbance.   Respiratory: Negative for shortness of breath and wheezing.    Cardiovascular: Negative for chest pain, palpitations and leg swelling.   Gastrointestinal: Negative for abdominal pain, diarrhea, nausea and vomiting.    Endocrine: Negative for cold intolerance, heat intolerance, polydipsia, polyphagia and polyuria.   Genitourinary: Negative for difficulty urinating and dysuria.   Musculoskeletal: Negative for arthralgias and joint swelling.   Skin: Negative for color change and rash.   Neurological: Negative for dizziness, tremors, numbness and headaches.   Hematological: Negative for adenopathy.   Psychiatric/Behavioral: Positive for dysphoric mood. Negative for confusion and sleep disturbance. The patient is not nervous/anxious.        Objective:      Physical Exam  Constitutional:       Appearance: He is well-developed.   HENT:      Head: Normocephalic and atraumatic.   Eyes:      Conjunctiva/sclera: Conjunctivae normal.   Neck:      Thyroid: No thyromegaly.      Trachea: No tracheal deviation.   Lymphadenopathy:      Cervical: No cervical adenopathy.   Skin:     General: Skin is warm and dry.      Findings: No erythema or rash.   Neurological:      Mental Status: He is alert.      Motor: No tremor.   Psychiatric:         Mood and Affect: Mood is not anxious.         Speech: Speech normal.      Comments: Dysphoric mood and flat affect           Lab Review:   Lab Visit on 08/20/2020   Component Date Value    PSA DIAGNOSTIC 08/20/2020 0.26    Lab Visit on 03/25/2020   Component Date Value    PSA DIAGNOSTIC 03/25/2020 0.52    Lab Visit on 03/05/2020   Component Date Value    TSH 03/05/2020 0.920     Free T4 03/05/2020 0.99        Assessment:     1. Hyperthyroidism  TSH    T4, free    Hepatic function panel    CBC auto differential    continue methimazole, check labs below, he says he has a little bit tired  Plan:   Hyperthyroidism  -     TSH; Future; Expected date: 08/31/2020  -     T4, free; Future; Expected date: 08/31/2020  -     Hepatic function panel; Future; Expected date: 08/31/2020  -     CBC auto differential; Future; Expected date: 08/31/2020          No follow-ups on file.  Can follow up in 6 months  Labs prior to  appointment? not applicable     Disclaimer:  This note may have been partially prepared using voice recognition software and  it may have not been extensively proofed, as such there could be errors within the text such as sound alike errors.

## 2020-09-01 LAB
ALBUMIN SERPL BCP-MCNC: 3.6 G/DL (ref 3.5–5.2)
ALP SERPL-CCNC: 83 U/L (ref 55–135)
ALT SERPL W/O P-5'-P-CCNC: 12 U/L (ref 10–44)
AST SERPL-CCNC: 11 U/L (ref 10–40)
BASOPHILS # BLD AUTO: 0.06 K/UL (ref 0–0.2)
BASOPHILS NFR BLD: 1.4 % (ref 0–1.9)
BILIRUB DIRECT SERPL-MCNC: 0.2 MG/DL (ref 0.1–0.3)
BILIRUB SERPL-MCNC: 0.3 MG/DL (ref 0.1–1)
DIFFERENTIAL METHOD: ABNORMAL
EOSINOPHIL # BLD AUTO: 0.5 K/UL (ref 0–0.5)
EOSINOPHIL NFR BLD: 10.7 % (ref 0–8)
ERYTHROCYTE [DISTWIDTH] IN BLOOD BY AUTOMATED COUNT: 13 % (ref 11.5–14.5)
HCT VFR BLD AUTO: 43.7 % (ref 40–54)
HGB BLD-MCNC: 13.5 G/DL (ref 14–18)
IMM GRANULOCYTES # BLD AUTO: 0.01 K/UL (ref 0–0.04)
IMM GRANULOCYTES NFR BLD AUTO: 0.2 % (ref 0–0.5)
LYMPHOCYTES # BLD AUTO: 1.4 K/UL (ref 1–4.8)
LYMPHOCYTES NFR BLD: 32 % (ref 18–48)
MCH RBC QN AUTO: 28.6 PG (ref 27–31)
MCHC RBC AUTO-ENTMCNC: 30.9 G/DL (ref 32–36)
MCV RBC AUTO: 93 FL (ref 82–98)
MONOCYTES # BLD AUTO: 0.3 K/UL (ref 0.3–1)
MONOCYTES NFR BLD: 7.8 % (ref 4–15)
NEUTROPHILS # BLD AUTO: 2 K/UL (ref 1.8–7.7)
NEUTROPHILS NFR BLD: 47.9 % (ref 38–73)
NRBC BLD-RTO: 0 /100 WBC
PLATELET # BLD AUTO: 238 K/UL (ref 150–350)
PMV BLD AUTO: 10.8 FL (ref 9.2–12.9)
PROT SERPL-MCNC: 7.7 G/DL (ref 6–8.4)
RBC # BLD AUTO: 4.72 M/UL (ref 4.6–6.2)
T4 FREE SERPL-MCNC: 0.96 NG/DL (ref 0.71–1.51)
TSH SERPL DL<=0.005 MIU/L-ACNC: 3.91 UIU/ML (ref 0.4–4)
WBC # BLD AUTO: 4.22 K/UL (ref 3.9–12.7)

## 2020-09-27 ENCOUNTER — PATIENT MESSAGE (OUTPATIENT)
Dept: ENDOCRINOLOGY | Facility: CLINIC | Age: 70
End: 2020-09-27

## 2020-09-29 ENCOUNTER — PATIENT MESSAGE (OUTPATIENT)
Dept: OTHER | Facility: OTHER | Age: 70
End: 2020-09-29

## 2020-10-13 ENCOUNTER — IMMUNIZATION (OUTPATIENT)
Dept: INTERNAL MEDICINE | Facility: CLINIC | Age: 70
End: 2020-10-13
Payer: MEDICARE

## 2020-10-13 PROCEDURE — 90694 VACC AIIV4 NO PRSRV 0.5ML IM: CPT | Mod: HCNC,S$GLB,, | Performed by: FAMILY MEDICINE

## 2020-10-13 PROCEDURE — 99999 PR PBB SHADOW E&M-EST. PATIENT-LVL II: ICD-10-PCS | Mod: PBBFAC,HCNC,,

## 2020-10-13 PROCEDURE — G0008 FLU VACCINE - QUADRIVALENT - ADJUVANTED: ICD-10-PCS | Mod: HCNC,S$GLB,, | Performed by: FAMILY MEDICINE

## 2020-10-13 PROCEDURE — G0008 ADMIN INFLUENZA VIRUS VAC: HCPCS | Mod: HCNC,S$GLB,, | Performed by: FAMILY MEDICINE

## 2020-10-13 PROCEDURE — 99999 PR PBB SHADOW E&M-EST. PATIENT-LVL II: CPT | Mod: PBBFAC,HCNC,,

## 2020-10-13 PROCEDURE — 90694 FLU VACCINE - QUADRIVALENT - ADJUVANTED: ICD-10-PCS | Mod: HCNC,S$GLB,, | Performed by: FAMILY MEDICINE

## 2020-10-26 ENCOUNTER — PATIENT OUTREACH (OUTPATIENT)
Dept: ADMINISTRATIVE | Facility: HOSPITAL | Age: 70
End: 2020-10-26

## 2020-11-23 ENCOUNTER — LAB VISIT (OUTPATIENT)
Dept: LAB | Facility: HOSPITAL | Age: 70
End: 2020-11-23
Attending: FAMILY MEDICINE
Payer: MEDICARE

## 2020-11-23 DIAGNOSIS — E11.8 TYPE 2 DIABETES MELLITUS WITH COMPLICATION: ICD-10-CM

## 2020-11-23 LAB
ALBUMIN SERPL BCP-MCNC: 3.6 G/DL (ref 3.5–5.2)
ALP SERPL-CCNC: 68 U/L (ref 55–135)
ALT SERPL W/O P-5'-P-CCNC: 13 U/L (ref 10–44)
ANION GAP SERPL CALC-SCNC: 7 MMOL/L (ref 8–16)
AST SERPL-CCNC: 11 U/L (ref 10–40)
BILIRUB SERPL-MCNC: 0.3 MG/DL (ref 0.1–1)
BUN SERPL-MCNC: 22 MG/DL (ref 8–23)
CALCIUM SERPL-MCNC: 8.9 MG/DL (ref 8.7–10.5)
CHLORIDE SERPL-SCNC: 103 MMOL/L (ref 95–110)
CHOLEST SERPL-MCNC: 171 MG/DL (ref 120–199)
CHOLEST/HDLC SERPL: 3.2 {RATIO} (ref 2–5)
CO2 SERPL-SCNC: 31 MMOL/L (ref 23–29)
CREAT SERPL-MCNC: 1.3 MG/DL (ref 0.5–1.4)
EST. GFR  (AFRICAN AMERICAN): >60 ML/MIN/1.73 M^2
EST. GFR  (NON AFRICAN AMERICAN): 55.3 ML/MIN/1.73 M^2
ESTIMATED AVG GLUCOSE: 117 MG/DL (ref 68–131)
GLUCOSE SERPL-MCNC: 95 MG/DL (ref 70–110)
HBA1C MFR BLD HPLC: 5.7 % (ref 4–5.6)
HDLC SERPL-MCNC: 53 MG/DL (ref 40–75)
HDLC SERPL: 31 % (ref 20–50)
LDLC SERPL CALC-MCNC: 108 MG/DL (ref 63–159)
NONHDLC SERPL-MCNC: 118 MG/DL
POTASSIUM SERPL-SCNC: 3.4 MMOL/L (ref 3.5–5.1)
PROT SERPL-MCNC: 7.5 G/DL (ref 6–8.4)
SODIUM SERPL-SCNC: 141 MMOL/L (ref 136–145)
TRIGL SERPL-MCNC: 50 MG/DL (ref 30–150)

## 2020-11-23 PROCEDURE — 36415 COLL VENOUS BLD VENIPUNCTURE: CPT | Mod: HCNC

## 2020-11-23 PROCEDURE — 83036 HEMOGLOBIN GLYCOSYLATED A1C: CPT | Mod: HCNC

## 2020-11-23 PROCEDURE — 80053 COMPREHEN METABOLIC PANEL: CPT | Mod: HCNC

## 2020-11-23 PROCEDURE — 80061 LIPID PANEL: CPT | Mod: HCNC

## 2020-11-30 ENCOUNTER — LAB VISIT (OUTPATIENT)
Dept: LAB | Facility: HOSPITAL | Age: 70
End: 2020-11-30
Attending: FAMILY MEDICINE
Payer: MEDICARE

## 2020-11-30 ENCOUNTER — OFFICE VISIT (OUTPATIENT)
Dept: INTERNAL MEDICINE | Facility: CLINIC | Age: 70
End: 2020-11-30
Payer: MEDICARE

## 2020-11-30 VITALS
OXYGEN SATURATION: 99 % | HEART RATE: 86 BPM | DIASTOLIC BLOOD PRESSURE: 70 MMHG | BODY MASS INDEX: 25.86 KG/M2 | HEIGHT: 74 IN | SYSTOLIC BLOOD PRESSURE: 124 MMHG | TEMPERATURE: 97 F | WEIGHT: 201.5 LBS

## 2020-11-30 DIAGNOSIS — E05.90 HYPERTHYROIDISM: ICD-10-CM

## 2020-11-30 DIAGNOSIS — E11.8 TYPE 2 DIABETES MELLITUS WITH COMPLICATION: Primary | ICD-10-CM

## 2020-11-30 DIAGNOSIS — F32.A ANXIETY AND DEPRESSION: ICD-10-CM

## 2020-11-30 DIAGNOSIS — I10 ESSENTIAL HYPERTENSION: ICD-10-CM

## 2020-11-30 DIAGNOSIS — F41.9 ANXIETY AND DEPRESSION: ICD-10-CM

## 2020-11-30 LAB
BASOPHILS # BLD AUTO: 0.04 K/UL (ref 0–0.2)
BASOPHILS NFR BLD: 0.9 % (ref 0–1.9)
DIFFERENTIAL METHOD: ABNORMAL
EOSINOPHIL # BLD AUTO: 0.3 K/UL (ref 0–0.5)
EOSINOPHIL NFR BLD: 7.4 % (ref 0–8)
ERYTHROCYTE [DISTWIDTH] IN BLOOD BY AUTOMATED COUNT: 13.3 % (ref 11.5–14.5)
HCT VFR BLD AUTO: 44 % (ref 40–54)
HGB BLD-MCNC: 14 G/DL (ref 14–18)
IMM GRANULOCYTES # BLD AUTO: 0.01 K/UL (ref 0–0.04)
IMM GRANULOCYTES NFR BLD AUTO: 0.2 % (ref 0–0.5)
LYMPHOCYTES # BLD AUTO: 1.6 K/UL (ref 1–4.8)
LYMPHOCYTES NFR BLD: 35.7 % (ref 18–48)
MCH RBC QN AUTO: 29 PG (ref 27–31)
MCHC RBC AUTO-ENTMCNC: 31.8 G/DL (ref 32–36)
MCV RBC AUTO: 91 FL (ref 82–98)
MONOCYTES # BLD AUTO: 0.3 K/UL (ref 0.3–1)
MONOCYTES NFR BLD: 7.2 % (ref 4–15)
NEUTROPHILS # BLD AUTO: 2.2 K/UL (ref 1.8–7.7)
NEUTROPHILS NFR BLD: 48.6 % (ref 38–73)
NRBC BLD-RTO: 0 /100 WBC
PLATELET # BLD AUTO: 230 K/UL (ref 150–350)
PMV BLD AUTO: 10.5 FL (ref 9.2–12.9)
RBC # BLD AUTO: 4.83 M/UL (ref 4.6–6.2)
WBC # BLD AUTO: 4.59 K/UL (ref 3.9–12.7)

## 2020-11-30 PROCEDURE — 3288F FALL RISK ASSESSMENT DOCD: CPT | Mod: HCNC,CPTII,S$GLB, | Performed by: FAMILY MEDICINE

## 2020-11-30 PROCEDURE — 3288F PR FALLS RISK ASSESSMENT DOCUMENTED: ICD-10-PCS | Mod: HCNC,CPTII,S$GLB, | Performed by: FAMILY MEDICINE

## 2020-11-30 PROCEDURE — 99999 PR PBB SHADOW E&M-EST. PATIENT-LVL IV: CPT | Mod: PBBFAC,HCNC,, | Performed by: FAMILY MEDICINE

## 2020-11-30 PROCEDURE — 3074F PR MOST RECENT SYSTOLIC BLOOD PRESSURE < 130 MM HG: ICD-10-PCS | Mod: HCNC,CPTII,S$GLB, | Performed by: FAMILY MEDICINE

## 2020-11-30 PROCEDURE — 99214 OFFICE O/P EST MOD 30 MIN: CPT | Mod: HCNC,S$GLB,, | Performed by: FAMILY MEDICINE

## 2020-11-30 PROCEDURE — 99499 RISK ADDL DX/OHS AUDIT: ICD-10-PCS | Mod: S$GLB,,, | Performed by: FAMILY MEDICINE

## 2020-11-30 PROCEDURE — 3008F PR BODY MASS INDEX (BMI) DOCUMENTED: ICD-10-PCS | Mod: HCNC,CPTII,S$GLB, | Performed by: FAMILY MEDICINE

## 2020-11-30 PROCEDURE — 3072F LOW RISK FOR RETINOPATHY: CPT | Mod: HCNC,S$GLB,, | Performed by: FAMILY MEDICINE

## 2020-11-30 PROCEDURE — 36415 COLL VENOUS BLD VENIPUNCTURE: CPT | Mod: HCNC

## 2020-11-30 PROCEDURE — 3078F DIAST BP <80 MM HG: CPT | Mod: HCNC,CPTII,S$GLB, | Performed by: FAMILY MEDICINE

## 2020-11-30 PROCEDURE — 84443 ASSAY THYROID STIM HORMONE: CPT | Mod: HCNC

## 2020-11-30 PROCEDURE — 99214 PR OFFICE/OUTPT VISIT, EST, LEVL IV, 30-39 MIN: ICD-10-PCS | Mod: HCNC,S$GLB,, | Performed by: FAMILY MEDICINE

## 2020-11-30 PROCEDURE — 1101F PT FALLS ASSESS-DOCD LE1/YR: CPT | Mod: HCNC,CPTII,S$GLB, | Performed by: FAMILY MEDICINE

## 2020-11-30 PROCEDURE — 1126F AMNT PAIN NOTED NONE PRSNT: CPT | Mod: HCNC,S$GLB,, | Performed by: FAMILY MEDICINE

## 2020-11-30 PROCEDURE — 99499 UNLISTED E&M SERVICE: CPT | Mod: S$GLB,,, | Performed by: FAMILY MEDICINE

## 2020-11-30 PROCEDURE — 3074F SYST BP LT 130 MM HG: CPT | Mod: HCNC,CPTII,S$GLB, | Performed by: FAMILY MEDICINE

## 2020-11-30 PROCEDURE — 3078F PR MOST RECENT DIASTOLIC BLOOD PRESSURE < 80 MM HG: ICD-10-PCS | Mod: HCNC,CPTII,S$GLB, | Performed by: FAMILY MEDICINE

## 2020-11-30 PROCEDURE — 1126F PR PAIN SEVERITY QUANTIFIED, NO PAIN PRESENT: ICD-10-PCS | Mod: HCNC,S$GLB,, | Performed by: FAMILY MEDICINE

## 2020-11-30 PROCEDURE — 3044F PR MOST RECENT HEMOGLOBIN A1C LEVEL <7.0%: ICD-10-PCS | Mod: HCNC,CPTII,S$GLB, | Performed by: FAMILY MEDICINE

## 2020-11-30 PROCEDURE — 99999 PR PBB SHADOW E&M-EST. PATIENT-LVL IV: ICD-10-PCS | Mod: PBBFAC,HCNC,, | Performed by: FAMILY MEDICINE

## 2020-11-30 PROCEDURE — 3044F HG A1C LEVEL LT 7.0%: CPT | Mod: HCNC,CPTII,S$GLB, | Performed by: FAMILY MEDICINE

## 2020-11-30 PROCEDURE — 1159F PR MEDICATION LIST DOCUMENTED IN MEDICAL RECORD: ICD-10-PCS | Mod: HCNC,S$GLB,, | Performed by: FAMILY MEDICINE

## 2020-11-30 PROCEDURE — 3072F PR LOW RISK FOR RETINOPATHY: ICD-10-PCS | Mod: HCNC,S$GLB,, | Performed by: FAMILY MEDICINE

## 2020-11-30 PROCEDURE — 1101F PR PT FALLS ASSESS DOC 0-1 FALLS W/OUT INJ PAST YR: ICD-10-PCS | Mod: HCNC,CPTII,S$GLB, | Performed by: FAMILY MEDICINE

## 2020-11-30 PROCEDURE — 3008F BODY MASS INDEX DOCD: CPT | Mod: HCNC,CPTII,S$GLB, | Performed by: FAMILY MEDICINE

## 2020-11-30 PROCEDURE — 1159F MED LIST DOCD IN RCRD: CPT | Mod: HCNC,S$GLB,, | Performed by: FAMILY MEDICINE

## 2020-11-30 PROCEDURE — 85025 COMPLETE CBC W/AUTO DIFF WBC: CPT | Mod: HCNC

## 2020-11-30 NOTE — PATIENT INSTRUCTIONS
Please follow up with Dr Ridley psychiatry. ? Able to wean off some of the psychiatric med  Please resume pravastatin  Shingrix new shingles vaccine  via a pharmacy  Tetanus/whooping cough vaccine via pharmacy

## 2020-11-30 NOTE — PROGRESS NOTES
Subjective:       Patient ID: Edin Green is a . male.    Chief Complaint: Multiple issues see below    HPI Multiple issues see below\        type 2 diabetes mellitus ; stable;stopped statinor never started. He wanted to dec amt meds. D/wd benefit statin        Depression anxiety : seeing psychiatrist; dr garcia but overdue; mood ok per him; he asks about mult meds        Graves utd dr story tsh ok last august 2020 but then and now had been off methimaz for over 6 months. No hyperthy shympt    He is holding off f/u neurol for poss parkinsons/dementia. Pound to be mainly psychiatric related . See prior notes    Hypertension: blood pressure normal. No home bps.  No orthostasis sympt    Prost Ca recent dx via dr phelan.         Past Medical History:   Diagnosis Date    Anemia     Anxiety     Asthma     Benign hematuria     Cataract OU    Colon polyp     dr strauss    Depression     dr garcia psychiatrist in     ED (erectile dysfunction)     Graves disease     story    HTN (hypertension)     Hypercholesteremia     Hypertensive retinopathy of both eyes     Hypogonadism     LAURENCE (obstructive sleep apnea)     Pneumonia     Prostate cancer 7/15/2019    Trouble in sleeping     Type 2 diabetes mellitus      Past Surgical History:   Procedure Laterality Date    BRACHYTHERAPY N/A 9/10/2019    Procedure: BRACHYTHERAPY;  Surgeon: Kiel Phelan IV, MD;  Location: [facility] OR;  Service: Urology;  Laterality: N/A;    RADIOACTIVE SEED IMPLANTATION OF PROSTATE N/A 9/10/2019    Procedure: INSERTION, RADIOACTIVE SEED, PROSTATE;  Surgeon: Kiel Phelan IV, MD;  Location: [facility] OR;  Service: Urology;  Laterality: N/A;    ULTRASOUND GUIDANCE N/A 9/10/2019    Procedure: ULTRASOUND GUIDANCE;  Surgeon: Kiel Phelan IV, MD;  Location: [facility] OR;  Service: Urology;  Laterality: N/A;     Family History   Problem Relation Age of Onset    Hypertension Unknown     Heart defect Mother     Strabismus Neg Hx      Retinal detachment Neg Hx     Macular degeneration Neg Hx     Glaucoma Neg Hx     Blindness Neg Hx     Amblyopia Neg Hx      Social History     Socioeconomic History    Marital status:      Spouse name: CHUCHO    Number of children: 3    Years of education: Not on file    Highest education level: Not on file   Occupational History    Not on file   Social Needs    Financial resource strain: Not on file    Food insecurity     Worry: Not on file     Inability: Not on file    Transportation needs     Medical: Not on file     Non-medical: Not on file   Tobacco Use    Smoking status: Never Smoker    Smokeless tobacco: Never Used   Substance and Sexual Activity    Alcohol use: Yes     Alcohol/week: 1.0 standard drinks     Types: 1 Cans of beer per week     Comment: No alcohol 72h prior to sx    Drug use: No    Sexual activity: Yes     Partners: Female   Lifestyle    Physical activity     Days per week: Not on file     Minutes per session: Not on file    Stress: Not on file   Relationships    Social connections     Talks on phone: Not on file     Gets together: Not on file     Attends [employer] service: Not on file     Active member of club or organization: Not on file     Attends meetings of clubs or organizations: Not on file     Relationship status: Not on file   Other Topics Concern    Not on file   Social History Narrative    Wears a seatbelt.             Review of Systems    Chest: no shortness of breath  Abd: no abd pain  Remainder review of systems negative    Objective:     Physical Exam   Constitutional: He is oriented to person, place, and time. He appears well-developed and well-nourished.   Eyes: Pupils are equal, round, and reactive to light. Conjunctivae and EOM are normal. No scleral icterus.   Neck: Normal range of motion. Neck supple. Carotid bruit is not present.   Cardiovascular: Normal rate, regular rhythm and normal heart sounds. Exam reveals no gallop and no friction rub.    No murmur heard.  Pulmonary/Chest: Effort normal and breath sounds normal. He has no wheezes.    abd +bs softntnd nohsm no mass  Neurological: He is alert and oriented to person, place, and time. He displays normal reflexes. No cranial nerve deficit. He exhibits normal muscle tone. Coordination normal.   Skin: Skin is warm and dry. No rash noted. No erythema.   Psychiatric: He has a normal mood and affect. His behavior is normal. Judgment and thought content normal.   Nursing note and vitals reviewed.      Assessment:       1. Type 2 diabetes mellitus without complication, without long-term current use of insulin    2.    3. Hyperthyroidism    4. Hypogonadism in male    5. Graves disease    6. Essential hypertension    7. Depression, unspecified depression type              Prost cancer  Plan:     Resume pravastatin  **has appt dr quinteros          F/u urol  whn due    Gabby tsh now since off methimazole x several months. D/wd import of being on this if tsh indicates  Lab and follow up after in 6 months        Another written reminder he  Speak w/ psych about decreasing some of his meds     Type 2 diabetes mellitus with complication  -     Hemoglobin A1C; Future; Expected date: 05/29/2021  -     Comprehensive Metabolic Panel; Future; Expected date: 05/29/2021  -     Lipid Panel; Future; Expected date: 05/29/2021    Hyperthyroidism  -     TSH; Future; Expected date: 05/29/2021  -     CBC Auto Differential; Future; Expected date: 11/30/2020  -     TSH; Future; Expected date: 11/30/2020    Essential hypertension    Anxiety and depression    Shingrix new shingles vaccine  via a pharmacy  Tetanus/whooping cough vaccine via pharmacy

## 2020-12-01 LAB — TSH SERPL DL<=0.005 MIU/L-ACNC: 1.97 UIU/ML (ref 0.4–4)

## 2020-12-11 ENCOUNTER — PATIENT MESSAGE (OUTPATIENT)
Dept: OTHER | Facility: OTHER | Age: 70
End: 2020-12-11

## 2020-12-15 ENCOUNTER — PES CALL (OUTPATIENT)
Dept: ADMINISTRATIVE | Facility: CLINIC | Age: 70
End: 2020-12-15

## 2020-12-24 ENCOUNTER — TELEPHONE (OUTPATIENT)
Dept: ENDOCRINOLOGY | Facility: CLINIC | Age: 70
End: 2020-12-24

## 2021-02-12 ENCOUNTER — IMMUNIZATION (OUTPATIENT)
Dept: INTERNAL MEDICINE | Facility: CLINIC | Age: 71
End: 2021-02-12
Payer: MEDICARE

## 2021-02-12 ENCOUNTER — LAB VISIT (OUTPATIENT)
Dept: LAB | Facility: HOSPITAL | Age: 71
End: 2021-02-12
Attending: RADIOLOGY
Payer: MEDICARE

## 2021-02-12 DIAGNOSIS — Z23 NEED FOR VACCINATION: Primary | ICD-10-CM

## 2021-02-12 DIAGNOSIS — C61 PROSTATE CANCER: ICD-10-CM

## 2021-02-12 LAB — COMPLEXED PSA SERPL-MCNC: 0.26 NG/ML (ref 0–4)

## 2021-02-12 PROCEDURE — 91300 COVID-19, MRNA, LNP-S, PF, 30 MCG/0.3 ML DOSE VACCINE: CPT | Mod: HCNC,PBBFAC | Performed by: FAMILY MEDICINE

## 2021-02-12 PROCEDURE — 36415 COLL VENOUS BLD VENIPUNCTURE: CPT

## 2021-02-12 PROCEDURE — 84153 ASSAY OF PSA TOTAL: CPT

## 2021-02-18 ENCOUNTER — PES CALL (OUTPATIENT)
Dept: ADMINISTRATIVE | Facility: CLINIC | Age: 71
End: 2021-02-18

## 2021-02-19 ENCOUNTER — OFFICE VISIT (OUTPATIENT)
Dept: RADIATION ONCOLOGY | Facility: CLINIC | Age: 71
End: 2021-02-19
Payer: MEDICARE

## 2021-02-19 VITALS
BODY MASS INDEX: 25.99 KG/M2 | WEIGHT: 202.5 LBS | TEMPERATURE: 98 F | HEIGHT: 74 IN | OXYGEN SATURATION: 98 % | SYSTOLIC BLOOD PRESSURE: 124 MMHG | HEART RATE: 75 BPM | RESPIRATION RATE: 18 BRPM | DIASTOLIC BLOOD PRESSURE: 78 MMHG

## 2021-02-19 DIAGNOSIS — C61 PROSTATE CANCER: Primary | ICD-10-CM

## 2021-02-19 PROCEDURE — 3008F BODY MASS INDEX DOCD: CPT | Mod: CPTII,S$GLB,, | Performed by: RADIOLOGY

## 2021-02-19 PROCEDURE — 99213 OFFICE O/P EST LOW 20 MIN: CPT | Mod: S$GLB,,, | Performed by: RADIOLOGY

## 2021-02-19 PROCEDURE — 3074F SYST BP LT 130 MM HG: CPT | Mod: CPTII,S$GLB,, | Performed by: RADIOLOGY

## 2021-02-19 PROCEDURE — 1159F PR MEDICATION LIST DOCUMENTED IN MEDICAL RECORD: ICD-10-PCS | Mod: S$GLB,,, | Performed by: RADIOLOGY

## 2021-02-19 PROCEDURE — 99213 PR OFFICE/OUTPT VISIT, EST, LEVL III, 20-29 MIN: ICD-10-PCS | Mod: S$GLB,,, | Performed by: RADIOLOGY

## 2021-02-19 PROCEDURE — 3072F PR LOW RISK FOR RETINOPATHY: ICD-10-PCS | Mod: S$GLB,,, | Performed by: RADIOLOGY

## 2021-02-19 PROCEDURE — 3078F DIAST BP <80 MM HG: CPT | Mod: CPTII,S$GLB,, | Performed by: RADIOLOGY

## 2021-02-19 PROCEDURE — 3072F LOW RISK FOR RETINOPATHY: CPT | Mod: S$GLB,,, | Performed by: RADIOLOGY

## 2021-02-19 PROCEDURE — 3008F PR BODY MASS INDEX (BMI) DOCUMENTED: ICD-10-PCS | Mod: CPTII,S$GLB,, | Performed by: RADIOLOGY

## 2021-02-19 PROCEDURE — 1101F PT FALLS ASSESS-DOCD LE1/YR: CPT | Mod: CPTII,S$GLB,, | Performed by: RADIOLOGY

## 2021-02-19 PROCEDURE — 99999 PR PBB SHADOW E&M-EST. PATIENT-LVL IV: ICD-10-PCS | Mod: PBBFAC,,, | Performed by: RADIOLOGY

## 2021-02-19 PROCEDURE — 1126F PR PAIN SEVERITY QUANTIFIED, NO PAIN PRESENT: ICD-10-PCS | Mod: S$GLB,,, | Performed by: RADIOLOGY

## 2021-02-19 PROCEDURE — 1159F MED LIST DOCD IN RCRD: CPT | Mod: S$GLB,,, | Performed by: RADIOLOGY

## 2021-02-19 PROCEDURE — 3074F PR MOST RECENT SYSTOLIC BLOOD PRESSURE < 130 MM HG: ICD-10-PCS | Mod: CPTII,S$GLB,, | Performed by: RADIOLOGY

## 2021-02-19 PROCEDURE — 3288F FALL RISK ASSESSMENT DOCD: CPT | Mod: CPTII,S$GLB,, | Performed by: RADIOLOGY

## 2021-02-19 PROCEDURE — 99999 PR PBB SHADOW E&M-EST. PATIENT-LVL IV: CPT | Mod: PBBFAC,,, | Performed by: RADIOLOGY

## 2021-02-19 PROCEDURE — 3288F PR FALLS RISK ASSESSMENT DOCUMENTED: ICD-10-PCS | Mod: CPTII,S$GLB,, | Performed by: RADIOLOGY

## 2021-02-19 PROCEDURE — 1101F PR PT FALLS ASSESS DOC 0-1 FALLS W/OUT INJ PAST YR: ICD-10-PCS | Mod: CPTII,S$GLB,, | Performed by: RADIOLOGY

## 2021-02-19 PROCEDURE — 3078F PR MOST RECENT DIASTOLIC BLOOD PRESSURE < 80 MM HG: ICD-10-PCS | Mod: CPTII,S$GLB,, | Performed by: RADIOLOGY

## 2021-02-19 PROCEDURE — 1126F AMNT PAIN NOTED NONE PRSNT: CPT | Mod: S$GLB,,, | Performed by: RADIOLOGY

## 2021-03-05 ENCOUNTER — TELEPHONE (OUTPATIENT)
Dept: ADMINISTRATIVE | Facility: HOSPITAL | Age: 71
End: 2021-03-05

## 2021-03-05 ENCOUNTER — IMMUNIZATION (OUTPATIENT)
Dept: INTERNAL MEDICINE | Facility: CLINIC | Age: 71
End: 2021-03-05
Payer: MEDICARE

## 2021-03-05 DIAGNOSIS — Z23 NEED FOR VACCINATION: Primary | ICD-10-CM

## 2021-03-05 PROCEDURE — 91300 COVID-19, MRNA, LNP-S, PF, 30 MCG/0.3 ML DOSE VACCINE: CPT | Mod: HCNC,PBBFAC | Performed by: FAMILY MEDICINE

## 2021-03-05 PROCEDURE — 0002A COVID-19, MRNA, LNP-S, PF, 30 MCG/0.3 ML DOSE VACCINE: CPT | Mod: HCNC,PBBFAC | Performed by: FAMILY MEDICINE

## 2021-05-17 ENCOUNTER — PATIENT MESSAGE (OUTPATIENT)
Dept: INTERNAL MEDICINE | Facility: CLINIC | Age: 71
End: 2021-05-17

## 2021-05-26 ENCOUNTER — PATIENT OUTREACH (OUTPATIENT)
Dept: ADMINISTRATIVE | Facility: OTHER | Age: 71
End: 2021-05-26

## 2021-05-27 ENCOUNTER — OFFICE VISIT (OUTPATIENT)
Dept: OPHTHALMOLOGY | Facility: CLINIC | Age: 71
End: 2021-05-27
Payer: MEDICARE

## 2021-05-27 DIAGNOSIS — H26.9 CORTICAL CATARACT OF BOTH EYES: ICD-10-CM

## 2021-05-27 DIAGNOSIS — R73.03 BORDERLINE DIABETES: ICD-10-CM

## 2021-05-27 DIAGNOSIS — H25.13 NUCLEAR SCLEROSIS, BILATERAL: Primary | ICD-10-CM

## 2021-05-27 DIAGNOSIS — H02.402 PTOSIS OF EYELID, LEFT: ICD-10-CM

## 2021-05-27 DIAGNOSIS — H52.7 REFRACTIVE ERROR: ICD-10-CM

## 2021-05-27 PROCEDURE — 92015 PR REFRACTION: ICD-10-PCS | Mod: HCNC,S$GLB,, | Performed by: OPHTHALMOLOGY

## 2021-05-27 PROCEDURE — 99999 PR PBB SHADOW E&M-EST. PATIENT-LVL III: CPT | Mod: PBBFAC,HCNC,, | Performed by: OPHTHALMOLOGY

## 2021-05-27 PROCEDURE — 99999 PR PBB SHADOW E&M-EST. PATIENT-LVL III: ICD-10-PCS | Mod: PBBFAC,HCNC,, | Performed by: OPHTHALMOLOGY

## 2021-05-27 PROCEDURE — 92014 PR EYE EXAM, EST PATIENT,COMPREHESV: ICD-10-PCS | Mod: HCNC,S$GLB,, | Performed by: OPHTHALMOLOGY

## 2021-05-27 PROCEDURE — 92014 COMPRE OPH EXAM EST PT 1/>: CPT | Mod: HCNC,S$GLB,, | Performed by: OPHTHALMOLOGY

## 2021-05-27 PROCEDURE — 92015 DETERMINE REFRACTIVE STATE: CPT | Mod: HCNC,S$GLB,, | Performed by: OPHTHALMOLOGY

## 2021-05-27 PROCEDURE — 2023F DILAT RTA XM W/O RTNOPTHY: CPT | Mod: HCNC,S$GLB,, | Performed by: OPHTHALMOLOGY

## 2021-05-27 PROCEDURE — 2023F PR DILATED RETINAL EXAM W/O EVID OF RETINOPATHY: ICD-10-PCS | Mod: HCNC,S$GLB,, | Performed by: OPHTHALMOLOGY

## 2021-05-27 RX ORDER — MIRTAZAPINE 15 MG/1
15 TABLET, FILM COATED ORAL NIGHTLY
COMMUNITY
Start: 2021-05-07 | End: 2023-10-26

## 2021-06-24 ENCOUNTER — LAB VISIT (OUTPATIENT)
Dept: LAB | Facility: HOSPITAL | Age: 71
End: 2021-06-24
Attending: FAMILY MEDICINE
Payer: MEDICARE

## 2021-06-24 DIAGNOSIS — E11.8 TYPE 2 DIABETES MELLITUS WITH COMPLICATION: ICD-10-CM

## 2021-06-24 DIAGNOSIS — E05.90 HYPERTHYROIDISM: ICD-10-CM

## 2021-06-24 LAB
ESTIMATED AVG GLUCOSE: 126 MG/DL (ref 68–131)
HBA1C MFR BLD: 6 % (ref 4–5.6)

## 2021-06-24 PROCEDURE — 36415 COLL VENOUS BLD VENIPUNCTURE: CPT | Mod: HCNC | Performed by: FAMILY MEDICINE

## 2021-06-24 PROCEDURE — 83036 HEMOGLOBIN GLYCOSYLATED A1C: CPT | Mod: HCNC | Performed by: FAMILY MEDICINE

## 2021-06-24 PROCEDURE — 80061 LIPID PANEL: CPT | Mod: HCNC | Performed by: FAMILY MEDICINE

## 2021-06-24 PROCEDURE — 80053 COMPREHEN METABOLIC PANEL: CPT | Mod: HCNC | Performed by: FAMILY MEDICINE

## 2021-06-24 PROCEDURE — 84443 ASSAY THYROID STIM HORMONE: CPT | Mod: HCNC | Performed by: FAMILY MEDICINE

## 2021-06-25 LAB
ALBUMIN SERPL BCP-MCNC: 3.4 G/DL (ref 3.5–5.2)
ALP SERPL-CCNC: 77 U/L (ref 55–135)
ALT SERPL W/O P-5'-P-CCNC: 12 U/L (ref 10–44)
ANION GAP SERPL CALC-SCNC: 8 MMOL/L (ref 8–16)
AST SERPL-CCNC: 12 U/L (ref 10–40)
BILIRUB SERPL-MCNC: 0.3 MG/DL (ref 0.1–1)
BUN SERPL-MCNC: 17 MG/DL (ref 8–23)
CALCIUM SERPL-MCNC: 9.5 MG/DL (ref 8.7–10.5)
CHLORIDE SERPL-SCNC: 104 MMOL/L (ref 95–110)
CHOLEST SERPL-MCNC: 181 MG/DL (ref 120–199)
CHOLEST/HDLC SERPL: 2.9 {RATIO} (ref 2–5)
CO2 SERPL-SCNC: 28 MMOL/L (ref 23–29)
CREAT SERPL-MCNC: 1.3 MG/DL (ref 0.5–1.4)
EST. GFR  (AFRICAN AMERICAN): >60 ML/MIN/1.73 M^2
EST. GFR  (NON AFRICAN AMERICAN): 54.9 ML/MIN/1.73 M^2
GLUCOSE SERPL-MCNC: 90 MG/DL (ref 70–110)
HDLC SERPL-MCNC: 62 MG/DL (ref 40–75)
HDLC SERPL: 34.3 % (ref 20–50)
LDLC SERPL CALC-MCNC: 109.8 MG/DL (ref 63–159)
NONHDLC SERPL-MCNC: 119 MG/DL
POTASSIUM SERPL-SCNC: 3.8 MMOL/L (ref 3.5–5.1)
PROT SERPL-MCNC: 7.3 G/DL (ref 6–8.4)
SODIUM SERPL-SCNC: 140 MMOL/L (ref 136–145)
TRIGL SERPL-MCNC: 46 MG/DL (ref 30–150)
TSH SERPL DL<=0.005 MIU/L-ACNC: 1.09 UIU/ML (ref 0.4–4)

## 2021-07-01 ENCOUNTER — PATIENT MESSAGE (OUTPATIENT)
Dept: ADMINISTRATIVE | Facility: OTHER | Age: 71
End: 2021-07-01

## 2021-07-01 ENCOUNTER — OFFICE VISIT (OUTPATIENT)
Dept: INTERNAL MEDICINE | Facility: CLINIC | Age: 71
End: 2021-07-01
Payer: MEDICARE

## 2021-07-01 VITALS
WEIGHT: 198.19 LBS | OXYGEN SATURATION: 98 % | DIASTOLIC BLOOD PRESSURE: 66 MMHG | BODY MASS INDEX: 25.44 KG/M2 | TEMPERATURE: 98 F | HEART RATE: 89 BPM | HEIGHT: 74 IN | SYSTOLIC BLOOD PRESSURE: 112 MMHG

## 2021-07-01 DIAGNOSIS — I10 ESSENTIAL HYPERTENSION: ICD-10-CM

## 2021-07-01 DIAGNOSIS — E78.00 HYPERCHOLESTEREMIA: ICD-10-CM

## 2021-07-01 DIAGNOSIS — E05.90 HYPERTHYROIDISM: ICD-10-CM

## 2021-07-01 DIAGNOSIS — C61 PROSTATE CANCER: ICD-10-CM

## 2021-07-01 DIAGNOSIS — F32.2 CURRENT SEVERE EPISODE OF MAJOR DEPRESSIVE DISORDER WITHOUT PSYCHOTIC FEATURES WITHOUT PRIOR EPISODE: ICD-10-CM

## 2021-07-01 DIAGNOSIS — E11.8 TYPE 2 DIABETES MELLITUS WITH COMPLICATION: Primary | ICD-10-CM

## 2021-07-01 PROCEDURE — 99999 PR PBB SHADOW E&M-EST. PATIENT-LVL III: ICD-10-PCS | Mod: PBBFAC,HCNC,, | Performed by: FAMILY MEDICINE

## 2021-07-01 PROCEDURE — 3044F HG A1C LEVEL LT 7.0%: CPT | Mod: HCNC,CPTII,S$GLB, | Performed by: FAMILY MEDICINE

## 2021-07-01 PROCEDURE — 1126F PR PAIN SEVERITY QUANTIFIED, NO PAIN PRESENT: ICD-10-PCS | Mod: HCNC,S$GLB,, | Performed by: FAMILY MEDICINE

## 2021-07-01 PROCEDURE — 1101F PT FALLS ASSESS-DOCD LE1/YR: CPT | Mod: HCNC,CPTII,S$GLB, | Performed by: FAMILY MEDICINE

## 2021-07-01 PROCEDURE — 3288F FALL RISK ASSESSMENT DOCD: CPT | Mod: HCNC,CPTII,S$GLB, | Performed by: FAMILY MEDICINE

## 2021-07-01 PROCEDURE — 3008F PR BODY MASS INDEX (BMI) DOCUMENTED: ICD-10-PCS | Mod: HCNC,CPTII,S$GLB, | Performed by: FAMILY MEDICINE

## 2021-07-01 PROCEDURE — 1126F AMNT PAIN NOTED NONE PRSNT: CPT | Mod: HCNC,S$GLB,, | Performed by: FAMILY MEDICINE

## 2021-07-01 PROCEDURE — 1159F MED LIST DOCD IN RCRD: CPT | Mod: HCNC,S$GLB,, | Performed by: FAMILY MEDICINE

## 2021-07-01 PROCEDURE — 1101F PR PT FALLS ASSESS DOC 0-1 FALLS W/OUT INJ PAST YR: ICD-10-PCS | Mod: HCNC,CPTII,S$GLB, | Performed by: FAMILY MEDICINE

## 2021-07-01 PROCEDURE — 99214 OFFICE O/P EST MOD 30 MIN: CPT | Mod: HCNC,S$GLB,, | Performed by: FAMILY MEDICINE

## 2021-07-01 PROCEDURE — 3288F PR FALLS RISK ASSESSMENT DOCUMENTED: ICD-10-PCS | Mod: HCNC,CPTII,S$GLB, | Performed by: FAMILY MEDICINE

## 2021-07-01 PROCEDURE — 3072F PR LOW RISK FOR RETINOPATHY: ICD-10-PCS | Mod: HCNC,S$GLB,, | Performed by: FAMILY MEDICINE

## 2021-07-01 PROCEDURE — 1159F PR MEDICATION LIST DOCUMENTED IN MEDICAL RECORD: ICD-10-PCS | Mod: HCNC,S$GLB,, | Performed by: FAMILY MEDICINE

## 2021-07-01 PROCEDURE — 99214 PR OFFICE/OUTPT VISIT, EST, LEVL IV, 30-39 MIN: ICD-10-PCS | Mod: HCNC,S$GLB,, | Performed by: FAMILY MEDICINE

## 2021-07-01 PROCEDURE — 3072F LOW RISK FOR RETINOPATHY: CPT | Mod: HCNC,S$GLB,, | Performed by: FAMILY MEDICINE

## 2021-07-01 PROCEDURE — 3008F BODY MASS INDEX DOCD: CPT | Mod: HCNC,CPTII,S$GLB, | Performed by: FAMILY MEDICINE

## 2021-07-01 PROCEDURE — 3044F PR MOST RECENT HEMOGLOBIN A1C LEVEL <7.0%: ICD-10-PCS | Mod: HCNC,CPTII,S$GLB, | Performed by: FAMILY MEDICINE

## 2021-07-01 PROCEDURE — 99999 PR PBB SHADOW E&M-EST. PATIENT-LVL III: CPT | Mod: PBBFAC,HCNC,, | Performed by: FAMILY MEDICINE

## 2021-07-01 RX ORDER — PRAVASTATIN SODIUM 40 MG/1
40 TABLET ORAL DAILY
Qty: 90 TABLET | Refills: 4 | Status: SHIPPED | OUTPATIENT
Start: 2021-07-01 | End: 2022-07-01 | Stop reason: SDUPTHER

## 2021-08-16 ENCOUNTER — LAB VISIT (OUTPATIENT)
Dept: LAB | Facility: HOSPITAL | Age: 71
End: 2021-08-16
Attending: RADIOLOGY
Payer: MEDICARE

## 2021-08-16 DIAGNOSIS — C61 PROSTATE CANCER: ICD-10-CM

## 2021-08-16 LAB — COMPLEXED PSA SERPL-MCNC: 0.12 NG/ML (ref 0–4)

## 2021-08-16 PROCEDURE — 36415 COLL VENOUS BLD VENIPUNCTURE: CPT | Mod: HCNC | Performed by: RADIOLOGY

## 2021-08-16 PROCEDURE — 84153 ASSAY OF PSA TOTAL: CPT | Mod: HCNC | Performed by: RADIOLOGY

## 2021-08-19 ENCOUNTER — OFFICE VISIT (OUTPATIENT)
Dept: RADIATION ONCOLOGY | Facility: CLINIC | Age: 71
End: 2021-08-19
Payer: MEDICARE

## 2021-08-19 VITALS
DIASTOLIC BLOOD PRESSURE: 67 MMHG | OXYGEN SATURATION: 98 % | RESPIRATION RATE: 18 BRPM | HEIGHT: 74 IN | TEMPERATURE: 98 F | BODY MASS INDEX: 25.24 KG/M2 | WEIGHT: 196.69 LBS | HEART RATE: 78 BPM | SYSTOLIC BLOOD PRESSURE: 114 MMHG

## 2021-08-19 DIAGNOSIS — C61 PROSTATE CANCER: Primary | ICD-10-CM

## 2021-08-19 PROCEDURE — 1126F PR PAIN SEVERITY QUANTIFIED, NO PAIN PRESENT: ICD-10-PCS | Mod: HCNC,CPTII,S$GLB, | Performed by: RADIOLOGY

## 2021-08-19 PROCEDURE — 3078F PR MOST RECENT DIASTOLIC BLOOD PRESSURE < 80 MM HG: ICD-10-PCS | Mod: HCNC,CPTII,S$GLB, | Performed by: RADIOLOGY

## 2021-08-19 PROCEDURE — 99999 PR PBB SHADOW E&M-EST. PATIENT-LVL IV: CPT | Mod: PBBFAC,HCNC,, | Performed by: RADIOLOGY

## 2021-08-19 PROCEDURE — 3072F LOW RISK FOR RETINOPATHY: CPT | Mod: HCNC,CPTII,S$GLB, | Performed by: RADIOLOGY

## 2021-08-19 PROCEDURE — 3044F HG A1C LEVEL LT 7.0%: CPT | Mod: HCNC,CPTII,S$GLB, | Performed by: RADIOLOGY

## 2021-08-19 PROCEDURE — 1159F PR MEDICATION LIST DOCUMENTED IN MEDICAL RECORD: ICD-10-PCS | Mod: HCNC,CPTII,S$GLB, | Performed by: RADIOLOGY

## 2021-08-19 PROCEDURE — 3288F PR FALLS RISK ASSESSMENT DOCUMENTED: ICD-10-PCS | Mod: HCNC,CPTII,S$GLB, | Performed by: RADIOLOGY

## 2021-08-19 PROCEDURE — 3074F PR MOST RECENT SYSTOLIC BLOOD PRESSURE < 130 MM HG: ICD-10-PCS | Mod: HCNC,CPTII,S$GLB, | Performed by: RADIOLOGY

## 2021-08-19 PROCEDURE — 3078F DIAST BP <80 MM HG: CPT | Mod: HCNC,CPTII,S$GLB, | Performed by: RADIOLOGY

## 2021-08-19 PROCEDURE — 1101F PT FALLS ASSESS-DOCD LE1/YR: CPT | Mod: HCNC,CPTII,S$GLB, | Performed by: RADIOLOGY

## 2021-08-19 PROCEDURE — 3044F PR MOST RECENT HEMOGLOBIN A1C LEVEL <7.0%: ICD-10-PCS | Mod: HCNC,CPTII,S$GLB, | Performed by: RADIOLOGY

## 2021-08-19 PROCEDURE — 1126F AMNT PAIN NOTED NONE PRSNT: CPT | Mod: HCNC,CPTII,S$GLB, | Performed by: RADIOLOGY

## 2021-08-19 PROCEDURE — 3072F PR LOW RISK FOR RETINOPATHY: ICD-10-PCS | Mod: HCNC,CPTII,S$GLB, | Performed by: RADIOLOGY

## 2021-08-19 PROCEDURE — 99213 PR OFFICE/OUTPT VISIT, EST, LEVL III, 20-29 MIN: ICD-10-PCS | Mod: HCNC,S$GLB,, | Performed by: RADIOLOGY

## 2021-08-19 PROCEDURE — 3008F PR BODY MASS INDEX (BMI) DOCUMENTED: ICD-10-PCS | Mod: HCNC,CPTII,S$GLB, | Performed by: RADIOLOGY

## 2021-08-19 PROCEDURE — 3008F BODY MASS INDEX DOCD: CPT | Mod: HCNC,CPTII,S$GLB, | Performed by: RADIOLOGY

## 2021-08-19 PROCEDURE — 3288F FALL RISK ASSESSMENT DOCD: CPT | Mod: HCNC,CPTII,S$GLB, | Performed by: RADIOLOGY

## 2021-08-19 PROCEDURE — 3074F SYST BP LT 130 MM HG: CPT | Mod: HCNC,CPTII,S$GLB, | Performed by: RADIOLOGY

## 2021-08-19 PROCEDURE — 1159F MED LIST DOCD IN RCRD: CPT | Mod: HCNC,CPTII,S$GLB, | Performed by: RADIOLOGY

## 2021-08-19 PROCEDURE — 99999 PR PBB SHADOW E&M-EST. PATIENT-LVL IV: ICD-10-PCS | Mod: PBBFAC,HCNC,, | Performed by: RADIOLOGY

## 2021-08-19 PROCEDURE — 99213 OFFICE O/P EST LOW 20 MIN: CPT | Mod: HCNC,S$GLB,, | Performed by: RADIOLOGY

## 2021-08-19 PROCEDURE — 1101F PR PT FALLS ASSESS DOC 0-1 FALLS W/OUT INJ PAST YR: ICD-10-PCS | Mod: HCNC,CPTII,S$GLB, | Performed by: RADIOLOGY

## 2021-08-31 ENCOUNTER — PATIENT MESSAGE (OUTPATIENT)
Dept: INTERNAL MEDICINE | Facility: CLINIC | Age: 71
End: 2021-08-31

## 2021-09-01 RX ORDER — HYDROCHLOROTHIAZIDE 25 MG/1
25 TABLET ORAL DAILY
Qty: 30 TABLET | Refills: 0 | Status: SHIPPED | OUTPATIENT
Start: 2021-09-01 | End: 2021-10-14

## 2021-10-04 ENCOUNTER — PATIENT MESSAGE (OUTPATIENT)
Dept: INTERNAL MEDICINE | Facility: CLINIC | Age: 71
End: 2021-10-04

## 2021-10-14 RX ORDER — HYDROCHLOROTHIAZIDE 25 MG/1
25 TABLET ORAL DAILY
Qty: 90 TABLET | Refills: 4 | Status: SHIPPED | OUTPATIENT
Start: 2021-10-14 | End: 2022-11-25 | Stop reason: SDUPTHER

## 2021-10-15 ENCOUNTER — IMMUNIZATION (OUTPATIENT)
Dept: INTERNAL MEDICINE | Facility: CLINIC | Age: 71
End: 2021-10-15
Payer: MEDICARE

## 2021-10-15 PROCEDURE — G0008 ADMIN INFLUENZA VIRUS VAC: HCPCS | Mod: HCNC,S$GLB,, | Performed by: INTERNAL MEDICINE

## 2021-10-15 PROCEDURE — 90694 FLU VACCINE - QUADRIVALENT - ADJUVANTED: ICD-10-PCS | Mod: HCNC,S$GLB,, | Performed by: INTERNAL MEDICINE

## 2021-10-15 PROCEDURE — G0008 FLU VACCINE - QUADRIVALENT - ADJUVANTED: ICD-10-PCS | Mod: HCNC,S$GLB,, | Performed by: INTERNAL MEDICINE

## 2021-10-15 PROCEDURE — 90694 VACC AIIV4 NO PRSRV 0.5ML IM: CPT | Mod: HCNC,S$GLB,, | Performed by: INTERNAL MEDICINE

## 2022-01-04 ENCOUNTER — TELEPHONE (OUTPATIENT)
Dept: INTERNAL MEDICINE | Facility: CLINIC | Age: 72
End: 2022-01-04
Payer: MEDICARE

## 2022-01-04 NOTE — TELEPHONE ENCOUNTER
I called and left a vm stating that his appt was rescheduled due to [facility] DOTSON being relocated to [facility] . //kah

## 2022-01-05 ENCOUNTER — PATIENT OUTREACH (OUTPATIENT)
Dept: ADMINISTRATIVE | Facility: HOSPITAL | Age: 72
End: 2022-01-05
Payer: MEDICARE

## 2022-01-05 DIAGNOSIS — E11.8 TYPE 2 DIABETES MELLITUS WITH COMPLICATION: Primary | ICD-10-CM

## 2022-01-05 NOTE — PROGRESS NOTES
Health Maintenance Due   Topic Date Due    TETANUS VACCINE  Never done    Shingles Vaccine (2 of 3) 01/30/2012    Hemoglobin A1c  12/24/2021    Diabetes Urine Screening  11/23/2021     Patient has a lab appointment scheduled 1/10/22, urine micro order entered and scheduled. Patient has an appointment scheduled with primary care on 1/19/22 for 6 month follow up.

## 2022-01-10 ENCOUNTER — LAB VISIT (OUTPATIENT)
Dept: LAB | Facility: HOSPITAL | Age: 72
End: 2022-01-10
Attending: FAMILY MEDICINE
Payer: MEDICARE

## 2022-01-10 DIAGNOSIS — E11.8 TYPE 2 DIABETES MELLITUS WITH COMPLICATION: ICD-10-CM

## 2022-01-10 DIAGNOSIS — E05.90 HYPERTHYROIDISM: ICD-10-CM

## 2022-01-10 LAB
ALBUMIN/CREAT UR: NORMAL UG/MG (ref 0–30)
CREAT UR-MCNC: 90 MG/DL (ref 23–375)
ESTIMATED AVG GLUCOSE: 131 MG/DL (ref 68–131)
HBA1C MFR BLD: 6.2 % (ref 4–5.6)
MICROALBUMIN UR DL<=1MG/L-MCNC: <5 UG/ML
TSH SERPL DL<=0.005 MIU/L-ACNC: 1.09 UIU/ML (ref 0.4–4)

## 2022-01-10 PROCEDURE — 83036 HEMOGLOBIN GLYCOSYLATED A1C: CPT | Mod: HCNC | Performed by: FAMILY MEDICINE

## 2022-01-10 PROCEDURE — 82570 ASSAY OF URINE CREATININE: CPT | Mod: HCNC | Performed by: FAMILY MEDICINE

## 2022-01-10 PROCEDURE — 36415 COLL VENOUS BLD VENIPUNCTURE: CPT | Mod: HCNC | Performed by: FAMILY MEDICINE

## 2022-01-10 PROCEDURE — 84443 ASSAY THYROID STIM HORMONE: CPT | Mod: HCNC | Performed by: FAMILY MEDICINE

## 2022-01-19 ENCOUNTER — OFFICE VISIT (OUTPATIENT)
Dept: INTERNAL MEDICINE | Facility: CLINIC | Age: 72
End: 2022-01-19
Payer: MEDICARE

## 2022-01-19 VITALS
TEMPERATURE: 98 F | RESPIRATION RATE: 16 BRPM | BODY MASS INDEX: 27.93 KG/M2 | SYSTOLIC BLOOD PRESSURE: 116 MMHG | OXYGEN SATURATION: 97 % | DIASTOLIC BLOOD PRESSURE: 72 MMHG | HEIGHT: 74 IN | WEIGHT: 217.63 LBS | HEART RATE: 75 BPM

## 2022-01-19 DIAGNOSIS — J45.20 MILD INTERMITTENT ASTHMA WITHOUT COMPLICATION: ICD-10-CM

## 2022-01-19 DIAGNOSIS — E78.00 HYPERCHOLESTEREMIA: ICD-10-CM

## 2022-01-19 DIAGNOSIS — E05.00 GRAVES DISEASE: ICD-10-CM

## 2022-01-19 DIAGNOSIS — G47.33 OSA (OBSTRUCTIVE SLEEP APNEA): ICD-10-CM

## 2022-01-19 DIAGNOSIS — F41.9 ANXIETY AND DEPRESSION: ICD-10-CM

## 2022-01-19 DIAGNOSIS — E11.8 TYPE 2 DIABETES MELLITUS WITH COMPLICATION: ICD-10-CM

## 2022-01-19 DIAGNOSIS — F32.A ANXIETY AND DEPRESSION: ICD-10-CM

## 2022-01-19 DIAGNOSIS — E29.1 HYPOGONADISM IN MALE: ICD-10-CM

## 2022-01-19 DIAGNOSIS — N52.9 ERECTILE DYSFUNCTION, UNSPECIFIED ERECTILE DYSFUNCTION TYPE: ICD-10-CM

## 2022-01-19 DIAGNOSIS — I10 PRIMARY HYPERTENSION: Primary | ICD-10-CM

## 2022-01-19 DIAGNOSIS — C61 PROSTATE CANCER: ICD-10-CM

## 2022-01-19 DIAGNOSIS — E05.90 HYPERTHYROIDISM: ICD-10-CM

## 2022-01-19 PROCEDURE — 3044F PR MOST RECENT HEMOGLOBIN A1C LEVEL <7.0%: ICD-10-PCS | Mod: HCNC,CPTII,S$GLB, | Performed by: NURSE PRACTITIONER

## 2022-01-19 PROCEDURE — 1126F AMNT PAIN NOTED NONE PRSNT: CPT | Mod: HCNC,CPTII,S$GLB, | Performed by: NURSE PRACTITIONER

## 2022-01-19 PROCEDURE — 3074F SYST BP LT 130 MM HG: CPT | Mod: HCNC,CPTII,S$GLB, | Performed by: NURSE PRACTITIONER

## 2022-01-19 PROCEDURE — 3072F PR LOW RISK FOR RETINOPATHY: ICD-10-PCS | Mod: HCNC,CPTII,S$GLB, | Performed by: NURSE PRACTITIONER

## 2022-01-19 PROCEDURE — 3288F PR FALLS RISK ASSESSMENT DOCUMENTED: ICD-10-PCS | Mod: HCNC,CPTII,S$GLB, | Performed by: NURSE PRACTITIONER

## 2022-01-19 PROCEDURE — 3078F DIAST BP <80 MM HG: CPT | Mod: HCNC,CPTII,S$GLB, | Performed by: NURSE PRACTITIONER

## 2022-01-19 PROCEDURE — 1159F MED LIST DOCD IN RCRD: CPT | Mod: HCNC,CPTII,S$GLB, | Performed by: NURSE PRACTITIONER

## 2022-01-19 PROCEDURE — 99999 PR PBB SHADOW E&M-EST. PATIENT-LVL IV: ICD-10-PCS | Mod: PBBFAC,HCNC,, | Performed by: NURSE PRACTITIONER

## 2022-01-19 PROCEDURE — 3066F NEPHROPATHY DOC TX: CPT | Mod: HCNC,CPTII,S$GLB, | Performed by: NURSE PRACTITIONER

## 2022-01-19 PROCEDURE — 3288F FALL RISK ASSESSMENT DOCD: CPT | Mod: HCNC,CPTII,S$GLB, | Performed by: NURSE PRACTITIONER

## 2022-01-19 PROCEDURE — 3061F PR NEG MICROALBUMINURIA RESULT DOCUMENTED/REVIEW: ICD-10-PCS | Mod: HCNC,CPTII,S$GLB, | Performed by: NURSE PRACTITIONER

## 2022-01-19 PROCEDURE — 99999 PR PBB SHADOW E&M-EST. PATIENT-LVL IV: CPT | Mod: PBBFAC,HCNC,, | Performed by: NURSE PRACTITIONER

## 2022-01-19 PROCEDURE — 3078F PR MOST RECENT DIASTOLIC BLOOD PRESSURE < 80 MM HG: ICD-10-PCS | Mod: HCNC,CPTII,S$GLB, | Performed by: NURSE PRACTITIONER

## 2022-01-19 PROCEDURE — 99499 RISK ADDL DX/OHS AUDIT: ICD-10-PCS | Mod: S$GLB,,, | Performed by: NURSE PRACTITIONER

## 2022-01-19 PROCEDURE — 3066F PR DOCUMENTATION OF TREATMENT FOR NEPHROPATHY: ICD-10-PCS | Mod: HCNC,CPTII,S$GLB, | Performed by: NURSE PRACTITIONER

## 2022-01-19 PROCEDURE — 3074F PR MOST RECENT SYSTOLIC BLOOD PRESSURE < 130 MM HG: ICD-10-PCS | Mod: HCNC,CPTII,S$GLB, | Performed by: NURSE PRACTITIONER

## 2022-01-19 PROCEDURE — 99499 UNLISTED E&M SERVICE: CPT | Mod: S$GLB,,, | Performed by: NURSE PRACTITIONER

## 2022-01-19 PROCEDURE — 99214 PR OFFICE/OUTPT VISIT, EST, LEVL IV, 30-39 MIN: ICD-10-PCS | Mod: HCNC,S$GLB,, | Performed by: NURSE PRACTITIONER

## 2022-01-19 PROCEDURE — 99214 OFFICE O/P EST MOD 30 MIN: CPT | Mod: HCNC,S$GLB,, | Performed by: NURSE PRACTITIONER

## 2022-01-19 PROCEDURE — 1101F PT FALLS ASSESS-DOCD LE1/YR: CPT | Mod: HCNC,CPTII,S$GLB, | Performed by: NURSE PRACTITIONER

## 2022-01-19 PROCEDURE — 1159F PR MEDICATION LIST DOCUMENTED IN MEDICAL RECORD: ICD-10-PCS | Mod: HCNC,CPTII,S$GLB, | Performed by: NURSE PRACTITIONER

## 2022-01-19 PROCEDURE — 1101F PR PT FALLS ASSESS DOC 0-1 FALLS W/OUT INJ PAST YR: ICD-10-PCS | Mod: HCNC,CPTII,S$GLB, | Performed by: NURSE PRACTITIONER

## 2022-01-19 PROCEDURE — 3072F LOW RISK FOR RETINOPATHY: CPT | Mod: HCNC,CPTII,S$GLB, | Performed by: NURSE PRACTITIONER

## 2022-01-19 PROCEDURE — 3044F HG A1C LEVEL LT 7.0%: CPT | Mod: HCNC,CPTII,S$GLB, | Performed by: NURSE PRACTITIONER

## 2022-01-19 PROCEDURE — 3008F PR BODY MASS INDEX (BMI) DOCUMENTED: ICD-10-PCS | Mod: HCNC,CPTII,S$GLB, | Performed by: NURSE PRACTITIONER

## 2022-01-19 PROCEDURE — 3008F BODY MASS INDEX DOCD: CPT | Mod: HCNC,CPTII,S$GLB, | Performed by: NURSE PRACTITIONER

## 2022-01-19 PROCEDURE — 3061F NEG MICROALBUMINURIA REV: CPT | Mod: HCNC,CPTII,S$GLB, | Performed by: NURSE PRACTITIONER

## 2022-01-19 PROCEDURE — 1126F PR PAIN SEVERITY QUANTIFIED, NO PAIN PRESENT: ICD-10-PCS | Mod: HCNC,CPTII,S$GLB, | Performed by: NURSE PRACTITIONER

## 2022-02-23 DIAGNOSIS — D84.9 IMMUNOSUPPRESSED STATUS: ICD-10-CM

## 2022-04-20 ENCOUNTER — IMMUNIZATION (OUTPATIENT)
Dept: PHARMACY | Facility: CLINIC | Age: 72
End: 2022-04-20

## 2022-04-20 DIAGNOSIS — Z23 NEED FOR VACCINATION: Primary | ICD-10-CM

## 2022-04-21 ENCOUNTER — TELEPHONE (OUTPATIENT)
Dept: INTERNAL MEDICINE | Facility: CLINIC | Age: 72
End: 2022-04-21
Payer: MEDICARE

## 2022-04-21 ENCOUNTER — PATIENT MESSAGE (OUTPATIENT)
Dept: INTERNAL MEDICINE | Facility: CLINIC | Age: 72
End: 2022-04-21
Payer: MEDICARE

## 2022-05-13 ENCOUNTER — TELEPHONE (OUTPATIENT)
Dept: INTERNAL MEDICINE | Facility: CLINIC | Age: 72
End: 2022-05-13
Payer: MEDICARE

## 2022-07-14 ENCOUNTER — OFFICE VISIT (OUTPATIENT)
Dept: OPHTHALMOLOGY | Facility: CLINIC | Age: 72
End: 2022-07-14
Payer: MEDICARE

## 2022-07-14 DIAGNOSIS — H25.13 NUCLEAR SCLEROSIS, BILATERAL: ICD-10-CM

## 2022-07-14 DIAGNOSIS — R73.03 BORDERLINE DIABETES: Primary | ICD-10-CM

## 2022-07-14 DIAGNOSIS — H02.402 PTOSIS OF EYELID, LEFT: ICD-10-CM

## 2022-07-14 DIAGNOSIS — H26.9 CORTICAL CATARACT OF BOTH EYES: ICD-10-CM

## 2022-07-14 DIAGNOSIS — H52.7 REFRACTIVE ERROR: ICD-10-CM

## 2022-07-14 PROCEDURE — 92015 DETERMINE REFRACTIVE STATE: CPT | Mod: S$GLB,,, | Performed by: OPHTHALMOLOGY

## 2022-07-14 PROCEDURE — 1159F MED LIST DOCD IN RCRD: CPT | Mod: CPTII,S$GLB,, | Performed by: OPHTHALMOLOGY

## 2022-07-14 PROCEDURE — 99999 PR PBB SHADOW E&M-EST. PATIENT-LVL III: ICD-10-PCS | Mod: PBBFAC,,, | Performed by: OPHTHALMOLOGY

## 2022-07-14 PROCEDURE — 3044F PR MOST RECENT HEMOGLOBIN A1C LEVEL <7.0%: ICD-10-PCS | Mod: CPTII,S$GLB,, | Performed by: OPHTHALMOLOGY

## 2022-07-14 PROCEDURE — 4010F ACE/ARB THERAPY RXD/TAKEN: CPT | Mod: CPTII,S$GLB,, | Performed by: OPHTHALMOLOGY

## 2022-07-14 PROCEDURE — 1160F RVW MEDS BY RX/DR IN RCRD: CPT | Mod: CPTII,S$GLB,, | Performed by: OPHTHALMOLOGY

## 2022-07-14 PROCEDURE — 1160F PR REVIEW ALL MEDS BY PRESCRIBER/CLIN PHARMACIST DOCUMENTED: ICD-10-PCS | Mod: CPTII,S$GLB,, | Performed by: OPHTHALMOLOGY

## 2022-07-14 PROCEDURE — 1159F PR MEDICATION LIST DOCUMENTED IN MEDICAL RECORD: ICD-10-PCS | Mod: CPTII,S$GLB,, | Performed by: OPHTHALMOLOGY

## 2022-07-14 PROCEDURE — 92014 COMPRE OPH EXAM EST PT 1/>: CPT | Mod: S$GLB,,, | Performed by: OPHTHALMOLOGY

## 2022-07-14 PROCEDURE — 2023F PR DILATED RETINAL EXAM W/O EVID OF RETINOPATHY: ICD-10-PCS | Mod: CPTII,S$GLB,, | Performed by: OPHTHALMOLOGY

## 2022-07-14 PROCEDURE — 3044F HG A1C LEVEL LT 7.0%: CPT | Mod: CPTII,S$GLB,, | Performed by: OPHTHALMOLOGY

## 2022-07-14 PROCEDURE — 92014 PR EYE EXAM, EST PATIENT,COMPREHESV: ICD-10-PCS | Mod: S$GLB,,, | Performed by: OPHTHALMOLOGY

## 2022-07-14 PROCEDURE — 3066F NEPHROPATHY DOC TX: CPT | Mod: CPTII,S$GLB,, | Performed by: OPHTHALMOLOGY

## 2022-07-14 PROCEDURE — 4010F PR ACE/ARB THEARPY RXD/TAKEN: ICD-10-PCS | Mod: CPTII,S$GLB,, | Performed by: OPHTHALMOLOGY

## 2022-07-14 PROCEDURE — 99999 PR PBB SHADOW E&M-EST. PATIENT-LVL III: CPT | Mod: PBBFAC,,, | Performed by: OPHTHALMOLOGY

## 2022-07-14 PROCEDURE — 3066F PR DOCUMENTATION OF TREATMENT FOR NEPHROPATHY: ICD-10-PCS | Mod: CPTII,S$GLB,, | Performed by: OPHTHALMOLOGY

## 2022-07-14 PROCEDURE — 3061F NEG MICROALBUMINURIA REV: CPT | Mod: CPTII,S$GLB,, | Performed by: OPHTHALMOLOGY

## 2022-07-14 PROCEDURE — 2023F DILAT RTA XM W/O RTNOPTHY: CPT | Mod: CPTII,S$GLB,, | Performed by: OPHTHALMOLOGY

## 2022-07-14 PROCEDURE — 3061F PR NEG MICROALBUMINURIA RESULT DOCUMENTED/REVIEW: ICD-10-PCS | Mod: CPTII,S$GLB,, | Performed by: OPHTHALMOLOGY

## 2022-07-14 PROCEDURE — 92015 PR REFRACTION: ICD-10-PCS | Mod: S$GLB,,, | Performed by: OPHTHALMOLOGY

## 2022-07-14 NOTE — PROGRESS NOTES
SUBJECTIVE  Edin Green is 71 y.o. male  Corrected distance visual acuity was 20/30 in the right eye and 20/25 in the left eye.   Chief Complaint   Patient presents with    Annual Exam     Complete Ocular Exam    Patient states OU is doing well, no changes in VA and denies any pain or irritation.          HPI     Annual Exam      Additional comments: Complete Ocular Exam    Patient states OU is doing well, no changes in VA and denies any pain or   irritation.              Comments     1. Hypertensive Retinopathy  2. Cataract OU  3. Ptosis OS  4. Borderline DM          Last edited by Viky Rosas, Patient Care Assistant on 7/14/2022  2:05   PM. (History)         Assessment /Plan :  1. Borderline diabetes No diabetic retinopathy at this time. Reviewed diabetic eye precautions including avoiding tobacco use,  Good glucose control, and importance of regular follow up.    2. Nuclear sclerosis, bilateral - Not visually significant. Observe.   3. Cortical cataract of both eyes    4. Ptosis of eyelid, left    5. Refractive error - Dispensed bifocal Rx     RTC in 1 year or prn any changes

## 2022-07-20 ENCOUNTER — LAB VISIT (OUTPATIENT)
Dept: LAB | Facility: HOSPITAL | Age: 72
End: 2022-07-20
Attending: NURSE PRACTITIONER
Payer: MEDICARE

## 2022-07-20 DIAGNOSIS — E05.00 GRAVES DISEASE: ICD-10-CM

## 2022-07-20 DIAGNOSIS — E11.8 TYPE 2 DIABETES MELLITUS WITH COMPLICATION: ICD-10-CM

## 2022-07-20 DIAGNOSIS — E78.00 HYPERCHOLESTEREMIA: ICD-10-CM

## 2022-07-20 LAB
ALBUMIN SERPL BCP-MCNC: 3.5 G/DL (ref 3.5–5.2)
ALP SERPL-CCNC: 80 U/L (ref 55–135)
ALT SERPL W/O P-5'-P-CCNC: 26 U/L (ref 10–44)
ANION GAP SERPL CALC-SCNC: 9 MMOL/L (ref 8–16)
AST SERPL-CCNC: 17 U/L (ref 10–40)
BILIRUB SERPL-MCNC: 0.6 MG/DL (ref 0.1–1)
BUN SERPL-MCNC: 14 MG/DL (ref 8–23)
CALCIUM SERPL-MCNC: 9.3 MG/DL (ref 8.7–10.5)
CHLORIDE SERPL-SCNC: 103 MMOL/L (ref 95–110)
CO2 SERPL-SCNC: 28 MMOL/L (ref 23–29)
CREAT SERPL-MCNC: 1.4 MG/DL (ref 0.5–1.4)
EST. GFR  (AFRICAN AMERICAN): 57.6 ML/MIN/1.73 M^2
EST. GFR  (NON AFRICAN AMERICAN): 49.8 ML/MIN/1.73 M^2
ESTIMATED AVG GLUCOSE: 128 MG/DL (ref 68–131)
GLUCOSE SERPL-MCNC: 99 MG/DL (ref 70–110)
HBA1C MFR BLD: 6.1 % (ref 4–5.6)
POTASSIUM SERPL-SCNC: 3.9 MMOL/L (ref 3.5–5.1)
PROT SERPL-MCNC: 7.4 G/DL (ref 6–8.4)
SODIUM SERPL-SCNC: 140 MMOL/L (ref 136–145)
TSH SERPL DL<=0.005 MIU/L-ACNC: 1.31 UIU/ML (ref 0.4–4)

## 2022-07-20 PROCEDURE — 84443 ASSAY THYROID STIM HORMONE: CPT | Performed by: NURSE PRACTITIONER

## 2022-07-20 PROCEDURE — 36415 COLL VENOUS BLD VENIPUNCTURE: CPT | Performed by: NURSE PRACTITIONER

## 2022-07-20 PROCEDURE — 80053 COMPREHEN METABOLIC PANEL: CPT | Performed by: NURSE PRACTITIONER

## 2022-07-20 PROCEDURE — 83036 HEMOGLOBIN GLYCOSYLATED A1C: CPT | Performed by: NURSE PRACTITIONER

## 2022-09-14 DIAGNOSIS — E11.9 TYPE 2 DIABETES MELLITUS WITHOUT COMPLICATION: ICD-10-CM

## 2022-09-27 ENCOUNTER — IMMUNIZATION (OUTPATIENT)
Dept: INTERNAL MEDICINE | Facility: CLINIC | Age: 72
End: 2022-09-27
Payer: MEDICARE

## 2022-09-27 PROCEDURE — 90694 FLU VACCINE - QUADRIVALENT - ADJUVANTED: ICD-10-PCS | Mod: S$GLB,,, | Performed by: PEDIATRICS

## 2022-09-27 PROCEDURE — G0008 ADMIN INFLUENZA VIRUS VAC: HCPCS | Mod: S$GLB,,, | Performed by: PEDIATRICS

## 2022-09-27 PROCEDURE — G0008 FLU VACCINE - QUADRIVALENT - ADJUVANTED: ICD-10-PCS | Mod: S$GLB,,, | Performed by: PEDIATRICS

## 2022-09-27 PROCEDURE — 90694 VACC AIIV4 NO PRSRV 0.5ML IM: CPT | Mod: S$GLB,,, | Performed by: PEDIATRICS

## 2022-10-05 ENCOUNTER — IMMUNIZATION (OUTPATIENT)
Dept: PHARMACY | Facility: CLINIC | Age: 72
End: 2022-10-05
Payer: MEDICARE

## 2022-10-05 DIAGNOSIS — Z23 NEED FOR VACCINATION: Primary | ICD-10-CM

## 2022-11-10 ENCOUNTER — TELEPHONE (OUTPATIENT)
Dept: ADMINISTRATIVE | Facility: HOSPITAL | Age: 72
End: 2022-11-10
Payer: MEDICARE

## 2022-11-12 ENCOUNTER — PATIENT MESSAGE (OUTPATIENT)
Dept: INTERNAL MEDICINE | Facility: CLINIC | Age: 72
End: 2022-11-12
Payer: MEDICARE

## 2022-11-22 ENCOUNTER — OFFICE VISIT (OUTPATIENT)
Dept: INTERNAL MEDICINE | Facility: CLINIC | Age: 72
End: 2022-11-22
Payer: MEDICARE

## 2022-11-22 ENCOUNTER — LAB VISIT (OUTPATIENT)
Dept: LAB | Facility: HOSPITAL | Age: 72
End: 2022-11-22
Attending: PHYSICIAN ASSISTANT
Payer: MEDICARE

## 2022-11-22 VITALS
WEIGHT: 208.56 LBS | DIASTOLIC BLOOD PRESSURE: 78 MMHG | BODY MASS INDEX: 26.77 KG/M2 | SYSTOLIC BLOOD PRESSURE: 110 MMHG | HEART RATE: 80 BPM | OXYGEN SATURATION: 95 % | HEIGHT: 74 IN | TEMPERATURE: 97 F

## 2022-11-22 DIAGNOSIS — G47.33 OSA (OBSTRUCTIVE SLEEP APNEA): ICD-10-CM

## 2022-11-22 DIAGNOSIS — N18.31 STAGE 3A CHRONIC KIDNEY DISEASE: ICD-10-CM

## 2022-11-22 DIAGNOSIS — I10 PRIMARY HYPERTENSION: Primary | ICD-10-CM

## 2022-11-22 DIAGNOSIS — F32.A ANXIETY AND DEPRESSION: ICD-10-CM

## 2022-11-22 DIAGNOSIS — Z12.5 ENCOUNTER FOR SCREENING FOR MALIGNANT NEOPLASM OF PROSTATE: ICD-10-CM

## 2022-11-22 DIAGNOSIS — J45.20 MILD INTERMITTENT ASTHMA WITHOUT COMPLICATION: ICD-10-CM

## 2022-11-22 DIAGNOSIS — F32.2 CURRENT SEVERE EPISODE OF MAJOR DEPRESSIVE DISORDER WITHOUT PSYCHOTIC FEATURES WITHOUT PRIOR EPISODE: ICD-10-CM

## 2022-11-22 DIAGNOSIS — C61 PROSTATE CANCER: ICD-10-CM

## 2022-11-22 DIAGNOSIS — E11.8 TYPE 2 DIABETES MELLITUS WITH COMPLICATION: ICD-10-CM

## 2022-11-22 DIAGNOSIS — B35.1 ONYCHOMYCOSIS: ICD-10-CM

## 2022-11-22 DIAGNOSIS — F41.9 ANXIETY AND DEPRESSION: ICD-10-CM

## 2022-11-22 DIAGNOSIS — E05.90 HYPERTHYROIDISM: ICD-10-CM

## 2022-11-22 DIAGNOSIS — E78.00 HYPERCHOLESTEREMIA: ICD-10-CM

## 2022-11-22 DIAGNOSIS — I10 PRIMARY HYPERTENSION: ICD-10-CM

## 2022-11-22 PROBLEM — E11.43 TYPE II DIABETES MELLITUS WITH PERIPHERAL AUTONOMIC NEUROPATHY: Status: ACTIVE | Noted: 2019-03-27

## 2022-11-22 PROCEDURE — 84153 ASSAY OF PSA TOTAL: CPT | Performed by: PHYSICIAN ASSISTANT

## 2022-11-22 PROCEDURE — 99214 PR OFFICE/OUTPT VISIT, EST, LEVL IV, 30-39 MIN: ICD-10-PCS | Mod: S$GLB,,, | Performed by: PHYSICIAN ASSISTANT

## 2022-11-22 PROCEDURE — 1159F PR MEDICATION LIST DOCUMENTED IN MEDICAL RECORD: ICD-10-PCS | Mod: CPTII,S$GLB,, | Performed by: PHYSICIAN ASSISTANT

## 2022-11-22 PROCEDURE — 1101F PR PT FALLS ASSESS DOC 0-1 FALLS W/OUT INJ PAST YR: ICD-10-PCS | Mod: CPTII,S$GLB,, | Performed by: PHYSICIAN ASSISTANT

## 2022-11-22 PROCEDURE — 3072F LOW RISK FOR RETINOPATHY: CPT | Mod: CPTII,S$GLB,, | Performed by: PHYSICIAN ASSISTANT

## 2022-11-22 PROCEDURE — 1159F MED LIST DOCD IN RCRD: CPT | Mod: CPTII,S$GLB,, | Performed by: PHYSICIAN ASSISTANT

## 2022-11-22 PROCEDURE — 99214 OFFICE O/P EST MOD 30 MIN: CPT | Mod: S$GLB,,, | Performed by: PHYSICIAN ASSISTANT

## 2022-11-22 PROCEDURE — 36415 COLL VENOUS BLD VENIPUNCTURE: CPT | Performed by: PHYSICIAN ASSISTANT

## 2022-11-22 PROCEDURE — 1126F PR PAIN SEVERITY QUANTIFIED, NO PAIN PRESENT: ICD-10-PCS | Mod: CPTII,S$GLB,, | Performed by: PHYSICIAN ASSISTANT

## 2022-11-22 PROCEDURE — 3044F HG A1C LEVEL LT 7.0%: CPT | Mod: CPTII,S$GLB,, | Performed by: PHYSICIAN ASSISTANT

## 2022-11-22 PROCEDURE — 3072F PR LOW RISK FOR RETINOPATHY: ICD-10-PCS | Mod: CPTII,S$GLB,, | Performed by: PHYSICIAN ASSISTANT

## 2022-11-22 PROCEDURE — 3078F PR MOST RECENT DIASTOLIC BLOOD PRESSURE < 80 MM HG: ICD-10-PCS | Mod: CPTII,S$GLB,, | Performed by: PHYSICIAN ASSISTANT

## 2022-11-22 PROCEDURE — 99499 UNLISTED E&M SERVICE: CPT | Mod: S$GLB,,, | Performed by: PHYSICIAN ASSISTANT

## 2022-11-22 PROCEDURE — 99999 PR PBB SHADOW E&M-EST. PATIENT-LVL V: ICD-10-PCS | Mod: PBBFAC,,, | Performed by: PHYSICIAN ASSISTANT

## 2022-11-22 PROCEDURE — 80061 LIPID PANEL: CPT | Performed by: PHYSICIAN ASSISTANT

## 2022-11-22 PROCEDURE — 3288F PR FALLS RISK ASSESSMENT DOCUMENTED: ICD-10-PCS | Mod: CPTII,S$GLB,, | Performed by: PHYSICIAN ASSISTANT

## 2022-11-22 PROCEDURE — 3061F NEG MICROALBUMINURIA REV: CPT | Mod: CPTII,S$GLB,, | Performed by: PHYSICIAN ASSISTANT

## 2022-11-22 PROCEDURE — 4010F PR ACE/ARB THEARPY RXD/TAKEN: ICD-10-PCS | Mod: CPTII,S$GLB,, | Performed by: PHYSICIAN ASSISTANT

## 2022-11-22 PROCEDURE — 3061F PR NEG MICROALBUMINURIA RESULT DOCUMENTED/REVIEW: ICD-10-PCS | Mod: CPTII,S$GLB,, | Performed by: PHYSICIAN ASSISTANT

## 2022-11-22 PROCEDURE — 3078F DIAST BP <80 MM HG: CPT | Mod: CPTII,S$GLB,, | Performed by: PHYSICIAN ASSISTANT

## 2022-11-22 PROCEDURE — 3008F PR BODY MASS INDEX (BMI) DOCUMENTED: ICD-10-PCS | Mod: CPTII,S$GLB,, | Performed by: PHYSICIAN ASSISTANT

## 2022-11-22 PROCEDURE — 3066F PR DOCUMENTATION OF TREATMENT FOR NEPHROPATHY: ICD-10-PCS | Mod: CPTII,S$GLB,, | Performed by: PHYSICIAN ASSISTANT

## 2022-11-22 PROCEDURE — 3044F PR MOST RECENT HEMOGLOBIN A1C LEVEL <7.0%: ICD-10-PCS | Mod: CPTII,S$GLB,, | Performed by: PHYSICIAN ASSISTANT

## 2022-11-22 PROCEDURE — 85025 COMPLETE CBC W/AUTO DIFF WBC: CPT | Performed by: PHYSICIAN ASSISTANT

## 2022-11-22 PROCEDURE — 3288F FALL RISK ASSESSMENT DOCD: CPT | Mod: CPTII,S$GLB,, | Performed by: PHYSICIAN ASSISTANT

## 2022-11-22 PROCEDURE — 80053 COMPREHEN METABOLIC PANEL: CPT | Performed by: PHYSICIAN ASSISTANT

## 2022-11-22 PROCEDURE — 1101F PT FALLS ASSESS-DOCD LE1/YR: CPT | Mod: CPTII,S$GLB,, | Performed by: PHYSICIAN ASSISTANT

## 2022-11-22 PROCEDURE — 3074F SYST BP LT 130 MM HG: CPT | Mod: CPTII,S$GLB,, | Performed by: PHYSICIAN ASSISTANT

## 2022-11-22 PROCEDURE — 3008F BODY MASS INDEX DOCD: CPT | Mod: CPTII,S$GLB,, | Performed by: PHYSICIAN ASSISTANT

## 2022-11-22 PROCEDURE — 1126F AMNT PAIN NOTED NONE PRSNT: CPT | Mod: CPTII,S$GLB,, | Performed by: PHYSICIAN ASSISTANT

## 2022-11-22 PROCEDURE — 99499 RISK ADDL DX/OHS AUDIT: ICD-10-PCS | Mod: S$GLB,,, | Performed by: PHYSICIAN ASSISTANT

## 2022-11-22 PROCEDURE — 3066F NEPHROPATHY DOC TX: CPT | Mod: CPTII,S$GLB,, | Performed by: PHYSICIAN ASSISTANT

## 2022-11-22 PROCEDURE — 4010F ACE/ARB THERAPY RXD/TAKEN: CPT | Mod: CPTII,S$GLB,, | Performed by: PHYSICIAN ASSISTANT

## 2022-11-22 PROCEDURE — 99999 PR PBB SHADOW E&M-EST. PATIENT-LVL V: CPT | Mod: PBBFAC,,, | Performed by: PHYSICIAN ASSISTANT

## 2022-11-22 PROCEDURE — 3074F PR MOST RECENT SYSTOLIC BLOOD PRESSURE < 130 MM HG: ICD-10-PCS | Mod: CPTII,S$GLB,, | Performed by: PHYSICIAN ASSISTANT

## 2022-11-22 NOTE — PROGRESS NOTES
Subjective:      Patient ID: Edin Green is a 71 y.o. male.    Chief Complaint: Annual Exam    HPI  Here today for his annual exam.   Overall doing well.   Doesn't use CPAP, intolerant.   Overdue for PSA recheck and follow upw with Dr. Quiroz.   TSH checked earlier this year and was normal.   [facility].   BP stable and controlled on current medication.     Patient Active Problem List   Diagnosis    Hypertensive retinopathy of both eyes    Refractive error - Both Eyes    Nuclear sclerosis    HTN (hypertension)    Anxiety and depression    Hypogonadism in male    ED (erectile dysfunction)    LAURENCE (obstructive sleep apnea)    Hypercholesteremia    Mild intermittent asthma without complication    Allergic rhinitis    Cortical senile cataract of both eyes    Posterior vitreous detachment of both eyes    Hyperthyroidism    Graves disease    Asthma    Type II diabetes mellitus with peripheral autonomic neuropathy    Prostate cancer    Current severe episode of major depressive disorder without psychotic features without prior episode    Stage 3a chronic kidney disease         Current Outpatient Medications:     amLODIPine (NORVASC) 5 MG tablet, TAKE 1 TABLET EVERY DAY, Disp: 90 tablet, Rfl: 0    aspirin (ECOTRIN) 81 MG EC tablet, Take 81 mg by mouth once daily., Disp: , Rfl:     buPROPion (WELLBUTRIN XL) 300 MG 24 hr tablet, , Disp: , Rfl:     COVID-19 vac, damari,Pfizer,,PF, (PFIZER COVID-19 DAMARI VACCN,PF,) 30 mcg/0.3 mL injection, Inject into the muscle., Disp: 0.3 mL, Rfl: 0    losartan (COZAAR) 100 MG tablet, TAKE 1 TABLET EVERY DAY, Disp: 90 tablet, Rfl: 0    mirtazapine (REMERON) 15 MG tablet, Take 15 mg by mouth every evening., Disp: , Rfl:     OXcarbazepine (TRILEPTAL) 150 MG Tab, Take 150 mg by mouth 2 (two) times daily. , Disp: , Rfl:     polyethylene glycol (GLYCOLAX) 17 gram/dose powder, , Disp: , Rfl:     pravastatin (PRAVACHOL) 40 MG tablet, TAKE 1 TABLET EVERY DAY, Disp: 90  tablet, Rfl: 0    risperiDONE (RISPERDAL M-TABS) 2 MG disintegrating tablet, Take 2 mg by mouth nightly. , Disp: , Rfl:     hydroCHLOROthiazide (HYDRODIURIL) 25 MG tablet, TAKE 1 TABLET (25 MG TOTAL) BY MOUTH ONCE DAILY., Disp: 90 tablet, Rfl: 4    tadalafil (CIALIS) 20 MG Tab, Use one tablet every 36 hours, Disp: 10 tablet, Rfl: 11    Health Maintenance Due   Topic Date Due    Lipid Panel  06/24/2022    Foot Exam  07/01/2022    PROSTATE-SPECIFIC ANTIGEN  08/16/2022    Diabetes Urine Screening  01/10/2023       Review of Systems   Constitutional:  Negative for activity change, appetite change, chills, diaphoresis, fatigue, fever and unexpected weight change.   HENT: Negative.  Negative for congestion, hearing loss, postnasal drip, rhinorrhea, sore throat, trouble swallowing and voice change.    Eyes: Negative.  Negative for visual disturbance.   Respiratory: Negative.  Negative for cough, choking, chest tightness and shortness of breath.    Cardiovascular:  Negative for chest pain, palpitations and leg swelling.   Gastrointestinal:  Negative for abdominal distention, abdominal pain, blood in stool, constipation, diarrhea, nausea and vomiting.   Endocrine: Negative for cold intolerance, heat intolerance, polydipsia and polyuria.   Genitourinary: Negative.  Negative for difficulty urinating and frequency.   Musculoskeletal:  Negative for arthralgias, back pain, gait problem, joint swelling and myalgias.   Skin:  Negative for color change, pallor, rash and wound.   Neurological:  Negative for dizziness, tremors, weakness, light-headedness, numbness and headaches.   Hematological:  Negative for adenopathy.   Psychiatric/Behavioral:  Negative for behavioral problems, confusion, self-injury, sleep disturbance and suicidal ideas. The patient is not nervous/anxious.    Objective:   /78 (BP Location: Left arm, Patient Position: Sitting, BP Method: Medium (Manual))   Pulse 80   Temp 96.6 °F (35.9 °C) (Tympanic)   Ht  "6' 2" (1.88 m)   Wt 94.6 kg (208 lb 8.9 oz)   SpO2 95%   BMI 26.78 kg/m²     Physical Exam  Vitals reviewed.   Constitutional:       General: He is not in acute distress.     Appearance: Normal appearance. He is well-developed. He is not ill-appearing, toxic-appearing or diaphoretic.   HENT:      Head: Normocephalic and atraumatic.      Right Ear: External ear normal.      Left Ear: External ear normal.      Nose: Nose normal.   Eyes:      Conjunctiva/sclera: Conjunctivae normal.      Pupils: Pupils are equal, round, and reactive to light.   Cardiovascular:      Rate and Rhythm: Normal rate and regular rhythm.      Heart sounds: Normal heart sounds. No murmur heard.    No friction rub. No gallop.   Pulmonary:      Effort: Pulmonary effort is normal. No respiratory distress.      Breath sounds: Normal breath sounds. No wheezing or rales.   Chest:      Chest wall: No tenderness.   Abdominal:      General: There is no distension.      Palpations: Abdomen is soft.      Tenderness: There is no abdominal tenderness.   Musculoskeletal:         General: Normal range of motion.      Cervical back: Normal range of motion and neck supple.      Right foot: Normal range of motion.      Left foot: Normal range of motion.   Feet:      Right foot:      Protective Sensation: 10 sites tested.   1 site sensed.     Skin integrity: Callus and dry skin present.      Toenail Condition: Right toenails are abnormally thick and long. Fungal disease present.     Left foot:      Protective Sensation: 10 sites tested.  2 sites sensed.      Skin integrity: Callus and dry skin present.      Toenail Condition: Left toenails are abnormally thick and long. Fungal disease present.  Lymphadenopathy:      Cervical: No cervical adenopathy.   Skin:     General: Skin is warm and dry.      Capillary Refill: Capillary refill takes less than 2 seconds.      Findings: No rash.   Neurological:      Mental Status: He is alert and oriented to person, place, " and time.      Motor: No weakness.      Coordination: Coordination normal.      Gait: Gait normal.   Psychiatric:         Mood and Affect: Mood normal.         Behavior: Behavior normal.         Thought Content: Thought content normal.         Judgment: Judgment normal.       Assessment:     1. Primary hypertension    2. Hypercholesteremia    3. Hyperthyroidism    4. LAURENCE (obstructive sleep apnea)    5. Anxiety and depression    6. Type 2 diabetes mellitus with complication    7. Prostate cancer    8. Current severe episode of major depressive disorder without psychotic features without prior episode    9. Mild intermittent asthma without complication    10. Stage 3a chronic kidney disease    11. Encounter for screening for malignant neoplasm of prostate    12. Onychomycosis      Plan:   Primary hypertension  -     Comprehensive Metabolic Panel; Future    Hypercholesteremia  -     Lipid Panel; Future; Expected date: 11/22/2022    Hyperthyroidism  -     TSH; Future; Expected date: 05/22/2023    LAURENCE (obstructive sleep apnea)  -intolerant to cpap.     Anxiety and depression  -[facility] and [facility] to [facility].     Type 2 diabetes mellitus with complication  -     Ambulatory referral/consult to Podiatry; Future; Expected date: 11/29/2022  -     Hemoglobin A1C; Future; Expected date: 05/22/2023  -     Microalbumin/Creatinine Ratio, Urine; Future; Expected date: 05/22/2023    Prostate cancer  -     PSA, Screening; Future; Expected date: 11/22/2022    Current severe episode of major depressive disorder without psychotic features without prior episode  -[facility] and up to date.     Mild intermittent asthma without complication  -     CBC Auto Differential; Future; Expected date: 11/22/2022    Stage 3a chronic kidney disease  -     CBC Auto Differential; Future; Expected date: 11/22/2022    Encounter for screening for malignant neoplasm of prostate  -     PSA, Screening; Future; Expected date:  11/22/2022    Onychomycosis  -     Ambulatory referral/consult to Podiatry; Future; Expected date: 11/29/2022  -decreased sensation on bilateral feet 1/10 sensation. Needs to see podiatry for foot care.     -A1C, microalb, tsh in 6 months just before apt with Dr. Paris. The rest of the labs today      Follow up in about 6 months (around 5/22/2023), or if symptoms worsen or fail to improve.

## 2022-11-23 LAB
ALBUMIN SERPL BCP-MCNC: 3.5 G/DL (ref 3.5–5.2)
ALP SERPL-CCNC: 76 U/L (ref 55–135)
ALT SERPL W/O P-5'-P-CCNC: 16 U/L (ref 10–44)
ANION GAP SERPL CALC-SCNC: 7 MMOL/L (ref 8–16)
AST SERPL-CCNC: 14 U/L (ref 10–40)
BASOPHILS # BLD AUTO: 0.04 K/UL (ref 0–0.2)
BASOPHILS NFR BLD: 0.9 % (ref 0–1.9)
BILIRUB SERPL-MCNC: 0.6 MG/DL (ref 0.1–1)
BUN SERPL-MCNC: 18 MG/DL (ref 8–23)
CALCIUM SERPL-MCNC: 9.6 MG/DL (ref 8.7–10.5)
CHLORIDE SERPL-SCNC: 105 MMOL/L (ref 95–110)
CHOLEST SERPL-MCNC: 133 MG/DL (ref 120–199)
CHOLEST/HDLC SERPL: 2.8 {RATIO} (ref 2–5)
CO2 SERPL-SCNC: 29 MMOL/L (ref 23–29)
COMPLEXED PSA SERPL-MCNC: 0.13 NG/ML (ref 0–4)
CREAT SERPL-MCNC: 1.3 MG/DL (ref 0.5–1.4)
DIFFERENTIAL METHOD: ABNORMAL
EOSINOPHIL # BLD AUTO: 0.4 K/UL (ref 0–0.5)
EOSINOPHIL NFR BLD: 7.6 % (ref 0–8)
ERYTHROCYTE [DISTWIDTH] IN BLOOD BY AUTOMATED COUNT: 13.5 % (ref 11.5–14.5)
EST. GFR  (NO RACE VARIABLE): 58.4 ML/MIN/1.73 M^2
GLUCOSE SERPL-MCNC: 98 MG/DL (ref 70–110)
HCT VFR BLD AUTO: 43.7 % (ref 40–54)
HDLC SERPL-MCNC: 48 MG/DL (ref 40–75)
HDLC SERPL: 36.1 % (ref 20–50)
HGB BLD-MCNC: 13.9 G/DL (ref 14–18)
IMM GRANULOCYTES # BLD AUTO: 0.01 K/UL (ref 0–0.04)
IMM GRANULOCYTES NFR BLD AUTO: 0.2 % (ref 0–0.5)
LDLC SERPL CALC-MCNC: 75.4 MG/DL (ref 63–159)
LYMPHOCYTES # BLD AUTO: 1.8 K/UL (ref 1–4.8)
LYMPHOCYTES NFR BLD: 38.9 % (ref 18–48)
MCH RBC QN AUTO: 28.7 PG (ref 27–31)
MCHC RBC AUTO-ENTMCNC: 31.8 G/DL (ref 32–36)
MCV RBC AUTO: 90 FL (ref 82–98)
MONOCYTES # BLD AUTO: 0.3 K/UL (ref 0.3–1)
MONOCYTES NFR BLD: 6.7 % (ref 4–15)
NEUTROPHILS # BLD AUTO: 2.1 K/UL (ref 1.8–7.7)
NEUTROPHILS NFR BLD: 45.7 % (ref 38–73)
NONHDLC SERPL-MCNC: 85 MG/DL
NRBC BLD-RTO: 0 /100 WBC
PLATELET # BLD AUTO: 221 K/UL (ref 150–450)
PMV BLD AUTO: 10.7 FL (ref 9.2–12.9)
POTASSIUM SERPL-SCNC: 3.6 MMOL/L (ref 3.5–5.1)
PROT SERPL-MCNC: 7.7 G/DL (ref 6–8.4)
RBC # BLD AUTO: 4.85 M/UL (ref 4.6–6.2)
SODIUM SERPL-SCNC: 141 MMOL/L (ref 136–145)
TRIGL SERPL-MCNC: 48 MG/DL (ref 30–150)
WBC # BLD AUTO: 4.63 K/UL (ref 3.9–12.7)

## 2022-11-25 ENCOUNTER — OFFICE VISIT (OUTPATIENT)
Dept: PODIATRY | Facility: CLINIC | Age: 72
End: 2022-11-25
Payer: MEDICARE

## 2022-11-25 VITALS — WEIGHT: 208.56 LBS | HEIGHT: 74 IN | BODY MASS INDEX: 26.77 KG/M2

## 2022-11-25 DIAGNOSIS — B35.1 ONYCHOMYCOSIS: ICD-10-CM

## 2022-11-25 DIAGNOSIS — E11.9 ENCOUNTER FOR COMPREHENSIVE DIABETIC FOOT EXAMINATION, TYPE 2 DIABETES MELLITUS: Primary | ICD-10-CM

## 2022-11-25 PROCEDURE — 99213 OFFICE O/P EST LOW 20 MIN: CPT | Mod: 25,S$GLB,, | Performed by: PODIATRIST

## 2022-11-25 PROCEDURE — 99999 PR PBB SHADOW E&M-EST. PATIENT-LVL III: ICD-10-PCS | Mod: PBBFAC,,, | Performed by: PODIATRIST

## 2022-11-25 PROCEDURE — 3044F PR MOST RECENT HEMOGLOBIN A1C LEVEL <7.0%: ICD-10-PCS | Mod: CPTII,S$GLB,, | Performed by: PODIATRIST

## 2022-11-25 PROCEDURE — 1101F PT FALLS ASSESS-DOCD LE1/YR: CPT | Mod: CPTII,S$GLB,, | Performed by: PODIATRIST

## 2022-11-25 PROCEDURE — 3288F PR FALLS RISK ASSESSMENT DOCUMENTED: ICD-10-PCS | Mod: CPTII,S$GLB,, | Performed by: PODIATRIST

## 2022-11-25 PROCEDURE — 3061F NEG MICROALBUMINURIA REV: CPT | Mod: CPTII,S$GLB,, | Performed by: PODIATRIST

## 2022-11-25 PROCEDURE — 3008F BODY MASS INDEX DOCD: CPT | Mod: CPTII,S$GLB,, | Performed by: PODIATRIST

## 2022-11-25 PROCEDURE — 3072F PR LOW RISK FOR RETINOPATHY: ICD-10-PCS | Mod: CPTII,S$GLB,, | Performed by: PODIATRIST

## 2022-11-25 PROCEDURE — 4010F ACE/ARB THERAPY RXD/TAKEN: CPT | Mod: CPTII,S$GLB,, | Performed by: PODIATRIST

## 2022-11-25 PROCEDURE — 3066F PR DOCUMENTATION OF TREATMENT FOR NEPHROPATHY: ICD-10-PCS | Mod: CPTII,S$GLB,, | Performed by: PODIATRIST

## 2022-11-25 PROCEDURE — 1160F PR REVIEW ALL MEDS BY PRESCRIBER/CLIN PHARMACIST DOCUMENTED: ICD-10-PCS | Mod: CPTII,S$GLB,, | Performed by: PODIATRIST

## 2022-11-25 PROCEDURE — 99999 PR PBB SHADOW E&M-EST. PATIENT-LVL III: CPT | Mod: PBBFAC,,, | Performed by: PODIATRIST

## 2022-11-25 PROCEDURE — 4010F PR ACE/ARB THEARPY RXD/TAKEN: ICD-10-PCS | Mod: CPTII,S$GLB,, | Performed by: PODIATRIST

## 2022-11-25 PROCEDURE — 1126F PR PAIN SEVERITY QUANTIFIED, NO PAIN PRESENT: ICD-10-PCS | Mod: CPTII,S$GLB,, | Performed by: PODIATRIST

## 2022-11-25 PROCEDURE — 1159F MED LIST DOCD IN RCRD: CPT | Mod: CPTII,S$GLB,, | Performed by: PODIATRIST

## 2022-11-25 PROCEDURE — 3061F PR NEG MICROALBUMINURIA RESULT DOCUMENTED/REVIEW: ICD-10-PCS | Mod: CPTII,S$GLB,, | Performed by: PODIATRIST

## 2022-11-25 PROCEDURE — 1101F PR PT FALLS ASSESS DOC 0-1 FALLS W/OUT INJ PAST YR: ICD-10-PCS | Mod: CPTII,S$GLB,, | Performed by: PODIATRIST

## 2022-11-25 PROCEDURE — 99213 PR OFFICE/OUTPT VISIT, EST, LEVL III, 20-29 MIN: ICD-10-PCS | Mod: 25,S$GLB,, | Performed by: PODIATRIST

## 2022-11-25 PROCEDURE — 1160F RVW MEDS BY RX/DR IN RCRD: CPT | Mod: CPTII,S$GLB,, | Performed by: PODIATRIST

## 2022-11-25 PROCEDURE — 3288F FALL RISK ASSESSMENT DOCD: CPT | Mod: CPTII,S$GLB,, | Performed by: PODIATRIST

## 2022-11-25 PROCEDURE — 1126F AMNT PAIN NOTED NONE PRSNT: CPT | Mod: CPTII,S$GLB,, | Performed by: PODIATRIST

## 2022-11-25 PROCEDURE — 3008F PR BODY MASS INDEX (BMI) DOCUMENTED: ICD-10-PCS | Mod: CPTII,S$GLB,, | Performed by: PODIATRIST

## 2022-11-25 PROCEDURE — 1159F PR MEDICATION LIST DOCUMENTED IN MEDICAL RECORD: ICD-10-PCS | Mod: CPTII,S$GLB,, | Performed by: PODIATRIST

## 2022-11-25 PROCEDURE — 3066F NEPHROPATHY DOC TX: CPT | Mod: CPTII,S$GLB,, | Performed by: PODIATRIST

## 2022-11-25 PROCEDURE — 3044F HG A1C LEVEL LT 7.0%: CPT | Mod: CPTII,S$GLB,, | Performed by: PODIATRIST

## 2022-11-25 PROCEDURE — 3072F LOW RISK FOR RETINOPATHY: CPT | Mod: CPTII,S$GLB,, | Performed by: PODIATRIST

## 2022-11-25 NOTE — PROGRESS NOTES
Subjective:     Patient ID: Edin Green is a 71 y.o. male.    Chief Complaint: Diabetic Foot Exam (Pt c/o BL foot fungus, Diabetic pt wears tennis shoes, PCP Dr. Paris last seen 11-22-22)    Edin is a 71 y.o. male who presents to the clinic upon referral from Dr. Cleaning  for evaluation and treatment of diabetic feet. Edin has a past medical history of Anemia, Anxiety, Asthma, Benign hematuria, Cataract (OU), Colon polyp, Depression, ED (erectile dysfunction), Graves disease, HTN (hypertension), Hypercholesteremia, Hypertensive retinopathy of both eyes, Hypogonadism, LAURENCE (obstructive sleep apnea), Pneumonia, Prostate cancer (7/15/2019), Trouble in sleeping, and Type 2 diabetes mellitus. Patient relates no major problem with feet. Only complaints today consist of fungal toenails     PCP: Carter Paris MD    Date Last Seen by PCP: 11/22/2022    Current shoe gear: Tennis shoes    Hemoglobin A1C   Date Value Ref Range Status   07/20/2022 6.1 (H) 4.0 - 5.6 % Final     Comment:     ADA Screening Guidelines:  5.7-6.4%  Consistent with prediabetes  >or=6.5%  Consistent with diabetes    High levels of fetal hemoglobin interfere with the HbA1C  assay. Heterozygous hemoglobin variants (HbS, HgC, etc)do  not significantly interfere with this assay.   However, presence of multiple variants may affect accuracy.     01/10/2022 6.2 (H) 4.0 - 5.6 % Final     Comment:     ADA Screening Guidelines:  5.7-6.4%  Consistent with prediabetes  >or=6.5%  Consistent with diabetes    High levels of fetal hemoglobin interfere with the HbA1C  assay. Heterozygous hemoglobin variants (HbS, HgC, etc)do  not significantly interfere with this assay.   However, presence of multiple variants may affect accuracy.     06/24/2021 6.0 (H) 4.0 - 5.6 % Final     Comment:     ADA Screening Guidelines:  5.7-6.4%  Consistent with prediabetes  >or=6.5%  Consistent with diabetes    High levels of fetal hemoglobin interfere with the HbA1C  assay.  Heterozygous hemoglobin variants (HbS, HgC, etc)do  not significantly interfere with this assay.   However, presence of multiple variants may affect accuracy.           Patient Active Problem List   Diagnosis    Hypertensive retinopathy of both eyes    Refractive error - Both Eyes    Nuclear sclerosis    HTN (hypertension)    Anxiety and depression    Hypogonadism in male    ED (erectile dysfunction)    LAURENCE (obstructive sleep apnea)    Hypercholesteremia    Mild intermittent asthma without complication    Allergic rhinitis    Cortical senile cataract of both eyes    Posterior vitreous detachment of both eyes    Hyperthyroidism    Graves disease    Asthma    Type II diabetes mellitus with peripheral autonomic neuropathy    Prostate cancer    Current severe episode 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    BUPROPION (WELLBUTRIN XL) 300 MG 24 HR TABLET        COVID-19 VAC, DAMARI,PFIZER,,PF, (PFIZER COVID-19 DAMARI VACCN,PF,) 30 MCG/0.3 ML INJECTION    Inject into the muscle.    HYDROCHLOROTHIAZIDE (HYDRODIURIL) 25 MG TABLET    TAKE 1 TABLET (25 MG TOTAL) BY MOUTH ONCE DAILY.    LOSARTAN (COZAAR) 100 MG TABLET    TAKE 1 TABLET EVERY DAY    MIRTAZAPINE (REMERON) 15 MG TABLET    Take 15 mg by mouth every evening.    OXCARBAZEPINE (TRILEPTAL) 150 MG TAB    Take 150 mg by mouth 2 (two) times daily.     POLYETHYLENE GLYCOL (GLYCOLAX) 17 GRAM/DOSE POWDER        PRAVASTATIN (PRAVACHOL) 40 MG TABLET    TAKE 1 TABLET EVERY DAY    RISPERIDONE (RISPERDAL M-TABS) 2 MG DISINTEGRATING TABLET    Take 2 mg by mouth nightly.     TADALAFIL (CIALIS) 20 MG TAB    Use one tablet every 36 hours       Review of patient's allergies indicates:   Allergen Reactions    Celebrex  "[celecoxib]      Lip swelling      Shrimp Itching     States reaction is with frozen shrimp    Tomato Itching    Venom-wasp Swelling       Past Surgical History:   Procedure Laterality Date    BRACHYTHERAPY N/A 9/10/2019    Procedure: BRACHYTHERAPY;  Surgeon: Kiel Ochoa IV, MD;  Location: [facility] OR;  Service: Urology;  Laterality: N/A;    RADIOACTIVE SEED IMPLANTATION OF PROSTATE N/A 9/10/2019    Procedure: INSERTION, RADIOACTIVE SEED, PROSTATE;  Surgeon: Kiel Ochoa IV, MD;  Location: [facility] OR;  Service: Urology;  Laterality: N/A;    ULTRASOUND GUIDANCE N/A 9/10/2019    Procedure: ULTRASOUND GUIDANCE;  Surgeon: Kiel Ochoa IV, MD;  Location: [facility] OR;  Service: Urology;  Laterality: N/A;       Family History   Problem Relation Age of Onset    Hypertension Unknown     Heart defect Mother     Strabismus Neg Hx     Retinal detachment Neg Hx     Macular degeneration Neg Hx     Glaucoma Neg Hx     Blindness Neg Hx     Amblyopia Neg Hx        Social History     Socioeconomic History    Marital status:      Spouse name: CHUCHO    Number of children: 3   Tobacco Use    Smoking status: Never    Smokeless tobacco: Never   Substance and Sexual Activity    Alcohol use: Yes     Alcohol/week: 1.0 standard drink     Types: 1 Cans of beer per week     Comment: No alcohol 72h prior to sx    Drug use: No    Sexual activity: Yes     Partners: Female   Social History Narrative    , no smokers or pets in household.       Vitals:    11/25/22 1128   Weight: 94.6 kg (208 lb 8.9 oz)   Height: 6' 2" (1.88 m)   PainSc: 0-No pain       Hemoglobin A1C   Date Value Ref Range Status   07/20/2022 6.1 (H) 4.0 - 5.6 % Final     Comment:     ADA Screening Guidelines:  5.7-6.4%  Consistent with prediabetes  >or=6.5%  Consistent with diabetes    High levels of fetal hemoglobin interfere with the HbA1C  assay. Heterozygous hemoglobin variants (HbS, HgC, etc)do  not significantly interfere with this assay.   However, " presence of multiple variants may affect accuracy.     01/10/2022 6.2 (H) 4.0 - 5.6 % Final     Comment:     ADA Screening Guidelines:  5.7-6.4%  Consistent with prediabetes  >or=6.5%  Consistent with diabetes    High levels of fetal hemoglobin interfere with the HbA1C  assay. Heterozygous hemoglobin variants (HbS, HgC, etc)do  not significantly interfere with this assay.   However, presence of multiple variants may affect accuracy.     06/24/2021 6.0 (H) 4.0 - 5.6 % Final     Comment:     ADA Screening Guidelines:  5.7-6.4%  Consistent with prediabetes  >or=6.5%  Consistent with diabetes    High levels of fetal hemoglobin interfere with the HbA1C  assay. Heterozygous hemoglobin variants (HbS, HgC, etc)do  not significantly interfere with this assay.   However, presence of multiple variants may affect accuracy.         Review of Systems   Constitutional:  Negative for chills and fever.   Respiratory:  Negative for shortness of breath.    Cardiovascular:  Negative for chest pain, palpitations, orthopnea, claudication and leg swelling.   Gastrointestinal:  Negative for diarrhea, nausea and vomiting.   Musculoskeletal:  Negative for joint pain.   Skin:  Negative for rash.   Neurological:  Negative for dizziness, tingling, sensory change, focal weakness and weakness.   Psychiatric/Behavioral: Negative.             Objective:      PHYSICAL EXAM: Apperance: Alert and orient in no distress,well developed, and with good attention to grooming and body habits  Patient presents ambulating in tennis shoes.   LOWER EXTREMITY EXAM:  VASCULAR: Dorsalis pedis pulses 2/4 bilateral and Posterior Tibial pulses 1/4 bilateral. Capillary fill time <blanched bilateral. Mild edema observed bilateral. Varicosities present bilateral. Skin temperature of the lower extremities is warm to warm, proximal to distal. Hair growth dim bilateral.  DERMATOLOGICAL: No skin rashes, subcutaneous nodules, lesions, or ulcers observed bilateral. Nails  2,3,4,5 bilateral elongated, thickened, and discolored with subungual debris. Bilateral hallux nails absent. Webspaces 1,2,3,4 bilateral clean, dry and without evidence of break in skin integrity. Dry and peeling skin noted to bilateral forefoot.   NEUROLOGICAL: Light touch, sharp-dull, proprioception all present and equal bilaterally.  Vibratory sensation intact at bilateral  hallux. Protective sensation intact at all 10 sites as tested with a [place] 5.07 monofilament.   MUSCULOSKELETAL: Muscle strength is 5/5 for foot inverters, everters, plantarflexors, and dorsiflexors. Muscle tone is normal.           Assessment:       ICD-10-CM ICD-9-CM   1. Encounter for comprehensive diabetic foot examination, type 2 diabetes mellitus  E11.9 250.00   2. Onychomycosis  B35.1 110.1       Plan:   Encounter for comprehensive diabetic foot examination, type 2 diabetes mellitus  -     Ambulatory referral/consult to Podiatry    Onychomycosis  -     Ambulatory referral/consult to Podiatry      I counseled the patient on his conditions, regarding findings of my examination, my impressions, and usual treatment plan.   Greater than 50% of this visit spent on counseling and coordination of care.  Greater than 12 minutes of a 15 minute appointment spent on education about the diabetic foot, neuropathy, and prevention of limb loss.  Shoe inspection. Diabetic Foot Education. Patient reminded of the importance of good nutrition and blood sugar control to help prevent podiatric complications of diabetes. Patient instructed on proper foot hygeine. We discussed wearing proper shoe gear, daily foot inspections, never walking without protective shoe gear, never putting sharp instruments to feet.    Patient  will continue to monitor the areas daily, inspect feet, wear protective shoe gear when ambulatory, moisturizer to maintain skin integrity. Patient reminded of the importance of good nutrition and blood sugar control to help prevent  podiatric complications of diabetes.  Patient to return 12 months or sooner if needed.           Saravanan Mata DPM  [facility]

## 2022-12-17 NOTE — TELEPHONE ENCOUNTER
----- Message from Orly Almaraz sent at 7/31/2019 10:59 AM CDT -----  Contact: Patient  .Type:  Patient Returning Call    Who Called:Patient  Who Left Message for Patient:Nomi  Does the patient know what this is regarding:  Would the patient rather a call back or a response via [employer]? Call  Best Call Back Number:[phone]  Additional Information:      show

## 2023-02-07 DIAGNOSIS — Z00.00 ENCOUNTER FOR MEDICARE ANNUAL WELLNESS EXAM: ICD-10-CM

## 2023-02-09 DIAGNOSIS — Z00.00 ENCOUNTER FOR MEDICARE ANNUAL WELLNESS EXAM: ICD-10-CM

## 2023-04-24 DIAGNOSIS — E78.00 HYPERCHOLESTEREMIA: ICD-10-CM

## 2023-04-24 NOTE — TELEPHONE ENCOUNTER
No new care gaps identified.  [facility] Embedded Care Gaps. Reference number: [id number]. 4/24/2023   6:25:41 AM CARINET

## 2023-04-24 NOTE — TELEPHONE ENCOUNTER
Refill Routing Note   Medication(s) are not appropriate for processing by [facility] for the following reason(s):      Patient not seen by PCP within 15 months    [facility] action(s):  Defer              Appointments  past 12m or future 3m with PCP    Date Provider   Last Visit   7/1/2021 Carter Paris MD   Next Visit   5/8/2023 Carter Paris MD   ED visits in past 90 days: 0        Note composed:5:42 PM 04/24/2023

## 2023-04-27 RX ORDER — PRAVASTATIN SODIUM 40 MG/1
TABLET ORAL
Qty: 90 TABLET | Refills: 4 | Status: SHIPPED | OUTPATIENT
Start: 2023-04-27

## 2023-05-01 ENCOUNTER — PATIENT OUTREACH (OUTPATIENT)
Dept: ADMINISTRATIVE | Facility: HOSPITAL | Age: 73
End: 2023-05-01
Payer: MEDICARE

## 2023-05-04 ENCOUNTER — LAB VISIT (OUTPATIENT)
Dept: LAB | Facility: HOSPITAL | Age: 73
End: 2023-05-04
Attending: PHYSICIAN ASSISTANT
Payer: MEDICARE

## 2023-05-04 DIAGNOSIS — E05.90 HYPERTHYROIDISM: ICD-10-CM

## 2023-05-04 DIAGNOSIS — E11.8 TYPE 2 DIABETES MELLITUS WITH COMPLICATION: ICD-10-CM

## 2023-05-04 LAB
ESTIMATED AVG GLUCOSE: 126 MG/DL (ref 68–131)
HBA1C MFR BLD: 6 % (ref 4–5.6)
TSH SERPL DL<=0.005 MIU/L-ACNC: 1.66 UIU/ML (ref 0.4–4)

## 2023-05-04 PROCEDURE — 83036 HEMOGLOBIN GLYCOSYLATED A1C: CPT | Mod: HCNC | Performed by: PHYSICIAN ASSISTANT

## 2023-05-04 PROCEDURE — 84443 ASSAY THYROID STIM HORMONE: CPT | Mod: HCNC | Performed by: PHYSICIAN ASSISTANT

## 2023-05-04 PROCEDURE — 36415 COLL VENOUS BLD VENIPUNCTURE: CPT | Mod: HCNC | Performed by: PHYSICIAN ASSISTANT

## 2023-05-08 ENCOUNTER — OFFICE VISIT (OUTPATIENT)
Dept: INTERNAL MEDICINE | Facility: CLINIC | Age: 73
End: 2023-05-08
Payer: MEDICARE

## 2023-05-08 ENCOUNTER — LAB VISIT (OUTPATIENT)
Dept: LAB | Facility: HOSPITAL | Age: 73
End: 2023-05-08
Attending: FAMILY MEDICINE
Payer: MEDICARE

## 2023-05-08 VITALS
SYSTOLIC BLOOD PRESSURE: 120 MMHG | DIASTOLIC BLOOD PRESSURE: 68 MMHG | BODY MASS INDEX: 26.91 KG/M2 | WEIGHT: 209.69 LBS | HEART RATE: 75 BPM | TEMPERATURE: 98 F | HEIGHT: 74 IN | OXYGEN SATURATION: 96 %

## 2023-05-08 DIAGNOSIS — E11.8 TYPE 2 DIABETES MELLITUS WITH COMPLICATION: ICD-10-CM

## 2023-05-08 DIAGNOSIS — E87.6 HYPOKALEMIA: ICD-10-CM

## 2023-05-08 DIAGNOSIS — E05.90 HYPERTHYROIDISM: ICD-10-CM

## 2023-05-08 DIAGNOSIS — C61 PROSTATE CANCER: ICD-10-CM

## 2023-05-08 DIAGNOSIS — Z12.11 SCREEN FOR COLON CANCER: ICD-10-CM

## 2023-05-08 DIAGNOSIS — I10 PRIMARY HYPERTENSION: Primary | ICD-10-CM

## 2023-05-08 LAB
ANION GAP SERPL CALC-SCNC: 8 MMOL/L (ref 8–16)
BUN SERPL-MCNC: 18 MG/DL (ref 8–23)
CALCIUM SERPL-MCNC: 9.2 MG/DL (ref 8.7–10.5)
CHLORIDE SERPL-SCNC: 103 MMOL/L (ref 95–110)
CO2 SERPL-SCNC: 29 MMOL/L (ref 23–29)
CREAT SERPL-MCNC: 1.4 MG/DL (ref 0.5–1.4)
EST. GFR  (NO RACE VARIABLE): 53.1 ML/MIN/1.73 M^2
GLUCOSE SERPL-MCNC: 107 MG/DL (ref 70–110)
MAGNESIUM SERPL-MCNC: 2.2 MG/DL (ref 1.6–2.6)
POTASSIUM SERPL-SCNC: 3.5 MMOL/L (ref 3.5–5.1)
SODIUM SERPL-SCNC: 140 MMOL/L (ref 136–145)

## 2023-05-08 PROCEDURE — 99499 RISK ADDL DX/OHS AUDIT: ICD-10-PCS | Mod: HCNC,S$GLB,, | Performed by: FAMILY MEDICINE

## 2023-05-08 PROCEDURE — 3074F PR MOST RECENT SYSTOLIC BLOOD PRESSURE < 130 MM HG: ICD-10-PCS | Mod: HCNC,CPTII,S$GLB, | Performed by: FAMILY MEDICINE

## 2023-05-08 PROCEDURE — 83735 ASSAY OF MAGNESIUM: CPT | Mod: HCNC | Performed by: FAMILY MEDICINE

## 2023-05-08 PROCEDURE — 3008F BODY MASS INDEX DOCD: CPT | Mod: HCNC,CPTII,S$GLB, | Performed by: FAMILY MEDICINE

## 2023-05-08 PROCEDURE — 1126F PR PAIN SEVERITY QUANTIFIED, NO PAIN PRESENT: ICD-10-PCS | Mod: HCNC,CPTII,S$GLB, | Performed by: FAMILY MEDICINE

## 2023-05-08 PROCEDURE — 3061F PR NEG MICROALBUMINURIA RESULT DOCUMENTED/REVIEW: ICD-10-PCS | Mod: HCNC,CPTII,S$GLB, | Performed by: FAMILY MEDICINE

## 2023-05-08 PROCEDURE — 3288F PR FALLS RISK ASSESSMENT DOCUMENTED: ICD-10-PCS | Mod: HCNC,CPTII,S$GLB, | Performed by: FAMILY MEDICINE

## 2023-05-08 PROCEDURE — 4010F PR ACE/ARB THEARPY RXD/TAKEN: ICD-10-PCS | Mod: HCNC,CPTII,S$GLB, | Performed by: FAMILY MEDICINE

## 2023-05-08 PROCEDURE — 4010F ACE/ARB THERAPY RXD/TAKEN: CPT | Mod: HCNC,CPTII,S$GLB, | Performed by: FAMILY MEDICINE

## 2023-05-08 PROCEDURE — 3072F LOW RISK FOR RETINOPATHY: CPT | Mod: HCNC,CPTII,S$GLB, | Performed by: FAMILY MEDICINE

## 2023-05-08 PROCEDURE — 99214 PR OFFICE/OUTPT VISIT, EST, LEVL IV, 30-39 MIN: ICD-10-PCS | Mod: HCNC,S$GLB,, | Performed by: FAMILY MEDICINE

## 2023-05-08 PROCEDURE — 3066F NEPHROPATHY DOC TX: CPT | Mod: HCNC,CPTII,S$GLB, | Performed by: FAMILY MEDICINE

## 2023-05-08 PROCEDURE — 3066F PR DOCUMENTATION OF TREATMENT FOR NEPHROPATHY: ICD-10-PCS | Mod: HCNC,CPTII,S$GLB, | Performed by: FAMILY MEDICINE

## 2023-05-08 PROCEDURE — 3078F DIAST BP <80 MM HG: CPT | Mod: HCNC,CPTII,S$GLB, | Performed by: FAMILY MEDICINE

## 2023-05-08 PROCEDURE — 1159F PR MEDICATION LIST DOCUMENTED IN MEDICAL RECORD: ICD-10-PCS | Mod: HCNC,CPTII,S$GLB, | Performed by: FAMILY MEDICINE

## 2023-05-08 PROCEDURE — 3074F SYST BP LT 130 MM HG: CPT | Mod: HCNC,CPTII,S$GLB, | Performed by: FAMILY MEDICINE

## 2023-05-08 PROCEDURE — 3072F PR LOW RISK FOR RETINOPATHY: ICD-10-PCS | Mod: HCNC,CPTII,S$GLB, | Performed by: FAMILY MEDICINE

## 2023-05-08 PROCEDURE — 1159F MED LIST DOCD IN RCRD: CPT | Mod: HCNC,CPTII,S$GLB, | Performed by: FAMILY MEDICINE

## 2023-05-08 PROCEDURE — 3078F PR MOST RECENT DIASTOLIC BLOOD PRESSURE < 80 MM HG: ICD-10-PCS | Mod: HCNC,CPTII,S$GLB, | Performed by: FAMILY MEDICINE

## 2023-05-08 PROCEDURE — 3044F HG A1C LEVEL LT 7.0%: CPT | Mod: HCNC,CPTII,S$GLB, | Performed by: FAMILY MEDICINE

## 2023-05-08 PROCEDURE — 1101F PR PT FALLS ASSESS DOC 0-1 FALLS W/OUT INJ PAST YR: ICD-10-PCS | Mod: HCNC,CPTII,S$GLB, | Performed by: FAMILY MEDICINE

## 2023-05-08 PROCEDURE — 36415 COLL VENOUS BLD VENIPUNCTURE: CPT | Mod: HCNC | Performed by: FAMILY MEDICINE

## 2023-05-08 PROCEDURE — 99499 UNLISTED E&M SERVICE: CPT | Mod: HCNC,S$GLB,, | Performed by: FAMILY MEDICINE

## 2023-05-08 PROCEDURE — 99999 PR PBB SHADOW E&M-EST. PATIENT-LVL V: CPT | Mod: PBBFAC,HCNC,, | Performed by: FAMILY MEDICINE

## 2023-05-08 PROCEDURE — 3008F PR BODY MASS INDEX (BMI) DOCUMENTED: ICD-10-PCS | Mod: HCNC,CPTII,S$GLB, | Performed by: FAMILY MEDICINE

## 2023-05-08 PROCEDURE — 3044F PR MOST RECENT HEMOGLOBIN A1C LEVEL <7.0%: ICD-10-PCS | Mod: HCNC,CPTII,S$GLB, | Performed by: FAMILY MEDICINE

## 2023-05-08 PROCEDURE — 1101F PT FALLS ASSESS-DOCD LE1/YR: CPT | Mod: HCNC,CPTII,S$GLB, | Performed by: FAMILY MEDICINE

## 2023-05-08 PROCEDURE — 99214 OFFICE O/P EST MOD 30 MIN: CPT | Mod: HCNC,S$GLB,, | Performed by: FAMILY MEDICINE

## 2023-05-08 PROCEDURE — 3061F NEG MICROALBUMINURIA REV: CPT | Mod: HCNC,CPTII,S$GLB, | Performed by: FAMILY MEDICINE

## 2023-05-08 PROCEDURE — 3288F FALL RISK ASSESSMENT DOCD: CPT | Mod: HCNC,CPTII,S$GLB, | Performed by: FAMILY MEDICINE

## 2023-05-08 PROCEDURE — 80048 BASIC METABOLIC PNL TOTAL CA: CPT | Mod: HCNC | Performed by: FAMILY MEDICINE

## 2023-05-08 PROCEDURE — 1126F AMNT PAIN NOTED NONE PRSNT: CPT | Mod: HCNC,CPTII,S$GLB, | Performed by: FAMILY MEDICINE

## 2023-05-08 PROCEDURE — 99999 PR PBB SHADOW E&M-EST. PATIENT-LVL V: ICD-10-PCS | Mod: PBBFAC,HCNC,, | Performed by: FAMILY MEDICINE

## 2023-05-09 NOTE — PROGRESS NOTES
Subjective:       Patient ID: Edin Green is a . male.    Chief Complaint: Multiple issues see below    HPI Multiple issues see below\        type 2 diabetes mellitus ; stable;        Depression anxiety : seeing psychiatrist; ; mood ok per him;         Graves utd dr story tsh ok last august 2020 but then and now had been off methimaz for many months. No hyperthy shympt; nl tsh     He is holding off f/u neurol for poss parkinsons/dementia. Angelica to be mainly psychiatric related . See prior notes    Hypertension: blood pressure normal. No home bps.  No orthostasis sympt    Prost Ca completion of brachytherapy  rad onc. Moveddue urol      Past Medical History:   Diagnosis Date    Anemia     Anxiety     Asthma     Benign hematuria     Cataract OU    Colon polyp     dr strauss    Depression     dr garcia psychiatrist in     ED (erectile dysfunction)     Graves disease     story    HTN (hypertension)     Hypercholesteremia     Hypertensive retinopathy of both eyes     Hypogonadism     LAURENCE (obstructive sleep apnea)     Pneumonia     Prostate cancer 7/15/2019    Trouble in sleeping     Type 2 diabetes mellitus      Past Surgical History:   Procedure Laterality Date    BRACHYTHERAPY N/A 9/10/2019    Procedure: BRACHYTHERAPY;  Surgeon: Kiel Ochoa IV, MD;  Location: [facility] OR;  Service: Urology;  Laterality: N/A;    RADIOACTIVE SEED IMPLANTATION OF PROSTATE N/A 9/10/2019    Procedure: INSERTION, RADIOACTIVE SEED, PROSTATE;  Surgeon: Kiel Ochoa IV, MD;  Location: [facility] OR;  Service: Urology;  Laterality: N/A;    ULTRASOUND GUIDANCE N/A 9/10/2019    Procedure: ULTRASOUND GUIDANCE;  Surgeon: Kiel Ochoa IV, MD;  Location: [facility] OR;  Service: Urology;  Laterality: N/A;     Family History   Problem Relation Age of Onset    Hypertension Unknown     Heart defect Mother     Strabismus Neg Hx     Retinal detachment Neg Hx     Macular degeneration Neg Hx     Glaucoma Neg Hx     Blindness Neg Hx     Amblyopia Neg  Hx      Social History     Socioeconomic History    Marital status:      Spouse name: CHUCHO    Number of children: 3    Years of education: Not on file    Highest education level: Not on file   Occupational History    Not on file   Tobacco Use    Smoking status: Never Smoker    Smokeless tobacco: Never Used   Substance and Sexual Activity    Alcohol use: Yes     Alcohol/week: 1.0 standard drink     Types: 1 Cans of beer per week     Comment: No alcohol 72h prior to sx    Drug use: No    Sexual activity: Yes     Partners: Female   Other Topics Concern    Not on file   Social History Narrative    Wears a seatbelt.     Social Determinants of Health     Financial Resource Strain:     Difficulty of Paying Living Expenses:    Food Insecurity:     Worried About Running Out of Food in the Last Year:     Ran Out of Food in the Last Year:    Transportation Needs:     Lack of Transportation (Medical):     Lack of Transportation (Non-Medical):    Physical Activity:     Days of Exercise per Week:     Minutes of Exercise per Session:    Stress:     Feeling of Stress :    Social Connections:     Frequency of Communication with Friends and Family:     Frequency of Social Gatherings with Friends and Family:     Attends Spiritism Services:     Active Member of Clubs or Organizations:     Attends Club or Organization Meetings:     Marital Status:          Review of Systems    Chest: no shortness of breath  Abd: no abd pain  Remainder review of systems negative    Objective:   Wife here  Physical Exam   Constitutional: He is oriented to person, place, and time. He appears well-developed and well-nourished.   Eyes: Pupils are equal, round, and reactive to light. Conjunctivae and EOM are normal. No scleral icterus.   Neck: Normal range of motion. Neck supple. Carotid bruit is not present.   Cardiovascular: Normal rate, regular rhythm and normal heart sounds. Exam reveals no gallop and no friction rub.   No murmur  heard.  Pulmonary/Chest: Effort normal and breath sounds normal. He has no wheezes.    abd +bs softntnd nohsm no mass  Neurological: He is alert and oriented to person, place, and time. He displays normal reflexes. No cranial nerve deficit. He exhibits normal muscle tone. Coordination normal.   Skin: Skin is warm and dry. No rash noted. No erythema.   Psychiatric: He has a normal mood and affect. His behavior is normal. Judgment and thought content normal.   Nursing note and vitals reviewed.    Bilateral feet with normal monofilament testing and no lesions.  Bilat dors pedis pulses 2+     Assessment:       1. Type 2 diabetes mellitus without complication, without long-term current use of insulin    2.    3. Hyperthyroidism    4. Hypogonadism in male    5. Graves disease    6. Essential hypertension    7. Depression, unspecified depression type              Prost cancer  Low k  Plan:          F/u urol ,       1. Primary hypertension    2. Hyperthyroidism  Overview:  dr aydee gonzalez      3. Type 2 diabetes mellitus with complication  -     Hemoglobin A1C; Future; Expected date: 11/04/2023  -     Lipid Panel; Future; Expected date: 11/04/2023  -     Comprehensive Metabolic Panel; Future; Expected date: 11/04/2023    4. Prostate cancer  -     Prostate Specific Antigen, Diagnostic; Future; Expected date: 05/08/2023  -     Ambulatory referral/consult to Urology; Future; Expected date: 05/15/2023    5. Hypokalemia  -     Basic Metabolic Panel; Future; Expected date: 05/08/2023  -     Magnesium; Future; Expected date: 05/08/2023    6. Screen for colon cancer  -     Ambulatory referral/consult to Endo Procedure ; Future; Expected date: 05/09/2023     Lab and follow up after in 6 months Margaret Canales, mag, psa today

## 2023-05-17 ENCOUNTER — HOSPITAL ENCOUNTER (OUTPATIENT)
Dept: PREADMISSION TESTING | Facility: HOSPITAL | Age: 73
Discharge: HOME | End: 2023-05-17
Attending: INTERNAL MEDICINE
Payer: MEDICARE

## 2023-05-17 DIAGNOSIS — Z12.11 SCREEN FOR COLON CANCER: ICD-10-CM

## 2023-05-23 ENCOUNTER — HOSPITAL ENCOUNTER (OUTPATIENT)
Dept: PREADMISSION TESTING | Facility: HOSPITAL | Age: 73
Discharge: HOME | End: 2023-05-23
Attending: INTERNAL MEDICINE
Payer: MEDICARE

## 2023-05-25 ENCOUNTER — HOSPITAL ENCOUNTER (OUTPATIENT)
Dept: PREADMISSION TESTING | Facility: HOSPITAL | Age: 73
Discharge: HOME | End: 2023-05-25
Attending: INTERNAL MEDICINE
Payer: MEDICARE

## 2023-05-25 DIAGNOSIS — Z12.11 SCREEN FOR COLON CANCER: Primary | ICD-10-CM

## 2023-05-25 RX ORDER — POLYETHYLENE GLYCOL 3350, SODIUM SULFATE ANHYDROUS, SODIUM BICARBONATE, SODIUM CHLORIDE, POTASSIUM CHLORIDE 236; 22.74; 6.74; 5.86; 2.97 G/4L; G/4L; G/4L; G/4L; G/4L
4 POWDER, FOR SOLUTION ORAL ONCE
Qty: 4000 ML | Refills: 0 | Status: SHIPPED | OUTPATIENT
Start: 2023-05-25 | End: 2023-05-25

## 2023-06-05 ENCOUNTER — ANESTHESIA EVENT (OUTPATIENT)
Dept: ENDOSCOPY | Facility: HOSPITAL | Age: 73
End: 2023-06-05
Payer: MEDICARE

## 2023-06-05 NOTE — ANESTHESIA PREPROCEDURE EVALUATION
06/05/2023  Edin Green is a 72 y.o., male.  Past Medical History:   Diagnosis Date    Anemia     Anxiety     Asthma     Benign hematuria     Cataract OU    Colon polyp     dr strauss    Depression     dr garcia psychiatrist in     ED (erectile dysfunction)     Graves disease     story    HTN (hypertension)     Hypercholesteremia     Hypertensive retinopathy of both eyes     Hypogonadism     LAURENCE (obstructive sleep apnea)     Pneumonia     Prostate cancer 7/15/2019    Trouble in sleeping     Type 2 diabetes mellitus      Past Surgical History:   Procedure Laterality Date    BRACHYTHERAPY N/A 09/10/2019    Procedure: BRACHYTHERAPY;  Surgeon: Kiel Ochoa IV, MD;  Location: [facility] OR;  Service: Urology;  Laterality: N/A;    PROSTATE SURGERY      RADIOACTIVE SEED IMPLANTATION OF PROSTATE N/A 09/10/2019    Procedure: INSERTION, RADIOACTIVE SEED, PROSTATE;  Surgeon: Kiel Ochoa IV, MD;  Location: [facility] OR;  Service: Urology;  Laterality: N/A;    ULTRASOUND GUIDANCE N/A 09/10/2019    Procedure: ULTRASOUND GUIDANCE;  Surgeon: Kiel Ochoa IV, MD;  Location: [facility] OR;  Service: Urology;  Laterality: N/A;         Pre-op Assessment    I have reviewed the Patient Summary Reports.     I have reviewed the Nursing Notes. I have reviewed the NPO Status.   I have reviewed the Medications.     Review of Systems  Anesthesia Hx:  No problems with previous Anesthesia    Cardiovascular:   Hypertension    Pulmonary:   Pneumonia Asthma Sleep Apnea    Renal/:   Chronic Renal Disease, CKD Stage 3a   Hepatic/GI:   Bowel Prep. screening   Endocrine:   Diabetes, type 2 Hyperthyroidism    Psych:   Psychiatric History anxiety depression          Physical Exam  General: Well nourished, Cooperative, Alert and Oriented    Airway:  Mallampati: II   Mouth Opening: Normal  TM Distance: Normal  Tongue:  Normal  Neck ROM: Normal ROM    Dental:  Partial Dentures        Anesthesia Plan  Type of Anesthesia, risks & benefits discussed:    Anesthesia Type: Gen Natural Airway  Intra-op Monitoring Plan: Standard ASA Monitors  Induction:  IV  Informed Consent: Informed consent signed with the Patient and all parties understand the risks and agree with anesthesia plan.  All questions answered. Patient consented to blood products? No  ASA Score: 3  Day of Surgery Review of History & Physical: H&P Update referred to the surgeon/provider.    Ready For Surgery From Anesthesia Perspective.     .

## 2023-06-07 ENCOUNTER — ANESTHESIA (OUTPATIENT)
Dept: ENDOSCOPY | Facility: HOSPITAL | Age: 73
End: 2023-06-07
Payer: MEDICARE

## 2023-06-07 ENCOUNTER — HOSPITAL ENCOUNTER (OUTPATIENT)
Facility: HOSPITAL | Age: 73
Discharge: HOME | End: 2023-06-07
Attending: INTERNAL MEDICINE | Admitting: INTERNAL MEDICINE
Payer: MEDICARE

## 2023-06-07 VITALS
TEMPERATURE: 98 F | HEIGHT: 74 IN | RESPIRATION RATE: 14 BRPM | BODY MASS INDEX: 26.56 KG/M2 | HEART RATE: 82 BPM | WEIGHT: 207 LBS | DIASTOLIC BLOOD PRESSURE: 89 MMHG | OXYGEN SATURATION: 99 % | SYSTOLIC BLOOD PRESSURE: 152 MMHG

## 2023-06-07 DIAGNOSIS — Z12.11 COLON CANCER SCREENING: Primary | ICD-10-CM

## 2023-06-07 LAB — POCT GLUCOSE: 101 MG/DL (ref 70–110)

## 2023-06-07 PROCEDURE — 63600175 PHARM REV CODE 636 W HCPCS: Performed by: INTERNAL MEDICINE

## 2023-06-07 PROCEDURE — 45380 PR COLONOSCOPY,BIOPSY: ICD-10-PCS | Mod: PT,,, | Performed by: INTERNAL MEDICINE

## 2023-06-07 PROCEDURE — 45380 COLONOSCOPY AND BIOPSY: CPT | Mod: PT,,, | Performed by: INTERNAL MEDICINE

## 2023-06-07 PROCEDURE — 88305 TISSUE EXAM BY PATHOLOGIST: CPT | Mod: 26,,, | Performed by: PATHOLOGY

## 2023-06-07 PROCEDURE — D9220A PRA ANESTHESIA: ICD-10-PCS | Mod: PT,,, | Performed by: NURSE ANESTHETIST, CERTIFIED REGISTERED

## 2023-06-07 PROCEDURE — 37000008 HC ANESTHESIA 1ST 15 MINUTES: Performed by: INTERNAL MEDICINE

## 2023-06-07 PROCEDURE — 37000009 HC ANESTHESIA EA ADD 15 MINS: Performed by: INTERNAL MEDICINE

## 2023-06-07 PROCEDURE — 88305 TISSUE EXAM BY PATHOLOGIST: CPT | Performed by: PATHOLOGY

## 2023-06-07 PROCEDURE — 25000003 PHARM REV CODE 250: Performed by: INTERNAL MEDICINE

## 2023-06-07 PROCEDURE — 63600175 PHARM REV CODE 636 W HCPCS: Performed by: NURSE ANESTHETIST, CERTIFIED REGISTERED

## 2023-06-07 PROCEDURE — 45380 COLONOSCOPY AND BIOPSY: CPT | Mod: PT | Performed by: INTERNAL MEDICINE

## 2023-06-07 PROCEDURE — 25000003 PHARM REV CODE 250: Performed by: NURSE ANESTHETIST, CERTIFIED REGISTERED

## 2023-06-07 PROCEDURE — 27201012 HC FORCEPS, HOT/COLD, DISP: Performed by: INTERNAL MEDICINE

## 2023-06-07 PROCEDURE — 88305 TISSUE EXAM BY PATHOLOGIST: ICD-10-PCS | Mod: 26,,, | Performed by: PATHOLOGY

## 2023-06-07 PROCEDURE — D9220A PRA ANESTHESIA: Mod: PT,,, | Performed by: NURSE ANESTHETIST, CERTIFIED REGISTERED

## 2023-06-07 RX ORDER — PROPOFOL 10 MG/ML
INJECTION, EMULSION INTRAVENOUS
Status: DISCONTINUED | OUTPATIENT
Start: 2023-06-07 | End: 2023-06-07

## 2023-06-07 RX ORDER — LIDOCAINE HYDROCHLORIDE 20 MG/ML
INJECTION INTRAVENOUS
Status: DISCONTINUED | OUTPATIENT
Start: 2023-06-07 | End: 2023-06-07

## 2023-06-07 RX ORDER — DEXTROMETHORPHAN/PSEUDOEPHED 2.5-7.5/.8
DROPS ORAL
Status: DISCONTINUED | OUTPATIENT
Start: 2023-06-07 | End: 2023-06-07 | Stop reason: HOSPADM

## 2023-06-07 RX ORDER — SODIUM CHLORIDE, SODIUM LACTATE, POTASSIUM CHLORIDE, CALCIUM CHLORIDE 600; 310; 30; 20 MG/100ML; MG/100ML; MG/100ML; MG/100ML
INJECTION, SOLUTION INTRAVENOUS CONTINUOUS
Status: ACTIVE | OUTPATIENT
Start: 2023-06-07

## 2023-06-07 RX ADMIN — SODIUM CHLORIDE, SODIUM LACTATE, POTASSIUM CHLORIDE, AND CALCIUM CHLORIDE: 600; 310; 30; 20 INJECTION, SOLUTION INTRAVENOUS at 12:06

## 2023-06-07 RX ADMIN — PROPOFOL 30 MG: 10 INJECTION, EMULSION INTRAVENOUS at 12:06

## 2023-06-07 RX ADMIN — PROPOFOL 20 MG: 10 INJECTION, EMULSION INTRAVENOUS at 12:06

## 2023-06-07 RX ADMIN — LIDOCAINE HYDROCHLORIDE 40 MG: 20 INJECTION INTRAVENOUS at 12:06

## 2023-06-07 RX ADMIN — PROPOFOL 50 MG: 10 INJECTION, EMULSION INTRAVENOUS at 12:06

## 2023-06-07 NOTE — PLAN OF CARE
Reviewed and completed all discharge orders. Printed AVS and educated patient and family member of its entirety, including physician's orders, follow-up appt, medications, when to call, and when to report to the emergency room. I encouraged questions, answered them thoroughly, and evaluated my instructions via teach-back method. Patient has met all hospital discharge criteria at this point. Patient wheeled to car by RN.

## 2023-06-07 NOTE — DISCHARGE INSTRUCTIONS
1 polyp, pathology results available in 7-10 days, approximately    This is the last colonoscopy needed.

## 2023-06-07 NOTE — PROVATION PATIENT INSTRUCTIONS
Discharge Summary/Instructions after an Endoscopic Procedure  Patient Name: Edin Green  Patient MRN: [id number]  Patient YOB: 1950 Wednesday, June 7, 2023  Marcie Funes MD  Dear patient,  As a result of recent federal legislation (The Federal Cures Act), you may   receive lab or pathology results from your procedure in your [facility]   account before your physician is able to contact you. Your physician or   their representative will relay the results to you with their   recommendations at their soonest availability.  Thank you,  RESTRICTIONS:  During your procedure today, you received medications for sedation.  These   medications may affect your judgment, balance and coordination.  Therefore,   for 24 hours, you have the following restrictions:   - DO NOT drive a car, operate machinery, make legal/financial decisions,   sign important papers or drink alcohol.    ACTIVITY:  Today: no heavy lifting, straining or running due to procedural   sedation/anesthesia.  The following day: return to full activity including work.  DIET:  Eat and drink normally unless instructed otherwise.     TREATMENT FOR COMMON SIDE EFFECTS:  - Mild abdominal pain, nausea, belching, bloating or excessive gas:  rest,   eat lightly and use a heating pad.  - Sore Throat: treat with throat lozenges and/or gargle with warm salt   water.  - Because air was used during the procedure, expelling large amounts of air   from your rectum or belching is normal.  - If a bowel prep was taken, you may not have a bowel movement for 1-3 days.    This is normal.  SYMPTOMS TO WATCH FOR AND REPORT TO YOUR PHYSICIAN:  1. Abdominal pain or bloating, other than gas cramps.  2. Chest pain.  3. Back pain.  4. Signs of infection such as: chills or fever occurring within 24 hours   after the procedure.  5. Rectal bleeding, which would show as bright red, maroon, or black stools.   (A tablespoon of blood from the rectum is not serious, especially  if   hemorrhoids are present.)  6. Vomiting.  7. Weakness or dizziness.  GO DIRECTLY TO THE NEAREST EMERGENCY ROOM IF YOU HAVE ANY OF THE FOLLOWING:      Difficulty breathing              Chills and/or fever over 101 F   Persistent vomiting and/or vomiting blood   Severe abdominal pain   Severe chest pain   Black, tarry stools   Bleeding- more than one tablespoon   Any other symptom or condition that you feel may need urgent attention  Your doctor recommends these additional instructions:  If any biopsies were taken, your doctors clinic will contact you in 1 to 2   weeks with any results.  - Patient has a contact number available for emergencies.  The signs and   symptoms of potential delayed complications were discussed with the   patient.  Return to normal activities tomorrow.  Written discharge   instructions were provided to the patient.   - Discharge patient to home (via wheelchair).   - Resume previous diet today.   - Continue present medications.   - Await pathology results.   - Repeat colonoscopy is not recommended due to current age (66 years or   older) for screening purposes given diminutive polyp on today's exam.  For questions, problems or results please call your physician Marcie Funes MD at Work:  [phone]  If you have any questions about the above instructions, call the GI   department at [phone] or call the endoscopy unit at [phone]   from 7am until 3 pm.  [facility] [place], EMERGENCY ROOM PHONE NUMBER:   [phone]  IF A COMPLICATION OR EMERGENCY SITUATION ARISES AND YOU ARE UNABLE TO REACH   YOUR PHYSICIAN - GO DIRECTLY TO THE EMERGENCY ROOM.  I have read or have had read to me these discharge instructions for my   procedure and have received a written copy.  I understand these   instructions and will follow-up with my physician if I have any questions.     __________________________________       _____________________________________  Nurse Signature                                           Patient/Designated   Responsible Party Signature  MD Marcie Velez MD  6/7/2023 12:52:07 PM  This report has been verified and signed electronically.  Dear patient,  As a result of recent federal legislation (The Federal Cures Act), you may   receive lab or pathology results from your procedure in your [facility]   account before your physician is able to contact you. Your physician or   their representative will relay the results to you with their   recommendations at their soonest availability.  Thank you,  PROVATION

## 2023-06-07 NOTE — ANESTHESIA POSTPROCEDURE EVALUATION
Anesthesia Post Evaluation    Patient: Edin Green    Procedure(s) Performed: Procedure(s) (LRB):  COLONOSCOPY (N/A)    Final Anesthesia Type: general      Patient location during evaluation: GI PACU  Patient participation: Yes- Able to Participate  Level of consciousness: awake and alert  Post-procedure vital signs: reviewed and stable  Pain management: adequate  Airway patency: patent    PONV status at discharge: No PONV  Anesthetic complications: no      Cardiovascular status: hemodynamically stable  Respiratory status: spontaneous ventilation  Hydration status: euvolemic  Follow-up not needed.          Vitals Value Taken Time   /89 06/07/23 1323   Temp 36.7 °C (98 °F) 06/07/23 1252   Pulse 60 06/07/23 1324   Resp 38 06/07/23 1324   SpO2 98 % 06/07/23 1323   Vitals shown include unvalidated device data.      Event Time   Out of Recovery 13:20:07         Pain/Isabelle Score: Isabelle Score: 10 (6/7/2023  1:15 PM)

## 2023-06-07 NOTE — TRANSFER OF CARE
"Anesthesia Transfer of Care Note    Patient: Edin Green    Procedure(s) Performed: Procedure(s) (LRB):  COLONOSCOPY (N/A)    Patient location: PACU    Anesthesia Type: general    Transport from OR: Transported from OR on room air with adequate spontaneous ventilation    Post pain: adequate analgesia    Post assessment: no apparent anesthetic complications    Post vital signs: stable    Level of consciousness: alert    Nausea/Vomiting: no nausea/vomiting    Complications: none    Transfer of care protocol was followed      Last vitals:   Visit Vitals  /72 (BP Location: Right arm, Patient Position: Sitting)   Pulse 74   Temp 36.6 °C (97.8 °F) (Temporal)   Resp 17   Ht 6' 2" (1.88 m)   Wt 93.9 kg (207 lb 0.2 oz)   SpO2 96%   BMI 26.58 kg/m²     "

## 2023-06-07 NOTE — H&P
PRE PROCEDURE H&P    Patient Name: Edin Green  MRN: [id number]  : 1950  Date of Procedure:  2023  Referring Physician: Carter Paris MD  Primary Physician: Carter Paris MD  Procedure Physician: Marcie Funes MD       Planned Procedure: Colonoscopy  Diagnosis: screening for colon cancer  Chief Complaint: Same as above    HPI: Patient is an 72 y.o. male is here for the above.     Last colonoscopy:   Family history: neg   Anticoagulation: none     Past Medical History:   Past Medical History:   Diagnosis Date    Anemia     Anxiety     Asthma     Benign hematuria     Cataract OU    Colon polyp     dr strauss    Depression     dr garcia psychiatrist in     ED (erectile dysfunction)     Graves disease     story    HTN (hypertension)     Hypercholesteremia     Hypertensive retinopathy of both eyes     Hypogonadism     LAURENCE (obstructive sleep apnea)     Pneumonia     Prostate cancer 7/15/2019    Trouble in sleeping     Type 2 diabetes mellitus         Past Surgical History:  Past Surgical History:   Procedure Laterality Date    BRACHYTHERAPY N/A 09/10/2019    Procedure: BRACHYTHERAPY;  Surgeon: Kiel Ochoa IV, MD;  Location: [facility] OR;  Service: Urology;  Laterality: N/A;    PROSTATE SURGERY      RADIOACTIVE SEED IMPLANTATION OF PROSTATE N/A 09/10/2019    Procedure: INSERTION, RADIOACTIVE SEED, PROSTATE;  Surgeon: Kiel Ochoa IV, MD;  Location: [facility] OR;  Service: Urology;  Laterality: N/A;    ULTRASOUND GUIDANCE N/A 09/10/2019    Procedure: ULTRASOUND GUIDANCE;  Surgeon: Kiel Ochoa IV, MD;  Location: [facility] OR;  Service: Urology;  Laterality: N/A;        Home Medications:  Prior to Admission medications    Medication Sig Start Date End Date Taking? Authorizing Provider   amLODIPine (NORVASC) 5 MG tablet TAKE 1 TABLET EVERY DAY 23  Yes Carter Paris MD   aspirin (ECOTRIN) 81 MG EC tablet Take 81 mg by mouth once daily.   Yes Historical Provider   buPROPion (WELLBUTRIN XL)  300 MG 24 hr tablet  4/24/20  Yes Historical Provider   losartan (COZAAR) 100 MG tablet TAKE 1 TABLET EVERY DAY 4/12/23  Yes Carter Paris MD   mirtazapine (REMERON) 15 MG tablet Take 15 mg by mouth every evening. 5/7/21  Yes Historical Provider   OXcarbazepine (TRILEPTAL) 150 MG Tab Take 150 mg by mouth 2 (two) times daily.  3/3/19  Yes Historical Provider   pravastatin (PRAVACHOL) 40 MG tablet TAKE 1 TABLET EVERY DAY 4/27/23  Yes Carter Paris MD   risperiDONE (RISPERDAL M-TABS) 2 MG disintegrating tablet Take 2 mg by mouth nightly.  3/3/19  Yes Historical Provider   hydroCHLOROthiazide (HYDRODIURIL) 25 MG tablet TAKE 1 TABLET (25 MG TOTAL) BY MOUTH ONCE DAILY. 11/25/22 5/8/23  Carter Paris MD   polyethylene glycol (GLYCOLAX) 17 gram/dose powder  3/16/18   Historical Provider   tadalafil (CIALIS) 20 MG Tab Use one tablet every 36 hours 5/22/17 5/18/20  Carter Paris MD        Allergies:  Review of patient's allergies indicates:   Allergen Reactions    Celebrex [celecoxib]      Lip swelling      Shrimp Itching     States reaction is with frozen shrimp    Tomato Itching    Venom-wasp Swelling        Social History:   Social History     Socioeconomic History    Marital status:      Spouse name: CHUCHO    Number of children: 3   Tobacco Use    Smoking status: Never    Smokeless tobacco: Never   Substance and Sexual Activity    Alcohol use: Not Currently     Comment: No alcohol 72h prior to sx    Drug use: No    Sexual activity: Not Currently     Partners: Female   Social History Narrative    , no smokers or pets in household.       Family History:  Family History   Problem Relation Age of Onset    Hypertension Other     Heart defect Mother     Cancer Maternal Grandmother     Heart disease Sister     Vision loss Brother     Strabismus Neg Hx     Retinal detachment Neg Hx     Macular degeneration Neg Hx     Glaucoma Neg Hx     Blindness Neg Hx     Amblyopia Neg Hx        ROS: No acute cardiac  "events, no acute respiratory complaints.     Physical Exam (all patients):    /72 (BP Location: Right arm, Patient Position: Sitting)   Pulse 74   Temp 97.8 °F (36.6 °C) (Temporal)   Resp 17   Ht 6' 2" (1.88 m)   Wt 93.9 kg (207 lb 0.2 oz)   SpO2 96%   BMI 26.58 kg/m²   Lungs: Clear to auscultation bilaterally, respirations unlabored  Heart: Regular rate and rhythm, S1 and S2 normal, no obvious murmurs  Abdomen:         Soft, non-tender, bowel sounds normal, no masses, no organomegaly    Lab Results   Component Value Date    WBC 4.63 11/22/2022    MCV 90 11/22/2022    RDW 13.5 11/22/2022     11/22/2022     05/08/2023    HGBA1C 6.0 (H) 05/04/2023    BUN 18 05/08/2023     05/08/2023    K 3.5 05/08/2023     05/08/2023        SEDATION PLAN: per anesthesia      History reviewed, vital signs satisfactory, cardiopulmonary status satisfactory, sedation options, risks and plans have been discussed with the patient  All their questions were answered and the patient agrees to the sedation procedures as planned and the patient is deemed an appropriate candidate for the sedation as planned.    Procedure explained to patient, informed consent obtained and placed in chart.    Marcie Funes  6/7/2023  12:16 PM    "

## 2023-06-11 ENCOUNTER — TELEPHONE (OUTPATIENT)
Dept: ADMINISTRATIVE | Facility: HOSPITAL | Age: 73
End: 2023-06-11
Payer: MEDICARE

## 2023-06-12 LAB
FINAL PATHOLOGIC DIAGNOSIS: NORMAL
Lab: NORMAL

## 2023-07-07 ENCOUNTER — TELEPHONE (OUTPATIENT)
Dept: ADMINISTRATIVE | Facility: HOSPITAL | Age: 73
End: 2023-07-07
Payer: MEDICARE

## 2023-07-12 ENCOUNTER — PATIENT MESSAGE (OUTPATIENT)
Dept: INTERNAL MEDICINE | Facility: CLINIC | Age: 73
End: 2023-07-12
Payer: MEDICARE

## 2023-09-19 ENCOUNTER — PATIENT MESSAGE (OUTPATIENT)
Dept: ADMINISTRATIVE | Facility: CLINIC | Age: 73
End: 2023-09-19
Payer: MEDICARE

## 2023-09-21 ENCOUNTER — OFFICE VISIT (OUTPATIENT)
Dept: OPHTHALMOLOGY | Facility: CLINIC | Age: 73
End: 2023-09-21
Payer: MEDICARE

## 2023-09-21 DIAGNOSIS — E11.36 DIABETIC CATARACT OF BOTH EYES: ICD-10-CM

## 2023-09-21 DIAGNOSIS — H52.203 HYPEROPIA WITH ASTIGMATISM AND PRESBYOPIA, BILATERAL: ICD-10-CM

## 2023-09-21 DIAGNOSIS — H25.013 CORTICAL AGE-RELATED CATARACT OF BOTH EYES: ICD-10-CM

## 2023-09-21 DIAGNOSIS — E11.9 TYPE 2 DIABETES MELLITUS WITHOUT RETINOPATHY: Primary | ICD-10-CM

## 2023-09-21 DIAGNOSIS — H52.03 HYPEROPIA WITH ASTIGMATISM AND PRESBYOPIA, BILATERAL: ICD-10-CM

## 2023-09-21 DIAGNOSIS — H25.13 NUCLEAR SCLEROSIS, BILATERAL: ICD-10-CM

## 2023-09-21 DIAGNOSIS — H52.4 HYPEROPIA WITH ASTIGMATISM AND PRESBYOPIA, BILATERAL: ICD-10-CM

## 2023-09-21 DIAGNOSIS — H40.013 OPEN ANGLE WITH BORDERLINE FINDINGS OF BOTH EYES: ICD-10-CM

## 2023-09-21 PROCEDURE — 2023F DILAT RTA XM W/O RTNOPTHY: CPT | Mod: HCNC,CPTII,S$GLB, | Performed by: OPTOMETRIST

## 2023-09-21 PROCEDURE — 3044F PR MOST RECENT HEMOGLOBIN A1C LEVEL <7.0%: ICD-10-PCS | Mod: HCNC,CPTII,S$GLB, | Performed by: OPTOMETRIST

## 2023-09-21 PROCEDURE — 3066F PR DOCUMENTATION OF TREATMENT FOR NEPHROPATHY: ICD-10-PCS | Mod: HCNC,CPTII,S$GLB, | Performed by: OPTOMETRIST

## 2023-09-21 PROCEDURE — 92004 COMPRE OPH EXAM NEW PT 1/>: CPT | Mod: HCNC,S$GLB,, | Performed by: OPTOMETRIST

## 2023-09-21 PROCEDURE — 1160F PR REVIEW ALL MEDS BY PRESCRIBER/CLIN PHARMACIST DOCUMENTED: ICD-10-PCS | Mod: HCNC,CPTII,S$GLB, | Performed by: OPTOMETRIST

## 2023-09-21 PROCEDURE — 4010F ACE/ARB THERAPY RXD/TAKEN: CPT | Mod: HCNC,CPTII,S$GLB, | Performed by: OPTOMETRIST

## 2023-09-21 PROCEDURE — 1159F MED LIST DOCD IN RCRD: CPT | Mod: HCNC,CPTII,S$GLB, | Performed by: OPTOMETRIST

## 2023-09-21 PROCEDURE — 4010F PR ACE/ARB THEARPY RXD/TAKEN: ICD-10-PCS | Mod: HCNC,CPTII,S$GLB, | Performed by: OPTOMETRIST

## 2023-09-21 PROCEDURE — 92133 CPTRZD OPH DX IMG PST SGM ON: CPT | Mod: HCNC,S$GLB,, | Performed by: OPTOMETRIST

## 2023-09-21 PROCEDURE — 2023F PR DILATED RETINAL EXAM W/O EVID OF RETINOPATHY: ICD-10-PCS | Mod: HCNC,CPTII,S$GLB, | Performed by: OPTOMETRIST

## 2023-09-21 PROCEDURE — 3061F NEG MICROALBUMINURIA REV: CPT | Mod: HCNC,CPTII,S$GLB, | Performed by: OPTOMETRIST

## 2023-09-21 PROCEDURE — 1159F PR MEDICATION LIST DOCUMENTED IN MEDICAL RECORD: ICD-10-PCS | Mod: HCNC,CPTII,S$GLB, | Performed by: OPTOMETRIST

## 2023-09-21 PROCEDURE — 99999 PR PBB SHADOW E&M-EST. PATIENT-LVL III: CPT | Mod: PBBFAC,HCNC,, | Performed by: OPTOMETRIST

## 2023-09-21 PROCEDURE — 3066F NEPHROPATHY DOC TX: CPT | Mod: HCNC,CPTII,S$GLB, | Performed by: OPTOMETRIST

## 2023-09-21 PROCEDURE — 1160F RVW MEDS BY RX/DR IN RCRD: CPT | Mod: HCNC,CPTII,S$GLB, | Performed by: OPTOMETRIST

## 2023-09-21 PROCEDURE — 92004 PR EYE EXAM, NEW PATIENT,COMPREHESV: ICD-10-PCS | Mod: HCNC,S$GLB,, | Performed by: OPTOMETRIST

## 2023-09-21 PROCEDURE — 99999 PR PBB SHADOW E&M-EST. PATIENT-LVL III: ICD-10-PCS | Mod: PBBFAC,HCNC,, | Performed by: OPTOMETRIST

## 2023-09-21 PROCEDURE — 3061F PR NEG MICROALBUMINURIA RESULT DOCUMENTED/REVIEW: ICD-10-PCS | Mod: HCNC,CPTII,S$GLB, | Performed by: OPTOMETRIST

## 2023-09-21 PROCEDURE — 3044F HG A1C LEVEL LT 7.0%: CPT | Mod: HCNC,CPTII,S$GLB, | Performed by: OPTOMETRIST

## 2023-09-21 PROCEDURE — 92015 DETERMINE REFRACTIVE STATE: CPT | Mod: HCNC,S$GLB,, | Performed by: OPTOMETRIST

## 2023-09-21 PROCEDURE — 92133 POSTERIOR SEGMENT OCT OPTIC NERVE(OCULAR COHERENCE TOMOGRAPHY) - OU - BOTH EYES: ICD-10-PCS | Mod: HCNC,S$GLB,, | Performed by: OPTOMETRIST

## 2023-09-21 PROCEDURE — 92015 PR REFRACTION: ICD-10-PCS | Mod: HCNC,S$GLB,, | Performed by: OPTOMETRIST

## 2023-09-21 NOTE — PROGRESS NOTES
HPI     Diabetic Eye Exam            Comments: Diagnosed with diabetes approx 2019   Lab Results       Component                Value               Date                       HGBA1C                   6.0 (H)             05/04/2023              Vision changes since last eye exam?: no  Any eye pain today: no  Other ocular symptoms: floaters  Interested in contact lens fitting today? no           Last edited by Clarissa Diaz MA on 9/21/2023  1:39 PM.            Assessment /Plan     For exam results, see Encounter Report.    Type 2 diabetes mellitus without retinopathy  There was no diabetic retinopathy present in either eye today.   Recommended that pt continue care with PCP and/or specialists regarding diabetes.  Follow-up dilated eye exam recommended in 12 months, sooner with any vision changes or new concerns.    Nuclear sclerosis, bilateral  Cortical age-related cataract of both eyes  Diabetic cataract of both eyes  Visually significant cataract.  Patient is at the option stage.   Cataract surgery will improve vision, but necessity is dependent on patient's symptoms.   Pt declines surgical consult at this time.  Will continue to monitor over the next 12 months. Pt to call or RTC with any significant change in vision prior to next visit.    Open angle with borderline findings of both eyes  -     Posterior Segment OCT Optic Nerve- Both eyes  Suspect based on ONH cupping/appearance OU  Normal IOP today OD, OS  Normal NFL scan OD, very slight thinning OS  Monitor 6 months        Hyperopia with astigmatism and presbyopia, bilateral  Eyeglass Final Rx       Eyeglass Final Rx         Sphere Cylinder Axis Add    Right +1.00 +1.00 160 +2.50    Left +0.75 +1.00 010 +2.50      Expiration Date: 9/21/2024                    RTC 6 months for 24-2VF, IOP check and pach or PRN  Discussed above and all questions were answered.

## 2023-09-23 ENCOUNTER — IMMUNIZATION (OUTPATIENT)
Dept: INTERNAL MEDICINE | Facility: CLINIC | Age: 73
End: 2023-09-23
Payer: MEDICARE

## 2023-09-23 PROCEDURE — 90694 VACC AIIV4 NO PRSRV 0.5ML IM: CPT | Mod: HCNC,S$GLB,, | Performed by: FAMILY MEDICINE

## 2023-09-23 PROCEDURE — G0008 FLU VACCINE - QUADRIVALENT - ADJUVANTED: ICD-10-PCS | Mod: HCNC,S$GLB,, | Performed by: FAMILY MEDICINE

## 2023-09-23 PROCEDURE — 90694 FLU VACCINE - QUADRIVALENT - ADJUVANTED: ICD-10-PCS | Mod: HCNC,S$GLB,, | Performed by: FAMILY MEDICINE

## 2023-09-23 PROCEDURE — G0008 ADMIN INFLUENZA VIRUS VAC: HCPCS | Mod: HCNC,S$GLB,, | Performed by: FAMILY MEDICINE

## 2023-10-26 ENCOUNTER — OFFICE VISIT (OUTPATIENT)
Dept: FAMILY MEDICINE | Facility: CLINIC | Age: 73
End: 2023-10-26
Payer: MEDICARE

## 2023-10-26 VITALS
TEMPERATURE: 97 F | WEIGHT: 209.19 LBS | HEART RATE: 70 BPM | SYSTOLIC BLOOD PRESSURE: 126 MMHG | RESPIRATION RATE: 20 BRPM | DIASTOLIC BLOOD PRESSURE: 76 MMHG | HEIGHT: 74 IN | BODY MASS INDEX: 26.85 KG/M2 | OXYGEN SATURATION: 94 %

## 2023-10-26 DIAGNOSIS — C61 PROSTATE CANCER: ICD-10-CM

## 2023-10-26 DIAGNOSIS — F41.9 ANXIETY AND DEPRESSION: ICD-10-CM

## 2023-10-26 DIAGNOSIS — E11.43 TYPE II DIABETES MELLITUS WITH PERIPHERAL AUTONOMIC NEUROPATHY: ICD-10-CM

## 2023-10-26 DIAGNOSIS — E29.1 HYPOGONADISM IN MALE: ICD-10-CM

## 2023-10-26 DIAGNOSIS — E05.90 HYPERTHYROIDISM: ICD-10-CM

## 2023-10-26 DIAGNOSIS — H25.013 CORTICAL SENILE CATARACT OF BOTH EYES: ICD-10-CM

## 2023-10-26 DIAGNOSIS — J45.20 MILD INTERMITTENT ASTHMA WITHOUT COMPLICATION: ICD-10-CM

## 2023-10-26 DIAGNOSIS — Z00.00 ENCOUNTER FOR PREVENTIVE HEALTH EXAMINATION: ICD-10-CM

## 2023-10-26 DIAGNOSIS — I10 PRIMARY HYPERTENSION: ICD-10-CM

## 2023-10-26 DIAGNOSIS — N18.31 STAGE 3A CHRONIC KIDNEY DISEASE: ICD-10-CM

## 2023-10-26 DIAGNOSIS — F32.A ANXIETY AND DEPRESSION: ICD-10-CM

## 2023-10-26 DIAGNOSIS — H35.033 HYPERTENSIVE RETINOPATHY OF BOTH EYES: ICD-10-CM

## 2023-10-26 DIAGNOSIS — E78.00 HYPERCHOLESTEREMIA: ICD-10-CM

## 2023-10-26 DIAGNOSIS — G47.33 OSA (OBSTRUCTIVE SLEEP APNEA): ICD-10-CM

## 2023-10-26 DIAGNOSIS — F32.2 CURRENT SEVERE EPISODE OF MAJOR DEPRESSIVE DISORDER WITHOUT PSYCHOTIC FEATURES WITHOUT PRIOR EPISODE: ICD-10-CM

## 2023-10-26 DIAGNOSIS — Z00.00 ENCOUNTER FOR MEDICARE ANNUAL WELLNESS EXAM: Primary | ICD-10-CM

## 2023-10-26 PROBLEM — Z12.11 COLON CANCER SCREENING: Status: RESOLVED | Noted: 2023-06-07 | Resolved: 2023-10-26

## 2023-10-26 PROCEDURE — 3288F PR FALLS RISK ASSESSMENT DOCUMENTED: ICD-10-PCS | Mod: HCNC,CPTII,S$GLB, | Performed by: NURSE PRACTITIONER

## 2023-10-26 PROCEDURE — 3044F PR MOST RECENT HEMOGLOBIN A1C LEVEL <7.0%: ICD-10-PCS | Mod: HCNC,CPTII,S$GLB, | Performed by: NURSE PRACTITIONER

## 2023-10-26 PROCEDURE — 3044F HG A1C LEVEL LT 7.0%: CPT | Mod: HCNC,CPTII,S$GLB, | Performed by: NURSE PRACTITIONER

## 2023-10-26 PROCEDURE — 1101F PT FALLS ASSESS-DOCD LE1/YR: CPT | Mod: HCNC,CPTII,S$GLB, | Performed by: NURSE PRACTITIONER

## 2023-10-26 PROCEDURE — 3074F SYST BP LT 130 MM HG: CPT | Mod: HCNC,CPTII,S$GLB, | Performed by: NURSE PRACTITIONER

## 2023-10-26 PROCEDURE — 3078F DIAST BP <80 MM HG: CPT | Mod: HCNC,CPTII,S$GLB, | Performed by: NURSE PRACTITIONER

## 2023-10-26 PROCEDURE — 1126F PR PAIN SEVERITY QUANTIFIED, NO PAIN PRESENT: ICD-10-PCS | Mod: HCNC,CPTII,S$GLB, | Performed by: NURSE PRACTITIONER

## 2023-10-26 PROCEDURE — 3061F PR NEG MICROALBUMINURIA RESULT DOCUMENTED/REVIEW: ICD-10-PCS | Mod: HCNC,CPTII,S$GLB, | Performed by: NURSE PRACTITIONER

## 2023-10-26 PROCEDURE — 3074F PR MOST RECENT SYSTOLIC BLOOD PRESSURE < 130 MM HG: ICD-10-PCS | Mod: HCNC,CPTII,S$GLB, | Performed by: NURSE PRACTITIONER

## 2023-10-26 PROCEDURE — 1160F RVW MEDS BY RX/DR IN RCRD: CPT | Mod: HCNC,CPTII,S$GLB, | Performed by: NURSE PRACTITIONER

## 2023-10-26 PROCEDURE — 4010F PR ACE/ARB THEARPY RXD/TAKEN: ICD-10-PCS | Mod: HCNC,CPTII,S$GLB, | Performed by: NURSE PRACTITIONER

## 2023-10-26 PROCEDURE — 3066F PR DOCUMENTATION OF TREATMENT FOR NEPHROPATHY: ICD-10-PCS | Mod: HCNC,CPTII,S$GLB, | Performed by: NURSE PRACTITIONER

## 2023-10-26 PROCEDURE — 3078F PR MOST RECENT DIASTOLIC BLOOD PRESSURE < 80 MM HG: ICD-10-PCS | Mod: HCNC,CPTII,S$GLB, | Performed by: NURSE PRACTITIONER

## 2023-10-26 PROCEDURE — 3288F FALL RISK ASSESSMENT DOCD: CPT | Mod: HCNC,CPTII,S$GLB, | Performed by: NURSE PRACTITIONER

## 2023-10-26 PROCEDURE — 99999 PR PBB SHADOW E&M-EST. PATIENT-LVL V: CPT | Mod: PBBFAC,HCNC,, | Performed by: NURSE PRACTITIONER

## 2023-10-26 PROCEDURE — 1126F AMNT PAIN NOTED NONE PRSNT: CPT | Mod: HCNC,CPTII,S$GLB, | Performed by: NURSE PRACTITIONER

## 2023-10-26 PROCEDURE — 1159F MED LIST DOCD IN RCRD: CPT | Mod: HCNC,CPTII,S$GLB, | Performed by: NURSE PRACTITIONER

## 2023-10-26 PROCEDURE — 4010F ACE/ARB THERAPY RXD/TAKEN: CPT | Mod: HCNC,CPTII,S$GLB, | Performed by: NURSE PRACTITIONER

## 2023-10-26 PROCEDURE — 1160F PR REVIEW ALL MEDS BY PRESCRIBER/CLIN PHARMACIST DOCUMENTED: ICD-10-PCS | Mod: HCNC,CPTII,S$GLB, | Performed by: NURSE PRACTITIONER

## 2023-10-26 PROCEDURE — G0439 PPPS, SUBSEQ VISIT: HCPCS | Mod: HCNC,S$GLB,, | Performed by: NURSE PRACTITIONER

## 2023-10-26 PROCEDURE — 1170F PR FUNCTIONAL STATUS ASSESSED: ICD-10-PCS | Mod: HCNC,CPTII,S$GLB, | Performed by: NURSE PRACTITIONER

## 2023-10-26 PROCEDURE — G0439 PR MEDICARE ANNUAL WELLNESS SUBSEQUENT VISIT: ICD-10-PCS | Mod: HCNC,S$GLB,, | Performed by: NURSE PRACTITIONER

## 2023-10-26 PROCEDURE — 1159F PR MEDICATION LIST DOCUMENTED IN MEDICAL RECORD: ICD-10-PCS | Mod: HCNC,CPTII,S$GLB, | Performed by: NURSE PRACTITIONER

## 2023-10-26 PROCEDURE — 3066F NEPHROPATHY DOC TX: CPT | Mod: HCNC,CPTII,S$GLB, | Performed by: NURSE PRACTITIONER

## 2023-10-26 PROCEDURE — 99999 PR PBB SHADOW E&M-EST. PATIENT-LVL V: ICD-10-PCS | Mod: PBBFAC,HCNC,, | Performed by: NURSE PRACTITIONER

## 2023-10-26 PROCEDURE — 1170F FXNL STATUS ASSESSED: CPT | Mod: HCNC,CPTII,S$GLB, | Performed by: NURSE PRACTITIONER

## 2023-10-26 PROCEDURE — 3061F NEG MICROALBUMINURIA REV: CPT | Mod: HCNC,CPTII,S$GLB, | Performed by: NURSE PRACTITIONER

## 2023-10-26 PROCEDURE — 1101F PR PT FALLS ASSESS DOC 0-1 FALLS W/OUT INJ PAST YR: ICD-10-PCS | Mod: HCNC,CPTII,S$GLB, | Performed by: NURSE PRACTITIONER

## 2023-10-26 RX ORDER — OXCARBAZEPINE 150 MG/1
1 TABLET, FILM COATED ORAL NIGHTLY
COMMUNITY
Start: 2021-01-15

## 2023-10-26 RX ORDER — MIRTAZAPINE 7.5 MG/1
1 TABLET, FILM COATED ORAL NIGHTLY
COMMUNITY
Start: 2021-01-15

## 2023-10-26 NOTE — PROGRESS NOTES
"  Edin Green presented for a  Medicare AWV and comprehensive Health Risk Assessment today. The following components were reviewed and updated:  Patient accompanied by wife  , who was present throughout the visit and aided in information gathering.        Medical history  Family History  Social history  Allergies and Current Medications  Health Risk Assessment  Health Maintenance  Care Team         ** See Completed Assessments for Annual Wellness Visit within the encounter summary.**         The following assessments were completed:  Living Situation  CAGE  Depression Screening  Timed Get Up and Go  Whisper Test  Cognitive Function Screening  Nutrition Screening  ADL Screening  PAQ Screening        Vitals:    10/26/23 1308   BP: 126/76   BP Location: Right arm   Patient Position: Sitting   Pulse: 70   Resp: 20   Temp: 97 °F (36.1 °C)   SpO2: (!) 94%   Weight: 94.9 kg (209 lb 3.5 oz)   Height: 6' 2" (1.88 m)     Body mass index is 26.86 kg/m².  Physical Exam  Vitals and nursing note reviewed.   Constitutional:       General: He is not in acute distress.     Appearance: He is well-developed.   HENT:      Head: Normocephalic and atraumatic.   Eyes:      Pupils: Pupils are equal, round, and reactive to light.   Neck:      Vascular: No carotid bruit.   Cardiovascular:      Rate and Rhythm: Normal rate and regular rhythm.      Pulses: Normal pulses.      Heart sounds: Normal heart sounds. No murmur heard.     No gallop.   Pulmonary:      Effort: Pulmonary effort is normal.      Breath sounds: Normal breath sounds.   Abdominal:      General: Bowel sounds are normal. There is no distension.      Palpations: Abdomen is soft.      Tenderness: There is no abdominal tenderness.   Musculoskeletal:         General: Normal range of motion.      Right lower le+ Edema present.      Left lower le+ Edema present.   Skin:     General: Skin is warm and dry.      Findings: Rash present. Rash is urticarial.      Comments: legs "   Neurological:      Motor: No abnormal muscle tone.      Gait: Gait normal.   Psychiatric:         Mood and Affect: Affect is flat.         Speech: Speech is delayed.         Behavior: Behavior is slowed.         Thought Content: Thought content normal.         Judgment: Judgment normal.       Current Outpatient Medications   Medication Instructions    amLODIPine (NORVASC) 5 MG tablet TAKE 1 TABLET EVERY DAY    aspirin (ECOTRIN) 81 mg, Oral, Daily    buPROPion (WELLBUTRIN XL) 300 MG 24 hr tablet No dose, route, or frequency recorded.    hydroCHLOROthiazide (HYDRODIURIL) 25 mg, Oral, Daily    losartan (COZAAR) 100 MG tablet TAKE 1 TABLET EVERY DAY    mirtazapine (REMERON) 7.5 MG Tab 1 tablet, Oral, Nightly    mirtazapine (REMERON) 15 mg, Oral, Nightly    OXcarbazepine (TRILEPTAL) 150 mg, Oral, 2 times daily    OXcarbazepine (TRILEPTAL) 1 mg, Oral, Nightly    polyethylene glycol (GLYCOLAX) 17 gram/dose powder No dose, route, or frequency recorded.    pravastatin (PRAVACHOL) 40 MG tablet TAKE 1 TABLET EVERY DAY    risperiDONE (RISPERDAL M-TABS) 1 mg, Oral, Nightly, Pt take 1/2 tab nightly    tadalafil (CIALIS) 20 MG Tab Use one tablet every 36 hours             Diagnoses and health risks identified today and associated recommendations/orders:    1. Encounter for Medicare annual wellness exam  Ambulatory Referral/Consult to Enhanced Annual Wellness Visit (eAWV)    2. Encounter for preventive health examination  Review for Opioid Screening: Patient does not have rx for Opioids.  Review for Substance Use Disorders: Patient does not use substance.      3. Type II diabetes mellitus with peripheral autonomic neuropathy  Ambulatory referral/consult to Podiatry  Chronic/ Monitored/Stable- no meds  Discussed and recommend  low fat/carb/chol diet. Cardio exercise as tolerated. Life style modifications.  Followed by PCP      4. Hypertensive retinopathy of both eyes  Stable/mitered -schedule for recheck 3/ 2024    5. Current  severe episode of major depressive disorder without psychotic features without prior episode  Chronic and Ongoing. See meds- wife states med dosage were decreased last yr due to age  Pt [facility]    6. Anxiety and depression  Chronic and Ongoing. See med's- wife states med dosage were decreased last yr due to age  Pt is followed [facility]    7. Primary hypertension  Chronic/ Monitored/Stable on  Norvasc/HCT as directed.  Followed by PCP    8. Hypercholesteremia  Chronic/ Monitored/Stable on  Pravastatin as directed.  Discussed and recommend  low fat/carb/chol diet. Cardio exercise as tolerated. Life style modifications.  Followed by PCP    9. Stage 3a chronic kidney disease  Chronic/ Monitored/Stable on  Norvasc/HCT as directed.  Followed by PCP    10. Prostate cancer  Chronic/ Monitored/Stable PSA levels  S/P radiation seed implant [id number]  No longer see urologist.  Followed by PCP    11. Hyperthyroidism  Chronic/ Monitored/Stable TSH level  Followed by PCP     12. Hypogonadism in male  Chronic/ Monitored/Stable on  C ialsis as directed.  Followed by PCP    13. LAURENCE (obstructive sleep apnea)  Uncontrolled- wife states pt no longer use CPAP-decline to use    14. Cortical senile cataract of both eyes  Stable/mointered -schedule for recheck 3/ 2024    15. Mild intermittent asthma without complication  Stable/mointered. No s/s . Followed by PCP     Provided Edin with a 5-10 year written screening schedule and personal prevention plan. Recommendations were developed using the USPSTF age appropriate recommendations. Education, counseling, and referrals were provided as needed. After Visit Summary printed and given to patient which includes a list of additional screenings\tests needed.    Follow up in about 1 year (around 10/26/2024).    Ольга Benavides NP

## 2023-10-26 NOTE — PATIENT INSTRUCTIONS
Counseling and Referral of Other Preventative  (Italic type indicates deductible and co-insurance are waived)    Patient Name: Edin Green  Today's Date: 10/26/2023    Health Maintenance       Date Due Completion Date    COVID-19 Vaccine (6 - 2023-24 season) 09/01/2023 10/5/2022    Hemoglobin A1c 11/04/2023 5/4/2023    Lipid Panel 11/22/2023 11/22/2022    Foot Exam 11/25/2023 11/25/2022 (Done)    Override on 11/25/2022: Done    Override on 7/1/2021: Done    Override on 6/19/2019: Done    PROSTATE-SPECIFIC ANTIGEN 11/22/2023 11/22/2022    Override on 6/15/2016: Done (dr quintero)    Diabetes Urine Screening 05/04/2024 5/4/2023    Low Dose Statin 06/07/2024 6/7/2023    Eye Exam 09/21/2024 9/21/2023    Override on 5/18/2020: Done    Colorectal Cancer Screening 06/07/2028 6/7/2023    Override on 1/31/2012: Done ([facility], repeat in 1 year)    TETANUS VACCINE 07/20/2032 7/20/2022        No orders of the defined types were placed in this encounter.      The following information is provided to all patients.  This information is to help you find resources for any of the problems found today that may be affecting your health:                Living healthy guide: www.[facility].[place].gov      Understanding Diabetes: www.diabetes.org      Eating healthy: www.cdc.gov/healthyweight      CDC home safety checklist: www.cdc.gov/steadi/patient.html      Agency on Aging: www.goea.[place].[facility]      Alcoholics anonymous (AA): www.aa.org      Physical Activity: www.[place].nih.gov/ki6jwop      Tobacco use: www.quitwithusla.org

## 2023-11-01 ENCOUNTER — LAB VISIT (OUTPATIENT)
Dept: LAB | Facility: HOSPITAL | Age: 73
End: 2023-11-01
Attending: FAMILY MEDICINE
Payer: MEDICARE

## 2023-11-01 DIAGNOSIS — E11.8 TYPE 2 DIABETES MELLITUS WITH COMPLICATION: ICD-10-CM

## 2023-11-01 LAB
ALBUMIN SERPL BCP-MCNC: 3.6 G/DL (ref 3.5–5.2)
ALP SERPL-CCNC: 82 U/L (ref 55–135)
ALT SERPL W/O P-5'-P-CCNC: 13 U/L (ref 10–44)
ANION GAP SERPL CALC-SCNC: 11 MMOL/L (ref 8–16)
AST SERPL-CCNC: 13 U/L (ref 10–40)
BILIRUB SERPL-MCNC: 0.7 MG/DL (ref 0.1–1)
BUN SERPL-MCNC: 13 MG/DL (ref 8–23)
CALCIUM SERPL-MCNC: 9.5 MG/DL (ref 8.7–10.5)
CHLORIDE SERPL-SCNC: 103 MMOL/L (ref 95–110)
CHOLEST SERPL-MCNC: 149 MG/DL (ref 120–199)
CHOLEST/HDLC SERPL: 3 {RATIO} (ref 2–5)
CO2 SERPL-SCNC: 25 MMOL/L (ref 23–29)
CREAT SERPL-MCNC: 1.3 MG/DL (ref 0.5–1.4)
EST. GFR  (NO RACE VARIABLE): 58 ML/MIN/1.73 M^2
ESTIMATED AVG GLUCOSE: 117 MG/DL (ref 68–131)
GLUCOSE SERPL-MCNC: 101 MG/DL (ref 70–110)
HBA1C MFR BLD: 5.7 % (ref 4–5.6)
HDLC SERPL-MCNC: 50 MG/DL (ref 40–75)
HDLC SERPL: 33.6 % (ref 20–50)
LDLC SERPL CALC-MCNC: 89.4 MG/DL (ref 63–159)
NONHDLC SERPL-MCNC: 99 MG/DL
POTASSIUM SERPL-SCNC: 3.6 MMOL/L (ref 3.5–5.1)
PROT SERPL-MCNC: 8 G/DL (ref 6–8.4)
SODIUM SERPL-SCNC: 139 MMOL/L (ref 136–145)
TRIGL SERPL-MCNC: 48 MG/DL (ref 30–150)

## 2023-11-01 PROCEDURE — 83036 HEMOGLOBIN GLYCOSYLATED A1C: CPT | Mod: HCNC | Performed by: FAMILY MEDICINE

## 2023-11-01 PROCEDURE — 36415 COLL VENOUS BLD VENIPUNCTURE: CPT | Mod: HCNC | Performed by: FAMILY MEDICINE

## 2023-11-01 PROCEDURE — 80053 COMPREHEN METABOLIC PANEL: CPT | Mod: HCNC | Performed by: FAMILY MEDICINE

## 2023-11-01 PROCEDURE — 80061 LIPID PANEL: CPT | Mod: HCNC | Performed by: FAMILY MEDICINE

## 2023-11-08 ENCOUNTER — OFFICE VISIT (OUTPATIENT)
Dept: INTERNAL MEDICINE | Facility: CLINIC | Age: 73
End: 2023-11-08
Payer: MEDICARE

## 2023-11-08 ENCOUNTER — OFFICE VISIT (OUTPATIENT)
Dept: PODIATRY | Facility: CLINIC | Age: 73
End: 2023-11-08
Payer: MEDICARE

## 2023-11-08 VITALS
DIASTOLIC BLOOD PRESSURE: 60 MMHG | SYSTOLIC BLOOD PRESSURE: 116 MMHG | TEMPERATURE: 98 F | HEART RATE: 71 BPM | OXYGEN SATURATION: 95 % | BODY MASS INDEX: 26.32 KG/M2 | WEIGHT: 205 LBS

## 2023-11-08 VITALS — WEIGHT: 209.19 LBS | HEIGHT: 74 IN | BODY MASS INDEX: 26.85 KG/M2

## 2023-11-08 DIAGNOSIS — B35.1 ONYCHOMYCOSIS: ICD-10-CM

## 2023-11-08 DIAGNOSIS — I10 PRIMARY HYPERTENSION: Primary | ICD-10-CM

## 2023-11-08 DIAGNOSIS — J45.20 MILD INTERMITTENT ASTHMA WITHOUT COMPLICATION: ICD-10-CM

## 2023-11-08 DIAGNOSIS — E11.43 TYPE II DIABETES MELLITUS WITH PERIPHERAL AUTONOMIC NEUROPATHY: ICD-10-CM

## 2023-11-08 DIAGNOSIS — E05.90 HYPERTHYROIDISM: ICD-10-CM

## 2023-11-08 DIAGNOSIS — E05.00 GRAVES DISEASE: ICD-10-CM

## 2023-11-08 DIAGNOSIS — N18.31 STAGE 3A CHRONIC KIDNEY DISEASE: ICD-10-CM

## 2023-11-08 DIAGNOSIS — E11.9 ENCOUNTER FOR COMPREHENSIVE DIABETIC FOOT EXAMINATION, TYPE 2 DIABETES MELLITUS: Primary | ICD-10-CM

## 2023-11-08 DIAGNOSIS — F32.2 CURRENT SEVERE EPISODE OF MAJOR DEPRESSIVE DISORDER WITHOUT PSYCHOTIC FEATURES WITHOUT PRIOR EPISODE: ICD-10-CM

## 2023-11-08 DIAGNOSIS — Z12.5 ENCOUNTER FOR SCREENING FOR MALIGNANT NEOPLASM OF PROSTATE: ICD-10-CM

## 2023-11-08 DIAGNOSIS — C61 PROSTATE CANCER: ICD-10-CM

## 2023-11-08 DIAGNOSIS — E78.00 HYPERCHOLESTEREMIA: ICD-10-CM

## 2023-11-08 DIAGNOSIS — G47.33 OSA (OBSTRUCTIVE SLEEP APNEA): ICD-10-CM

## 2023-11-08 PROCEDURE — 1126F PR PAIN SEVERITY QUANTIFIED, NO PAIN PRESENT: ICD-10-PCS | Mod: HCNC,CPTII,S$GLB, | Performed by: PHYSICIAN ASSISTANT

## 2023-11-08 PROCEDURE — 1159F MED LIST DOCD IN RCRD: CPT | Mod: HCNC,CPTII,S$GLB, | Performed by: PHYSICIAN ASSISTANT

## 2023-11-08 PROCEDURE — 3061F PR NEG MICROALBUMINURIA RESULT DOCUMENTED/REVIEW: ICD-10-PCS | Mod: HCNC,CPTII,S$GLB, | Performed by: PODIATRIST

## 2023-11-08 PROCEDURE — 99999 PR PBB SHADOW E&M-EST. PATIENT-LVL III: CPT | Mod: PBBFAC,HCNC,, | Performed by: PHYSICIAN ASSISTANT

## 2023-11-08 PROCEDURE — 1101F PR PT FALLS ASSESS DOC 0-1 FALLS W/OUT INJ PAST YR: ICD-10-PCS | Mod: HCNC,CPTII,S$GLB, | Performed by: PHYSICIAN ASSISTANT

## 2023-11-08 PROCEDURE — 3074F SYST BP LT 130 MM HG: CPT | Mod: HCNC,CPTII,S$GLB, | Performed by: PHYSICIAN ASSISTANT

## 2023-11-08 PROCEDURE — 3061F NEG MICROALBUMINURIA REV: CPT | Mod: HCNC,CPTII,S$GLB, | Performed by: PHYSICIAN ASSISTANT

## 2023-11-08 PROCEDURE — 4010F ACE/ARB THERAPY RXD/TAKEN: CPT | Mod: HCNC,CPTII,S$GLB, | Performed by: PODIATRIST

## 2023-11-08 PROCEDURE — 99213 OFFICE O/P EST LOW 20 MIN: CPT | Mod: HCNC,S$GLB,, | Performed by: PHYSICIAN ASSISTANT

## 2023-11-08 PROCEDURE — 3044F HG A1C LEVEL LT 7.0%: CPT | Mod: HCNC,CPTII,S$GLB, | Performed by: PODIATRIST

## 2023-11-08 PROCEDURE — 3074F PR MOST RECENT SYSTOLIC BLOOD PRESSURE < 130 MM HG: ICD-10-PCS | Mod: HCNC,CPTII,S$GLB, | Performed by: PHYSICIAN ASSISTANT

## 2023-11-08 PROCEDURE — 3078F DIAST BP <80 MM HG: CPT | Mod: HCNC,CPTII,S$GLB, | Performed by: PHYSICIAN ASSISTANT

## 2023-11-08 PROCEDURE — 3008F PR BODY MASS INDEX (BMI) DOCUMENTED: ICD-10-PCS | Mod: HCNC,CPTII,S$GLB, | Performed by: PHYSICIAN ASSISTANT

## 2023-11-08 PROCEDURE — 3066F PR DOCUMENTATION OF TREATMENT FOR NEPHROPATHY: ICD-10-PCS | Mod: HCNC,CPTII,S$GLB, | Performed by: PODIATRIST

## 2023-11-08 PROCEDURE — 3078F PR MOST RECENT DIASTOLIC BLOOD PRESSURE < 80 MM HG: ICD-10-PCS | Mod: HCNC,CPTII,S$GLB, | Performed by: PHYSICIAN ASSISTANT

## 2023-11-08 PROCEDURE — 3066F NEPHROPATHY DOC TX: CPT | Mod: HCNC,CPTII,S$GLB, | Performed by: PODIATRIST

## 2023-11-08 PROCEDURE — 4010F PR ACE/ARB THEARPY RXD/TAKEN: ICD-10-PCS | Mod: HCNC,CPTII,S$GLB, | Performed by: PHYSICIAN ASSISTANT

## 2023-11-08 PROCEDURE — 1159F PR MEDICATION LIST DOCUMENTED IN MEDICAL RECORD: ICD-10-PCS | Mod: HCNC,CPTII,S$GLB, | Performed by: PODIATRIST

## 2023-11-08 PROCEDURE — 1160F RVW MEDS BY RX/DR IN RCRD: CPT | Mod: HCNC,CPTII,S$GLB, | Performed by: PODIATRIST

## 2023-11-08 PROCEDURE — 3061F NEG MICROALBUMINURIA REV: CPT | Mod: HCNC,CPTII,S$GLB, | Performed by: PODIATRIST

## 2023-11-08 PROCEDURE — 3008F BODY MASS INDEX DOCD: CPT | Mod: HCNC,CPTII,S$GLB, | Performed by: PODIATRIST

## 2023-11-08 PROCEDURE — 3008F BODY MASS INDEX DOCD: CPT | Mod: HCNC,CPTII,S$GLB, | Performed by: PHYSICIAN ASSISTANT

## 2023-11-08 PROCEDURE — 1126F AMNT PAIN NOTED NONE PRSNT: CPT | Mod: HCNC,CPTII,S$GLB, | Performed by: PHYSICIAN ASSISTANT

## 2023-11-08 PROCEDURE — 4010F ACE/ARB THERAPY RXD/TAKEN: CPT | Mod: HCNC,CPTII,S$GLB, | Performed by: PHYSICIAN ASSISTANT

## 2023-11-08 PROCEDURE — 11721 DEBRIDE NAIL 6 OR MORE: CPT | Mod: Q9,HCNC,S$GLB, | Performed by: PODIATRIST

## 2023-11-08 PROCEDURE — 3288F PR FALLS RISK ASSESSMENT DOCUMENTED: ICD-10-PCS | Mod: HCNC,CPTII,S$GLB, | Performed by: PODIATRIST

## 2023-11-08 PROCEDURE — 1159F PR MEDICATION LIST DOCUMENTED IN MEDICAL RECORD: ICD-10-PCS | Mod: HCNC,CPTII,S$GLB, | Performed by: PHYSICIAN ASSISTANT

## 2023-11-08 PROCEDURE — 99213 OFFICE O/P EST LOW 20 MIN: CPT | Mod: 25,HCNC,S$GLB, | Performed by: PODIATRIST

## 2023-11-08 PROCEDURE — 3288F FALL RISK ASSESSMENT DOCD: CPT | Mod: HCNC,CPTII,S$GLB, | Performed by: PHYSICIAN ASSISTANT

## 2023-11-08 PROCEDURE — 1126F PR PAIN SEVERITY QUANTIFIED, NO PAIN PRESENT: ICD-10-PCS | Mod: HCNC,CPTII,S$GLB, | Performed by: PODIATRIST

## 2023-11-08 PROCEDURE — 1101F PT FALLS ASSESS-DOCD LE1/YR: CPT | Mod: HCNC,CPTII,S$GLB, | Performed by: PODIATRIST

## 2023-11-08 PROCEDURE — 3044F PR MOST RECENT HEMOGLOBIN A1C LEVEL <7.0%: ICD-10-PCS | Mod: HCNC,CPTII,S$GLB, | Performed by: PHYSICIAN ASSISTANT

## 2023-11-08 PROCEDURE — 3008F PR BODY MASS INDEX (BMI) DOCUMENTED: ICD-10-PCS | Mod: HCNC,CPTII,S$GLB, | Performed by: PODIATRIST

## 2023-11-08 PROCEDURE — 1160F PR REVIEW ALL MEDS BY PRESCRIBER/CLIN PHARMACIST DOCUMENTED: ICD-10-PCS | Mod: HCNC,CPTII,S$GLB, | Performed by: PODIATRIST

## 2023-11-08 PROCEDURE — 99999 PR PBB SHADOW E&M-EST. PATIENT-LVL III: CPT | Mod: PBBFAC,HCNC,, | Performed by: PODIATRIST

## 2023-11-08 PROCEDURE — 1101F PR PT FALLS ASSESS DOC 0-1 FALLS W/OUT INJ PAST YR: ICD-10-PCS | Mod: HCNC,CPTII,S$GLB, | Performed by: PODIATRIST

## 2023-11-08 PROCEDURE — 99999 PR PBB SHADOW E&M-EST. PATIENT-LVL III: ICD-10-PCS | Mod: PBBFAC,HCNC,, | Performed by: PODIATRIST

## 2023-11-08 PROCEDURE — 1101F PT FALLS ASSESS-DOCD LE1/YR: CPT | Mod: HCNC,CPTII,S$GLB, | Performed by: PHYSICIAN ASSISTANT

## 2023-11-08 PROCEDURE — 1159F MED LIST DOCD IN RCRD: CPT | Mod: HCNC,CPTII,S$GLB, | Performed by: PODIATRIST

## 2023-11-08 PROCEDURE — 3066F PR DOCUMENTATION OF TREATMENT FOR NEPHROPATHY: ICD-10-PCS | Mod: HCNC,CPTII,S$GLB, | Performed by: PHYSICIAN ASSISTANT

## 2023-11-08 PROCEDURE — 99213 PR OFFICE/OUTPT VISIT, EST, LEVL III, 20-29 MIN: ICD-10-PCS | Mod: 25,HCNC,S$GLB, | Performed by: PODIATRIST

## 2023-11-08 PROCEDURE — 3066F NEPHROPATHY DOC TX: CPT | Mod: HCNC,CPTII,S$GLB, | Performed by: PHYSICIAN ASSISTANT

## 2023-11-08 PROCEDURE — 3044F HG A1C LEVEL LT 7.0%: CPT | Mod: HCNC,CPTII,S$GLB, | Performed by: PHYSICIAN ASSISTANT

## 2023-11-08 PROCEDURE — 99213 PR OFFICE/OUTPT VISIT, EST, LEVL III, 20-29 MIN: ICD-10-PCS | Mod: HCNC,S$GLB,, | Performed by: PHYSICIAN ASSISTANT

## 2023-11-08 PROCEDURE — 1126F AMNT PAIN NOTED NONE PRSNT: CPT | Mod: HCNC,CPTII,S$GLB, | Performed by: PODIATRIST

## 2023-11-08 PROCEDURE — 99999 PR PBB SHADOW E&M-EST. PATIENT-LVL III: ICD-10-PCS | Mod: PBBFAC,HCNC,, | Performed by: PHYSICIAN ASSISTANT

## 2023-11-08 PROCEDURE — 3061F PR NEG MICROALBUMINURIA RESULT DOCUMENTED/REVIEW: ICD-10-PCS | Mod: HCNC,CPTII,S$GLB, | Performed by: PHYSICIAN ASSISTANT

## 2023-11-08 PROCEDURE — 11721 PR DEBRIDEMENT OF NAILS, 6 OR MORE: ICD-10-PCS | Mod: Q9,HCNC,S$GLB, | Performed by: PODIATRIST

## 2023-11-08 PROCEDURE — 3288F FALL RISK ASSESSMENT DOCD: CPT | Mod: HCNC,CPTII,S$GLB, | Performed by: PODIATRIST

## 2023-11-08 PROCEDURE — 4010F PR ACE/ARB THEARPY RXD/TAKEN: ICD-10-PCS | Mod: HCNC,CPTII,S$GLB, | Performed by: PODIATRIST

## 2023-11-08 PROCEDURE — 3044F PR MOST RECENT HEMOGLOBIN A1C LEVEL <7.0%: ICD-10-PCS | Mod: HCNC,CPTII,S$GLB, | Performed by: PODIATRIST

## 2023-11-08 PROCEDURE — 3288F PR FALLS RISK ASSESSMENT DOCUMENTED: ICD-10-PCS | Mod: HCNC,CPTII,S$GLB, | Performed by: PHYSICIAN ASSISTANT

## 2023-11-08 NOTE — PROGRESS NOTES
Subjective:      Patient ID: Edin Green is a 72 y.o. male.    Chief Complaint: Follow-up    HPI  Here today for a routine follow up.   Saw psychiatrist on Monday for his depression/anxiety.  BP stable and controlled on losartan and amlodipine. No hypotension.  Lipids stable on pravastatin and asa.  Not using cpap, did not tolerate.   Hyperthyroid/graves has been stable. TSH has been in normal.   Asthma stable and controlled. Doesn't need an inhaler.   Up to date with ophthalmologist. Seen in September.   Lost to follow up with urology for prostate ca. Last seen Dr. Quiroz in 2021. Recommended follow up and monitoring by the urologist. Pt has not yet seen the urologist.   Saw podiatry today for his foot exam.     Patient Active Problem List   Diagnosis    Hypertensive retinopathy of both eyes    Refractive error - Both Eyes    Nuclear sclerosis    HTN (hypertension)    Anxiety and depression    Hypogonadism in male    ED (erectile dysfunction)    LAURENCE (obstructive sleep apnea)    Hypercholesteremia    Mild intermittent asthma without complication    Allergic rhinitis    Cortical senile cataract of both eyes    Posterior vitreous detachment of both eyes    Hyperthyroidism    Graves disease    Type II diabetes mellitus with peripheral autonomic neuropathy    Prostate cancer    Current severe episode of major depressive disorder without psychotic features without prior episode    Stage 3a chronic kidney disease         Current Outpatient Medications:     amLODIPine (NORVASC) 5 MG tablet, TAKE 1 TABLET EVERY DAY, Disp: 90 tablet, Rfl: 4    aspirin (ECOTRIN) 81 MG EC tablet, Take 81 mg by mouth once daily., Disp: , Rfl:     losartan (COZAAR) 100 MG tablet, TAKE 1 TABLET EVERY DAY, Disp: 90 tablet, Rfl: 4    mirtazapine (REMERON) 7.5 MG Tab, Take 1 tablet by mouth every evening., Disp: , Rfl:     OXcarbazepine (TRILEPTAL) 150 MG Tab, Take 1 mg by mouth every evening., Disp: , Rfl:     pravastatin (PRAVACHOL) 40 MG  tablet, TAKE 1 TABLET EVERY DAY, Disp: 90 tablet, Rfl: 4    risperiDONE (RISPERDAL M-TABS) 2 MG disintegrating tablet, Take 1 mg by mouth nightly. Pt take 1/2 tab nightly, Disp: , Rfl:     hydroCHLOROthiazide (HYDRODIURIL) 25 MG tablet, TAKE 1 TABLET (25 MG TOTAL) BY MOUTH ONCE DAILY., Disp: 90 tablet, Rfl: 4    tadalafil (CIALIS) 20 MG Tab, Use one tablet every 36 hours, Disp: 10 tablet, Rfl: 11  No current facility-administered medications for this visit.    Facility-Administered Medications Ordered in Other Visits:     lactated ringers infusion, , Intravenous, Continuous, Marcie Funes MD, New Bag at 06/07/23 1226    Review of Systems   Constitutional:  Negative for activity change, appetite change, chills, diaphoresis, fatigue, fever and unexpected weight change.   HENT: Negative.  Negative for congestion, hearing loss, postnasal drip, rhinorrhea, sore throat, trouble swallowing and voice change.    Eyes: Negative.  Negative for visual disturbance.   Respiratory: Negative.  Negative for cough, choking, chest tightness and shortness of breath.    Cardiovascular:  Negative for chest pain, palpitations and leg swelling.   Gastrointestinal:  Negative for abdominal distention, abdominal pain, blood in stool, constipation, diarrhea, nausea and vomiting.   Endocrine: Negative for cold intolerance, heat intolerance, polydipsia and polyuria.   Genitourinary: Negative.  Negative for difficulty urinating and frequency.   Musculoskeletal:  Negative for arthralgias, back pain, gait problem, joint swelling and myalgias.   Skin:  Negative for color change, pallor, rash and wound.   Neurological:  Negative for dizziness, tremors, weakness, light-headedness, numbness and headaches.   Hematological:  Negative for adenopathy.   Psychiatric/Behavioral:  Negative for behavioral problems, confusion, self-injury, sleep disturbance and suicidal ideas. The patient is not nervous/anxious.      Objective:   /60 (BP Location:  Right arm, Patient Position: Sitting, BP Method: Large (Manual))   Pulse 71   Temp 97.5 °F (36.4 °C) (Tympanic)   Wt 93 kg (205 lb 0.4 oz)   SpO2 95%   BMI 26.32 kg/m²     Physical Exam  Vitals and nursing note reviewed.   Constitutional:       General: He is not in acute distress.     Appearance: Normal appearance. He is well-developed. He is not ill-appearing, toxic-appearing or diaphoretic.   HENT:      Head: Normocephalic and atraumatic.   Neck:      Vascular: No carotid bruit.   Cardiovascular:      Rate and Rhythm: Normal rate and regular rhythm.      Heart sounds: Normal heart sounds. No murmur heard.     No friction rub. No gallop.   Pulmonary:      Effort: Pulmonary effort is normal. No respiratory distress.      Breath sounds: Normal breath sounds. No wheezing, rhonchi or rales.   Abdominal:      General: Abdomen is flat. Bowel sounds are normal.   Skin:     General: Skin is warm.      Findings: No rash.   Neurological:      Mental Status: He is alert and oriented to person, place, and time.   Psychiatric:         Mood and Affect: Mood normal.         Behavior: Behavior normal.         Thought Content: Thought content normal.         Judgment: Judgment normal.       Lab Visit on 11/01/2023   Component Date Value Ref Range Status    Hemoglobin A1C 11/01/2023 5.7 (H)  4.0 - 5.6 % Final    Comment: ADA Screening Guidelines:  5.7-6.4%  Consistent with prediabetes  >or=6.5%  Consistent with diabetes    High levels of fetal hemoglobin interfere with the HbA1C  assay. Heterozygous hemoglobin variants (HbS, HgC, etc)do  not significantly interfere with this assay.   However, presence of multiple variants may affect accuracy.      Estimated Avg Glucose 11/01/2023 117  68 - 131 mg/dL Final    Cholesterol 11/01/2023 149  120 - 199 mg/dL Final    Comment: The National Cholesterol Education Program (NCEP) has set the  following guidelines (reference ranges) for Cholesterol:  Optimal.....................<200  mg/dL  Borderline High.............200-239 mg/dL  High........................> or = 240 mg/dL      Triglycerides 11/01/2023 48  30 - 150 mg/dL Final    Comment: The National Cholesterol Education Program (NCEP) has set the  following guidelines (reference values) for triglycerides:  Normal......................<150 mg/dL  Borderline High.............150-199 mg/dL  High........................200-499 mg/dL      HDL 11/01/2023 50  40 - 75 mg/dL Final    Comment: The National Cholesterol Education Program (NCEP) has set the  following guidelines (reference values) for HDL Cholesterol:  Low...............<40 mg/dL  Optimal...........>60 mg/dL      LDL Cholesterol 11/01/2023 89.4  63.0 - 159.0 mg/dL Final    Comment: The National Cholesterol Education Program (NCEP) has set the  following guidelines (reference values) for LDL Cholesterol:  Optimal.......................<130 mg/dL  Borderline High...............130-159 mg/dL  High..........................160-189 mg/dL  Very High.....................>190 mg/dL      HDL/Cholesterol Ratio 11/01/2023 33.6  20.0 - 50.0 % Final    Total Cholesterol/HDL Ratio 11/01/2023 3.0  2.0 - 5.0 Final    Non-HDL Cholesterol 11/01/2023 99  mg/dL Final    Comment: Risk category and Non-HDL cholesterol goals:  Coronary heart disease (CHD)or equivalent (10-year risk of CHD >20%):  Non-HDL cholesterol goal     <130 mg/dL  Two or more CHD risk factors and 10-year risk of CHD <= 20%:  Non-HDL cholesterol goal     <160 mg/dL  0 to 1 CHD risk factor:  Non-HDL cholesterol goal     <190 mg/dL      Sodium 11/01/2023 139  136 - 145 mmol/L Final    Potassium 11/01/2023 3.6  3.5 - 5.1 mmol/L Final    Chloride 11/01/2023 103  95 - 110 mmol/L Final    CO2 11/01/2023 25  23 - 29 mmol/L Final    Glucose 11/01/2023 101  70 - 110 mg/dL Final    BUN 11/01/2023 13  8 - 23 mg/dL Final    Creatinine 11/01/2023 1.3  0.5 - 1.4 mg/dL Final    Calcium 11/01/2023 9.5  8.7 - 10.5 mg/dL Final    Total Protein  11/01/2023 8.0  6.0 - 8.4 g/dL Final    Albumin 11/01/2023 3.6  3.5 - 5.2 g/dL Final    Total Bilirubin 11/01/2023 0.7  0.1 - 1.0 mg/dL Final    Comment: For infants and newborns, interpretation of results should be based  on gestational age, weight and in agreement with clinical  observations.    Premature Infant recommended reference ranges:  Up to 24 hours.............<8.0 mg/dL  Up to 48 hours............<12.0 mg/dL  3-5 days..................<15.0 mg/dL  6-29 days.................<15.0 mg/dL      Alkaline Phosphatase 11/01/2023 82  55 - 135 U/L Final    AST 11/01/2023 13  10 - 40 U/L Final    ALT 11/01/2023 13  10 - 44 U/L Final    eGFR 11/01/2023 58.0 (A)  >60 mL/min/1.73 m^2 Final    Anion Gap 11/01/2023 11  8 - 16 mmol/L Final       Assessment:     1. Primary hypertension    2. Prostate cancer    3. Type II diabetes mellitus with peripheral autonomic neuropathy    4. Current severe episode of major depressive disorder without psychotic features without prior episode    5. Stage 3a chronic kidney disease    6. Hypercholesteremia    7. LAURENCE (obstructive sleep apnea)    8. Mild intermittent asthma without complication    9. Hyperthyroidism    10. Graves disease    11. Encounter for screening for malignant neoplasm of prostate      Plan:   Primary hypertension  -stable and well controlled on hctz, amlodipine, and losartan.     Prostate cancer  -     Ambulatory referral/consult to Urology; Future; Expected date: 11/15/2023  -     PSA, Screening; Future; Expected date: 05/08/2024    Type II diabetes mellitus with peripheral autonomic neuropathy  -     Hemoglobin A1C; Future; Expected date: 05/08/2024  -     Microalbumin/Creatinine Ratio, Urine; Future; Expected date: 05/08/2024    Current severe episode of major depressive disorder without psychotic features without prior episode  [facility]     Stage 3a chronic kidney disease  -improved on recent labs    Hypercholesteremia  -recent labs  reviewed. Stable and well controlled on pravastatin and asa    LAURENCE (obstructive sleep apnea)  -intolerant to cpap    Mild intermittent asthma without complication  -stable. No recent flare ups.     Hyperthyroidism  -     TSH; Future; Expected date: 05/08/2024    Graves disease  -     TSH; Future; Expected date: 05/08/2024    Encounter for screening for malignant neoplasm of prostate  -     PSA, Screening; Future; Expected date: 05/08/2024        Follow up in about 6 months (around 5/8/2024), or if symptoms worsen or fail to improve.

## 2023-11-16 NOTE — PROGRESS NOTES
Subjective:     Patient ID: Edin Green is a 72 y.o. male.    Chief Complaint: Diabetes Mellitus (Annual foot check, diabetic pt, wearing tennis shoes and socks, last seen PCP Dr. Paris/Ольга Benavides NP 10/26/23))    Edin is a 72 y.o. male who presents to the clinic upon referral from Dr. Benavides  for evaluation and treatment of diabetic feet. Edin has a past medical history of Anemia, Anxiety, Asthma, Benign hematuria, Cataract (OU), Colon polyp, Depression, ED (erectile dysfunction), Graves disease, HTN (hypertension), Hypercholesteremia, Hypertensive retinopathy of both eyes, Hypogonadism, LAURENCE (obstructive sleep apnea), Pneumonia, Prostate cancer (7/15/2019), Trouble in sleeping, and Type 2 diabetes mellitus. Patient relates no major problem with feet. Only complaints today consist of fungal toenails     PCP: Carter Paris MD    Date Last Seen by PCP: 10/26/2023    Current shoe gear: Tennis shoes    Hemoglobin A1C   Date Value Ref Range Status   11/01/2023 5.7 (H) 4.0 - 5.6 % Final     Comment:     ADA Screening Guidelines:  5.7-6.4%  Consistent with prediabetes  >or=6.5%  Consistent with diabetes    High levels of fetal hemoglobin interfere with the HbA1C  assay. Heterozygous hemoglobin variants (HbS, HgC, etc)do  not significantly interfere with this assay.   However, presence of multiple variants may affect accuracy.     05/04/2023 6.0 (H) 4.0 - 5.6 % Final     Comment:     ADA Screening Guidelines:  5.7-6.4%  Consistent with prediabetes  >or=6.5%  Consistent with diabetes    High levels of fetal hemoglobin interfere with the HbA1C  assay. Heterozygous hemoglobin variants (HbS, HgC, etc)do  not significantly interfere with this assay.   However, presence of multiple variants may affect accuracy.     07/20/2022 6.1 (H) 4.0 - 5.6 % Final     Comment:     ADA Screening Guidelines:  5.7-6.4%  Consistent with prediabetes  >or=6.5%  Consistent with diabetes    High levels of fetal hemoglobin  interfere with the HbA1C  assay. Heterozygous hemoglobin variants (HbS, HgC, etc)do  not significantly interfere with this assay.   However, presence of multiple variants may affect accuracy.           Patient Active Problem List   Diagnosis    Hypertensive retinopathy of both eyes    Refractive error - Both Eyes    Nuclear sclerosis    HTN (hypertension)    Anxiety and depression    Hypogonadism in male    ED (erectile dysfunction)    LAURENCE (obstructive sleep apnea)    Hypercholesteremia    Mild intermittent asthma without complication    Allergic rhinitis    Cortical senile cataract of both eyes    Posterior vitreous detachment of both eyes    Hyperthyroidism    Graves disease    Type II diabetes mellitus with peripheral autonomic neuropathy    Prostate cancer    Current severe episode 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    HYDROCHLOROTHIAZIDE (HYDRODIURIL) 25 MG TABLET    TAKE 1 TABLET (25 MG TOTAL) BY MOUTH ONCE DAILY.    LOSARTAN (COZAAR) 100 MG TABLET    TAKE 1 TABLET EVERY DAY    MIRTAZAPINE (REMERON) 7.5 MG TAB    Take 1 tablet by mouth every evening.    OXCARBAZEPINE (TRILEPTAL) 150 MG TAB    Take 1 mg by mouth every evening.    PRAVASTATIN (PRAVACHOL) 40 MG TABLET    TAKE 1 TABLET EVERY DAY    RISPERIDONE (RISPERDAL M-TABS) 2 MG DISINTEGRATING TABLET    Take 1 mg by mouth nightly. Pt take 1/2 tab nightly    TADALAFIL (CIALIS) 20 MG TAB    Use one tablet every 36 hours       Review of patient's allergies indicates:   Allergen Reactions    Celebrex [celecoxib]      Lip swelling      Shrimp Itching     States reaction is with frozen shrimp    Venom-wasp Swelling       Past Surgical History:   Procedure Laterality Date    BRACHYTHERAPY N/A  09/10/2019    Procedure: BRACHYTHERAPY;  Surgeon: Kiel Ochoa IV, MD;  Location: [facility] OR;  Service: Urology;  Laterality: N/A;    COLONOSCOPY N/A 6/7/2023    Procedure: COLONOSCOPY;  Surgeon: Marcie Funes MD;  Location: [facility] ENDO;  Service: Endoscopy;  Laterality: N/A;    PROSTATE SURGERY      RADIOACTIVE SEED IMPLANTATION OF PROSTATE N/A 09/10/2019    Procedure: INSERTION, RADIOACTIVE SEED, PROSTATE;  Surgeon: Kiel Ochoa IV, MD;  Location: [facility] OR;  Service: Urology;  Laterality: N/A;    ULTRASOUND GUIDANCE N/A 09/10/2019    Procedure: ULTRASOUND GUIDANCE;  Surgeon: Kiel Ochoa IV, MD;  Location: [facility] OR;  Service: Urology;  Laterality: N/A;       Family History   Problem Relation Age of Onset    Hypertension Other     Heart defect Mother     Cancer Maternal Grandmother     Heart disease Sister     Vision loss Brother     Strabismus Neg Hx     Retinal detachment Neg Hx     Macular degeneration Neg Hx     Glaucoma Neg Hx     Blindness Neg Hx     Amblyopia Neg Hx        Social History     Socioeconomic History    Marital status:      Spouse name: CHUCHO    Number of children: 3   Tobacco Use    Smoking status: Never    Smokeless tobacco: Never   Substance and Sexual Activity    Alcohol use: Not Currently     Comment: No alcohol 72h prior to sx    Drug use: No    Sexual activity: Not Currently     Partners: Female   Social History Narrative    , no smokers or pets in household.     Social Determinants of Health     Financial Resource Strain: Low Risk  (10/26/2023)    Overall Financial Resource Strain (CARDIA)     Difficulty of Paying Living Expenses: Not hard at all   Food Insecurity: No Food Insecurity (10/26/2023)    Hunger Vital Sign     Worried About Running Out of Food in the Last Year: Never true     Ran Out of Food in the Last Year: Never true   Transportation Needs: No Transportation Needs (10/26/2023)    PRAPARE - Transportation     Lack of Transportation (Medical): No     " Lack of Transportation (Non-Medical): No   Physical Activity: Inactive (10/26/2023)    Exercise Vital Sign     Days of Exercise per Week: 0 days     Minutes of Exercise per Session: 0 min   Stress: No Stress Concern Present (10/26/2023)    [employer] - Occupational Stress Questionnaire     Feeling of Stress : Not at all   Social Connections: Socially Integrated (10/26/2023)    Social Connection and Isolation Panel [NHANES]     Frequency of Communication with Friends and Family: More than three times a week     Frequency of Social Gatherings with Friends and Family: More than three times a week     Attends [employer] Services: More than 4 times per year     Active Member of Clubs or Organizations: No     Attends Club or Organization Meetings: More than 4 times per year     Marital Status:    Housing Stability: Unknown (10/26/2023)    Housing Stability Vital Sign     Unable to Pay for Housing in the Last Year: No     Unstable Housing in the Last Year: No       Vitals:    11/08/23 1204   Weight: 94.9 kg (209 lb 3.5 oz)   Height: 6' 2" (1.88 m)   PainSc: 0-No pain       Hemoglobin A1C   Date Value Ref Range Status   11/01/2023 5.7 (H) 4.0 - 5.6 % Final     Comment:     ADA Screening Guidelines:  5.7-6.4%  Consistent with prediabetes  >or=6.5%  Consistent with diabetes    High levels of fetal hemoglobin interfere with the HbA1C  assay. Heterozygous hemoglobin variants (HbS, HgC, etc)do  not significantly interfere with this assay.   However, presence of multiple variants may affect accuracy.     05/04/2023 6.0 (H) 4.0 - 5.6 % Final     Comment:     ADA Screening Guidelines:  5.7-6.4%  Consistent with prediabetes  >or=6.5%  Consistent with diabetes    High levels of fetal hemoglobin interfere with the HbA1C  assay. Heterozygous hemoglobin variants (HbS, HgC, etc)do  not significantly interfere with this assay.   However, presence of multiple variants may affect accuracy.     07/20/2022 " 6.1 (H) 4.0 - 5.6 % Final     Comment:     ADA Screening Guidelines:  5.7-6.4%  Consistent with prediabetes  >or=6.5%  Consistent with diabetes    High levels of fetal hemoglobin interfere with the HbA1C  assay. Heterozygous hemoglobin variants (HbS, HgC, etc)do  not significantly interfere with this assay.   However, presence of multiple variants may affect accuracy.         Review of Systems   Constitutional:  Negative for chills and fever.   Respiratory:  Negative for shortness of breath.    Cardiovascular:  Negative for chest pain, palpitations, orthopnea, claudication and leg swelling.   Gastrointestinal:  Negative for diarrhea, nausea and vomiting.   Musculoskeletal:  Negative for joint pain.   Skin:  Negative for rash.   Neurological:  Negative for dizziness, tingling, sensory change, focal weakness and weakness.   Psychiatric/Behavioral: Negative.               Objective:      PHYSICAL EXAM: Apperance: Alert and orient in no distress,well developed, and with good attention to grooming and body habits  Patient presents ambulating in tennis shoes.   LOWER EXTREMITY EXAM:  VASCULAR: Dorsalis pedis pulses 2/4 bilateral and Posterior Tibial pulses 1/4 bilateral. Capillary fill time <blanched bilateral. Mild edema observed bilateral. Varicosities present bilateral. Skin temperature of the lower extremities is warm to warm, proximal to distal. Hair growth dim bilateral.  DERMATOLOGICAL: No skin rashes, subcutaneous nodules, lesions, or ulcers observed bilateral. Nails 2,3,4,5 bilateral elongated, thickened, and discolored with subungual debris. Bilateral hallux nails absent. Webspaces 1,2,3,4 bilateral clean, dry and without evidence of break in skin integrity. Dry and peeling skin noted to bilateral forefoot.   NEUROLOGICAL: Light touch, sharp-dull, proprioception all present and equal bilaterally.  Vibratory sensation diminished at bilateral  hallux. Protective sensation intact at all 10 sites as tested with a  [place]-Kamran 5.07 monofilament.   MUSCULOSKELETAL: Muscle strength is 5/5 for foot inverters, everters, plantarflexors, and dorsiflexors. Muscle tone is normal.           Assessment:       ICD-10-CM ICD-9-CM   1. Encounter for comprehensive diabetic foot examination, type 2 diabetes mellitus  E11.9 250.00   2. Type II diabetes mellitus with peripheral autonomic neuropathy  E11.43 250.60     337.1   3. Onychomycosis  B35.1 110.1       Plan:   Encounter for comprehensive diabetic foot examination, type 2 diabetes mellitus    Type II diabetes mellitus with peripheral autonomic neuropathy  -     Ambulatory referral/consult to Podiatry    Onychomycosis      I counseled the patient on his conditions, regarding findings of my examination, my impressions, and usual treatment plan.   This visit spent on counseling and coordination of care.  Appointment spent on education about the diabetic foot, neuropathy, and prevention of limb loss.  Shoe inspection. Diabetic Foot Education. Patient reminded of the importance of good nutrition and blood sugar control to help prevent podiatric complications of diabetes. Patient instructed on proper foot hygeine. We discussed wearing proper shoe gear, daily foot inspections, never walking without protective shoe gear, never putting sharp instruments to feet.    With patient's permission, nails 2-5 bilateral were debrided/trimmed in length and thickness aggressively to their soft tissue attachment mechanically and with electric , removing all offending nail and debris. Patient relates relief following the procedure.  Patient  will continue to monitor the areas daily, inspect feet, wear protective shoe gear when ambulatory, moisturizer to maintain skin integrity. Patient reminded of the importance of good nutrition and blood sugar control to help prevent podiatric complications of diabetes.  Patient to return 6 months or sooner if needed.            Sharnette Miles, DPM [facility]

## 2023-12-01 ENCOUNTER — LAB VISIT (OUTPATIENT)
Dept: LAB | Facility: HOSPITAL | Age: 73
End: 2023-12-01
Attending: UROLOGY
Payer: MEDICARE

## 2023-12-01 ENCOUNTER — OFFICE VISIT (OUTPATIENT)
Dept: UROLOGY | Facility: CLINIC | Age: 73
End: 2023-12-01
Payer: MEDICARE

## 2023-12-01 VITALS — HEIGHT: 74 IN | BODY MASS INDEX: 26.31 KG/M2 | WEIGHT: 205 LBS

## 2023-12-01 DIAGNOSIS — C61 PROSTATE CANCER: ICD-10-CM

## 2023-12-01 PROCEDURE — 3066F NEPHROPATHY DOC TX: CPT | Mod: HCNC,CPTII,S$GLB, | Performed by: UROLOGY

## 2023-12-01 PROCEDURE — 3066F PR DOCUMENTATION OF TREATMENT FOR NEPHROPATHY: ICD-10-PCS | Mod: HCNC,CPTII,S$GLB, | Performed by: UROLOGY

## 2023-12-01 PROCEDURE — 99999 PR PBB SHADOW E&M-EST. PATIENT-LVL III: ICD-10-PCS | Mod: PBBFAC,HCNC,, | Performed by: UROLOGY

## 2023-12-01 PROCEDURE — 36415 COLL VENOUS BLD VENIPUNCTURE: CPT | Mod: HCNC | Performed by: UROLOGY

## 2023-12-01 PROCEDURE — 4010F PR ACE/ARB THEARPY RXD/TAKEN: ICD-10-PCS | Mod: HCNC,CPTII,S$GLB, | Performed by: UROLOGY

## 2023-12-01 PROCEDURE — 3061F NEG MICROALBUMINURIA REV: CPT | Mod: HCNC,CPTII,S$GLB, | Performed by: UROLOGY

## 2023-12-01 PROCEDURE — 3044F HG A1C LEVEL LT 7.0%: CPT | Mod: HCNC,CPTII,S$GLB, | Performed by: UROLOGY

## 2023-12-01 PROCEDURE — 3008F BODY MASS INDEX DOCD: CPT | Mod: HCNC,CPTII,S$GLB, | Performed by: UROLOGY

## 2023-12-01 PROCEDURE — 3008F PR BODY MASS INDEX (BMI) DOCUMENTED: ICD-10-PCS | Mod: HCNC,CPTII,S$GLB, | Performed by: UROLOGY

## 2023-12-01 PROCEDURE — 4010F ACE/ARB THERAPY RXD/TAKEN: CPT | Mod: HCNC,CPTII,S$GLB, | Performed by: UROLOGY

## 2023-12-01 PROCEDURE — 99999 PR PBB SHADOW E&M-EST. PATIENT-LVL III: CPT | Mod: PBBFAC,HCNC,, | Performed by: UROLOGY

## 2023-12-01 PROCEDURE — 1160F RVW MEDS BY RX/DR IN RCRD: CPT | Mod: HCNC,CPTII,S$GLB, | Performed by: UROLOGY

## 2023-12-01 PROCEDURE — 1159F PR MEDICATION LIST DOCUMENTED IN MEDICAL RECORD: ICD-10-PCS | Mod: HCNC,CPTII,S$GLB, | Performed by: UROLOGY

## 2023-12-01 PROCEDURE — 3044F PR MOST RECENT HEMOGLOBIN A1C LEVEL <7.0%: ICD-10-PCS | Mod: HCNC,CPTII,S$GLB, | Performed by: UROLOGY

## 2023-12-01 PROCEDURE — 99213 PR OFFICE/OUTPT VISIT, EST, LEVL III, 20-29 MIN: ICD-10-PCS | Mod: HCNC,S$GLB,, | Performed by: UROLOGY

## 2023-12-01 PROCEDURE — 1160F PR REVIEW ALL MEDS BY PRESCRIBER/CLIN PHARMACIST DOCUMENTED: ICD-10-PCS | Mod: HCNC,CPTII,S$GLB, | Performed by: UROLOGY

## 2023-12-01 PROCEDURE — 99213 OFFICE O/P EST LOW 20 MIN: CPT | Mod: HCNC,S$GLB,, | Performed by: UROLOGY

## 2023-12-01 PROCEDURE — 3061F PR NEG MICROALBUMINURIA RESULT DOCUMENTED/REVIEW: ICD-10-PCS | Mod: HCNC,CPTII,S$GLB, | Performed by: UROLOGY

## 2023-12-01 PROCEDURE — 1159F MED LIST DOCD IN RCRD: CPT | Mod: HCNC,CPTII,S$GLB, | Performed by: UROLOGY

## 2023-12-01 PROCEDURE — 84153 ASSAY OF PSA TOTAL: CPT | Mod: HCNC | Performed by: UROLOGY

## 2023-12-01 NOTE — PROGRESS NOTES
"Chief Complaint: T1c CaP    HPI:   12/01/2023 - returns today for follow-up, no new issues in the interim, voiding well, notes some rare urgency but denies any incontinence, denies gross hematuria or dysuria, denies UTIs, due for PSA    9/25/19: No problems from brachytherapy, urine clearing was bloody early on.  Flomax doesn't do much.  8/12/19: Dr. Smith [facility] he have CMRT with brachy alone an option.  SpaceOAR but no ADT (depression).  Reviewed history in detail.   7/3/19: MRI is done.  PIRADs2 no obvious findings.  Reviewed history in detail.  5/21/19: Bx shows Gl 3+4=7 in 1/2 of left mid, 15% of core.  Reviewed history in detail.    4/29/19: TRUS/Bx today 32 gm.  3/25/19: PSA shows a sustained positive trend.  [facility] biopsy.  Reviewed history in detail.  9/17/18: 68 yo man here to discuss elevated PSA.  Was seeing Dr. Sanchez.  I spoke to his [employer] group last year.  No abd/pelvic pain and no exac/rel factors.  No hematuria.  No urolithiasis.  No urinary bother.  Cialis/viagra prn for ED. Normal sexual function.  Was told he had a "mushy" prostate and was given cipro by Dr. Paris.  No LUTS.    Allergies:  Celebrex [celecoxib], Shrimp, and Venom-wasp    Medications:  has a current medication list which includes the following prescription(s): amlodipine, aspirin, losartan, mirtazapine, oxcarbazepine, pravastatin, risperidone, hydrochlorothiazide, and tadalafil, and the following Facility-Administered Medications: lactated ringers.    Review of Systems:  General: No fever, chills  Skin: No rashes  Chest:  Denies cough and sputum production  Heart: Denies chest pain  Resp: Denies dyspnea  Abdomen: Denies diarrhea, abdominal pain, hematemesis, or blood in stool.  Musculoskeletal: No joint stiffness or swelling. Denies back pain.  : see HPI  Neuro: no dizziness or weakness      PMH:   has a past medical history of Anemia, Anxiety, Asthma, Benign hematuria, Cataract (OU), Colon polyp, Depression, ED (erectile " dysfunction), Graves disease, HTN (hypertension), Hypercholesteremia, Hypertensive retinopathy of both eyes, Hypogonadism, LAURENCE (obstructive sleep apnea), Pneumonia, Prostate cancer (7/15/2019), Trouble in sleeping, and Type 2 diabetes mellitus.    PSH:   has a past surgical history that includes Brachytherapy (N/A, 09/10/2019); Radioactive seed implantation of prostate (N/A, 09/10/2019); Ultrasound guidance (N/A, 09/10/2019); Prostate surgery; and Colonoscopy (N/A, 6/7/2023).    FamHx: family history includes Cancer in his maternal grandmother; Heart defect in his mother; Heart disease in his sister; Hypertension in an other family member; Vision loss in his brother.    SocHx:  reports that he has never smoked. He has never used smokeless tobacco. He reports that he does not currently use alcohol. He reports that he does not use drugs.      Physical Exam:  There were no vitals filed for this visit.  General: awake, alert, cooperative  Head: NC/AT  Ears: external ears normal  Eyes: sclera normal  Lungs: normal inspiration, NAD  Heart: well-perfused  Skin: The skin is warm and dry  Ext: No c/c/e.  Neuro: grossly intact, AOx3      Labs/Studies:   Lab Results   Component Value Date    WBC 4.63 11/22/2022    HGB 13.9 (L) 11/22/2022    HCT 43.7 11/22/2022     11/22/2022     11/01/2023    K 3.6 11/01/2023     11/01/2023    CREATININE 1.3 11/01/2023    BUN 13 11/01/2023    CO2 25 11/01/2023    TSH 1.655 05/04/2023    PSA 0.13 11/22/2022    GLUF 108 06/29/2018    HGBA1C 5.7 (H) 11/01/2023    CHOL 149 11/01/2023    TRIG 48 11/01/2023    HDL 50 11/01/2023    ALT 13 11/01/2023    AST 13 11/01/2023       PSA    9/08: 1.7    7/14: 3.2    5/17: 3.2    5/18: 5.1    6/18: 4.3    3/19: 6.8, 5.7    Impression/Plan:   Prostate cancer - status post brachy, PSA today, follow-up one yr    Antoni Brunson MD

## 2023-12-02 LAB — COMPLEXED PSA SERPL-MCNC: 0.06 NG/ML (ref 0–4)

## 2024-01-05 NOTE — TELEPHONE ENCOUNTER
No care due was identified.  [facility] Embedded Care Due Messages. Reference number: [id number].   1/05/2024 2:55:35 PM CST

## 2024-01-07 RX ORDER — HYDROCHLOROTHIAZIDE 25 MG/1
25 TABLET ORAL DAILY
Qty: 90 TABLET | Refills: 1 | Status: SHIPPED | OUTPATIENT
Start: 2024-01-07 | End: 2024-07-05

## 2024-01-07 NOTE — TELEPHONE ENCOUNTER
Refill Decision Note   Edin Green  is requesting a refill authorization.  Brief Assessment and Rationale for Refill:  Approve     Medication Therapy Plan:         Comments:     Note composed:5:48 PM 01/07/2024

## 2024-03-21 ENCOUNTER — OFFICE VISIT (OUTPATIENT)
Dept: OPHTHALMOLOGY | Facility: CLINIC | Age: 74
End: 2024-03-21
Payer: MEDICARE

## 2024-03-21 DIAGNOSIS — H40.013 OPEN ANGLE WITH BORDERLINE FINDINGS OF BOTH EYES: Primary | ICD-10-CM

## 2024-03-21 PROCEDURE — 1159F MED LIST DOCD IN RCRD: CPT | Mod: HCNC,CPTII,S$GLB, | Performed by: OPTOMETRIST

## 2024-03-21 PROCEDURE — 76514 ECHO EXAM OF EYE THICKNESS: CPT | Mod: HCNC,S$GLB,, | Performed by: OPTOMETRIST

## 2024-03-21 PROCEDURE — 92012 INTRM OPH EXAM EST PATIENT: CPT | Mod: HCNC,S$GLB,, | Performed by: OPTOMETRIST

## 2024-03-21 PROCEDURE — 4010F ACE/ARB THERAPY RXD/TAKEN: CPT | Mod: HCNC,CPTII,S$GLB, | Performed by: OPTOMETRIST

## 2024-03-21 PROCEDURE — 1160F RVW MEDS BY RX/DR IN RCRD: CPT | Mod: HCNC,CPTII,S$GLB, | Performed by: OPTOMETRIST

## 2024-03-21 PROCEDURE — 92083 EXTENDED VISUAL FIELD XM: CPT | Mod: HCNC,S$GLB,, | Performed by: OPTOMETRIST

## 2024-03-21 PROCEDURE — 99999 PR PBB SHADOW E&M-EST. PATIENT-LVL II: CPT | Mod: PBBFAC,HCNC,, | Performed by: OPTOMETRIST

## 2024-03-21 NOTE — PROGRESS NOTES
HPI     Glaucoma Suspect            Comments: Pt was last seen with DNL 9/21/23 for full exam. PT was told to   rtc 6 months for 24-2VF, IOP check and pach  Medication eye drops: none  Last HVF: 3/21/24  Last gOCT: 9/21/23                  Last edited by Radha Maria MA on 3/21/2024  2:29 PM.            Assessment /Plan     For exam results, see Encounter Report.    Open angle with borderline findings of both eyes  -     Walker Visual Field - OU - Extended - Both Eyes    Normal IOP today with average pachs OU  VF shows non specific changes OD with poor reliability  Inferior and superior arcuate defects OS with poor reliability    Monitor 6 months      RTC 6 months for dilated eye exam and gOCT or PRN if any problems.   Discussed above and answered questions.

## 2024-04-17 RX ORDER — AMLODIPINE BESYLATE 5 MG/1
TABLET ORAL
Qty: 90 TABLET | Refills: 0 | Status: SHIPPED | OUTPATIENT
Start: 2024-04-17

## 2024-04-17 NOTE — TELEPHONE ENCOUNTER
No care due was identified.  [facility] Embedded Care Due Messages. Reference number: [id number].   4/17/2024 1:20:26 AM CDT

## 2024-04-17 NOTE — TELEPHONE ENCOUNTER
Refill Decision Note   Edin Green  is requesting a refill authorization.    Brief Assessment and Rationale for Refill:  Approve       Medication Therapy Plan:         Comments:     Note composed:1:14 PM 04/17/2024

## 2024-05-06 ENCOUNTER — PATIENT OUTREACH (OUTPATIENT)
Dept: ADMINISTRATIVE | Facility: HOSPITAL | Age: 74
End: 2024-05-06
Payer: MEDICARE

## 2024-05-08 ENCOUNTER — OFFICE VISIT (OUTPATIENT)
Dept: PODIATRY | Facility: CLINIC | Age: 74
End: 2024-05-08
Payer: MEDICARE

## 2024-05-08 ENCOUNTER — LAB VISIT (OUTPATIENT)
Dept: LAB | Facility: HOSPITAL | Age: 74
End: 2024-05-08
Attending: PHYSICIAN ASSISTANT
Payer: MEDICARE

## 2024-05-08 VITALS — BODY MASS INDEX: 26.31 KG/M2 | HEIGHT: 74 IN | WEIGHT: 205 LBS

## 2024-05-08 DIAGNOSIS — E05.00 GRAVES DISEASE: ICD-10-CM

## 2024-05-08 DIAGNOSIS — E05.90 HYPERTHYROIDISM: ICD-10-CM

## 2024-05-08 DIAGNOSIS — Z12.5 ENCOUNTER FOR SCREENING FOR MALIGNANT NEOPLASM OF PROSTATE: ICD-10-CM

## 2024-05-08 DIAGNOSIS — E11.43 TYPE II DIABETES MELLITUS WITH PERIPHERAL AUTONOMIC NEUROPATHY: ICD-10-CM

## 2024-05-08 DIAGNOSIS — E11.9 ENCOUNTER FOR COMPREHENSIVE DIABETIC FOOT EXAMINATION, TYPE 2 DIABETES MELLITUS: Primary | ICD-10-CM

## 2024-05-08 DIAGNOSIS — C61 PROSTATE CANCER: ICD-10-CM

## 2024-05-08 DIAGNOSIS — B35.1 ONYCHOMYCOSIS: ICD-10-CM

## 2024-05-08 LAB
ALBUMIN/CREAT UR: ABNORMAL UG/MG (ref 0–30)
COMPLEXED PSA SERPL-MCNC: 0.07 NG/ML (ref 0–4)
CREAT UR-MCNC: >450 MG/DL (ref 23–375)
ESTIMATED AVG GLUCOSE: 123 MG/DL (ref 68–131)
HBA1C MFR BLD: 5.9 % (ref 4–5.6)
MICROALBUMIN UR DL<=1MG/L-MCNC: 22 UG/ML
TSH SERPL DL<=0.005 MIU/L-ACNC: 1.38 UIU/ML (ref 0.4–4)

## 2024-05-08 PROCEDURE — 3072F LOW RISK FOR RETINOPATHY: CPT | Mod: HCNC,CPTII,S$GLB, | Performed by: PODIATRIST

## 2024-05-08 PROCEDURE — 99999 PR PBB SHADOW E&M-EST. PATIENT-LVL III: CPT | Mod: PBBFAC,HCNC,, | Performed by: PODIATRIST

## 2024-05-08 PROCEDURE — 80069 RENAL FUNCTION PANEL: CPT | Mod: HCNC | Performed by: PHYSICIAN ASSISTANT

## 2024-05-08 PROCEDURE — 3288F FALL RISK ASSESSMENT DOCD: CPT | Mod: HCNC,CPTII,S$GLB, | Performed by: PODIATRIST

## 2024-05-08 PROCEDURE — 11721 DEBRIDE NAIL 6 OR MORE: CPT | Mod: Q9,HCNC,S$GLB, | Performed by: PODIATRIST

## 2024-05-08 PROCEDURE — 83036 HEMOGLOBIN GLYCOSYLATED A1C: CPT | Mod: HCNC | Performed by: PHYSICIAN ASSISTANT

## 2024-05-08 PROCEDURE — 4010F ACE/ARB THERAPY RXD/TAKEN: CPT | Mod: HCNC,CPTII,S$GLB, | Performed by: PODIATRIST

## 2024-05-08 PROCEDURE — 84153 ASSAY OF PSA TOTAL: CPT | Mod: HCNC | Performed by: PHYSICIAN ASSISTANT

## 2024-05-08 PROCEDURE — 82043 UR ALBUMIN QUANTITATIVE: CPT | Mod: HCNC | Performed by: PHYSICIAN ASSISTANT

## 2024-05-08 PROCEDURE — 84443 ASSAY THYROID STIM HORMONE: CPT | Mod: HCNC | Performed by: PHYSICIAN ASSISTANT

## 2024-05-08 PROCEDURE — 1101F PT FALLS ASSESS-DOCD LE1/YR: CPT | Mod: HCNC,CPTII,S$GLB, | Performed by: PODIATRIST

## 2024-05-08 PROCEDURE — 3044F HG A1C LEVEL LT 7.0%: CPT | Mod: HCNC,CPTII,S$GLB, | Performed by: PODIATRIST

## 2024-05-08 PROCEDURE — 3066F NEPHROPATHY DOC TX: CPT | Mod: HCNC,CPTII,S$GLB, | Performed by: PODIATRIST

## 2024-05-08 PROCEDURE — 36415 COLL VENOUS BLD VENIPUNCTURE: CPT | Mod: HCNC | Performed by: PHYSICIAN ASSISTANT

## 2024-05-08 PROCEDURE — 99213 OFFICE O/P EST LOW 20 MIN: CPT | Mod: 25,HCNC,S$GLB, | Performed by: PODIATRIST

## 2024-05-08 PROCEDURE — 1126F AMNT PAIN NOTED NONE PRSNT: CPT | Mod: HCNC,CPTII,S$GLB, | Performed by: PODIATRIST

## 2024-05-08 PROCEDURE — 3061F NEG MICROALBUMINURIA REV: CPT | Mod: HCNC,CPTII,S$GLB, | Performed by: PODIATRIST

## 2024-05-08 NOTE — PROGRESS NOTES
Subjective:     Patient ID: Edin Green is a 73 y.o. male.    Chief Complaint: Nail Care (Diabetic pt wears tennis shoes, PCP Dr. Paris last seen 11-8-23)    Edin is a 73 y.o. male who presents to the clinic upon referral from Dr. Christina forte. provider found  for evaluation and treatment of diabetic feet. Edin has a past medical history of Anemia, Anxiety, Asthma, Benign hematuria, Cataract (OU), Colon polyp, Depression, ED (erectile dysfunction), Graves disease, HTN (hypertension), Hypercholesteremia, Hypertensive retinopathy of both eyes, Hypogonadism, LAURENCE (obstructive sleep apnea), Pneumonia, Prostate cancer (7/15/2019), Trouble in sleeping, and Type 2 diabetes mellitus. Patient relates no major problem with feet. Only complaints today consist of fungal toenails     PCP: Carter Paris MD    Date Last Seen by PCP: 11/08/2023    Current shoe gear: Tennis shoes    Hemoglobin A1C   Date Value Ref Range Status   05/08/2024 5.9 (H) 4.0 - 5.6 % Final     Comment:     ADA Screening Guidelines:  5.7-6.4%  Consistent with prediabetes  >or=6.5%  Consistent with diabetes    High levels of fetal hemoglobin interfere with the HbA1C  assay. Heterozygous hemoglobin variants (HbS, HgC, etc)do  not significantly interfere with this assay.   However, presence of multiple variants may affect accuracy.     11/01/2023 5.7 (H) 4.0 - 5.6 % Final     Comment:     ADA Screening Guidelines:  5.7-6.4%  Consistent with prediabetes  >or=6.5%  Consistent with diabetes    High levels of fetal hemoglobin interfere with the HbA1C  assay. Heterozygous hemoglobin variants (HbS, HgC, etc)do  not significantly interfere with this assay.   However, presence of multiple variants may affect accuracy.     05/04/2023 6.0 (H) 4.0 - 5.6 % Final     Comment:     ADA Screening Guidelines:  5.7-6.4%  Consistent with prediabetes  >or=6.5%  Consistent with diabetes    High levels of fetal hemoglobin interfere with the HbA1C  assay. Heterozygous  hemoglobin variants (HbS, HgC, etc)do  not significantly interfere with this assay.   However, presence of multiple variants may affect accuracy.           Patient Active Problem List   Diagnosis    Hypertensive retinopathy of both eyes    Refractive error - Both Eyes    Nuclear sclerosis    HTN (hypertension)    Anxiety and depression    Hypogonadism in male    ED (erectile dysfunction)    LAURENCE (obstructive sleep apnea)    Hypercholesteremia    Mild intermittent asthma without complication    Allergic rhinitis    Cortical senile cataract of both eyes    Posterior vitreous detachment of both eyes    Hyperthyroidism    Graves disease    Type II diabetes mellitus with peripheral autonomic neuropathy    Prostate cancer    Current severe episode 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    HYDROCHLOROTHIAZIDE (HYDRODIURIL) 25 MG TABLET    TAKE 1 TABLET (25 MG TOTAL) BY MOUTH ONCE DAILY.    MIRTAZAPINE (REMERON) 7.5 MG TAB    Take 1 tablet by mouth every evening.    OXCARBAZEPINE (TRILEPTAL) 150 MG TAB    Take 1 mg by mouth every evening.    PRAVASTATIN (PRAVACHOL) 40 MG TABLET    TAKE 1 TABLET EVERY DAY    RISPERIDONE (RISPERDAL M-TABS) 2 MG DISINTEGRATING TABLET    Take 1 mg by mouth nightly. Pt take 1/2 tab nightly    TADALAFIL (CIALIS) 20 MG TAB    Use one tablet every 36 hours   Changed and/or Refilled Medications    Modified Medication Previous Medication    LOSARTAN (COZAAR) 100 MG TABLET losartan (COZAAR) 100 MG tablet       TAKE 1 TABLET EVERY DAY    TAKE 1 TABLET EVERY DAY       Review of patient's allergies indicates:   Allergen Reactions    Celebrex [celecoxib]      Lip swelling      Shrimp Itching     States reaction is with frozen shrimp     Venom-wasp Swelling       Past Surgical History:   Procedure Laterality Date    BRACHYTHERAPY N/A 09/10/2019    Procedure: BRACHYTHERAPY;  Surgeon: Kiel Ochoa IV, MD;  Location: [facility] OR;  Service: Urology;  Laterality: N/A;    COLONOSCOPY N/A 6/7/2023    Procedure: COLONOSCOPY;  Surgeon: Marcie Funes MD;  Location: [facility] ENDO;  Service: Endoscopy;  Laterality: N/A;    PROSTATE SURGERY      RADIOACTIVE SEED IMPLANTATION OF PROSTATE N/A 09/10/2019    Procedure: INSERTION, RADIOACTIVE SEED, PROSTATE;  Surgeon: Kiel Ochoa IV, MD;  Location: [facility] OR;  Service: Urology;  Laterality: N/A;    ULTRASOUND GUIDANCE N/A 09/10/2019    Procedure: ULTRASOUND GUIDANCE;  Surgeon: Kiel Ochoa IV, MD;  Location: [facility] OR;  Service: Urology;  Laterality: N/A;       Family History   Problem Relation Name Age of Onset    Hypertension Other      Heart defect Mother      Cancer Maternal Grandmother Julianna Mauricio     Heart disease Sister Radha Young     Vision loss Brother Kiel Martinez     Strabismus Neg Hx      Retinal detachment Neg Hx      Macular degeneration Neg Hx      Glaucoma Neg Hx      Blindness Neg Hx      Amblyopia Neg Hx         Social History     Socioeconomic History    Marital status:      Spouse name: CHUCHO    Number of children: 3   Tobacco Use    Smoking status: Never    Smokeless tobacco: Never   Substance and Sexual Activity    Alcohol use: Not Currently     Comment: No alcohol 72h prior to sx    Drug use: No    Sexual activity: Not Currently     Partners: Female   Social History Narrative    , no smokers or pets in household.     Social Determinants of Health     Financial Resource Strain: Low Risk  (10/26/2023)    Overall Financial Resource Strain (CARDIA)     Difficulty of Paying Living Expenses: Not hard at all   Food Insecurity: No Food Insecurity (10/26/2023)    Hunger Vital Sign     Worried About Running Out of Food in the Last Year: Never true     Ran Out of Food in the  "Last Year: Never true   Transportation Needs: No Transportation Needs (10/26/2023)    PRAPARE - Transportation     Lack of Transportation (Medical): No     Lack of Transportation (Non-Medical): No   Physical Activity: Inactive (10/26/2023)    Exercise Vital Sign     Days of Exercise per Week: 0 days     Minutes of Exercise per Session: 0 min   Stress: No Stress Concern Present (10/26/2023)    [employer] - Occupational Stress Questionnaire     Feeling of Stress : Not at all   Housing Stability: Unknown (10/26/2023)    Housing Stability Vital Sign     Unable to Pay for Housing in the Last Year: No     Unstable Housing in the Last Year: No       Vitals:    05/08/24 1044   Weight: 93 kg (205 lb 0.4 oz)   Height: 6' 2" (1.88 m)   PainSc: 0-No pain       Hemoglobin A1C   Date Value Ref Range Status   05/08/2024 5.9 (H) 4.0 - 5.6 % Final     Comment:     ADA Screening Guidelines:  5.7-6.4%  Consistent with prediabetes  >or=6.5%  Consistent with diabetes    High levels of fetal hemoglobin interfere with the HbA1C  assay. Heterozygous hemoglobin variants (HbS, HgC, etc)do  not significantly interfere with this assay.   However, presence of multiple variants may affect accuracy.     11/01/2023 5.7 (H) 4.0 - 5.6 % Final     Comment:     ADA Screening Guidelines:  5.7-6.4%  Consistent with prediabetes  >or=6.5%  Consistent with diabetes    High levels of fetal hemoglobin interfere with the HbA1C  assay. Heterozygous hemoglobin variants (HbS, HgC, etc)do  not significantly interfere with this assay.   However, presence of multiple variants may affect accuracy.     05/04/2023 6.0 (H) 4.0 - 5.6 % Final     Comment:     ADA Screening Guidelines:  5.7-6.4%  Consistent with prediabetes  >or=6.5%  Consistent with diabetes    High levels of fetal hemoglobin interfere with the HbA1C  assay. Heterozygous hemoglobin variants (HbS, HgC, etc)do  not significantly interfere with this assay.   However, presence " of multiple variants may affect accuracy.         Review of Systems   Constitutional:  Negative for chills and fever.   Respiratory:  Negative for shortness of breath.    Cardiovascular:  Negative for chest pain, palpitations, orthopnea, claudication and leg swelling.   Gastrointestinal:  Negative for diarrhea, nausea and vomiting.   Musculoskeletal:  Negative for joint pain.   Skin:  Negative for rash.   Neurological:  Negative for dizziness, tingling, sensory change, focal weakness and weakness.   Psychiatric/Behavioral: Negative.           Objective:      PHYSICAL EXAM: Apperance: Alert and orient in no distress,well developed, and with good attention to grooming and body habits  Patient presents ambulating in tennis shoes.   LOWER EXTREMITY EXAM:  VASCULAR: Dorsalis pedis pulses 2/4 bilateral and Posterior Tibial pulses 1/4 bilateral. Capillary fill time <blanched bilateral. Mild edema observed bilateral. Varicosities present bilateral. Skin temperature of the lower extremities is warm to warm, proximal to distal. Hair growth dim bilateral.  DERMATOLOGICAL: No skin rashes, subcutaneous nodules, lesions, or ulcers observed bilateral. Nails 2,3,4,5 bilateral elongated, thickened, and discolored with subungual debris. Bilateral hallux nails absent. Webspaces 1,2,3,4 bilateral clean, dry and without evidence of break in skin integrity. Dry and peeling skin noted to bilateral forefoot.   NEUROLOGICAL: Light touch, sharp-dull, proprioception all present and equal bilaterally.  Vibratory sensation diminished at bilateral  hallux. Protective sensation intact at all 10 sites as tested with a [place]-[employer]ran 5.07 monofilament.   MUSCULOSKELETAL: Muscle strength is 5/5 for foot inverters, everters, plantarflexors, and dorsiflexors. Muscle tone is normal.           Assessment:       ICD-10-CM ICD-9-CM   1. Encounter for comprehensive diabetic foot examination, type 2 diabetes mellitus  E11.9 250.00   2. Type II diabetes  mellitus with peripheral autonomic neuropathy  E11.43 250.60     337.1   3. Onychomycosis  B35.1 110.1       Plan:   Encounter for comprehensive diabetic foot examination, type 2 diabetes mellitus    Type II diabetes mellitus with peripheral autonomic neuropathy    Onychomycosis    I counseled the patient on his conditions, regarding findings of my examination, my impressions, and usual treatment plan.   This visit spent on counseling and coordination of care.  Appointment spent on education about the diabetic foot, neuropathy, and prevention of limb loss.  Shoe inspection. Diabetic Foot Education. Patient reminded of the importance of good nutrition and blood sugar control to help prevent podiatric complications of diabetes. Patient instructed on proper foot hygeine. We discussed wearing proper shoe gear, daily foot inspections, never walking without protective shoe gear, never putting sharp instruments to feet.    With patient's permission, nails 2-5 bilateral were debrided/trimmed in length and thickness aggressively to their soft tissue attachment mechanically and with electric , removing all offending nail and debris. Patient relates relief following the procedure.  Patient  will continue to monitor the areas daily, inspect feet, wear protective shoe gear when ambulatory, moisturizer to maintain skin integrity. Patient reminded of the importance of good nutrition and blood sugar control to help prevent podiatric complications of diabetes.  Patient to return 6 months or sooner if needed.          Saravanan Mata DPM  [facility]

## 2024-05-09 DIAGNOSIS — R82.998 HIGH URINE CREATINE: Primary | ICD-10-CM

## 2024-05-09 LAB
ALBUMIN SERPL BCP-MCNC: 3.5 G/DL (ref 3.5–5.2)
ANION GAP SERPL CALC-SCNC: 12 MMOL/L (ref 8–16)
BUN SERPL-MCNC: 18 MG/DL (ref 8–23)
CALCIUM SERPL-MCNC: 9.4 MG/DL (ref 8.7–10.5)
CHLORIDE SERPL-SCNC: 105 MMOL/L (ref 95–110)
CO2 SERPL-SCNC: 24 MMOL/L (ref 23–29)
CREAT SERPL-MCNC: 1.3 MG/DL (ref 0.5–1.4)
EST. GFR  (NO RACE VARIABLE): 57.6 ML/MIN/1.73 M^2
GLUCOSE SERPL-MCNC: 84 MG/DL (ref 70–110)
PHOSPHATE SERPL-MCNC: 2.8 MG/DL (ref 2.7–4.5)
POTASSIUM SERPL-SCNC: 3.2 MMOL/L (ref 3.5–5.1)
SODIUM SERPL-SCNC: 141 MMOL/L (ref 136–145)

## 2024-05-14 ENCOUNTER — OFFICE VISIT (OUTPATIENT)
Dept: INTERNAL MEDICINE | Facility: CLINIC | Age: 74
End: 2024-05-14
Payer: MEDICARE

## 2024-05-14 VITALS
OXYGEN SATURATION: 97 % | SYSTOLIC BLOOD PRESSURE: 108 MMHG | BODY MASS INDEX: 25.76 KG/M2 | TEMPERATURE: 98 F | WEIGHT: 200.75 LBS | DIASTOLIC BLOOD PRESSURE: 66 MMHG | HEART RATE: 86 BPM | HEIGHT: 74 IN

## 2024-05-14 DIAGNOSIS — E11.43 TYPE II DIABETES MELLITUS WITH PERIPHERAL AUTONOMIC NEUROPATHY: Primary | ICD-10-CM

## 2024-05-14 DIAGNOSIS — N18.31 TYPE 2 DIABETES MELLITUS WITH STAGE 3A CHRONIC KIDNEY DISEASE, WITHOUT LONG-TERM CURRENT USE OF INSULIN: ICD-10-CM

## 2024-05-14 DIAGNOSIS — E05.00 GRAVES DISEASE: ICD-10-CM

## 2024-05-14 DIAGNOSIS — F32.2 CURRENT SEVERE EPISODE OF MAJOR DEPRESSIVE DISORDER WITHOUT PSYCHOTIC FEATURES WITHOUT PRIOR EPISODE: ICD-10-CM

## 2024-05-14 DIAGNOSIS — I10 PRIMARY HYPERTENSION: ICD-10-CM

## 2024-05-14 DIAGNOSIS — F41.9 ANXIETY AND DEPRESSION: ICD-10-CM

## 2024-05-14 DIAGNOSIS — E11.22 TYPE 2 DIABETES MELLITUS WITH STAGE 3A CHRONIC KIDNEY DISEASE, WITHOUT LONG-TERM CURRENT USE OF INSULIN: ICD-10-CM

## 2024-05-14 DIAGNOSIS — E05.90 HYPERTHYROIDISM: ICD-10-CM

## 2024-05-14 DIAGNOSIS — H35.033 HYPERTENSIVE RETINOPATHY OF BOTH EYES: ICD-10-CM

## 2024-05-14 DIAGNOSIS — F32.A ANXIETY AND DEPRESSION: ICD-10-CM

## 2024-05-14 DIAGNOSIS — E78.00 HYPERCHOLESTEREMIA: ICD-10-CM

## 2024-05-14 DIAGNOSIS — C61 PROSTATE CANCER: ICD-10-CM

## 2024-05-14 DIAGNOSIS — J45.20 MILD INTERMITTENT ASTHMA WITHOUT COMPLICATION: ICD-10-CM

## 2024-05-14 PROCEDURE — 3044F HG A1C LEVEL LT 7.0%: CPT | Mod: HCNC,CPTII,S$GLB, | Performed by: PHYSICIAN ASSISTANT

## 2024-05-14 PROCEDURE — 3061F NEG MICROALBUMINURIA REV: CPT | Mod: HCNC,CPTII,S$GLB, | Performed by: PHYSICIAN ASSISTANT

## 2024-05-14 PROCEDURE — 99999 PR PBB SHADOW E&M-EST. PATIENT-LVL III: CPT | Mod: PBBFAC,HCNC,, | Performed by: PHYSICIAN ASSISTANT

## 2024-05-14 PROCEDURE — 99214 OFFICE O/P EST MOD 30 MIN: CPT | Mod: HCNC,S$GLB,, | Performed by: PHYSICIAN ASSISTANT

## 2024-05-14 PROCEDURE — 3078F DIAST BP <80 MM HG: CPT | Mod: HCNC,CPTII,S$GLB, | Performed by: PHYSICIAN ASSISTANT

## 2024-05-14 PROCEDURE — 3288F FALL RISK ASSESSMENT DOCD: CPT | Mod: HCNC,CPTII,S$GLB, | Performed by: PHYSICIAN ASSISTANT

## 2024-05-14 PROCEDURE — 1101F PT FALLS ASSESS-DOCD LE1/YR: CPT | Mod: HCNC,CPTII,S$GLB, | Performed by: PHYSICIAN ASSISTANT

## 2024-05-14 PROCEDURE — 4010F ACE/ARB THERAPY RXD/TAKEN: CPT | Mod: HCNC,CPTII,S$GLB, | Performed by: PHYSICIAN ASSISTANT

## 2024-05-14 PROCEDURE — 3072F LOW RISK FOR RETINOPATHY: CPT | Mod: HCNC,CPTII,S$GLB, | Performed by: PHYSICIAN ASSISTANT

## 2024-05-14 PROCEDURE — 1126F AMNT PAIN NOTED NONE PRSNT: CPT | Mod: HCNC,CPTII,S$GLB, | Performed by: PHYSICIAN ASSISTANT

## 2024-05-14 PROCEDURE — 3066F NEPHROPATHY DOC TX: CPT | Mod: HCNC,CPTII,S$GLB, | Performed by: PHYSICIAN ASSISTANT

## 2024-05-14 PROCEDURE — 1159F MED LIST DOCD IN RCRD: CPT | Mod: HCNC,CPTII,S$GLB, | Performed by: PHYSICIAN ASSISTANT

## 2024-05-14 PROCEDURE — 3074F SYST BP LT 130 MM HG: CPT | Mod: HCNC,CPTII,S$GLB, | Performed by: PHYSICIAN ASSISTANT

## 2024-05-14 NOTE — PROGRESS NOTES
Subjective:      Patient ID: Edin Green is a 73 y.o. male.    Chief Complaint: Follow-up (6m)    HPI  Here today for his 6 month follow up. Recent labs will be reviewed.   HTN: amlodipine, hctz, losartan. BP a little low today. Has been normal/low for the past year. Denies any falls or lightheadedness.   TSH: stable on recent labs  Dm: RECENT A1c in prediabetes zone. Up to date with podiatry and ophthalmology.   ASTHMA:stable. Hasn't needed his inhaler.   Lipid: pravastatin.   Lost 5 lbs over the past week. Unintentional weight loss.     Urology: following up once a year.     Wt Readings from Last 3 Encounters:   05/14/24 1441 91.1 kg (200 lb 12.4 oz)   05/08/24 1044 93 kg (205 lb 0.4 oz)   12/01/23 1204 93 kg (205 lb)      BP Readings from Last 3 Encounters:   05/14/24 108/66   11/08/23 116/60   10/26/23 126/76      Patient Active Problem List   Diagnosis    Hypertensive retinopathy of both eyes    Refractive error - Both Eyes    Nuclear sclerosis    HTN (hypertension)    Anxiety and depression    Hypogonadism in male    ED (erectile dysfunction)    LAURENCE (obstructive sleep apnea)    Hypercholesteremia    Mild intermittent asthma without complication    Allergic rhinitis    Cortical senile cataract of both eyes    Posterior vitreous detachment of both eyes    Hyperthyroidism    Graves disease    Type II diabetes mellitus with peripheral autonomic neuropathy    Prostate cancer    Current severe episode of major depressive disorder without psychotic features without prior episode    Stage 3a chronic kidney disease         Current Outpatient Medications:     amLODIPine (NORVASC) 5 MG tablet, TAKE 1 TABLET EVERY DAY, Disp: 90 tablet, Rfl: 0    aspirin (ECOTRIN) 81 MG EC tablet, Take 81 mg by mouth once daily., Disp: , Rfl:     hydroCHLOROthiazide (HYDRODIURIL) 25 MG tablet, TAKE 1 TABLET (25 MG TOTAL) BY MOUTH ONCE DAILY., Disp: 90 tablet, Rfl: 1    losartan (COZAAR) 100 MG tablet, TAKE 1 TABLET EVERY DAY, Disp:  90 tablet, Rfl: 0    mirtazapine (REMERON) 7.5 MG Tab, Take 1 tablet by mouth every evening., Disp: , Rfl:     OXcarbazepine (TRILEPTAL) 150 MG Tab, Take 1 mg by mouth every evening., Disp: , Rfl:     pravastatin (PRAVACHOL) 40 MG tablet, TAKE 1 TABLET EVERY DAY, Disp: 90 tablet, Rfl: 4    risperiDONE (RISPERDAL M-TABS) 2 MG disintegrating tablet, Take 1 mg by mouth nightly. Pt take 1/2 tab nightly, Disp: , Rfl:     tadalafil (CIALIS) 20 MG Tab, Use one tablet every 36 hours, Disp: 10 tablet, Rfl: 11  No current facility-administered medications for this visit.    Facility-Administered Medications Ordered in Other Visits:     lactated ringers infusion, , Intravenous, Continuous, Marcie Funes MD, New Bag at 06/07/23 1226    Review of Systems   Constitutional:  Negative for activity change, appetite change, chills, diaphoresis, fatigue, fever and unexpected weight change.   HENT: Negative.  Negative for congestion, hearing loss, postnasal drip, rhinorrhea, sore throat, trouble swallowing and voice change.    Eyes: Negative.  Negative for visual disturbance.   Respiratory: Negative.  Negative for cough, choking, chest tightness and shortness of breath.    Cardiovascular:  Negative for chest pain, palpitations and leg swelling.   Gastrointestinal:  Negative for abdominal distention, abdominal pain, blood in stool, constipation, diarrhea, nausea and vomiting.   Endocrine: Negative for cold intolerance, heat intolerance, polydipsia and polyuria.   Genitourinary: Negative.  Negative for difficulty urinating and frequency.   Musculoskeletal:  Negative for arthralgias, back pain, gait problem, joint swelling and myalgias.   Skin:  Negative for color change, pallor, rash and wound.   Neurological:  Negative for dizziness, tremors, weakness, light-headedness, numbness and headaches.   Hematological:  Negative for adenopathy.   Psychiatric/Behavioral:  Negative for behavioral problems, confusion, self-injury, sleep  "disturbance and suicidal ideas. The patient is not nervous/anxious.      Objective:   /66 (BP Location: Left arm, Patient Position: Sitting, BP Method: Large (Manual))   Pulse 86   Temp 97.5 °F (36.4 °C) (Tympanic)   Ht 6' 2" (1.88 m)   Wt 91.1 kg (200 lb 12.4 oz)   SpO2 97%   BMI 25.78 kg/m²     Physical Exam  Vitals and nursing note reviewed.   Constitutional:       General: He is not in acute distress.     Appearance: Normal appearance. He is well-developed. He is not ill-appearing, toxic-appearing or diaphoretic.   HENT:      Head: Normocephalic and atraumatic.   Cardiovascular:      Rate and Rhythm: Normal rate and regular rhythm.      Heart sounds: Normal heart sounds. No murmur heard.     No friction rub. No gallop.   Pulmonary:      Effort: Pulmonary effort is normal. No respiratory distress.      Breath sounds: Normal breath sounds. No wheezing or rales.   Skin:     General: Skin is warm.      Findings: No rash.   Neurological:      Mental Status: He is alert and oriented to person, place, and time.   Psychiatric:         Mood and Affect: Mood normal.         Behavior: Behavior normal.         Thought Content: Thought content normal.         Judgment: Judgment normal.       Lab Visit on 05/08/2024   Component Date Value Ref Range Status    PSA, Screen 05/08/2024 0.07  0.00 - 4.00 ng/mL Final    Comment: The testing method is a chemiluminescent microparticle immunoassay   manufactured by [employer] and performed on the [employer]   or   [employer] system. Values obtained with different assay manufacturers   for   methods may be different and cannot be used interchangeably.  PSA Expected levels:  Hormonal Therapy: <0.05 ng/ml  Prostatectomy: <0.01 ng/ml  Radiation Therapy: <1.00 ng/ml      Hemoglobin A1C 05/08/2024 5.9 (H)  4.0 - 5.6 % Final    Comment: ADA Screening Guidelines:  5.7-6.4%  Consistent with prediabetes  >or=6.5%  Consistent with diabetes    High levels of fetal hemoglobin " interfere with the HbA1C  assay. Heterozygous hemoglobin variants (HbS, HgC, etc)do  not significantly interfere with this assay.   However, presence of multiple variants may affect accuracy.      Estimated Avg Glucose 05/08/2024 123  68 - 131 mg/dL Final    Microalbumin, Urine 05/08/2024 22.0  ug/mL Final    Creatinine, Urine 05/08/2024 >450.0 (H)  23.0 - 375.0 mg/dL Final    Microalb/Creat Ratio 05/08/2024 Unable to calculate  0.0 - 30.0 ug/mg Final    TSH 05/08/2024 1.377  0.400 - 4.000 uIU/mL Final    Glucose 05/08/2024 84  70 - 110 mg/dL Final    Sodium 05/08/2024 141  136 - 145 mmol/L Final    Potassium 05/08/2024 3.2 (L)  3.5 - 5.1 mmol/L Final    Chloride 05/08/2024 105  95 - 110 mmol/L Final    CO2 05/08/2024 24  23 - 29 mmol/L Final    BUN 05/08/2024 18  8 - 23 mg/dL Final    Calcium 05/08/2024 9.4  8.7 - 10.5 mg/dL Final    Creatinine 05/08/2024 1.3  0.5 - 1.4 mg/dL Final    Albumin 05/08/2024 3.5  3.5 - 5.2 g/dL Final    Phosphorus 05/08/2024 2.8  2.7 - 4.5 mg/dL Final    eGFR 05/08/2024 57.6 (A)  >60 mL/min/1.73 m^2 Final    Anion Gap 05/08/2024 12  8 - 16 mmol/L Final       Assessment:     1. Type II diabetes mellitus with peripheral autonomic neuropathy    2. Type 2 diabetes mellitus with stage 3a chronic kidney disease, without long-term current use of insulin    3. Primary hypertension    4. Current severe episode of major depressive disorder without psychotic features without prior episode    5. Anxiety and depression    6. Hypercholesteremia    7. Mild intermittent asthma without complication    8. Hyperthyroidism    9. Graves disease    10. Prostate cancer    11. Hypertensive retinopathy of both eyes      Plan:   Type II diabetes mellitus with peripheral autonomic neuropathy  Type 2 diabetes mellitus with stage 3a chronic kidney disease, without long-term current use of insulin  -A1c stable in prediabetic range.   -cont management with diet  -podiatry and eye exam current    Primary  hypertension  -hypokalemia on recent labs. Pt admits to not drinking water. Will try stopping the hctz. Recheck in 2 weeks.   -stop hctz. Continue losartan 100mg and amlodipine 5mg.   -lost 5 lbs in the past week.   -follow up in 2 weeks to check blood pressure off the hctz and also check weight.     Current severe episode of major depressive disorder without psychotic features without prior episode  Anxiety and depression  -stable    Hypercholesteremia  -stable on asa and pravastatin    Mild intermittent asthma without complication  -stable. No flare ups    Hyperthyroidism  Graves disease  -tsh normal on recent labs    Prostate cancer  -urology once a year    Hypertensive retinopathy of both eyes          Follow up in about 2 weeks (around 5/28/2024), or if symptoms worsen or fail to improve.

## 2024-05-28 ENCOUNTER — OFFICE VISIT (OUTPATIENT)
Dept: INTERNAL MEDICINE | Facility: CLINIC | Age: 74
End: 2024-05-28
Payer: MEDICARE

## 2024-05-28 VITALS
WEIGHT: 199.94 LBS | TEMPERATURE: 97 F | HEIGHT: 74 IN | DIASTOLIC BLOOD PRESSURE: 62 MMHG | SYSTOLIC BLOOD PRESSURE: 110 MMHG | BODY MASS INDEX: 25.66 KG/M2

## 2024-05-28 DIAGNOSIS — I10 PRIMARY HYPERTENSION: Primary | ICD-10-CM

## 2024-05-28 DIAGNOSIS — F32.2 CURRENT SEVERE EPISODE OF MAJOR DEPRESSIVE DISORDER WITHOUT PSYCHOTIC FEATURES WITHOUT PRIOR EPISODE: ICD-10-CM

## 2024-05-28 PROCEDURE — 99999 PR PBB SHADOW E&M-EST. PATIENT-LVL III: CPT | Mod: PBBFAC,HCNC,, | Performed by: PHYSICIAN ASSISTANT

## 2024-05-28 PROCEDURE — 3072F LOW RISK FOR RETINOPATHY: CPT | Mod: HCNC,CPTII,S$GLB, | Performed by: PHYSICIAN ASSISTANT

## 2024-05-28 PROCEDURE — 3044F HG A1C LEVEL LT 7.0%: CPT | Mod: HCNC,CPTII,S$GLB, | Performed by: PHYSICIAN ASSISTANT

## 2024-05-28 PROCEDURE — 1160F RVW MEDS BY RX/DR IN RCRD: CPT | Mod: HCNC,CPTII,S$GLB, | Performed by: PHYSICIAN ASSISTANT

## 2024-05-28 PROCEDURE — 1126F AMNT PAIN NOTED NONE PRSNT: CPT | Mod: HCNC,CPTII,S$GLB, | Performed by: PHYSICIAN ASSISTANT

## 2024-05-28 PROCEDURE — 3078F DIAST BP <80 MM HG: CPT | Mod: HCNC,CPTII,S$GLB, | Performed by: PHYSICIAN ASSISTANT

## 2024-05-28 PROCEDURE — 1101F PT FALLS ASSESS-DOCD LE1/YR: CPT | Mod: HCNC,CPTII,S$GLB, | Performed by: PHYSICIAN ASSISTANT

## 2024-05-28 PROCEDURE — 99214 OFFICE O/P EST MOD 30 MIN: CPT | Mod: HCNC,S$GLB,, | Performed by: PHYSICIAN ASSISTANT

## 2024-05-28 PROCEDURE — 3061F NEG MICROALBUMINURIA REV: CPT | Mod: HCNC,CPTII,S$GLB, | Performed by: PHYSICIAN ASSISTANT

## 2024-05-28 PROCEDURE — 1159F MED LIST DOCD IN RCRD: CPT | Mod: HCNC,CPTII,S$GLB, | Performed by: PHYSICIAN ASSISTANT

## 2024-05-28 PROCEDURE — 3066F NEPHROPATHY DOC TX: CPT | Mod: HCNC,CPTII,S$GLB, | Performed by: PHYSICIAN ASSISTANT

## 2024-05-28 PROCEDURE — 3288F FALL RISK ASSESSMENT DOCD: CPT | Mod: HCNC,CPTII,S$GLB, | Performed by: PHYSICIAN ASSISTANT

## 2024-05-28 PROCEDURE — 3074F SYST BP LT 130 MM HG: CPT | Mod: HCNC,CPTII,S$GLB, | Performed by: PHYSICIAN ASSISTANT

## 2024-05-28 PROCEDURE — 4010F ACE/ARB THERAPY RXD/TAKEN: CPT | Mod: HCNC,CPTII,S$GLB, | Performed by: PHYSICIAN ASSISTANT

## 2024-05-28 RX ORDER — MIRTAZAPINE 30 MG/1
30 TABLET, ORALLY DISINTEGRATING ORAL NIGHTLY
COMMUNITY

## 2024-05-28 RX ORDER — BUPROPION HYDROCHLORIDE 100 MG/1
100 TABLET, EXTENDED RELEASE ORAL DAILY
COMMUNITY

## 2024-05-28 RX ORDER — RISPERIDONE 1 MG/1
0.5 TABLET, ORALLY DISINTEGRATING ORAL DAILY
COMMUNITY

## 2024-05-28 RX ORDER — PRAZOSIN HYDROCHLORIDE 2 MG/1
2 CAPSULE ORAL NIGHTLY
COMMUNITY

## 2024-05-28 NOTE — PROGRESS NOTES
Subjective:      Patient ID: Edin Green is a 73 y.o. male.    Chief Complaint: Follow-up    HPI  Here today for a 2 week follow up to recheck BP after stopping hctz and to monitor his recent involuntary weight loss.   He is on losartan 100mg and amlodipine 5mg for blood pressure. .   Lost 1 lb in the past 2 weeks. No abdominal pain, nausea, vomiting, or changes to bowel movement. Goes 2-3 days without eating on occasion.   Thyroid labs are in normal range.   [facility] He is on risperdone and trileptal  Pt stays in bed most of the day. Misses meals frequently. Denies any thoughts of hurting himself.   Urology apt scheduled for December.     Wt Readings from Last 3 Encounters:   05/28/24 1502 90.7 kg (199 lb 15.3 oz)   05/14/24 1441 91.1 kg (200 lb 12.4 oz)   05/08/24 1044 93 kg (205 lb 0.4 oz)       Patient Active Problem List   Diagnosis    Hypertensive retinopathy of both eyes    Refractive error - Both Eyes    Nuclear sclerosis    HTN (hypertension)    Anxiety and depression    Hypogonadism in male    ED (erectile dysfunction)    LAURENCE (obstructive sleep apnea)    Hypercholesteremia    Mild intermittent asthma without complication    Allergic rhinitis    Cortical senile cataract of both eyes    Posterior vitreous detachment of both eyes    Hyperthyroidism    Graves disease    Type II diabetes mellitus with peripheral autonomic neuropathy    Prostate cancer    Current severe episode of major depressive disorder without psychotic features without prior episode    Stage 3a chronic kidney disease         Current Outpatient Medications:     amLODIPine (NORVASC) 5 MG tablet, TAKE 1 TABLET EVERY DAY, Disp: 90 tablet, Rfl: 0    aspirin (ECOTRIN) 81 MG EC tablet, Take 81 mg by mouth once daily., Disp: , Rfl:     losartan (COZAAR) 100 MG tablet, TAKE 1 TABLET EVERY DAY, Disp: 90 tablet, Rfl: 0    OXcarbazepine (TRILEPTAL) 150 MG Tab, Take 1 mg by mouth every evening., Disp: , Rfl:      pravastatin (PRAVACHOL) 40 MG tablet, TAKE 1 TABLET EVERY DAY, Disp: 90 tablet, Rfl: 4    buPROPion (WELLBUTRIN SR) 100 MG TBSR 12 hr tablet, Take 100 mg by mouth once daily., Disp: , Rfl:     mirtazapine (REMERON SOL-TAB) 30 MG disintegrating tablet, Take 30 mg by mouth every evening., Disp: , Rfl:     prazosin (MINIPRESS) 2 MG Cap, Take 2 mg by mouth every evening., Disp: , Rfl:     risperiDONE (RISPERDAL M-TABS) 1 MG TbDL, Take 0.5 mg by mouth once daily., Disp: , Rfl:   No current facility-administered medications for this visit.    Facility-Administered Medications Ordered in Other Visits:     lactated ringers infusion, , Intravenous, Continuous, Marcie Funes MD, New Bag at 06/07/23 1226    Review of Systems   Constitutional:  Positive for appetite change and unexpected weight change. Negative for activity change, chills, diaphoresis, fatigue and fever.   HENT: Negative.  Negative for congestion, hearing loss, postnasal drip, rhinorrhea, sore throat, trouble swallowing and voice change.    Eyes: Negative.  Negative for visual disturbance.   Respiratory: Negative.  Negative for cough, choking, chest tightness and shortness of breath.    Cardiovascular:  Negative for chest pain, palpitations and leg swelling.   Gastrointestinal:  Negative for abdominal distention, abdominal pain, blood in stool, constipation, diarrhea, nausea and vomiting.   Endocrine: Negative for cold intolerance, heat intolerance, polydipsia and polyuria.   Genitourinary: Negative.  Negative for difficulty urinating and frequency.   Musculoskeletal:  Negative for arthralgias, back pain, gait problem, joint swelling and myalgias.   Skin:  Negative for color change, pallor, rash and wound.   Neurological:  Negative for dizziness, tremors, weakness, light-headedness, numbness and headaches.   Hematological:  Negative for adenopathy.   Psychiatric/Behavioral:  Negative for behavioral problems, confusion, self-injury, sleep disturbance and  "suicidal ideas. The patient is not nervous/anxious.      Objective:   /62 (BP Location: Right arm, Patient Position: Sitting, BP Method: Large (Manual))   Temp 97.4 °F (36.3 °C) (Tympanic)   Ht 6' 2" (1.88 m)   Wt 90.7 kg (199 lb 15.3 oz)   BMI 25.67 kg/m²     Physical Exam  Vitals reviewed.   Constitutional:       General: He is not in acute distress.     Appearance: Normal appearance. He is well-developed. He is not ill-appearing, toxic-appearing or diaphoretic.   HENT:      Head: Normocephalic and atraumatic.      Right Ear: External ear normal.      Left Ear: External ear normal.      Nose: Nose normal.   Eyes:      Conjunctiva/sclera: Conjunctivae normal.      Pupils: Pupils are equal, round, and reactive to light.   Cardiovascular:      Rate and Rhythm: Normal rate and regular rhythm.      Heart sounds: Normal heart sounds. No murmur heard.     No friction rub. No gallop.   Pulmonary:      Effort: Pulmonary effort is normal. No respiratory distress.      Breath sounds: Normal breath sounds. No wheezing or rales.   Chest:      Chest wall: No tenderness.   Abdominal:      General: There is no distension.      Palpations: Abdomen is soft.      Tenderness: There is no abdominal tenderness.   Musculoskeletal:         General: Normal range of motion.      Cervical back: Normal range of motion and neck supple.   Lymphadenopathy:      Cervical: No cervical adenopathy.   Skin:     General: Skin is warm and dry.      Capillary Refill: Capillary refill takes less than 2 seconds.      Findings: No rash.   Neurological:      Mental Status: He is alert and oriented to person, place, and time.      Motor: No weakness.      Coordination: Coordination normal.      Gait: Gait normal.   Psychiatric:         Mood and Affect: Mood is depressed.         Behavior: Behavior normal.         Thought Content: Thought content normal.         Judgment: Judgment normal.       Lab Results   Component Value Date    TSH 1.377 " 05/08/2024     Lab Visit on 05/08/2024   Component Date Value Ref Range Status    PSA, Screen 05/08/2024 0.07  0.00 - 4.00 ng/mL Final    Comment: The testing method is a chemiluminescent microparticle immunoassay   manufactured by [employer] and performed on the [employer]   or   [employer] system. Values obtained with different assay manufacturers   for   methods may be different and cannot be used interchangeably.  PSA Expected levels:  Hormonal Therapy: <0.05 ng/ml  Prostatectomy: <0.01 ng/ml  Radiation Therapy: <1.00 ng/ml      Hemoglobin A1C 05/08/2024 5.9 (H)  4.0 - 5.6 % Final    Comment: ADA Screening Guidelines:  5.7-6.4%  Consistent with prediabetes  >or=6.5%  Consistent with diabetes    High levels of fetal hemoglobin interfere with the HbA1C  assay. Heterozygous hemoglobin variants (HbS, HgC, etc)do  not significantly interfere with this assay.   However, presence of multiple variants may affect accuracy.      Estimated Avg Glucose 05/08/2024 123  68 - 131 mg/dL Final    Microalbumin, Urine 05/08/2024 22.0  ug/mL Final    Creatinine, Urine 05/08/2024 >450.0 (H)  23.0 - 375.0 mg/dL Final    Microalb/Creat Ratio 05/08/2024 Unable to calculate  0.0 - 30.0 ug/mg Final    TSH 05/08/2024 1.377  0.400 - 4.000 uIU/mL Final    Glucose 05/08/2024 84  70 - 110 mg/dL Final    Sodium 05/08/2024 141  136 - 145 mmol/L Final    Potassium 05/08/2024 3.2 (L)  3.5 - 5.1 mmol/L Final    Chloride 05/08/2024 105  95 - 110 mmol/L Final    CO2 05/08/2024 24  23 - 29 mmol/L Final    BUN 05/08/2024 18  8 - 23 mg/dL Final    Calcium 05/08/2024 9.4  8.7 - 10.5 mg/dL Final    Creatinine 05/08/2024 1.3  0.5 - 1.4 mg/dL Final    Albumin 05/08/2024 3.5  3.5 - 5.2 g/dL Final    Phosphorus 05/08/2024 2.8  2.7 - 4.5 mg/dL Final    eGFR 05/08/2024 57.6 (A)  >60 mL/min/1.73 m^2 Final    Anion Gap 05/08/2024 12  8 - 16 mmol/L Final       Assessment:     1. Primary hypertension    2. Current severe episode of major depressive  disorder without psychotic features without prior episode      Plan:   Primary hypertension  -bp stable and controlled off hctz. Cont losartan and amlodipine    Current severe episode of major depressive disorder without psychotic features without prior episode  -concerned depression not stable/controlled. Follow up with psychiatrist.  -advised to discuss recent weight loss and refusal to eat with his psychiatrist.   -follow up in 6-8 weeks with DR. Paris     Follow up in about 2 months (around 7/28/2024), or if symptoms worsen or fail to improve.

## 2024-07-09 DIAGNOSIS — E78.00 HYPERCHOLESTEREMIA: ICD-10-CM

## 2024-07-09 RX ORDER — PRAVASTATIN SODIUM 40 MG/1
40 TABLET ORAL DAILY
Qty: 90 TABLET | Refills: 0 | Status: SHIPPED | OUTPATIENT
Start: 2024-07-09

## 2024-07-09 RX ORDER — AMLODIPINE BESYLATE 5 MG/1
5 TABLET ORAL DAILY
Qty: 90 TABLET | Refills: 0 | Status: SHIPPED | OUTPATIENT
Start: 2024-07-09

## 2024-07-09 NOTE — TELEPHONE ENCOUNTER
No care due was identified.  [facility] Embedded Care Due Messages. Reference number: [id number].   7/09/2024 3:40:41 PM CDT

## 2024-07-10 NOTE — TELEPHONE ENCOUNTER
Refill Decision Note   Edin Green  is requesting a refill authorization.    Brief Assessment and Rationale for Refill:  Approve       Medication Therapy Plan:         Comments:     Note composed:9:58 PM 07/09/2024

## 2024-07-25 RX ORDER — LOSARTAN POTASSIUM 100 MG/1
100 TABLET ORAL DAILY
Qty: 90 TABLET | Refills: 0 | Status: SHIPPED | OUTPATIENT
Start: 2024-07-25

## 2024-08-19 ENCOUNTER — LAB VISIT (OUTPATIENT)
Dept: LAB | Facility: HOSPITAL | Age: 74
End: 2024-08-19
Attending: FAMILY MEDICINE
Payer: MEDICARE

## 2024-08-19 ENCOUNTER — OFFICE VISIT (OUTPATIENT)
Dept: INTERNAL MEDICINE | Facility: CLINIC | Age: 74
End: 2024-08-19
Payer: MEDICARE

## 2024-08-19 VITALS
HEIGHT: 74 IN | TEMPERATURE: 97 F | SYSTOLIC BLOOD PRESSURE: 110 MMHG | WEIGHT: 183.44 LBS | DIASTOLIC BLOOD PRESSURE: 70 MMHG | HEART RATE: 88 BPM | BODY MASS INDEX: 23.54 KG/M2 | OXYGEN SATURATION: 99 %

## 2024-08-19 DIAGNOSIS — E05.90 HYPERTHYROIDISM: ICD-10-CM

## 2024-08-19 DIAGNOSIS — N18.31 TYPE 2 DIABETES MELLITUS WITH STAGE 3A CHRONIC KIDNEY DISEASE, WITHOUT LONG-TERM CURRENT USE OF INSULIN: ICD-10-CM

## 2024-08-19 DIAGNOSIS — R63.4 LOSS OF WEIGHT: ICD-10-CM

## 2024-08-19 DIAGNOSIS — E11.22 TYPE 2 DIABETES MELLITUS WITH STAGE 3A CHRONIC KIDNEY DISEASE, WITHOUT LONG-TERM CURRENT USE OF INSULIN: ICD-10-CM

## 2024-08-19 DIAGNOSIS — E11.43 TYPE II DIABETES MELLITUS WITH PERIPHERAL AUTONOMIC NEUROPATHY: ICD-10-CM

## 2024-08-19 DIAGNOSIS — I10 PRIMARY HYPERTENSION: Primary | ICD-10-CM

## 2024-08-19 DIAGNOSIS — C61 PROSTATE CANCER: ICD-10-CM

## 2024-08-19 DIAGNOSIS — F41.9 ANXIETY AND DEPRESSION: ICD-10-CM

## 2024-08-19 DIAGNOSIS — F32.A ANXIETY AND DEPRESSION: ICD-10-CM

## 2024-08-19 PROCEDURE — 3288F FALL RISK ASSESSMENT DOCD: CPT | Mod: HCNC,CPTII,S$GLB, | Performed by: FAMILY MEDICINE

## 2024-08-19 PROCEDURE — 3078F DIAST BP <80 MM HG: CPT | Mod: HCNC,CPTII,S$GLB, | Performed by: FAMILY MEDICINE

## 2024-08-19 PROCEDURE — 3008F BODY MASS INDEX DOCD: CPT | Mod: HCNC,CPTII,S$GLB, | Performed by: FAMILY MEDICINE

## 2024-08-19 PROCEDURE — 1101F PT FALLS ASSESS-DOCD LE1/YR: CPT | Mod: HCNC,CPTII,S$GLB, | Performed by: FAMILY MEDICINE

## 2024-08-19 PROCEDURE — 1126F AMNT PAIN NOTED NONE PRSNT: CPT | Mod: HCNC,CPTII,S$GLB, | Performed by: FAMILY MEDICINE

## 2024-08-19 PROCEDURE — 1159F MED LIST DOCD IN RCRD: CPT | Mod: HCNC,CPTII,S$GLB, | Performed by: FAMILY MEDICINE

## 2024-08-19 PROCEDURE — 99999 PR PBB SHADOW E&M-EST. PATIENT-LVL III: CPT | Mod: PBBFAC,HCNC,, | Performed by: FAMILY MEDICINE

## 2024-08-19 PROCEDURE — 85025 COMPLETE CBC W/AUTO DIFF WBC: CPT | Mod: HCNC | Performed by: FAMILY MEDICINE

## 2024-08-19 PROCEDURE — 83036 HEMOGLOBIN GLYCOSYLATED A1C: CPT | Mod: HCNC | Performed by: FAMILY MEDICINE

## 2024-08-19 PROCEDURE — G2211 COMPLEX E/M VISIT ADD ON: HCPCS | Mod: HCNC,S$GLB,, | Performed by: FAMILY MEDICINE

## 2024-08-19 PROCEDURE — 3066F NEPHROPATHY DOC TX: CPT | Mod: HCNC,CPTII,S$GLB, | Performed by: FAMILY MEDICINE

## 2024-08-19 PROCEDURE — 3061F NEG MICROALBUMINURIA REV: CPT | Mod: HCNC,CPTII,S$GLB, | Performed by: FAMILY MEDICINE

## 2024-08-19 PROCEDURE — 84443 ASSAY THYROID STIM HORMONE: CPT | Mod: HCNC | Performed by: FAMILY MEDICINE

## 2024-08-19 PROCEDURE — 3044F HG A1C LEVEL LT 7.0%: CPT | Mod: HCNC,CPTII,S$GLB, | Performed by: FAMILY MEDICINE

## 2024-08-19 PROCEDURE — 3074F SYST BP LT 130 MM HG: CPT | Mod: HCNC,CPTII,S$GLB, | Performed by: FAMILY MEDICINE

## 2024-08-19 PROCEDURE — 3072F LOW RISK FOR RETINOPATHY: CPT | Mod: HCNC,CPTII,S$GLB, | Performed by: FAMILY MEDICINE

## 2024-08-19 PROCEDURE — 80053 COMPREHEN METABOLIC PANEL: CPT | Mod: HCNC | Performed by: FAMILY MEDICINE

## 2024-08-19 PROCEDURE — 36415 COLL VENOUS BLD VENIPUNCTURE: CPT | Mod: HCNC | Performed by: FAMILY MEDICINE

## 2024-08-19 PROCEDURE — 99214 OFFICE O/P EST MOD 30 MIN: CPT | Mod: HCNC,S$GLB,, | Performed by: FAMILY MEDICINE

## 2024-08-19 PROCEDURE — 4010F ACE/ARB THERAPY RXD/TAKEN: CPT | Mod: HCNC,CPTII,S$GLB, | Performed by: FAMILY MEDICINE

## 2024-08-19 RX ORDER — OXCARBAZEPINE 150 MG/1
150 TABLET, FILM COATED ORAL NIGHTLY
COMMUNITY

## 2024-08-19 RX ORDER — MIRTAZAPINE 45 MG/1
45 TABLET, FILM COATED ORAL NIGHTLY
COMMUNITY

## 2024-08-19 NOTE — PROGRESS NOTES
Chief Complaint: Follow-up    History of Present Illness    CHIEF COMPLAINT:  Mr. Green presents today for follow up.    WEIGHT LOSS AND APPETITE:  He reports significant weight loss associated with decreased appetite. He often does not feel hungry and sometimes only drinks water and fluids throughout the day. He denies any difficulty or pain with eating, ruling out dental issues. His wife has been providing nutritional supplements like Boost and Ensure to help with calorie intake. He denies abdominal pain or difficulty breathing. He feels tired and occasionally experiences lightheadedness upon standing, though not daily.    PSYCHIATRIC CARE AND MEDICATIONS:  [facility] Avila. Recent medication changes include discontinuation of Risperdal 3 weeks ago, increase of Mirtazapine to 45 mg last month, addition of Prazosin (dosage unspecified), and fluctuation of Bupropion (currently at 100 mg). He admits to inconsistent adherence to his psychiatric medications, which may impact their effectiveness. His wife is trying to help improve his medication compliance. A follow-up video appointment with the psychiatrist is scheduled for September 9th to reassess the medication regimen.    MOOD:  He denies experiencing any suicidal thoughts. He acknowledges a connection between his mood changes and reduced appetite. He reports feeling tired and experiencing dry mouth.    ENT:  He describes his voice as having a soft rasp, which is consistent with his usual presentation. He denies any recent changes or worsening of hoarseness.    SOCIAL HISTORY:  He expresses interest in football, having attended a championship game in the past. He engages in discussions about recent football games and team strategies.    ROS:  Constitutional: +weight loss, +fatigue, +lightheadedness, +appetite changes  ENT: -hoarseness, +dry mouth  Respiratory: -difficulty breathing  Gastrointestinal: -abdominal  pain  Psychiatric: -suicidal ideation, +mood swings          Objective:   Gen nad  Wife present  Physical Exam    Vitals: Reviewed. Nursing note reviewed.  Constitutional: Alert.  HENT: Normocephalic. Atraumatic. External ears normal. Nose normal. PERRL. Conjunctivae normal.  Neck: ROM normal. Supple.  Cardiovascular: Normal rate and regular rhythm. Normal heart sounds.  Pulmonary: Normal breath sounds. Effort normal.  Abdominal: Bowel sounds are normal. Soft.  Musculoskeletal: ROM normal.  Skin: Warm. Dry.  Neurological: Oriented x3.  Psychiatric: Behavior normal. Thought content normal. Judgment normal.       Assessment:       1. Primary hypertension    2. Type 2 diabetes mellitus with stage 3a chronic kidney disease, without long-term current use of insulin    3. Anxiety and depression    4. Hyperthyroidism    5. Type II diabetes mellitus with peripheral autonomic neuropathy    6. Prostate cancer    7. Loss of weight        Plan:   Assessment & Plan    R49.0 Dysphonia  Z91.148 Patient's other noncompliance with medication regimen for other reason  R63.0 Anorexia  C61 Malignant neoplasm of prostate  F39 Unspecified mood [affective] disorder  Z85.46 Personal history of malignant neoplasm of prostate  Assessed significant weight loss since last visit, likely related to decreased appetite and mood changes  Considered thyroid function as potential cause, but recent labs showed normal thyroid levels  Evaluated [facility] changes made by [facility], including discontinuation of risperidone and increase in mirtazapine  Determined to allow more time for psychiatric medication adjustments to take effect before considering further workup for weight loss  Ruled out immediate concerns for cancer as cause of weight loss, particularly related to known prostate cancer  Considered potential for blood pressure medication adjustment due to weight loss, but current BP normal  WEIGHT MANAGEMENT:  - Discussed relationship between  mood, appetite, and weight.  - Explained potential for blood pressure changes with weight loss and associated symptoms.  - Mr. Green to continue using Boost and Ensure supplements to add calories, especially after meals.  ORTHOSTATIC HYPOTENSION:  - Mr. Green to monitor for lightheadedness when standing up.  - Mr. Green to check blood pressure at home a few times per week.  MEDICATIONS/SUPPLEMENTS:  - Explained importance of consistent medication adherence for assessing effectiveness.  LABS:  - Lab work ordered to check thyroid function, blood sugar, kidney and liver function, and blood count.  FOLLOW UP:  - Follow up in about 1 month, after the next [facility] appointment on September 9th, to reassess medication effectiveness and weight status.  - Contact the office if lab results show any abnormalities.          Lab today  F/u winston one month

## 2024-08-20 LAB
ALBUMIN SERPL BCP-MCNC: 3.6 G/DL (ref 3.5–5.2)
ALP SERPL-CCNC: 75 U/L (ref 55–135)
ALT SERPL W/O P-5'-P-CCNC: 8 U/L (ref 10–44)
ANION GAP SERPL CALC-SCNC: 14 MMOL/L (ref 8–16)
AST SERPL-CCNC: 14 U/L (ref 10–40)
BASOPHILS # BLD AUTO: 0.04 K/UL (ref 0–0.2)
BASOPHILS NFR BLD: 0.8 % (ref 0–1.9)
BILIRUB SERPL-MCNC: 0.7 MG/DL (ref 0.1–1)
BUN SERPL-MCNC: 20 MG/DL (ref 8–23)
CALCIUM SERPL-MCNC: 9.8 MG/DL (ref 8.7–10.5)
CHLORIDE SERPL-SCNC: 102 MMOL/L (ref 95–110)
CO2 SERPL-SCNC: 20 MMOL/L (ref 23–29)
CREAT SERPL-MCNC: 1.7 MG/DL (ref 0.5–1.4)
DIFFERENTIAL METHOD BLD: NORMAL
EOSINOPHIL # BLD AUTO: 0.2 K/UL (ref 0–0.5)
EOSINOPHIL NFR BLD: 4.2 % (ref 0–8)
ERYTHROCYTE [DISTWIDTH] IN BLOOD BY AUTOMATED COUNT: 13.5 % (ref 11.5–14.5)
EST. GFR  (NO RACE VARIABLE): 41.8 ML/MIN/1.73 M^2
ESTIMATED AVG GLUCOSE: 103 MG/DL (ref 68–131)
GLUCOSE SERPL-MCNC: 132 MG/DL (ref 70–110)
HBA1C MFR BLD: 5.2 % (ref 4–5.6)
HCT VFR BLD AUTO: 43.8 % (ref 40–54)
HGB BLD-MCNC: 14.3 G/DL (ref 14–18)
IMM GRANULOCYTES # BLD AUTO: 0.02 K/UL (ref 0–0.04)
IMM GRANULOCYTES NFR BLD AUTO: 0.4 % (ref 0–0.5)
LYMPHOCYTES # BLD AUTO: 1.5 K/UL (ref 1–4.8)
LYMPHOCYTES NFR BLD: 30.7 % (ref 18–48)
MCH RBC QN AUTO: 29.5 PG (ref 27–31)
MCHC RBC AUTO-ENTMCNC: 32.6 G/DL (ref 32–36)
MCV RBC AUTO: 90 FL (ref 82–98)
MONOCYTES # BLD AUTO: 0.3 K/UL (ref 0.3–1)
MONOCYTES NFR BLD: 7.1 % (ref 4–15)
NEUTROPHILS # BLD AUTO: 2.7 K/UL (ref 1.8–7.7)
NEUTROPHILS NFR BLD: 56.8 % (ref 38–73)
NRBC BLD-RTO: 0 /100 WBC
PLATELET # BLD AUTO: 217 K/UL (ref 150–450)
PMV BLD AUTO: 12.1 FL (ref 9.2–12.9)
POTASSIUM SERPL-SCNC: 3.9 MMOL/L (ref 3.5–5.1)
PROT SERPL-MCNC: 7.8 G/DL (ref 6–8.4)
RBC # BLD AUTO: 4.85 M/UL (ref 4.6–6.2)
SODIUM SERPL-SCNC: 136 MMOL/L (ref 136–145)
TSH SERPL DL<=0.005 MIU/L-ACNC: 0.86 UIU/ML (ref 0.4–4)
WBC # BLD AUTO: 4.79 K/UL (ref 3.9–12.7)

## 2024-08-31 DIAGNOSIS — R79.89 ELEVATED SERUM CREATININE: Primary | ICD-10-CM

## 2024-09-19 ENCOUNTER — OFFICE VISIT (OUTPATIENT)
Dept: INTERNAL MEDICINE | Facility: CLINIC | Age: 74
End: 2024-09-19
Payer: MEDICARE

## 2024-09-19 ENCOUNTER — LAB VISIT (OUTPATIENT)
Dept: LAB | Facility: HOSPITAL | Age: 74
End: 2024-09-19
Attending: FAMILY MEDICINE
Payer: MEDICARE

## 2024-09-19 VITALS
HEART RATE: 95 BPM | OXYGEN SATURATION: 97 % | DIASTOLIC BLOOD PRESSURE: 58 MMHG | WEIGHT: 183.19 LBS | TEMPERATURE: 98 F | SYSTOLIC BLOOD PRESSURE: 112 MMHG | BODY MASS INDEX: 23.51 KG/M2 | HEIGHT: 74 IN

## 2024-09-19 DIAGNOSIS — F41.9 ANXIETY AND DEPRESSION: ICD-10-CM

## 2024-09-19 DIAGNOSIS — E11.43 TYPE II DIABETES MELLITUS WITH PERIPHERAL AUTONOMIC NEUROPATHY: ICD-10-CM

## 2024-09-19 DIAGNOSIS — F32.2 CURRENT SEVERE EPISODE OF MAJOR DEPRESSIVE DISORDER WITHOUT PSYCHOTIC FEATURES WITHOUT PRIOR EPISODE: ICD-10-CM

## 2024-09-19 DIAGNOSIS — C61 PROSTATE CANCER: ICD-10-CM

## 2024-09-19 DIAGNOSIS — F32.A ANXIETY AND DEPRESSION: ICD-10-CM

## 2024-09-19 DIAGNOSIS — R79.89 ELEVATED SERUM CREATININE: ICD-10-CM

## 2024-09-19 DIAGNOSIS — E05.00 GRAVES DISEASE: ICD-10-CM

## 2024-09-19 DIAGNOSIS — N18.31 STAGE 3A CHRONIC KIDNEY DISEASE: ICD-10-CM

## 2024-09-19 DIAGNOSIS — I10 PRIMARY HYPERTENSION: ICD-10-CM

## 2024-09-19 DIAGNOSIS — Z87.898 H/O ABNORMAL WEIGHT LOSS: Primary | ICD-10-CM

## 2024-09-19 DIAGNOSIS — E05.90 HYPERTHYROIDISM: ICD-10-CM

## 2024-09-19 PROCEDURE — 36415 COLL VENOUS BLD VENIPUNCTURE: CPT | Mod: HCNC | Performed by: FAMILY MEDICINE

## 2024-09-19 PROCEDURE — 99999 PR PBB SHADOW E&M-EST. PATIENT-LVL III: CPT | Mod: PBBFAC,HCNC,, | Performed by: PHYSICIAN ASSISTANT

## 2024-09-19 PROCEDURE — 80048 BASIC METABOLIC PNL TOTAL CA: CPT | Mod: HCNC | Performed by: FAMILY MEDICINE

## 2024-09-19 NOTE — PROGRESS NOTES
Subjective:      Patient ID: Edin Green is a 73 y.o. male.    Chief Complaint: Follow-up    HPI  History of Present Illness    CHIEF COMPLAINT:  Mr. Green presents today for follow up on psychiatric appointments and weight monitoring.    PSYCHIATRIC MEDICATIONS:  He reports recent adjustments to psychiatric medications following a video appointment with his psychiatrist. He is currently tapering off mirtazapine and is down to one-fourth of the original dose. The psychiatrist has discussed potentially starting Zoloft, but this medication has not been initiated or prescribed yet.    WEIGHT AND BLOOD PRESSURE:  His weight remains stable from the previous visit. He reports low blood pressure but denies experiencing any symptoms such as lightheadedness or dizziness.    LAB RESULTS:  He reports slightly declined kidney function, attributed to possible dehydration. His thyroid and blood counts are reported as good. Recent lab results show no indications of diabetes or cancer.    MEDICAL HISTORY:  He had a colonoscopy last year with no recommendation for repeat at that time.     UPCOMING APPOINTMENTS:  He has upcoming appointments scheduled: an eye exam in September, a podiatrist appointment in November, and a urologist appointment in December.     Medications were reviewed      ROS:  General: -fever, -chills, -fatigue, -weight gain, -weight loss  Eyes: -vision changes, -redness, -discharge  ENT: -ear pain, -nasal congestion, -sore throat  Cardiovascular: -chest pain, -palpitations, -lower extremity edema  Respiratory: -cough, -shortness of breath  Gastrointestinal: -abdominal pain, -nausea, -vomiting, -diarrhea, -constipation, -blood in stool  Genitourinary: -dysuria, -hematuria, -frequency  Musculoskeletal: -joint pain, -muscle pain  Skin: -rash, -lesion  Neurological: -headache, -dizziness, -numbness, -tingling, -lightheadedness  Psychiatric: -anxiety, -depression, -sleep difficulty         Wt Readings from  Last 3 Encounters:   09/19/24 1151 83.1 kg (183 lb 3.2 oz)   08/19/24 1511 83.2 kg (183 lb 6.8 oz)   05/28/24 1502 90.7 kg (199 lb 15.3 oz)      Patient Active Problem List   Diagnosis    Hypertensive retinopathy of both eyes    Refractive error - Both Eyes    Nuclear sclerosis    HTN (hypertension)    Anxiety and depression    Hypogonadism in male    ED (erectile dysfunction)    LAURENCE (obstructive sleep apnea)    Mild intermittent asthma without complication    Allergic rhinitis    Cortical senile cataract of both eyes    Posterior vitreous detachment of both eyes    Hyperthyroidism    Graves disease    Type II diabetes mellitus with peripheral autonomic neuropathy    Prostate cancer    Current severe episode of major depressive disorder without psychotic features without prior episode    Stage 3a chronic kidney disease         Current Outpatient Medications:     amLODIPine (NORVASC) 5 MG tablet, Take 1 tablet (5 mg total) by mouth once daily., Disp: 90 tablet, Rfl: 0    aspirin (ECOTRIN) 81 MG EC tablet, Take 81 mg by mouth once daily., Disp: , Rfl:     losartan (COZAAR) 100 MG tablet, Take 1 tablet (100 mg total) by mouth once daily., Disp: 90 tablet, Rfl: 0    mirtazapine (REMERON) 45 MG tablet, Take 45 mg by mouth every evening. 1/4 of a tablet, Disp: , Rfl:     OXcarbazepine (TRILEPTAL) 150 MG Tab, Take 150 mg by mouth every evening., Disp: , Rfl:     pravastatin (PRAVACHOL) 40 MG tablet, Take 1 tablet (40 mg total) by mouth once daily., Disp: 90 tablet, Rfl: 0    prazosin (MINIPRESS) 2 MG Cap, Take 2 mg by mouth every evening., Disp: , Rfl:   No current facility-administered medications for this visit.    Facility-Administered Medications Ordered in Other Visits:     lactated ringers infusion, , Intravenous, Continuous, Marcie Funes MD, New Bag at 06/07/23 1226    Review of Systems   Constitutional:  Negative for activity change, appetite change, chills, diaphoresis, fatigue, fever and unexpected weight  "change.   HENT: Negative.  Negative for congestion, hearing loss, postnasal drip, rhinorrhea, sore throat, trouble swallowing and voice change.    Eyes: Negative.  Negative for visual disturbance.   Respiratory: Negative.  Negative for cough, choking, chest tightness and shortness of breath.    Cardiovascular:  Negative for chest pain, palpitations and leg swelling.   Gastrointestinal:  Negative for abdominal distention, abdominal pain, blood in stool, constipation, diarrhea, nausea and vomiting.   Endocrine: Negative for cold intolerance, heat intolerance, polydipsia and polyuria.   Genitourinary: Negative.  Negative for difficulty urinating and frequency.   Musculoskeletal:  Negative for arthralgias, back pain, gait problem, joint swelling and myalgias.   Skin:  Negative for color change, pallor, rash and wound.   Neurological:  Negative for dizziness, tremors, weakness, light-headedness, numbness and headaches.   Hematological:  Negative for adenopathy.   Psychiatric/Behavioral:  Negative for behavioral problems, confusion, self-injury, sleep disturbance and suicidal ideas. The patient is not nervous/anxious.      Objective:   BP (!) 112/58   Pulse 95   Temp 98.3 °F (36.8 °C)   Ht 6' 2" (1.88 m)   Wt 83.1 kg (183 lb 3.2 oz)   SpO2 97%   BMI 23.52 kg/m²     Physical Exam  Vitals and nursing note reviewed.   Constitutional:       General: He is not in acute distress.     Appearance: Normal appearance. He is well-developed. He is not ill-appearing, toxic-appearing or diaphoretic.   HENT:      Head: Normocephalic and atraumatic.   Cardiovascular:      Rate and Rhythm: Normal rate and regular rhythm.      Heart sounds: Normal heart sounds. No murmur heard.     No friction rub. No gallop.   Pulmonary:      Effort: Pulmonary effort is normal. No respiratory distress.      Breath sounds: Normal breath sounds. No wheezing or rales.   Skin:     General: Skin is warm.      Findings: No rash.   Neurological:      " Mental Status: He is alert and oriented to person, place, and time.   Psychiatric:         Mood and Affect: Mood normal.         Behavior: Behavior normal.         Thought Content: Thought content normal.         Judgment: Judgment normal.       No visits with results within 1 Month(s) from this visit.   Latest known visit with results is:   Lab Visit on 08/19/2024   Component Date Value Ref Range Status    Sodium 08/19/2024 136  136 - 145 mmol/L Final    Potassium 08/19/2024 3.9  3.5 - 5.1 mmol/L Final    Chloride 08/19/2024 102  95 - 110 mmol/L Final    CO2 08/19/2024 20 (L)  23 - 29 mmol/L Final    Glucose 08/19/2024 132 (H)  70 - 110 mg/dL Final    BUN 08/19/2024 20  8 - 23 mg/dL Final    Creatinine 08/19/2024 1.7 (H)  0.5 - 1.4 mg/dL Final    Calcium 08/19/2024 9.8  8.7 - 10.5 mg/dL Final    Total Protein 08/19/2024 7.8  6.0 - 8.4 g/dL Final    Albumin 08/19/2024 3.6  3.5 - 5.2 g/dL Final    Total Bilirubin 08/19/2024 0.7  0.1 - 1.0 mg/dL Final    Comment: For infants and newborns, interpretation of results should be based  on gestational age, weight and in agreement with clinical  observations.    Premature Infant recommended reference ranges:  Up to 24 hours.............<8.0 mg/dL  Up to 48 hours............<12.0 mg/dL  3-5 days..................<15.0 mg/dL  6-29 days.................<15.0 mg/dL      Alkaline Phosphatase 08/19/2024 75  55 - 135 U/L Final    AST 08/19/2024 14  10 - 40 U/L Final    ALT 08/19/2024 8 (L)  10 - 44 U/L Final    eGFR 08/19/2024 41.8 (A)  >60 mL/min/1.73 m^2 Final    Anion Gap 08/19/2024 14  8 - 16 mmol/L Final    TSH 08/19/2024 0.859  0.400 - 4.000 uIU/mL Final    WBC 08/19/2024 4.79  3.90 - 12.70 K/uL Final    RBC 08/19/2024 4.85  4.60 - 6.20 M/uL Final    Hemoglobin 08/19/2024 14.3  14.0 - 18.0 g/dL Final    Hematocrit 08/19/2024 43.8  40.0 - 54.0 % Final    MCV 08/19/2024 90  82 - 98 fL Final    MCH 08/19/2024 29.5  27.0 - 31.0 pg Final    MCHC 08/19/2024 32.6  32.0 - 36.0 g/dL  Final    RDW 08/19/2024 13.5  11.5 - 14.5 % Final    Platelets 08/19/2024 217  150 - 450 K/uL Final    MPV 08/19/2024 12.1  9.2 - 12.9 fL Final    Immature Granulocytes 08/19/2024 0.4  0.0 - 0.5 % Final    Gran # (ANC) 08/19/2024 2.7  1.8 - 7.7 K/uL Final    Immature Grans (Abs) 08/19/2024 0.02  0.00 - 0.04 K/uL Final    Comment: Mild elevation in immature granulocytes is non specific and   can be seen in a variety of conditions including stress response,   acute inflammation, trauma and pregnancy. Correlation with other   laboratory and clinical findings is essential.      Lymph # 08/19/2024 1.5  1.0 - 4.8 K/uL Final    Mono # 08/19/2024 0.3  0.3 - 1.0 K/uL Final    Eos # 08/19/2024 0.2  0.0 - 0.5 K/uL Final    Baso # 08/19/2024 0.04  0.00 - 0.20 K/uL Final    nRBC 08/19/2024 0  0 /100 WBC Final    Gran % 08/19/2024 56.8  38.0 - 73.0 % Final    Lymph % 08/19/2024 30.7  18.0 - 48.0 % Final    Mono % 08/19/2024 7.1  4.0 - 15.0 % Final    Eosinophil % 08/19/2024 4.2  0.0 - 8.0 % Final    Basophil % 08/19/2024 0.8  0.0 - 1.9 % Final    Differential Method 08/19/2024 Automated   Final    Hemoglobin A1C 08/19/2024 5.2  4.0 - 5.6 % Final    Comment: ADA Screening Guidelines:  5.7-6.4%  Consistent with prediabetes  >or=6.5%  Consistent with diabetes    High levels of fetal hemoglobin interfere with the HbA1C  assay. Heterozygous hemoglobin variants (HbS, HgC, etc)do  not significantly interfere with this assay.   However, presence of multiple variants may affect accuracy.      Estimated Avg Glucose 08/19/2024 103  68 - 131 mg/dL Final       Assessment:     1. H/O abnormal weight loss    2. Current severe episode of major depressive disorder without psychotic features without prior episode    3. Anxiety and depression    4. Prostate cancer    5. Stage 3a chronic kidney disease    6. Primary hypertension    7. Graves disease    8. Hyperthyroidism    9. Type II diabetes mellitus with peripheral autonomic neuropathy      Plan:    H/O abnormal weight loss  -stable since last month. No loss    Current severe episode of major depressive disorder without psychotic features without prior episode  Anxiety and depression  -stable. Seeing outside psych for med management    Prostate cancer  -scheduled for follow up in December.   -recent psa in good range    Stage 3a chronic kidney disease  -stable    Primary hypertension  -stable on current meds. Denies any hypotension    Graves disease  Hyperthyroidism  -tsh in normal range on recent labs    Type II diabetes mellitus with peripheral autonomic neuropathy  -A1c stable. Diabetes in remission.       Follow up in 3 months (on 12/19/2024), or if symptoms worsen or fail to improve.

## 2024-09-20 LAB
ANION GAP SERPL CALC-SCNC: 10 MMOL/L (ref 8–16)
BUN SERPL-MCNC: 12 MG/DL (ref 8–23)
CALCIUM SERPL-MCNC: 9.5 MG/DL (ref 8.7–10.5)
CHLORIDE SERPL-SCNC: 105 MMOL/L (ref 95–110)
CO2 SERPL-SCNC: 25 MMOL/L (ref 23–29)
CREAT SERPL-MCNC: 1.3 MG/DL (ref 0.5–1.4)
EST. GFR  (NO RACE VARIABLE): 57.6 ML/MIN/1.73 M^2
GLUCOSE SERPL-MCNC: 142 MG/DL (ref 70–110)
POTASSIUM SERPL-SCNC: 3.6 MMOL/L (ref 3.5–5.1)
SODIUM SERPL-SCNC: 140 MMOL/L (ref 136–145)

## 2024-09-23 ENCOUNTER — OFFICE VISIT (OUTPATIENT)
Dept: OPHTHALMOLOGY | Facility: CLINIC | Age: 74
End: 2024-09-23
Payer: MEDICARE

## 2024-09-23 ENCOUNTER — IMMUNIZATION (OUTPATIENT)
Dept: INTERNAL MEDICINE | Facility: CLINIC | Age: 74
End: 2024-09-23
Payer: MEDICARE

## 2024-09-23 DIAGNOSIS — H52.03 HYPEROPIA WITH ASTIGMATISM AND PRESBYOPIA, BILATERAL: ICD-10-CM

## 2024-09-23 DIAGNOSIS — H40.013 OPEN ANGLE WITH BORDERLINE FINDINGS OF BOTH EYES: ICD-10-CM

## 2024-09-23 DIAGNOSIS — H52.203 HYPEROPIA WITH ASTIGMATISM AND PRESBYOPIA, BILATERAL: ICD-10-CM

## 2024-09-23 DIAGNOSIS — E11.9 TYPE 2 DIABETES MELLITUS WITHOUT RETINOPATHY: Primary | ICD-10-CM

## 2024-09-23 DIAGNOSIS — Z23 NEED FOR VACCINATION: Primary | ICD-10-CM

## 2024-09-23 DIAGNOSIS — H52.4 HYPEROPIA WITH ASTIGMATISM AND PRESBYOPIA, BILATERAL: ICD-10-CM

## 2024-09-23 DIAGNOSIS — H25.13 NUCLEAR SCLEROSIS, BILATERAL: ICD-10-CM

## 2024-09-23 DIAGNOSIS — H25.013 CORTICAL AGE-RELATED CATARACT OF BOTH EYES: ICD-10-CM

## 2024-09-23 DIAGNOSIS — E11.36 DIABETIC CATARACT OF BOTH EYES: ICD-10-CM

## 2024-09-23 PROCEDURE — 1126F AMNT PAIN NOTED NONE PRSNT: CPT | Mod: HCNC,CPTII,S$GLB, | Performed by: OPTOMETRIST

## 2024-09-23 PROCEDURE — 90653 IIV ADJUVANT VACCINE IM: CPT | Mod: HCNC,S$GLB,, | Performed by: FAMILY MEDICINE

## 2024-09-23 PROCEDURE — 1160F RVW MEDS BY RX/DR IN RCRD: CPT | Mod: HCNC,CPTII,S$GLB, | Performed by: OPTOMETRIST

## 2024-09-23 PROCEDURE — 3044F HG A1C LEVEL LT 7.0%: CPT | Mod: HCNC,CPTII,S$GLB, | Performed by: OPTOMETRIST

## 2024-09-23 PROCEDURE — 92133 CPTRZD OPH DX IMG PST SGM ON: CPT | Mod: HCNC,S$GLB,, | Performed by: OPTOMETRIST

## 2024-09-23 PROCEDURE — 4010F ACE/ARB THERAPY RXD/TAKEN: CPT | Mod: HCNC,CPTII,S$GLB, | Performed by: OPTOMETRIST

## 2024-09-23 PROCEDURE — 3066F NEPHROPATHY DOC TX: CPT | Mod: HCNC,CPTII,S$GLB, | Performed by: OPTOMETRIST

## 2024-09-23 PROCEDURE — 2023F DILAT RTA XM W/O RTNOPTHY: CPT | Mod: HCNC,CPTII,S$GLB, | Performed by: OPTOMETRIST

## 2024-09-23 PROCEDURE — 99999 PR PBB SHADOW E&M-EST. PATIENT-LVL III: CPT | Mod: PBBFAC,HCNC,, | Performed by: OPTOMETRIST

## 2024-09-23 PROCEDURE — 3061F NEG MICROALBUMINURIA REV: CPT | Mod: HCNC,CPTII,S$GLB, | Performed by: OPTOMETRIST

## 2024-09-23 PROCEDURE — 92014 COMPRE OPH EXAM EST PT 1/>: CPT | Mod: HCNC,S$GLB,, | Performed by: OPTOMETRIST

## 2024-09-23 PROCEDURE — G0008 ADMIN INFLUENZA VIRUS VAC: HCPCS | Mod: HCNC,S$GLB,, | Performed by: FAMILY MEDICINE

## 2024-09-23 PROCEDURE — 1159F MED LIST DOCD IN RCRD: CPT | Mod: HCNC,CPTII,S$GLB, | Performed by: OPTOMETRIST

## 2024-09-23 PROCEDURE — 92015 DETERMINE REFRACTIVE STATE: CPT | Mod: HCNC,S$GLB,, | Performed by: OPTOMETRIST

## 2024-09-23 NOTE — PROGRESS NOTES
HPI     Diabetic Eye Exam            Comments: 6 months for dilated eye exam and gOCT.     Vision changes since last eye exam?:  No changes that he noticed    Any eye pain today: No    Other ocular symptoms: No     Interested in contact lens fitting today? No                     Comments    Diagnosed with diabetes in 2019  Lab Results       Component                Value               Date                       HGBA1C                   5.2                 08/19/2024              DM 2019  Cataract OU  Ptosis OS  OAG susp based on ONH cupping/appearance             Last edited by Silverio Angeles on 9/23/2024  2:20 PM.            Assessment /Plan     For exam results, see Encounter Report.    Type 2 diabetes mellitus without retinopathy  There was no diabetic retinopathy present in either eye today.   Recommended that pt continue care with PCP and/or specialists regarding diabetes.  Follow-up dilated eye exam recommended in 12 months, sooner with any vision changes or new concerns.    Open angle with borderline findings of both eyes  -     Posterior Segment OCT Optic Nerve- Both eyes  Normal IOP today OD, OS  Stable gOCT today OU, no changes compared to previous scan  No evidence of glaucoma at this time but based on risk factors recommend to continue monitoring.   Monitor 12 months    Nuclear sclerosis, bilateral  Cortical age-related cataract of both eyes  Diabetic cataract of both eyes  Visually significant cataract.  Patient is at the option stage.   Cataract surgery will improve vision, but necessity is dependent on patient's symptoms.   Pt declines surgical consult at this time.  Will continue to monitor over the next 12 months. Pt to call or RTC with any significant change in vision prior to next visit.    Hyperopia with astigmatism and presbyopia, bilateral  Eyeglass Final Rx       Eyeglass Final Rx         Sphere Cylinder Axis Add    Right +1.00 +1.00 160 +2.50    Left +0.75 +1.00 010 +2.50      Type: PAL     Expiration Date: 9/23/2025                    RTC 1 yr for dilated eye exam and gOCT or PRN if any problems.   Discussed above and answered questions.

## 2024-10-06 RX ORDER — LOSARTAN POTASSIUM 100 MG/1
100 TABLET ORAL
Qty: 90 TABLET | Refills: 3 | Status: SHIPPED | OUTPATIENT
Start: 2024-10-06

## 2024-10-06 NOTE — TELEPHONE ENCOUNTER
Refill Decision Note   Edin Green  is requesting a refill authorization.  Brief Assessment and Rationale for Refill:  Approve     Medication Therapy Plan:         Comments:     Note composed:2:30 PM 10/06/2024

## 2024-10-06 NOTE — TELEPHONE ENCOUNTER
No care due was identified.  Health [facility] Embedded Care Due Messages. Reference number: [id number].   10/06/2024 7:47:21 AM CDT

## 2024-11-04 PROBLEM — E11.22 TYPE 2 DIABETES MELLITUS WITH STAGE 3A CHRONIC KIDNEY DISEASE, WITHOUT LONG-TERM CURRENT USE OF INSULIN: Status: ACTIVE | Noted: 2022-11-22

## 2024-11-04 PROBLEM — E11.36 DM CATARACT: Status: ACTIVE | Noted: 2024-11-04

## 2024-11-05 ENCOUNTER — OFFICE VISIT (OUTPATIENT)
Dept: INTERNAL MEDICINE | Facility: CLINIC | Age: 74
End: 2024-11-05
Payer: MEDICARE

## 2024-11-05 VITALS
SYSTOLIC BLOOD PRESSURE: 112 MMHG | HEIGHT: 74 IN | DIASTOLIC BLOOD PRESSURE: 64 MMHG | BODY MASS INDEX: 23.32 KG/M2 | WEIGHT: 181.69 LBS | HEART RATE: 76 BPM | RESPIRATION RATE: 20 BRPM

## 2024-11-05 DIAGNOSIS — E11.43 TYPE II DIABETES MELLITUS WITH PERIPHERAL AUTONOMIC NEUROPATHY: ICD-10-CM

## 2024-11-05 DIAGNOSIS — N52.1 ERECTILE DYSFUNCTION ASSOCIATED WITH TYPE 2 DIABETES MELLITUS: ICD-10-CM

## 2024-11-05 DIAGNOSIS — C61 PROSTATE CANCER: ICD-10-CM

## 2024-11-05 DIAGNOSIS — Z00.00 ENCOUNTER FOR MEDICARE ANNUAL WELLNESS EXAM: Primary | ICD-10-CM

## 2024-11-05 DIAGNOSIS — H25.10 NUCLEAR SCLEROSIS, UNSPECIFIED LATERALITY: ICD-10-CM

## 2024-11-05 DIAGNOSIS — F41.9 ANXIETY AND DEPRESSION: ICD-10-CM

## 2024-11-05 DIAGNOSIS — H52.7 REFRACTIVE ERROR: ICD-10-CM

## 2024-11-05 DIAGNOSIS — F32.2 CURRENT SEVERE EPISODE OF MAJOR DEPRESSIVE DISORDER WITHOUT PSYCHOTIC FEATURES WITHOUT PRIOR EPISODE: ICD-10-CM

## 2024-11-05 DIAGNOSIS — F32.A ANXIETY AND DEPRESSION: ICD-10-CM

## 2024-11-05 DIAGNOSIS — Z00.00 ENCOUNTER FOR PREVENTIVE HEALTH EXAMINATION: ICD-10-CM

## 2024-11-05 DIAGNOSIS — E11.69 ERECTILE DYSFUNCTION ASSOCIATED WITH TYPE 2 DIABETES MELLITUS: ICD-10-CM

## 2024-11-05 DIAGNOSIS — E11.22 TYPE 2 DIABETES MELLITUS WITH STAGE 3A CHRONIC KIDNEY DISEASE, WITHOUT LONG-TERM CURRENT USE OF INSULIN: ICD-10-CM

## 2024-11-05 DIAGNOSIS — H25.013 CORTICAL SENILE CATARACT OF BOTH EYES: ICD-10-CM

## 2024-11-05 DIAGNOSIS — E05.90 HYPERTHYROIDISM: ICD-10-CM

## 2024-11-05 DIAGNOSIS — E11.69 HYPERLIPIDEMIA ASSOCIATED WITH TYPE 2 DIABETES MELLITUS: ICD-10-CM

## 2024-11-05 DIAGNOSIS — N18.31 TYPE 2 DIABETES MELLITUS WITH STAGE 3A CHRONIC KIDNEY DISEASE, WITHOUT LONG-TERM CURRENT USE OF INSULIN: ICD-10-CM

## 2024-11-05 DIAGNOSIS — I15.2 HYPERTENSION ASSOCIATED WITH DIABETES: ICD-10-CM

## 2024-11-05 DIAGNOSIS — E29.1 HYPOGONADISM IN MALE: ICD-10-CM

## 2024-11-05 DIAGNOSIS — E05.00 GRAVES DISEASE: ICD-10-CM

## 2024-11-05 DIAGNOSIS — R63.4 WEIGHT LOSS: ICD-10-CM

## 2024-11-05 DIAGNOSIS — J45.20 MILD INTERMITTENT ASTHMA WITHOUT COMPLICATION: ICD-10-CM

## 2024-11-05 DIAGNOSIS — H43.813 POSTERIOR VITREOUS DETACHMENT OF BOTH EYES: ICD-10-CM

## 2024-11-05 DIAGNOSIS — E11.36 DM CATARACT: ICD-10-CM

## 2024-11-05 DIAGNOSIS — H35.033 HYPERTENSIVE RETINOPATHY OF BOTH EYES: ICD-10-CM

## 2024-11-05 DIAGNOSIS — E78.5 HYPERLIPIDEMIA ASSOCIATED WITH TYPE 2 DIABETES MELLITUS: ICD-10-CM

## 2024-11-05 DIAGNOSIS — Z78.9 PATIENT UNDER CARE OF MULTIPLE PROVIDERS: ICD-10-CM

## 2024-11-05 DIAGNOSIS — E11.59 HYPERTENSION ASSOCIATED WITH DIABETES: ICD-10-CM

## 2024-11-05 DIAGNOSIS — G47.33 OSA (OBSTRUCTIVE SLEEP APNEA): ICD-10-CM

## 2024-11-05 DIAGNOSIS — J30.9 ALLERGIC RHINITIS, UNSPECIFIED SEASONALITY, UNSPECIFIED TRIGGER: ICD-10-CM

## 2024-11-05 PROCEDURE — 99999 PR PBB SHADOW E&M-EST. PATIENT-LVL IV: CPT | Mod: PBBFAC,HCNC,, | Performed by: NURSE PRACTITIONER

## 2024-11-05 RX ORDER — SERTRALINE HYDROCHLORIDE 50 MG/1
50 TABLET, FILM COATED ORAL DAILY
COMMUNITY

## 2024-11-05 NOTE — PROGRESS NOTES
"  Edin Green presented for a  Medicare AWV and comprehensive Health Risk Assessment today. The following components were reviewed and updated:    Medical history  Family History  Social history  Allergies and Current Medications  Health Risk Assessment  Health Maintenance  Care Team         ** See Completed Assessments for Annual Wellness Visit within the encounter summary.**         The following assessments were completed:  Living Situation  CAGE  Depression Screening  Timed Get Up and Go  Whisper Test  Cognitive Function Screening  Nutrition Screening  ADL Screening  PAQ Screening    Wife with patient for clinic appointment    Opioid documentation:      Patient does not have a current opioid prescription.        Vitals:    11/05/24 1456   BP: 112/64   BP Location: Right arm   Patient Position: Sitting   Pulse: 76   Resp: 20   Weight: 82.4 kg (181 lb 10.5 oz)   Height:    Pain scale 6' 2" (1.88 m)    0     Body mass index is 23.32 kg/m².  Physical Exam  Constitutional:       General: He is not in acute distress.     Appearance: He is not ill-appearing or diaphoretic.   HENT:      Mouth/Throat:      Mouth: Mucous membranes are moist.   Eyes:      General:         Right eye: No discharge.         Left eye: No discharge.      Conjunctiva/sclera: Conjunctivae normal.   Cardiovascular:      Rate and Rhythm: Normal rate and regular rhythm.   Pulmonary:      Effort: Pulmonary effort is normal. No respiratory distress.      Comments: BBS diminished  Skin:     General: Skin is warm and dry.   Neurological:      Mental Status: He is alert and oriented to person, place, and time. Mental status is at baseline.   Psychiatric:         Mood and Affect: Mood normal.         Behavior: Behavior normal.         Thought Content: Thought content normal.         Judgment: Judgment normal.     Diagnoses and health risks identified today and associated recommendations/orders:    1. Encounter for Medicare annual wellness exam, " Encounter for preventive health examination  Review for opioid screening: Patient does not have Rx for Opioids  Review for substance use disorder: Patient does not use substance per chart    Patient states he rarely drinks alcohol - States has not had any so far this year    I offered to discuss advanced care planning, including how to pick a person who would make decisions for you if you were unable to make them for yourself, called a health care power of , and what kind of decisions you might make such as use of life sustaining treatments such as ventilators and tube feeding when faced with a life limiting illness recorded on a living will that they will need to know. (How you want to be cared for as you near the end of your natural life)     X Patient is interested in learning more about how to make advanced directives.  I provided them paperwork and offered to discuss this with them.  - LIPID PANEL; Future    2. Prostate cancer, Hypogonadism in male, Erectile dysfunction associated with type 2 diabetes mellitus  Monitor  Patient states he had seed radiation  in past  Follow up with Urology as directed    3. DM cataract, Cortical senile cataract of both eyes, Hypertensive retinopathy of both eyes,  Posterior vitreous detachment of both eyes, Nuclear sclerosis, unspecified laterality, Refractive error - Both Eyes  Monitor  Follow up with Ophtho as directed  Continue home minipress    4. Current severe episode of major depressive disorder without psychotic features without prior episode, Anxiety and depression  Monitor  Stable  Continue home adelaeptadiel barryoft  [facility]    5. Type 2 diabetes mellitus with stage 3a chronic kidney disease, without long-term current use of insulin  Monitor  Follow up as directed     6. Type II diabetes mellitus with peripheral autonomic neuropathy  Monitor  Follow up as directed      7. Hypertension associated with  diabetes  Monitor  Stable  Continue home norvasc, losartan    8. Mild intermittent asthma without complication  Monitor  Stable  Follow up as needed,  directed       9. Allergic rhinitis, unspecified seasonality, unspecified trigger  Monitor  Stable  Follow up as needed, directed     10. Hyperlipidemia associated with type 2 diabetes mellitus  Monitor  Follow up with PCP  Continue home asa, pravachol  - LIPID PANEL; Future    11. Graves disease, Hyperthyroidism  Monitor  Follow up as directed        12. LAURENCE (obstructive sleep apnea)  Monitor  Follow up as needed, directed      13. Weight loss  Monitor  Patient has lost about 20 pounds in past 6 months unintentionally. He denies any GI symptoms- No nausea, vomiting, diarrhea, constipation, blood in stool, abdominal pain, etc. Patient states he just isn't hungry sometimes and at times only eats one meal a day. Patient instructed to increase caloric intake, eat 3 meals a day with snacks, and to monitor weight with scales at home. Patient to follow up with PCP next month.   Follow up with PCP                                       Provided Edin with a 5-10 year written screening schedule and personal prevention plan. Recommendations were developed using the USPSTF age appropriate recommendations. Education, counseling, and referrals were provided as needed. After Visit Summary printed and given to patient which includes a list of additional screenings\tests needed.    Follow up in about 1 year (around 11/5/2025) for Annual wellness visit.    LAZARA Polanco

## 2024-11-05 NOTE — PATIENT INSTRUCTIONS
Counseling and Referral of Other Preventative  (Italic type indicates deductible and co-insurance are waived)    Patient Name: Edin Green  Today's Date: 11/5/2024    Health Maintenance       Date Due Completion Date    Lipid Panel 11/01/2024 11/1/2023    COVID-19 Vaccine (8 - 2024-25 season) 11/14/2024 9/19/2024    Hemoglobin A1c 02/19/2025 8/19/2024    PROSTATE-SPECIFIC ANTIGEN 05/08/2025 5/8/2024    Override on 6/15/2016: Done (dr quintero)    Diabetes Urine Screening 05/08/2025 5/8/2024    Foot Exam 05/08/2025 5/8/2024 (Done)    Override on 5/8/2024: Done    Override on 11/8/2023: Done    Override on 11/25/2022: Done    Override on 7/1/2021: Done    Override on 6/19/2019: Done    Eye Exam 09/23/2025 9/23/2024    Override on 5/18/2020: Done    Low Dose Statin 11/05/2025 11/5/2024    Colorectal Cancer Screening 06/07/2028 6/7/2023    Override on 1/31/2012: Done ([facility], repeat in 1 year)    TETANUS VACCINE 07/20/2032 7/20/2022        Orders Placed This Encounter   Procedures    LIPID PANEL       The following information is provided to all patients.  This information is to help you find resources for any of the problems found today that may be affecting your health:                  Living healthy guide: www.[facility].[place].gov      Understanding Diabetes: www.diabetes.org      Eating healthy: www.cdc.gov/healthyweight      CDC home safety checklist: www.cdc.gov/steadi/patient.html      Agency on Aging: www.goea.[place].gov      Alcoholics anonymous (AA): www.aa.org      Physical Activity: www.[place].nih.gov/qx4bkev      Tobacco use: www.quitwithusla.org

## 2024-11-21 ENCOUNTER — OFFICE VISIT (OUTPATIENT)
Dept: PODIATRY | Facility: CLINIC | Age: 74
End: 2024-11-21
Payer: MEDICARE

## 2024-11-21 DIAGNOSIS — E11.43 TYPE II DIABETES MELLITUS WITH PERIPHERAL AUTONOMIC NEUROPATHY: ICD-10-CM

## 2024-11-21 DIAGNOSIS — E11.9 ENCOUNTER FOR COMPREHENSIVE DIABETIC FOOT EXAMINATION, TYPE 2 DIABETES MELLITUS: Primary | ICD-10-CM

## 2024-11-21 DIAGNOSIS — B35.1 ONYCHOMYCOSIS: ICD-10-CM

## 2024-11-21 PROCEDURE — 3061F NEG MICROALBUMINURIA REV: CPT | Mod: HCNC,CPTII,S$GLB, | Performed by: PODIATRIST

## 2024-11-21 PROCEDURE — 1159F MED LIST DOCD IN RCRD: CPT | Mod: HCNC,CPTII,S$GLB, | Performed by: PODIATRIST

## 2024-11-21 PROCEDURE — 99999 PR PBB SHADOW E&M-EST. PATIENT-LVL II: CPT | Mod: PBBFAC,HCNC,, | Performed by: PODIATRIST

## 2024-11-21 PROCEDURE — 1160F RVW MEDS BY RX/DR IN RCRD: CPT | Mod: HCNC,CPTII,S$GLB, | Performed by: PODIATRIST

## 2024-11-21 PROCEDURE — 3066F NEPHROPATHY DOC TX: CPT | Mod: HCNC,CPTII,S$GLB, | Performed by: PODIATRIST

## 2024-11-21 PROCEDURE — 1101F PT FALLS ASSESS-DOCD LE1/YR: CPT | Mod: HCNC,CPTII,S$GLB, | Performed by: PODIATRIST

## 2024-11-21 PROCEDURE — 11721 DEBRIDE NAIL 6 OR MORE: CPT | Mod: Q9,HCNC,S$GLB, | Performed by: PODIATRIST

## 2024-11-21 PROCEDURE — 3288F FALL RISK ASSESSMENT DOCD: CPT | Mod: HCNC,CPTII,S$GLB, | Performed by: PODIATRIST

## 2024-11-21 PROCEDURE — 4010F ACE/ARB THERAPY RXD/TAKEN: CPT | Mod: HCNC,CPTII,S$GLB, | Performed by: PODIATRIST

## 2024-11-21 PROCEDURE — 3044F HG A1C LEVEL LT 7.0%: CPT | Mod: HCNC,CPTII,S$GLB, | Performed by: PODIATRIST

## 2024-11-21 PROCEDURE — 99213 OFFICE O/P EST LOW 20 MIN: CPT | Mod: 25,HCNC,S$GLB, | Performed by: PODIATRIST

## 2024-11-21 NOTE — PROGRESS NOTES
Subjective:     Patient ID: Edin Green is a 73 y.o. male.    Chief Complaint: Nail Care (Diabetic pt wears tennis shoes, PCP Dr. Paris last seen 11-5-24)    Edin is a 73 y.o. male who presents to the clinic upon referral from Dr. Christina forte. provider found  for evaluation and treatment of diabetic feet. Edin has a past medical history of Anemia, Anxiety, Asthma, Benign hematuria, Cataract (OU), Colon polyp, Depression, ED (erectile dysfunction), Graves disease, HTN (hypertension), Hypercholesteremia, Hypertensive retinopathy of both eyes, Hypogonadism, LAURENCE (obstructive sleep apnea), Pneumonia, Prostate cancer (7/15/2019), Trouble in sleeping, and Type 2 diabetes mellitus. Patient relates no major problem with feet. Only complaints today consist of fungal toenails     PCP: Carter Paris MD    Date Last Seen by PCP: 11/05/2024    Current shoe gear: Tennis shoes    Hemoglobin A1C   Date Value Ref Range Status   08/19/2024 5.2 4.0 - 5.6 % Final     Comment:     ADA Screening Guidelines:  5.7-6.4%  Consistent with prediabetes  >or=6.5%  Consistent with diabetes    High levels of fetal hemoglobin interfere with the HbA1C  assay. Heterozygous hemoglobin variants (HbS, HgC, etc)do  not significantly interfere with this assay.   However, presence of multiple variants may affect accuracy.     05/08/2024 5.9 (H) 4.0 - 5.6 % Final     Comment:     ADA Screening Guidelines:  5.7-6.4%  Consistent with prediabetes  >or=6.5%  Consistent with diabetes    High levels of fetal hemoglobin interfere with the HbA1C  assay. Heterozygous hemoglobin variants (HbS, HgC, etc)do  not significantly interfere with this assay.   However, presence of multiple variants may affect accuracy.     11/01/2023 5.7 (H) 4.0 - 5.6 % Final     Comment:     ADA Screening Guidelines:  5.7-6.4%  Consistent with prediabetes  >or=6.5%  Consistent with diabetes    High levels of fetal hemoglobin interfere with the HbA1C  assay. Heterozygous hemoglobin  variants (HbS, HgC, etc)do  not significantly interfere with this assay.   However, presence of multiple variants may affect accuracy.           Patient Active Problem List   Diagnosis    Hypertensive retinopathy of both eyes    Refractive error - Both Eyes    Nuclear sclerosis    Hypertension associated with diabetes    Anxiety and depression    Hypogonadism in male    Erectile dysfunction associated with type 2 diabetes mellitus    LAURENCE (obstructive sleep apnea)    Mild intermittent asthma without complication    Allergic rhinitis    Cortical senile cataract of both eyes    Posterior vitreous detachment of both eyes    Hyperthyroidism    Graves disease    Type II diabetes mellitus with peripheral autonomic neuropathy    Prostate cancer    Current severe episode of major depressive disorder without psychotic features without prior episode    Type 2 diabetes mellitus with stage 3a chronic kidney disease, without long-term current use of insulin    DM cataract    Hyperlipidemia associated with type 2 diabetes mellitus    Weight loss    [facility]       Medication List with Changes/Refills   Current Medications    AMLODIPINE (NORVASC) 5 MG TABLET    TAKE 1 TABLET ONE TIME DAILY    ASPIRIN (ECOTRIN) 81 MG EC TABLET    Take 81 mg by mouth once daily.    LOSARTAN (COZAAR) 100 MG TABLET    TAKE 1 TABLET EVERY DAY    OXCARBAZEPINE (TRILEPTAL) 150 MG TAB    Take 150 mg by mouth every evening.    PRAVASTATIN (PRAVACHOL) 40 MG TABLET    TAKE 1 TABLET ONE TIME DAILY    PRAZOSIN (MINIPRESS) 2 MG CAP    Take 2 mg by mouth every evening.    SERTRALINE (ZOLOFT) 50 MG TABLET    Take 50 mg by mouth once daily.       Review of patient's allergies indicates:   Allergen Reactions    Celebrex [celecoxib]      Lip swelling      Shrimp Itching     States reaction is with frozen shrimp    Venom-wasp Swelling       Past Surgical History:   Procedure Laterality Date    BRACHYTHERAPY N/A 09/10/2019     Procedure: BRACHYTHERAPY;  Surgeon: Kiel Ochoa IV, MD;  Location: [facility] OR;  Service: Urology;  Laterality: N/A;    COLONOSCOPY N/A 6/7/2023    Procedure: COLONOSCOPY;  Surgeon: Marcie Funes MD;  Location: [facility] ENDO;  Service: Endoscopy;  Laterality: N/A;    PROSTATE SURGERY      RADIOACTIVE SEED IMPLANTATION OF PROSTATE N/A 09/10/2019    Procedure: INSERTION, RADIOACTIVE SEED, PROSTATE;  Surgeon: Kiel Ochoa IV, MD;  Location: [facility] OR;  Service: Urology;  Laterality: N/A;    ULTRASOUND GUIDANCE N/A 09/10/2019    Procedure: ULTRASOUND GUIDANCE;  Surgeon: Kiel Ochoa IV, MD;  Location: [facility] OR;  Service: Urology;  Laterality: N/A;       Family History   Problem Relation Name Age of Onset    Hypertension Other      Heart defect Mother Arianna     Heart disease Mother Arianna     Cancer Maternal Grandmother Julianna Mauricio     Heart disease Sister Radha Coello     Vision loss Brother Kiel Matrinez     Strabismus Neg Hx      Retinal detachment Neg Hx      Macular degeneration Neg Hx      Glaucoma Neg Hx      Blindness Neg Hx      Amblyopia Neg Hx         Social History     Socioeconomic History    Marital status:      Spouse name: CHUCHO    Number of children: 3   Tobacco Use    Smoking status: Never    Smokeless tobacco: Never   Substance and Sexual Activity    Alcohol use: Not Currently     Comment: No alcohol 72h prior to sx    Drug use: No    Sexual activity: Not Currently     Partners: Female     Birth control/protection: Abstinence   Social History Narrative    , no smokers or pets in household.     Social Drivers of Health     Financial Resource Strain: Low Risk  (11/5/2024)    Overall Financial Resource Strain (CARDIA)     Difficulty of Paying Living Expenses: Not hard at all   Food Insecurity: No Food Insecurity (11/5/2024)    Hunger Vital Sign     Worried About Running Out of Food in the Last Year: Never true     Ran Out of Food in the Last Year: Never true   Transportation  Needs: No Transportation Needs (11/5/2024)    PRAPARE - Transportation     Lack of Transportation (Medical): No     Lack of Transportation (Non-Medical): No   Physical Activity: Inactive (11/5/2024)    Exercise Vital Sign     Days of Exercise per Week: 0 days     Minutes of Exercise per Session: 0 min   Stress: Stress Concern Present (11/5/2024)    [employer] [place] [place] [employer] - Occupational Stress Questionnaire     Feeling of Stress : To some extent   Housing Stability: Low Risk  (11/5/2024)    Housing Stability Vital Sign     Unable to Pay for Housing in the Last Year: No     Homeless in the Last Year: No       There were no vitals filed for this visit.      Hemoglobin A1C   Date Value Ref Range Status   08/19/2024 5.2 4.0 - 5.6 % Final     Comment:     ADA Screening Guidelines:  5.7-6.4%  Consistent with prediabetes  >or=6.5%  Consistent with diabetes    High levels of fetal hemoglobin interfere with the HbA1C  assay. Heterozygous hemoglobin variants (HbS, HgC, etc)do  not significantly interfere with this assay.   However, presence of multiple variants may affect accuracy.     05/08/2024 5.9 (H) 4.0 - 5.6 % Final     Comment:     ADA Screening Guidelines:  5.7-6.4%  Consistent with prediabetes  >or=6.5%  Consistent with diabetes    High levels of fetal hemoglobin interfere with the HbA1C  assay. Heterozygous hemoglobin variants (HbS, HgC, etc)do  not significantly interfere with this assay.   However, presence of multiple variants may affect accuracy.     11/01/2023 5.7 (H) 4.0 - 5.6 % Final     Comment:     ADA Screening Guidelines:  5.7-6.4%  Consistent with prediabetes  >or=6.5%  Consistent with diabetes    High levels of fetal hemoglobin interfere with the HbA1C  assay. Heterozygous hemoglobin variants (HbS, HgC, etc)do  not significantly interfere with this assay.   However, presence of multiple variants may affect accuracy.         Review of Systems   Constitutional:  Negative for chills and  fever.   Respiratory:  Negative for shortness of breath.    Cardiovascular:  Negative for chest pain, palpitations, orthopnea, claudication and leg swelling.   Gastrointestinal:  Negative for diarrhea, nausea and vomiting.   Musculoskeletal:  Negative for joint pain.   Skin:  Negative for rash.   Neurological:  Negative for dizziness, tingling, sensory change, focal weakness and weakness.   Psychiatric/Behavioral: Negative.           Objective:      PHYSICAL EXAM: Apperance: Alert and orient in no distress,well developed, and with good attention to grooming and body habits  Patient presents ambulating in tennis shoes.   LOWER EXTREMITY EXAM:  VASCULAR: Dorsalis pedis pulses 2/4 bilateral and Posterior Tibial pulses 1/4 bilateral. Capillary fill time <blanched bilateral. Mild edema observed bilateral. Varicosities present bilateral. Skin temperature of the lower extremities is warm to warm, proximal to distal. Hair growth dim bilateral.  DERMATOLOGICAL: No skin rashes, subcutaneous nodules, lesions, or ulcers observed bilateral. Nails 2,3,4,5 bilateral elongated, thickened, and discolored with subungual debris. Bilateral hallux nails absent. Webspaces 1,2,3,4 bilateral clean, dry and without evidence of break in skin integrity. Dry and peeling skin noted to bilateral forefoot.   NEUROLOGICAL: Light touch, sharp-dull, proprioception all present and equal bilaterally.  Vibratory sensation diminished at bilateral  hallux. Protective sensation intact at all 10 sites as tested with a Cowgill-Kamran 5.07 monofilament.   MUSCULOSKELETAL: Muscle strength is 5/5 for foot inverters, everters, plantarflexors, and dorsiflexors. Muscle tone is normal.           Assessment:       ICD-10-CM ICD-9-CM   1. Encounter for comprehensive diabetic foot examination, type 2 diabetes mellitus  E11.9 250.00   2. Type II diabetes mellitus with peripheral autonomic neuropathy  E11.43 250.60     337.1   3. Onychomycosis  B35.1 110.1          Plan:   Encounter for comprehensive diabetic foot examination, type 2 diabetes mellitus    Type II diabetes mellitus with peripheral autonomic neuropathy    Onychomycosis      I counseled the patient on his conditions, regarding findings of my examination, my impressions, and usual treatment plan.   This visit spent on counseling and coordination of care.  Appointment spent on education about the diabetic foot, neuropathy, and prevention of limb loss.  Shoe inspection. Diabetic Foot Education. Patient reminded of the importance of good nutrition and blood sugar control to help prevent podiatric complications of diabetes. Patient instructed on proper foot hygeine. We discussed wearing proper shoe gear, daily foot inspections, never walking without protective shoe gear, never putting sharp instruments to feet.    With patient's permission, nails 2-5 bilateral were debrided/trimmed in length and thickness aggressively to their soft tissue attachment mechanically and with electric , removing all offending nail and debris. Patient relates relief following the procedure.  Patient  will continue to monitor the areas daily, inspect feet, wear protective shoe gear when ambulatory, moisturizer to maintain skin integrity. Patient reminded of the importance of good nutrition and blood sugar control to help prevent podiatric complications of diabetes.  Patient to return 6 months or sooner if needed.          Saravanan Mata DPM  [facility]

## 2024-12-06 DIAGNOSIS — E78.00 HYPERCHOLESTEREMIA: ICD-10-CM

## 2024-12-06 RX ORDER — PRAVASTATIN SODIUM 40 MG/1
40 TABLET ORAL
Qty: 90 TABLET | Refills: 4 | Status: SHIPPED | OUTPATIENT
Start: 2024-12-06

## 2024-12-06 NOTE — TELEPHONE ENCOUNTER
Care Due:                  Date            Visit Type   Department     Provider  --------------------------------------------------------------------------------                                EP -                              PRIMARY      [facility] INTER[facility]  Last Visit: 08-      CARE ([facility])   MEDICINE       Carter Paris  Next Visit: None Scheduled  None         None Found                                                            Last  Test          Frequency    Reason                     Performed    Due Date  --------------------------------------------------------------------------------    Lipid Panel.  12 months..  pravastatin..............  11-   10-    Health [facility] Embedded Care Due Messages. Reference number: [id number].   12/06/2024 2:25:12 AM CST

## 2024-12-06 NOTE — TELEPHONE ENCOUNTER
Refill Routing Note   Medication(s) are not appropriate for processing by [facility] for the following reason(s):        Required labs outdated    [facility] action(s):  Defer     Requires labs : Yes             Appointments  past 12m or future 3m with PCP    Date Provider   Last Visit   8/19/2024 Carter Paris MD   Next Visit   Visit date not found Carter Paris MD   ED visits in past 90 days: 0        Note composed:7:57 AM 12/06/2024

## 2024-12-13 ENCOUNTER — LAB VISIT (OUTPATIENT)
Dept: LAB | Facility: HOSPITAL | Age: 74
End: 2024-12-13
Attending: UROLOGY
Payer: MEDICARE

## 2024-12-13 ENCOUNTER — OFFICE VISIT (OUTPATIENT)
Dept: INTERNAL MEDICINE | Facility: CLINIC | Age: 74
End: 2024-12-13
Payer: MEDICARE

## 2024-12-13 ENCOUNTER — OFFICE VISIT (OUTPATIENT)
Dept: UROLOGY | Facility: CLINIC | Age: 74
End: 2024-12-13
Payer: MEDICARE

## 2024-12-13 VITALS
BODY MASS INDEX: 24.61 KG/M2 | SYSTOLIC BLOOD PRESSURE: 118 MMHG | WEIGHT: 186.31 LBS | HEIGHT: 72 IN | DIASTOLIC BLOOD PRESSURE: 80 MMHG | HEART RATE: 75 BPM | OXYGEN SATURATION: 96 % | SYSTOLIC BLOOD PRESSURE: 130 MMHG | BODY MASS INDEX: 25.24 KG/M2 | WEIGHT: 181.69 LBS | HEART RATE: 69 BPM | TEMPERATURE: 97 F | DIASTOLIC BLOOD PRESSURE: 76 MMHG | HEIGHT: 72 IN

## 2024-12-13 DIAGNOSIS — E05.00 GRAVES DISEASE: ICD-10-CM

## 2024-12-13 DIAGNOSIS — N18.31 TYPE 2 DIABETES MELLITUS WITH STAGE 3A CHRONIC KIDNEY DISEASE, WITHOUT LONG-TERM CURRENT USE OF INSULIN: Primary | ICD-10-CM

## 2024-12-13 DIAGNOSIS — F41.9 ANXIETY AND DEPRESSION: ICD-10-CM

## 2024-12-13 DIAGNOSIS — E11.69 HYPERLIPIDEMIA ASSOCIATED WITH TYPE 2 DIABETES MELLITUS: ICD-10-CM

## 2024-12-13 DIAGNOSIS — E78.5 HYPERLIPIDEMIA ASSOCIATED WITH TYPE 2 DIABETES MELLITUS: ICD-10-CM

## 2024-12-13 DIAGNOSIS — J45.20 MILD INTERMITTENT ASTHMA WITHOUT COMPLICATION: ICD-10-CM

## 2024-12-13 DIAGNOSIS — E11.22 TYPE 2 DIABETES MELLITUS WITH STAGE 3A CHRONIC KIDNEY DISEASE, WITHOUT LONG-TERM CURRENT USE OF INSULIN: Primary | ICD-10-CM

## 2024-12-13 DIAGNOSIS — I15.2 HYPERTENSION ASSOCIATED WITH DIABETES: ICD-10-CM

## 2024-12-13 DIAGNOSIS — E05.90 HYPERTHYROIDISM: ICD-10-CM

## 2024-12-13 DIAGNOSIS — F32.2 CURRENT SEVERE EPISODE OF MAJOR DEPRESSIVE DISORDER WITHOUT PSYCHOTIC FEATURES WITHOUT PRIOR EPISODE: ICD-10-CM

## 2024-12-13 DIAGNOSIS — C61 PROSTATE CANCER: ICD-10-CM

## 2024-12-13 DIAGNOSIS — E11.43 TYPE II DIABETES MELLITUS WITH PERIPHERAL AUTONOMIC NEUROPATHY: ICD-10-CM

## 2024-12-13 DIAGNOSIS — F32.A ANXIETY AND DEPRESSION: ICD-10-CM

## 2024-12-13 DIAGNOSIS — E11.59 HYPERTENSION ASSOCIATED WITH DIABETES: ICD-10-CM

## 2024-12-13 DIAGNOSIS — C61 PROSTATE CANCER: Primary | ICD-10-CM

## 2024-12-13 LAB
CHOLEST SERPL-MCNC: 139 MG/DL (ref 120–199)
CHOLEST/HDLC SERPL: 2.6 {RATIO} (ref 2–5)
HDLC SERPL-MCNC: 54 MG/DL (ref 40–75)
HDLC SERPL: 38.8 % (ref 20–50)
LDLC SERPL CALC-MCNC: 75.2 MG/DL (ref 63–159)
NONHDLC SERPL-MCNC: 85 MG/DL
TRIGL SERPL-MCNC: 49 MG/DL (ref 30–150)

## 2024-12-13 PROCEDURE — 99999 PR PBB SHADOW E&M-EST. PATIENT-LVL III: CPT | Mod: PBBFAC,HCNC,, | Performed by: UROLOGY

## 2024-12-13 PROCEDURE — 84153 ASSAY OF PSA TOTAL: CPT | Mod: HCNC | Performed by: UROLOGY

## 2024-12-13 PROCEDURE — 99999 PR PBB SHADOW E&M-EST. PATIENT-LVL IV: CPT | Mod: PBBFAC,HCNC,, | Performed by: PHYSICIAN ASSISTANT

## 2024-12-13 PROCEDURE — 36415 COLL VENOUS BLD VENIPUNCTURE: CPT | Mod: HCNC | Performed by: UROLOGY

## 2024-12-13 PROCEDURE — 80061 LIPID PANEL: CPT | Mod: HCNC | Performed by: PHYSICIAN ASSISTANT

## 2024-12-13 NOTE — PROGRESS NOTES
Subjective:      Patient ID: Edin Green is a 73 y.o. male.    Chief Complaint: Follow-up    HPI  Here today for a routine follow up.   He was having some involuntary weight loss earlier this year. Weight is up 5lbs since previous visit last month.   Doing well. No complaints today.   Scheduled for psa lab. Saw urology this morning.   We need to complete his lipid panel. Will add that to his labwork for today.     Wt Readings from Last 3 Encounters:   12/13/24 1145 84.5 kg (186 lb 4.6 oz)   12/13/24 1057 82.4 kg (181 lb 10.5 oz)   11/05/24 1456 82.4 kg (181 lb 10.5 oz)      Due to recheck lipid  Patient Active Problem List   Diagnosis    Hypertensive retinopathy of both eyes    Refractive error - Both Eyes    Nuclear sclerosis    Hypertension associated with diabetes    Anxiety and depression    Hypogonadism in male    Erectile dysfunction associated with type 2 diabetes mellitus    LAURENCE (obstructive sleep apnea)    Mild intermittent asthma without complication    Allergic rhinitis    Cortical senile cataract of both eyes    Posterior vitreous detachment of both eyes    Hyperthyroidism    Graves disease    Type II diabetes mellitus with peripheral autonomic neuropathy    Prostate cancer    Current severe episode of major depressive disorder without psychotic features without prior episode    Type 2 diabetes mellitus with stage 3a chronic kidney disease, without long-term current use of insulin    DM cataract    Hyperlipidemia associated with type 2 diabetes mellitus    Weight loss    [facility]         Current Outpatient Medications:     amLODIPine (NORVASC) 5 MG tablet, TAKE 1 TABLET ONE TIME DAILY, Disp: 90 tablet, Rfl: 3    aspirin (ECOTRIN) 81 MG EC tablet, Take 81 mg by mouth once daily., Disp: , Rfl:     losartan (COZAAR) 100 MG tablet, TAKE 1 TABLET EVERY DAY, Disp: 90 tablet, Rfl: 3    OXcarbazepine (TRILEPTAL) 150 MG Tab, Take 150 mg by mouth every evening.,  Disp: , Rfl:     pravastatin (PRAVACHOL) 40 MG tablet, TAKE 1 TABLET EVERY DAY, Disp: 90 tablet, Rfl: 4    prazosin (MINIPRESS) 2 MG Cap, Take 2 mg by mouth every evening., Disp: , Rfl:     sertraline (ZOLOFT) 50 MG tablet, Take 50 mg by mouth once daily., Disp: , Rfl:   No current facility-administered medications for this visit.    Facility-Administered Medications Ordered in Other Visits:     lactated ringers infusion, , Intravenous, Continuous, Marcie Funes MD, New Bag at 06/07/23 1226    Review of Systems   Constitutional:  Negative for activity change, appetite change, chills, diaphoresis, fatigue, fever and unexpected weight change.   HENT: Negative.  Negative for congestion, hearing loss, postnasal drip, rhinorrhea, sore throat, trouble swallowing and voice change.    Eyes: Negative.  Negative for visual disturbance.   Respiratory: Negative.  Negative for cough, choking, chest tightness and shortness of breath.    Cardiovascular:  Negative for chest pain, palpitations and leg swelling.   Gastrointestinal:  Negative for abdominal distention, abdominal pain, blood in stool, constipation, diarrhea, nausea and vomiting.   Endocrine: Negative for cold intolerance, heat intolerance, polydipsia and polyuria.   Genitourinary: Negative.  Negative for difficulty urinating and frequency.   Musculoskeletal:  Negative for arthralgias, back pain, gait problem, joint swelling and myalgias.   Skin:  Negative for color change, pallor, rash and wound.   Neurological:  Negative for dizziness, tremors, weakness, light-headedness, numbness and headaches.   Hematological:  Negative for adenopathy.   Psychiatric/Behavioral:  Negative for behavioral problems, confusion, self-injury, sleep disturbance and suicidal ideas. The patient is not nervous/anxious.      Objective:   /80 (BP Location: Left arm, Patient Position: Sitting)   Pulse 75   Temp 97.3 °F (36.3 °C) (Tympanic)   Ht 6' (1.829 m)   Wt 84.5 kg (186 lb 4.6  oz)   SpO2 96%   BMI 25.27 kg/m²     Physical Exam  Vitals and nursing note reviewed.   Constitutional:       General: He is not in acute distress.     Appearance: Normal appearance. He is well-developed. He is not ill-appearing, toxic-appearing or diaphoretic.   HENT:      Head: Normocephalic and atraumatic.   Cardiovascular:      Rate and Rhythm: Normal rate and regular rhythm.      Heart sounds: Normal heart sounds. No murmur heard.     No friction rub. No gallop.   Pulmonary:      Effort: Pulmonary effort is normal. No respiratory distress.      Breath sounds: Normal breath sounds. No wheezing or rales.   Skin:     General: Skin is warm.      Findings: No rash.   Neurological:      Mental Status: He is alert and oriented to person, place, and time.   Psychiatric:         Mood and Affect: Mood normal.         Behavior: Behavior normal.         Thought Content: Thought content normal.         Judgment: Judgment normal.       Lab Results   Component Value Date    HGBA1C 5.2 08/19/2024     CMP  Sodium   Date Value Ref Range Status   09/19/2024 140 136 - 145 mmol/L Final     Potassium   Date Value Ref Range Status   09/19/2024 3.6 3.5 - 5.1 mmol/L Final     Chloride   Date Value Ref Range Status   09/19/2024 105 95 - 110 mmol/L Final     CO2   Date Value Ref Range Status   09/19/2024 25 23 - 29 mmol/L Final     Glucose   Date Value Ref Range Status   09/19/2024 142 (H) 70 - 110 mg/dL Final     BUN   Date Value Ref Range Status   09/19/2024 12 8 - 23 mg/dL Final     Creatinine   Date Value Ref Range Status   09/19/2024 1.3 0.5 - 1.4 mg/dL Final     Calcium   Date Value Ref Range Status   09/19/2024 9.5 8.7 - 10.5 mg/dL Final     Total Protein   Date Value Ref Range Status   08/19/2024 7.8 6.0 - 8.4 g/dL Final     Albumin   Date Value Ref Range Status   08/19/2024 3.6 3.5 - 5.2 g/dL Final     Total Bilirubin   Date Value Ref Range Status   08/19/2024 0.7 0.1 - 1.0 mg/dL Final     Comment:     For infants and  newborns, interpretation of results should be based  on gestational age, weight and in agreement with clinical  observations.    Premature Infant recommended reference ranges:  Up to 24 hours.............<8.0 mg/dL  Up to 48 hours............<12.0 mg/dL  3-5 days..................<15.0 mg/dL  6-29 days.................<15.0 mg/dL       Alkaline Phosphatase   Date Value Ref Range Status   08/19/2024 75 55 - 135 U/L Final     AST   Date Value Ref Range Status   08/19/2024 14 10 - 40 U/L Final     ALT   Date Value Ref Range Status   08/19/2024 8 (L) 10 - 44 U/L Final     Anion Gap   Date Value Ref Range Status   09/19/2024 10 8 - 16 mmol/L Final     eGFR   Date Value Ref Range Status   09/19/2024 57.6 (A) >60 mL/min/1.73 m^2 Final     BMP  Lab Results   Component Value Date     09/19/2024    K 3.6 09/19/2024     09/19/2024    CO2 25 09/19/2024    BUN 12 09/19/2024    CREATININE 1.3 09/19/2024    CALCIUM 9.5 09/19/2024    ANIONGAP 10 09/19/2024    EGFRNORACEVR 57.6 (A) 09/19/2024       Assessment:     1. Type 2 diabetes mellitus with stage 3a chronic kidney disease, without long-term current use of insulin    2. Type II diabetes mellitus with peripheral autonomic neuropathy    3. Hyperlipidemia associated with type 2 diabetes mellitus    4. Hypertension associated with diabetes    5. Graves disease    6. Hyperthyroidism    7. Current severe episode of major depressive disorder without psychotic features without prior episode    8. Prostate cancer    9. Anxiety and depression    10. Mild intermittent asthma without complication      Plan:   Type 2 diabetes mellitus with stage 3a chronic kidney disease, without long-term current use of insulin  -     Comprehensive Metabolic Panel; Future; Expected date: 06/13/2025  -     Hemoglobin A1C; Future; Expected date: 06/13/2025    Type II diabetes mellitus with peripheral autonomic neuropathy  -stable in remission    Hyperlipidemia associated with type 2 diabetes  mellitus  -     Lipid Panel; Future; Expected date: 12/13/2024    Hypertension associated with diabetes  -stable and controlled on current meds    Graves disease  -stable. Recheck in 6 months    Hyperthyroidism  -     TSH; Future; Expected date: 06/13/2025    Current severe episode of major depressive disorder without psychotic features without prior episode  -stable on sertraline    Prostate cancer  -up to date with urology. PSA today    Anxiety and depression  -stable on sertraline    Mild intermittent asthma without complication    -add lipid to his blood work for today  -tsh, cmp, A1c in 6 months  -jessenia in 6 months  -weight stable    Follow up in about 6 months (around 6/13/2025), or if symptoms worsen or fail to improve.

## 2024-12-13 NOTE — PROGRESS NOTES
"Chief Complaint: T1c CaP    HPI:   12/13/2024 - returns today for follow-up, no gross hematuria or dysuria, does have some urgency but not bothersome enough that he wants to try medication for this, PSA in May 0.07    12/01/2023 - returns today for follow-up, no new issues in the interim, voiding well, notes some rare urgency but denies any incontinence, denies gross hematuria or dysuria, denies UTIs, due for PSA    9/25/19: No problems from brachytherapy, urine clearing was bloody early on.  Flomax doesn't do much.  8/12/19: Dr. Smith [facility] he have CMRT with brachy alone an option.  SpaceOAR but no ADT (depression).  Reviewed history in detail.   7/3/19: MRI is done.  PIRADs2 no obvious findings.  Reviewed history in detail.  5/21/19: Bx shows Gl 3+4=7 in 1/2 of left mid, 15% of core.  Reviewed history in detail.    4/29/19: TRUS/Bx today 32 gm.  3/25/19: PSA shows a sustained positive trend.  [facility] biopsy.  Reviewed history in detail.  9/17/18: 68 yo man here to discuss elevated PSA.  Was seeing Dr. Sanchez.  I spoke to his Confucianist group last year.  No abd/pelvic pain and no exac/rel factors.  No hematuria.  No urolithiasis.  No urinary bother.  Cialis/viagra prn for ED. Normal sexual function.  Was told he had a "mushy" prostate and was given cipro by Dr. Paris.  No LUTS.    Allergies:  Celebrex [celecoxib], Shrimp, and Venom-wasp    Medications:  has a current medication list which includes the following prescription(s): amlodipine, aspirin, losartan, oxcarbazepine, pravastatin, prazosin, and sertraline, and the following Facility-Administered Medications: lactated ringers.    Review of Systems:  General: No fever, chills  Skin: No rashes  Chest:  Denies cough and sputum production  Heart: Denies chest pain  Resp: Denies dyspnea  Abdomen: Denies diarrhea, abdominal pain, hematemesis, or blood in stool.  Musculoskeletal: No joint stiffness or swelling. Denies back pain.  : see HPI  Neuro: no dizziness or " weakness      PMH:   has a past medical history of Anemia, Anxiety, Asthma, Benign hematuria, Cataract (OU), Colon polyp, Depression, ED (erectile dysfunction), Graves disease, HTN (hypertension), Hypercholesteremia, Hypertensive retinopathy of both eyes, Hypogonadism, LAURENCE (obstructive sleep apnea), Pneumonia, Prostate cancer (7/15/2019), Trouble in sleeping, and Type 2 diabetes mellitus.    PSH:   has a past surgical history that includes Brachytherapy (N/A, 09/10/2019); Radioactive seed implantation of prostate (N/A, 09/10/2019); Ultrasound guidance (N/A, 09/10/2019); Prostate surgery; and Colonoscopy (N/A, 6/7/2023).    FamHx: family history includes Cancer in his maternal grandmother; Heart defect in his mother; Heart disease in his mother and sister; Hypertension in an other family member; Vision loss in his brother.    SocHx:  reports that he has never smoked. He has never used smokeless tobacco. He reports that he does not currently use alcohol. He reports that he does not use drugs.      Physical Exam:  Vitals:    12/13/24 1057   BP: 118/76   Pulse: 69     General: awake, alert, cooperative  Head: NC/AT  Ears: external ears normal  Eyes: sclera normal  Lungs: normal inspiration, NAD  Heart: well-perfused  Skin: The skin is warm and dry  Ext: No c/c/e.  Neuro: grossly intact, AOx3      Labs/Studies:   Lab Results   Component Value Date    WBC 4.79 08/19/2024    HGB 14.3 08/19/2024    HCT 43.8 08/19/2024     08/19/2024     09/19/2024    K 3.6 09/19/2024     09/19/2024    CREATININE 1.3 09/19/2024    BUN 12 09/19/2024    CO2 25 09/19/2024    TSH 0.859 08/19/2024    PSA 0.07 05/08/2024    GLUF 108 06/29/2018    HGBA1C 5.2 08/19/2024    CHOL 149 11/01/2023    TRIG 48 11/01/2023    HDL 50 11/01/2023    ALT 8 (L) 08/19/2024    AST 14 08/19/2024       PSA    9/08: 1.7    7/14: 3.2    5/17: 3.2    5/18: 5.1    6/18: 4.3    3/19: 6.8, 5.7    Impression/Plan:   Prostate cancer - status post brachy,  PSA today, follow-up one yr    Antoni Brunson MD

## 2024-12-14 LAB — COMPLEXED PSA SERPL-MCNC: 0.06 NG/ML (ref 0–4)

## 2025-03-12 NOTE — PROGRESS NOTES
Subjective:       Patient ID: Edin Green is a 71 y.o. male.    Chief Complaint: Follow-up and Results    HPI    DM- well controlled. No acute issues  Depression/anxiety-stable.  Thyroid disease- stable. Off meds  Prostate CA-stable. Seeing rad onc  Asthma-stable.    Past Medical History:   Diagnosis Date    Anemia     Anxiety     Asthma     Benign hematuria     Cataract OU    Colon polyp     dr strauss    Depression     dr garcia psychiatrist in     ED (erectile dysfunction)     Graves disease     story    HTN (hypertension)     Hypercholesteremia     Hypertensive retinopathy of both eyes     Hypogonadism     LAURENCE (obstructive sleep apnea)     Pneumonia     Prostate cancer 7/15/2019    Trouble in sleeping     Type 2 diabetes mellitus        Past Surgical History:   Procedure Laterality Date    BRACHYTHERAPY N/A 9/10/2019    Procedure: BRACHYTHERAPY;  Surgeon: Kiel Ochoa IV, MD;  Location: [facility] OR;  Service: Urology;  Laterality: N/A;    RADIOACTIVE SEED IMPLANTATION OF PROSTATE N/A 9/10/2019    Procedure: INSERTION, RADIOACTIVE SEED, PROSTATE;  Surgeon: Kiel Ochoa IV, MD;  Location: [facility] OR;  Service: Urology;  Laterality: N/A;    ULTRASOUND GUIDANCE N/A 9/10/2019    Procedure: ULTRASOUND GUIDANCE;  Surgeon: Kiel Ochoa IV, MD;  Location: [facility] OR;  Service: Urology;  Laterality: N/A;     Social History     Socioeconomic History    Marital status:      Spouse name: CHUCHO    Number of children: 3   Tobacco Use    Smoking status: Never Smoker    Smokeless tobacco: Never Used   Substance and Sexual Activity    Alcohol use: Yes     Alcohol/week: 1.0 standard drink     Types: 1 Cans of beer per week     Comment: No alcohol 72h prior to sx    Drug use: No    Sexual activity: Yes     Partners: Female   Social History Narrative    , no smokers or pets in household.     Review of patient's allergies indicates:   Allergen Reactions    Celebrex  no lesions,  no deformities,  no traumatic injuries,   no masses present, breathing is unlabored without accessory muscle use, normal breath sounds [celecoxib]      Lip swelling      Shrimp Itching     States reaction is with frozen shrimp    Tomato Itching    Venom-wasp Swelling     Current Outpatient Medications   Medication Sig    amLODIPine (NORVASC) 5 MG tablet TAKE 1 TABLET (5 MG TOTAL) BY MOUTH ONCE DAILY.    aspirin (ECOTRIN) 81 MG EC tablet Take 81 mg by mouth once daily.    buPROPion (WELLBUTRIN XL) 300 MG 24 hr tablet     hydroCHLOROthiazide (HYDRODIURIL) 25 MG tablet TAKE 1 TABLET (25 MG TOTAL) BY MOUTH ONCE DAILY.    losartan (COZAAR) 100 MG tablet Take 1 tablet (100 mg total) by mouth once daily.    mirtazapine (REMERON) 15 MG tablet Take 15 mg by mouth every evening.    OXcarbazepine (TRILEPTAL) 150 MG Tab Take 150 mg by mouth 2 (two) times daily.     polyethylene glycol (GLYCOLAX) 17 gram/dose powder     pravastatin (PRAVACHOL) 40 MG tablet Take 1 tablet (40 mg total) by mouth once daily.    risperiDONE (RISPERDAL M-TABS) 2 MG disintegrating tablet Take 2 mg by mouth nightly.     tadalafil (CIALIS) 20 MG Tab Use one tablet every 36 hours     No current facility-administered medications for this visit.           Review of Systems   Constitutional: Negative for activity change, appetite change, chills, diaphoresis, fatigue, fever and unexpected weight change.   HENT: Negative for congestion, ear pain, postnasal drip, rhinorrhea, sinus pressure, sinus pain, sneezing, sore throat, tinnitus, trouble swallowing and voice change.    Eyes: Negative for photophobia, pain and visual disturbance.   Respiratory: Negative for cough, chest tightness, shortness of breath and wheezing.    Cardiovascular: Negative for chest pain, palpitations and leg swelling.   Gastrointestinal: Negative for abdominal distention, abdominal pain, constipation, diarrhea, nausea and vomiting.   Genitourinary: Negative for decreased urine volume, difficulty urinating, dysuria, flank pain, frequency, hematuria and urgency.   Musculoskeletal: Negative for arthralgias,  back pain, joint swelling, neck pain and neck stiffness.   Allergic/Immunologic: Negative for immunocompromised state.   Neurological: Negative for dizziness, tremors, seizures, syncope, facial asymmetry, speech difficulty, weakness, light-headedness, numbness and headaches.   Hematological: Negative for adenopathy. Does not bruise/bleed easily.   Psychiatric/Behavioral: Negative for confusion and sleep disturbance.       Objective:      Physical Exam  HENT:      Head: Normocephalic and atraumatic.      Right Ear: Tympanic membrane normal.      Left Ear: Tympanic membrane normal.   Eyes:      Extraocular Movements: EOM normal.      Conjunctiva/sclera: Conjunctivae normal.   Cardiovascular:      Rate and Rhythm: Normal rate and regular rhythm.      Pulses: Intact distal pulses.      Heart sounds: Normal heart sounds.   Pulmonary:      Effort: Pulmonary effort is normal.      Breath sounds: Normal breath sounds.   Abdominal:      General: Bowel sounds are normal.      Palpations: Abdomen is soft.   Musculoskeletal:         General: Normal range of motion.      Cervical back: Normal range of motion and neck supple.   Skin:     General: Skin is warm and dry.   Neurological:      Mental Status: He is alert and oriented to person, place, and time.         Assessment:     Vitals:    01/19/22 1345   BP: 116/72   Pulse: 75   Resp: 16   Temp: 97.9 °F (36.6 °C)         1. Primary hypertension    2. Hypercholesteremia    3. Mild intermittent asthma without complication    4. Anxiety and depression    5. Type 2 diabetes mellitus with complication    6. Erectile dysfunction, unspecified erectile dysfunction type    7. Prostate cancer    8. Hypogonadism in male    9. Graves disease    10. Hyperthyroidism    11. LAURENCE (obstructive sleep apnea)        Plan:   Primary hypertension    Hypercholesteremia  -     Comprehensive Metabolic Panel; Future; Expected date: 07/18/2022    Mild intermittent asthma without complication    Anxiety and  depression    Type 2 diabetes mellitus with complication  -     Hemoglobin A1C; Future; Expected date: 07/18/2022    Erectile dysfunction, unspecified erectile dysfunction type    Prostate cancer    Hypogonadism in male    Graves disease  -     TSH; Future; Expected date: 07/18/2022    Hyperthyroidism    LAURENCE (obstructive sleep apnea)      Labs stable.  He is doing well overall  Keep specialty care  Return with PCP in 6 months   Sooner if necessary

## 2025-03-14 NOTE — TELEPHONE ENCOUNTER
Refill Decision Note   Edin Green  is requesting a refill authorization.  Brief Assessment and Rationale for Refill:  Approve     Medication Therapy Plan:         Comments:     Note composed:9:42 AM 07/25/2024           
No care due was identified.  [facility] Embedded Care Due Messages. Reference number: [id number].   7/25/2024 1:08:25 AM CDT  
unable to assess immunization status...

## 2025-03-27 ENCOUNTER — OFFICE VISIT (OUTPATIENT)
Dept: INTERNAL MEDICINE | Facility: CLINIC | Age: 75
End: 2025-03-27
Payer: MEDICARE

## 2025-03-27 VITALS
OXYGEN SATURATION: 96 % | TEMPERATURE: 98 F | BODY MASS INDEX: 24.75 KG/M2 | HEIGHT: 72 IN | WEIGHT: 182.75 LBS | DIASTOLIC BLOOD PRESSURE: 68 MMHG | SYSTOLIC BLOOD PRESSURE: 132 MMHG | HEART RATE: 83 BPM | RESPIRATION RATE: 16 BRPM

## 2025-03-27 DIAGNOSIS — L50.9 URTICARIA: Primary | ICD-10-CM

## 2025-03-27 DIAGNOSIS — F41.9 ANXIETY AND DEPRESSION: ICD-10-CM

## 2025-03-27 DIAGNOSIS — F32.A ANXIETY AND DEPRESSION: ICD-10-CM

## 2025-03-27 PROCEDURE — 99999 PR PBB SHADOW E&M-EST. PATIENT-LVL IV: CPT | Mod: PBBFAC,HCNC,, | Performed by: FAMILY MEDICINE

## 2025-03-27 RX ORDER — PREDNISONE 20 MG/1
40 TABLET ORAL DAILY
Qty: 10 TABLET | Refills: 0 | Status: SHIPPED | OUTPATIENT
Start: 2025-03-27 | End: 2025-04-01

## 2025-03-27 NOTE — PROGRESS NOTES
Chief Complaint: Insect Bite    History of Present Illness    CHIEF COMPLAINT:  Mr. Green presents today with skin rash and itching    SKIN CONCERNS:  He reports recurring hives for 3 weeks that appear and disappear in different locations on his body. He denies any breathing problems or throat swelling. He discontinued red dye consumption 2-3 weeks ago.      MENTAL HEALTH:  He reports being without a [facility] since January when his previous provider left the practice. He has been unsuccessful in getting reassigned despite [facility] over several months.    CURRENT MEDICATIONS:  He continues Pravastatin, Losartan, Amlodipine, Sertraline, Prazosin, and Trileptil with last adjustment in October.          Objective:       Gen nad wife present  Cvrrr  Chest ctabilat  No lips swelling    Assessment:       1. Urticaria    2. Anxiety and depression        Plan:   Assessment & Plan    Assessed skin condition as hives (urticaria) based on appearance and symptoms.  Identified oxcarbazepine (Trileptal) as poss  culprit if medication-related.      TYPE 2 DIABETES MELLITUS WITH OTHER CIRCULATORY COMPLICATIONS:  Monitor blood sugar while taking prednisone, although levels have been stable.  Continue Losartan and Amlodipine for hypertension management.    CURRENT SEVERE EPISODE OF MAJOR DEPRESSIVE DISORDER WITHOUT PSYCHOTIC FEATURES WITHOUT PRIOR EPISODE:  Continue Sertraline for depression management.  Instructed patient to seek help if mood symptoms become significant.  Advised patient to contact [facility] psychiatrist or behavioral health clinic to discuss potential medication adjustment for oxcarbazepine.  Acknowledged difficulty in obtaining psychiatric care and offered referral to local psychiatrists.  Referred patient to psychiatry within the local healthcare system.    URTICARIA (HIVES):  Explained hives (urticaria) as a potential allergic reaction that can occur spontaneously or in response to  medications or foods.  Mr. Green reports pruritic rashes that appear, resolve, and recur in different locations for several weeks, with the current presentation being the most severe.  Examined and identified the rashes as urticaria, documenting them with photographs.  Auscultated the patient's breathing, which was normal.    .    HYPERLIPIDEMIA:  Continue Pravastatin for hyperlipidemia management.

## 2025-05-01 ENCOUNTER — LAB VISIT (OUTPATIENT)
Dept: LAB | Facility: HOSPITAL | Age: 75
End: 2025-05-01
Attending: ALLERGY & IMMUNOLOGY
Payer: MEDICARE

## 2025-05-01 ENCOUNTER — OFFICE VISIT (OUTPATIENT)
Dept: ALLERGY | Facility: CLINIC | Age: 75
End: 2025-05-01
Payer: MEDICARE

## 2025-05-01 VITALS
DIASTOLIC BLOOD PRESSURE: 74 MMHG | OXYGEN SATURATION: 95 % | SYSTOLIC BLOOD PRESSURE: 138 MMHG | HEART RATE: 73 BPM | HEIGHT: 75 IN | BODY MASS INDEX: 23.46 KG/M2 | WEIGHT: 188.69 LBS

## 2025-05-01 DIAGNOSIS — L50.9 URTICARIA: ICD-10-CM

## 2025-05-01 DIAGNOSIS — T78.40XD ALLERGY, SUBSEQUENT ENCOUNTER: Primary | ICD-10-CM

## 2025-05-01 PROCEDURE — 3288F FALL RISK ASSESSMENT DOCD: CPT | Mod: CPTII,HCNC,S$GLB, | Performed by: ALLERGY & IMMUNOLOGY

## 2025-05-01 PROCEDURE — 3075F SYST BP GE 130 - 139MM HG: CPT | Mod: CPTII,HCNC,S$GLB, | Performed by: ALLERGY & IMMUNOLOGY

## 2025-05-01 PROCEDURE — 36415 COLL VENOUS BLD VENIPUNCTURE: CPT | Mod: HCNC,PO

## 2025-05-01 PROCEDURE — 86376 MICROSOMAL ANTIBODY EACH: CPT | Mod: HCNC

## 2025-05-01 PROCEDURE — 3078F DIAST BP <80 MM HG: CPT | Mod: CPTII,HCNC,S$GLB, | Performed by: ALLERGY & IMMUNOLOGY

## 2025-05-01 PROCEDURE — 1101F PT FALLS ASSESS-DOCD LE1/YR: CPT | Mod: CPTII,HCNC,S$GLB, | Performed by: ALLERGY & IMMUNOLOGY

## 2025-05-01 PROCEDURE — 84165 PROTEIN E-PHORESIS SERUM: CPT | Mod: HCNC

## 2025-05-01 PROCEDURE — 1159F MED LIST DOCD IN RCRD: CPT | Mod: CPTII,HCNC,S$GLB, | Performed by: ALLERGY & IMMUNOLOGY

## 2025-05-01 PROCEDURE — 1126F AMNT PAIN NOTED NONE PRSNT: CPT | Mod: CPTII,HCNC,S$GLB, | Performed by: ALLERGY & IMMUNOLOGY

## 2025-05-01 PROCEDURE — 83520 IMMUNOASSAY QUANT NOS NONAB: CPT | Mod: HCNC

## 2025-05-01 PROCEDURE — 99999 PR PBB SHADOW E&M-EST. PATIENT-LVL III: CPT | Mod: PBBFAC,HCNC,, | Performed by: ALLERGY & IMMUNOLOGY

## 2025-05-01 PROCEDURE — 3072F LOW RISK FOR RETINOPATHY: CPT | Mod: CPTII,HCNC,S$GLB, | Performed by: ALLERGY & IMMUNOLOGY

## 2025-05-01 PROCEDURE — 99204 OFFICE O/P NEW MOD 45 MIN: CPT | Mod: HCNC,S$GLB,, | Performed by: ALLERGY & IMMUNOLOGY

## 2025-05-01 NOTE — PROGRESS NOTES
"Subjective:      Patient ID: Edin Green is a 74 y.o. male.    Chief Complaint:  Urticaria      HPI: 5/1/2025:  74 year old male referred by Dr. Paris for Urticaria    Hives in March- arms and legs  "First time they were that bad."  Duration- 2 weeks  Medications: steroids by PCP and benadryl  No hives since first week in April  He denies herbal medications.  He denies being sick or ill.  NO illness two weeks prior      Celebrex- swelling    Wasp-" almost passed out"- He reports doing allergy immunotherapy for flying insect venom.    Shrimp-ate two days ago- no hives    He denies food allergies.      Past Medical History:   Diagnosis Date    Anemia     Anxiety     Asthma     Benign hematuria     Cataract OU    Colon polyp     dr ahumadareen    Depression     dr garcia psychiatrist in     ED (erectile dysfunction)     Graves disease     story    HTN (hypertension)     Hypercholesteremia     Hypertensive retinopathy of both eyes     Hypogonadism     LAURENCE (obstructive sleep apnea)     Pneumonia     Prostate cancer 7/15/2019    Trouble in sleeping     Type 2 diabetes mellitus             Family History   Problem Relation Name Age of Onset    Hypertension Other      Heart defect Mother Arianna     Heart disease Mother Arianna     Cancer Maternal Grandmother Julianna Mauricio     Heart disease Sister Radha Young     Vision loss Brother Kiel Martinez     Strabismus Neg Hx      Retinal detachment Neg Hx      Macular degeneration Neg Hx      Glaucoma Neg Hx      Blindness Neg Hx      Amblyopia Neg Hx          Review of patient's allergies indicates:   Allergen Reactions    Celebrex [celecoxib]      Lip swelling      Venom-wasp Swelling        Environmental History: Pets in the home: none.  Tobacco Smoke in Home: no  Review of Systems   Constitutional:  Negative for chills and fever.   HENT:  Negative for congestion and rhinorrhea.    Eyes:  Negative for discharge and itching.   Skin:  Positive for rash. Negative for wound. "   Allergic/Immunologic: Negative for environmental allergies and food allergies.   Neurological:  Negative for facial asymmetry and speech difficulty.       Objective:   Physical Exam  Constitutional:       General: He is not in acute distress.     Appearance: Normal appearance. He is not ill-appearing or toxic-appearing.   HENT:      Head: Normocephalic and atraumatic.      Right Ear: Tympanic membrane, ear canal and external ear normal. There is no impacted cerumen.      Left Ear: Tympanic membrane, ear canal and external ear normal. There is no impacted cerumen.      Nose: Nose normal. No congestion or rhinorrhea.      Mouth/Throat:      Mouth: Mucous membranes are moist.      Pharynx: No oropharyngeal exudate or posterior oropharyngeal erythema.   Eyes:      General: No scleral icterus.        Right eye: No discharge.         Left eye: No discharge.      Pupils: Pupils are equal, round, and reactive to light.   Neck:      Thyroid: No thyromegaly.   Cardiovascular:      Rate and Rhythm: Normal rate and regular rhythm.      Heart sounds: Normal heart sounds. No murmur heard.     No friction rub. No gallop.   Pulmonary:      Effort: Pulmonary effort is normal. No respiratory distress.      Breath sounds: Normal breath sounds. No stridor. No wheezing or rales.   Musculoskeletal:         General: Normal range of motion.      Cervical back: Normal range of motion and neck supple. No rigidity or tenderness.   Lymphadenopathy:      Cervical: No cervical adenopathy.   Skin:     General: Skin is warm and dry.      Findings: No rash.   Neurological:      General: No focal deficit present.      Mental Status: He is alert and oriented to person, place, and time.      Motor: No weakness.   Psychiatric:         Mood and Affect: Mood normal.         Behavior: Behavior normal.         Thought Content: Thought content normal.         Judgment: Judgment normal.           Assessment:      1. Allergy, subsequent encounter    2.  Urticaria        Plan:         Allergy, subsequent encounter    Urticaria  -     Ambulatory referral/consult to Allergy  -     Protein Electrophoresis, Serum; Future; Expected date: 05/01/2025  -     Tryptase; Future; Expected date: 05/01/2025  -     Thyroid Peroxidase and Thyroglobulin Ab; Future; Expected date: 05/01/2025  -     Anti-IgE Receptor Antibody; Future; Expected date: 05/01/2025  -     Comprehensive Metabolic Panel; Future; Expected date: 05/01/2025  -     CBC Auto Differential; Future; Expected date: 05/01/2025  -     C1 ESTERASE INHIBITOR PROTEIN; Future; Expected date: 05/01/2025  -     C1 ESTERASE INHIBITOR, FUNCTIONAL; Future; Expected date: 05/01/2025       NO current symptoms.  Labs ordered.    RTC 3-4 weeks    DOYLE PICKERING spent a total of 50 minutes on the day of the visit.This includes face to face time and non-face to face time preparing to see the patient (eg, review of tests), obtaining and/or reviewing separately obtained history, documenting clinical information in the electronic or other health record, independently interpreting results and communicating results to the patient/family/caregiver, or care coordinator.     CC: Dr. Paris

## 2025-05-05 LAB
ALBUMIN, SPE (OHS): 3.56 G/DL (ref 3.35–5.55)
ALPHA 1 GLOB (OHS): 0.27 GM/DL (ref 0.17–0.41)
ALPHA 2 GLOB (OHS): 0.7 GM/DL (ref 0.43–0.99)
BETA GLOB (OHS): 0.71 GM/DL (ref 0.5–1.1)
GAMMA GLOBULIN (OHS): 1.97 GM/DL (ref 0.67–1.58)
PROT SERPL-MCNC: 7.2 GM/DL (ref 6–8.4)
THYROGLOB AB SERPL IA-ACNC: <1.8 IU/ML
THYROPEROXIDASE AB SERPL-ACNC: 3.7 IU/ML

## 2025-05-06 LAB
PATHOLOGIST REVIEW - SPE (OHS): NORMAL
TRYPTASE SERPL-MCNC: 3.1 NG/ML

## 2025-05-15 ENCOUNTER — RESULTS FOLLOW-UP (OUTPATIENT)
Dept: ALLERGY | Facility: CLINIC | Age: 75
End: 2025-05-15

## 2025-05-15 DIAGNOSIS — R89.9 ABNORMAL LABORATORY TEST RESULT: Primary | ICD-10-CM

## 2025-05-21 ENCOUNTER — TELEPHONE (OUTPATIENT)
Dept: HEMATOLOGY/ONCOLOGY | Facility: CLINIC | Age: 75
End: 2025-05-21
Payer: MEDICARE

## 2025-05-21 ENCOUNTER — OFFICE VISIT (OUTPATIENT)
Dept: PODIATRY | Facility: CLINIC | Age: 75
End: 2025-05-21
Payer: MEDICARE

## 2025-05-21 VITALS — BODY MASS INDEX: 23.46 KG/M2 | HEIGHT: 75 IN | WEIGHT: 188.69 LBS

## 2025-05-21 DIAGNOSIS — E11.9 TYPE 2 DIABETES MELLITUS WITHOUT COMPLICATION: ICD-10-CM

## 2025-05-21 DIAGNOSIS — E11.43 TYPE II DIABETES MELLITUS WITH PERIPHERAL AUTONOMIC NEUROPATHY: ICD-10-CM

## 2025-05-21 DIAGNOSIS — B35.1 ONYCHOMYCOSIS: ICD-10-CM

## 2025-05-21 DIAGNOSIS — E11.9 ENCOUNTER FOR COMPREHENSIVE DIABETIC FOOT EXAMINATION, TYPE 2 DIABETES MELLITUS: Primary | ICD-10-CM

## 2025-05-21 PROCEDURE — 1159F MED LIST DOCD IN RCRD: CPT | Mod: CPTII,HCNC,S$GLB, | Performed by: PODIATRIST

## 2025-05-21 PROCEDURE — 3288F FALL RISK ASSESSMENT DOCD: CPT | Mod: CPTII,HCNC,S$GLB, | Performed by: PODIATRIST

## 2025-05-21 PROCEDURE — 3072F LOW RISK FOR RETINOPATHY: CPT | Mod: CPTII,HCNC,S$GLB, | Performed by: PODIATRIST

## 2025-05-21 PROCEDURE — 1160F RVW MEDS BY RX/DR IN RCRD: CPT | Mod: CPTII,HCNC,S$GLB, | Performed by: PODIATRIST

## 2025-05-21 PROCEDURE — 1101F PT FALLS ASSESS-DOCD LE1/YR: CPT | Mod: CPTII,HCNC,S$GLB, | Performed by: PODIATRIST

## 2025-05-21 PROCEDURE — 99999 PR PBB SHADOW E&M-EST. PATIENT-LVL III: CPT | Mod: PBBFAC,HCNC,, | Performed by: PODIATRIST

## 2025-05-21 PROCEDURE — 99213 OFFICE O/P EST LOW 20 MIN: CPT | Mod: HCNC,S$GLB,, | Performed by: PODIATRIST

## 2025-05-21 NOTE — TELEPHONE ENCOUNTER
Spoke to patient and spouse in reference to Hematology referral from Dr. Horowitz. Appointment scheduled per patient's request next available at the [place] after 7/17. Appointment notice via pt portal.

## 2025-05-21 NOTE — PROGRESS NOTES
Subjective:     Patient ID: Edin Green is a 74 y.o. male.    Chief Complaint: Nail Care (Diabetic pt wears tennis shoes, PCP Dr. Paris last seen 3-27-25)    Edin is a 74 y.o. male who presents to the clinic upon referral from Dr. Christina forte. provider found  for evaluation and treatment of diabetic feet. Edin has a past medical history of Anemia, Anxiety, Asthma, Benign hematuria, Cataract (OU), Colon polyp, Depression, ED (erectile dysfunction), Graves disease, HTN (hypertension), Hypercholesteremia, Hypertensive retinopathy of both eyes, Hypogonadism, LAURENCE (obstructive sleep apnea), Pneumonia, Prostate cancer (7/15/2019), Trouble in sleeping, and Type 2 diabetes mellitus. Patient relates no major problem with feet. Only complaints today consist of fungal toenails     PCP: Carter Paris MD    Date Last Seen by PCP: 03/07/2025    Current shoe gear: Tennis shoes    Hemoglobin A1C   Date Value Ref Range Status   08/19/2024 5.2 4.0 - 5.6 % Final     Comment:     ADA Screening Guidelines:  5.7-6.4%  Consistent with prediabetes  >or=6.5%  Consistent with diabetes    High levels of fetal hemoglobin interfere with the HbA1C  assay. Heterozygous hemoglobin variants (HbS, HgC, etc)do  not significantly interfere with this assay.   However, presence of multiple variants may affect accuracy.     05/08/2024 5.9 (H) 4.0 - 5.6 % Final     Comment:     ADA Screening Guidelines:  5.7-6.4%  Consistent with prediabetes  >or=6.5%  Consistent with diabetes    High levels of fetal hemoglobin interfere with the HbA1C  assay. Heterozygous hemoglobin variants (HbS, HgC, etc)do  not significantly interfere with this assay.   However, presence of multiple variants may affect accuracy.     11/01/2023 5.7 (H) 4.0 - 5.6 % Final     Comment:     ADA Screening Guidelines:  5.7-6.4%  Consistent with prediabetes  >or=6.5%  Consistent with diabetes    High levels of fetal hemoglobin interfere with the HbA1C  assay. Heterozygous hemoglobin  variants (HbS, HgC, etc)do  not significantly interfere with this assay.   However, presence of multiple variants may affect accuracy.           Patient Active Problem List   Diagnosis    Hypertensive retinopathy of both eyes    Refractive error - Both Eyes    Nuclear sclerosis    Hypertension associated with diabetes    Anxiety and depression    Hypogonadism in male    Erectile dysfunction associated with type 2 diabetes mellitus    LAURENCE (obstructive sleep apnea)    Mild intermittent asthma without complication    Allergic rhinitis    Cortical senile cataract of both eyes    Posterior vitreous detachment of both eyes    Hyperthyroidism    Graves disease    Type II diabetes mellitus with peripheral autonomic neuropathy    Prostate cancer    Current severe episode of major depressive disorder without psychotic features without prior episode    Type 2 diabetes mellitus with stage 3a chronic kidney disease, without long-term current use of insulin    DM cataract    Hyperlipidemia associated with type 2 diabetes mellitus    Weight loss    [facility] providers[facility]       Medication List with Changes/Refills   Current Medications    AMLODIPINE (NORVASC) 5 MG TABLET    TAKE 1 TABLET ONE TIME DAILY    ASPIRIN (ECOTRIN) 81 MG EC TABLET    Take 81 mg by mouth once daily.    LOSARTAN (COZAAR) 100 MG TABLET    TAKE 1 TABLET EVERY DAY    OXCARBAZEPINE (TRILEPTAL) 150 MG TAB    Take 150 mg by mouth every evening.    PRAVASTATIN (PRAVACHOL) 40 MG TABLET    TAKE 1 TABLET EVERY DAY    PRAZOSIN (MINIPRESS) 2 MG CAP    Take 2 mg by mouth every evening.    SERTRALINE (ZOLOFT) 50 MG TABLET    Take 50 mg by mouth once daily.       Review of patient's allergies indicates:   Allergen Reactions    Celebrex [celecoxib]      Lip swelling      Venom-wasp Swelling       Past Surgical History:   Procedure Laterality Date    BRACHYTHERAPY N/A 09/10/2019    Procedure: BRACHYTHERAPY;  Surgeon: Kiel Ochoa IV, MD;   Location: [facility] OR;  Service: Urology;  Laterality: N/A;    COLONOSCOPY N/A 6/7/2023    Procedure: COLONOSCOPY;  Surgeon: Marcie Funes MD;  Location: [facility] ENDO;  Service: Endoscopy;  Laterality: N/A;    PROSTATE SURGERY      RADIOACTIVE SEED IMPLANTATION OF PROSTATE N/A 09/10/2019    Procedure: INSERTION, RADIOACTIVE SEED, PROSTATE;  Surgeon: Kiel Ochoa IV, MD;  Location: [facility] OR;  Service: Urology;  Laterality: N/A;    ULTRASOUND GUIDANCE N/A 09/10/2019    Procedure: ULTRASOUND GUIDANCE;  Surgeon: Kiel Ochoa IV, MD;  Location: [facility] OR;  Service: Urology;  Laterality: N/A;       Family History   Problem Relation Name Age of Onset    Hypertension Other      Heart defect Mother Arianna     Heart disease Mother Arianna     Cancer Maternal Grandmother Julianna Mauricio     Heart disease Sister Radha Young     Vision loss Brother Kiel Martinez     Strabismus Neg Hx      Retinal detachment Neg Hx      Macular degeneration Neg Hx      Glaucoma Neg Hx      Blindness Neg Hx      Amblyopia Neg Hx         Social History     Socioeconomic History    Marital status:      Spouse name: CHUCHO    Number of children: 3   Tobacco Use    Smoking status: Never    Smokeless tobacco: Never   Substance and Sexual Activity    Alcohol use: Not Currently     Comment: No alcohol 72h prior to sx    Drug use: No    Sexual activity: Not Currently     Partners: Female     Birth control/protection: Abstinence   Social History Narrative    , no smokers or pets in household.     Social Drivers of Health     Financial Resource Strain: Low Risk  (11/5/2024)    Overall Financial Resource Strain (CARDIA)     Difficulty of Paying Living Expenses: Not hard at all   Food Insecurity: No Food Insecurity (11/5/2024)    Hunger Vital Sign     Worried About Running Out of Food in the Last Year: Never true     Ran Out of Food in the Last Year: Never true   Transportation Needs: No Transportation Needs (11/5/2024)    PRAPARE -  "Transportation     Lack of Transportation (Medical): No     Lack of Transportation (Non-Medical): No   Physical Activity: Inactive (11/5/2024)    Exercise Vital Sign     Days of Exercise per Week: 0 days     Minutes of Exercise per Session: 0 min   Stress: Stress Concern Present (11/5/2024)    [employer] [place] [employer] - Occupational Stress Questionnaire     Feeling of Stress : To some extent   Housing Stability: Unknown (11/5/2024)    Housing Stability Vital Sign     Unable to Pay for Housing in the Last Year: No     Homeless in the Last Year: No       Vitals:    05/21/25 1426   Weight: 85.6 kg (188 lb 11.4 oz)   Height: 6' 3" (1.905 m)         Hemoglobin A1C   Date Value Ref Range Status   08/19/2024 5.2 4.0 - 5.6 % Final     Comment:     ADA Screening Guidelines:  5.7-6.4%  Consistent with prediabetes  >or=6.5%  Consistent with diabetes    High levels of fetal hemoglobin interfere with the HbA1C  assay. Heterozygous hemoglobin variants (HbS, HgC, etc)do  not significantly interfere with this assay.   However, presence of multiple variants may affect accuracy.     05/08/2024 5.9 (H) 4.0 - 5.6 % Final     Comment:     ADA Screening Guidelines:  5.7-6.4%  Consistent with prediabetes  >or=6.5%  Consistent with diabetes    High levels of fetal hemoglobin interfere with the HbA1C  assay. Heterozygous hemoglobin variants (HbS, HgC, etc)do  not significantly interfere with this assay.   However, presence of multiple variants may affect accuracy.     11/01/2023 5.7 (H) 4.0 - 5.6 % Final     Comment:     ADA Screening Guidelines:  5.7-6.4%  Consistent with prediabetes  >or=6.5%  Consistent with diabetes    High levels of fetal hemoglobin interfere with the HbA1C  assay. Heterozygous hemoglobin variants (HbS, HgC, etc)do  not significantly interfere with this assay.   However, presence of multiple variants may affect accuracy.         Review of Systems   Constitutional:  Negative for chills and fever. "   Respiratory:  Negative for shortness of breath.    Cardiovascular:  Negative for chest pain, palpitations, orthopnea, claudication and leg swelling.   Gastrointestinal:  Negative for diarrhea, nausea and vomiting.   Musculoskeletal:  Negative for joint pain.   Skin:  Negative for rash.   Neurological:  Negative for dizziness, tingling, sensory change, focal weakness and weakness.   Psychiatric/Behavioral: Negative.           Objective:      PHYSICAL EXAM: Apperance: Alert and orient in no distress,well developed, and with good attention to grooming and body habits  Patient presents ambulating in tennis shoes.   LOWER EXTREMITY EXAM:  VASCULAR: Dorsalis pedis pulses 2/4 bilateral and Posterior Tibial pulses 1/4 bilateral. Capillary fill time <blanched bilateral. Mild edema observed bilateral. Varicosities present bilateral. Skin temperature of the lower extremities is warm to warm, proximal to distal. Hair growth dim bilateral.  DERMATOLOGICAL: No skin rashes, subcutaneous nodules, lesions, or ulcers observed bilateral. Nails 2,3,4,5 bilateral elongated, thickened, and discolored with subungual debris. Bilateral hallux nails absent. Webspaces 1,2,3,4 bilateral clean, dry and without evidence of break in skin integrity. Dry and peeling skin noted to bilateral forefoot.   NEUROLOGICAL: Light touch, sharp-dull, proprioception all present and equal bilaterally.  Vibratory sensation diminished at bilateral  hallux. Protective sensation intact at all 10 sites as tested with a [place] 5.07 monofilament.   MUSCULOSKELETAL: Muscle strength is 5/5 for foot inverters, everters, plantarflexors, and dorsiflexors. Muscle tone is normal.           Assessment:       ICD-10-CM ICD-9-CM   1. Encounter for comprehensive diabetic foot examination, type 2 diabetes mellitus  E11.9 250.00   2. Type II diabetes mellitus with peripheral autonomic neuropathy  E11.43 250.60     337.1   3. Onychomycosis  B35.1 110.1           Plan:    Encounter for comprehensive diabetic foot examination, type 2 diabetes mellitus    Type II diabetes mellitus with peripheral autonomic neuropathy    Onychomycosis        I counseled the patient on his conditions, regarding findings of my examination, my impressions, and usual treatment plan.   This visit spent on counseling and coordination of care.  Appointment spent on education about the diabetic foot, neuropathy, and prevention of limb loss.  Shoe inspection. Diabetic Foot Education. Patient reminded of the importance of good nutrition and blood sugar control to help prevent podiatric complications of diabetes. Patient instructed on proper foot hygeine. We discussed wearing proper shoe gear, daily foot inspections, never walking without protective shoe gear, never putting sharp instruments to feet.    With patient's permission, nails 2-5 bilateral were debrided/trimmed in length and thickness aggressively to their soft tissue attachment mechanically and with electric , removing all offending nail and debris. Patient relates relief following the procedure.  Patient  will continue to monitor the areas daily, inspect feet, wear protective shoe gear when ambulatory, moisturizer to maintain skin integrity. Patient reminded of the importance of good nutrition and blood sugar control to help prevent podiatric complications of diabetes.  Patient to return 6 months or sooner if needed.          Saravanan Mata DPM  [facility]

## 2025-06-03 ENCOUNTER — PATIENT OUTREACH (OUTPATIENT)
Dept: ADMINISTRATIVE | Facility: HOSPITAL | Age: 75
End: 2025-06-03
Payer: MEDICARE

## 2025-06-06 ENCOUNTER — OFFICE VISIT (OUTPATIENT)
Dept: ALLERGY | Facility: CLINIC | Age: 75
End: 2025-06-06
Payer: MEDICARE

## 2025-06-06 VITALS
DIASTOLIC BLOOD PRESSURE: 71 MMHG | BODY MASS INDEX: 24.45 KG/M2 | SYSTOLIC BLOOD PRESSURE: 123 MMHG | HEART RATE: 80 BPM | WEIGHT: 190.5 LBS | HEIGHT: 74 IN | TEMPERATURE: 98 F

## 2025-06-06 DIAGNOSIS — R89.9 ABNORMAL LABORATORY TEST RESULT: ICD-10-CM

## 2025-06-06 DIAGNOSIS — L50.9 URTICARIA: Primary | ICD-10-CM

## 2025-06-06 DIAGNOSIS — Z88.9 DRUG ALLERGY: ICD-10-CM

## 2025-06-06 PROCEDURE — 1126F AMNT PAIN NOTED NONE PRSNT: CPT | Mod: CPTII,S$GLB,, | Performed by: ALLERGY & IMMUNOLOGY

## 2025-06-06 PROCEDURE — 1101F PT FALLS ASSESS-DOCD LE1/YR: CPT | Mod: CPTII,S$GLB,, | Performed by: ALLERGY & IMMUNOLOGY

## 2025-06-06 PROCEDURE — 99215 OFFICE O/P EST HI 40 MIN: CPT | Mod: S$GLB,,, | Performed by: ALLERGY & IMMUNOLOGY

## 2025-06-06 PROCEDURE — 3288F FALL RISK ASSESSMENT DOCD: CPT | Mod: CPTII,S$GLB,, | Performed by: ALLERGY & IMMUNOLOGY

## 2025-06-06 PROCEDURE — G2211 COMPLEX E/M VISIT ADD ON: HCPCS | Mod: S$GLB,,, | Performed by: ALLERGY & IMMUNOLOGY

## 2025-06-06 PROCEDURE — 3072F LOW RISK FOR RETINOPATHY: CPT | Mod: CPTII,S$GLB,, | Performed by: ALLERGY & IMMUNOLOGY

## 2025-06-06 PROCEDURE — 3074F SYST BP LT 130 MM HG: CPT | Mod: CPTII,S$GLB,, | Performed by: ALLERGY & IMMUNOLOGY

## 2025-06-06 PROCEDURE — 99999 PR PBB SHADOW E&M-EST. PATIENT-LVL III: CPT | Mod: PBBFAC,,, | Performed by: ALLERGY & IMMUNOLOGY

## 2025-06-06 PROCEDURE — 1159F MED LIST DOCD IN RCRD: CPT | Mod: CPTII,S$GLB,, | Performed by: ALLERGY & IMMUNOLOGY

## 2025-06-06 PROCEDURE — 3078F DIAST BP <80 MM HG: CPT | Mod: CPTII,S$GLB,, | Performed by: ALLERGY & IMMUNOLOGY

## 2025-06-06 NOTE — PROGRESS NOTES
"Subjective:      Patient ID: Edin Green is a 74 y.o. male.    Chief Complaint:  Follow-up        HPI: 6/6/2025: Here for follow up  No rash, no hives    HPI: 5/1/2025:  74 year old male referred by Dr. Paris for Urticaria    Hives in March- arms and legs  "First time they were that bad."  Duration- 2 weeks  Medications: steroids by PCP and benadryl  No hives since first week in April  He denies herbal medications.  He denies being sick or ill.  NO illness two weeks prior      Celebrex- swelling    Wasp-" almost passed out"- He reports doing allergy immunotherapy for flying insect venom.    Shrimp-ate two days ago- no hives    He denies food allergies.      Past Medical History:   Diagnosis Date    Anemia     Anxiety     Asthma     Benign hematuria     Cataract OU    Colon polyp     dr ahumadareen    Depression     dr garcia psychiatrist in     ED (erectile dysfunction)     Graves disease     story    HTN (hypertension)     Hypercholesteremia     Hypertensive retinopathy of both eyes     Hypogonadism     LAURENCE (obstructive sleep apnea)     Pneumonia     Prostate cancer 7/15/2019    Trouble in sleeping     Type 2 diabetes mellitus             Family History   Problem Relation Name Age of Onset    Hypertension Other      Heart defect Mother Arianna     Heart disease Mother Arianna     Cancer Maternal Grandmother Julianna Mauricio     Heart disease Sister Radha Coello     Vision loss Brother Kiel Martinez     Strabismus Neg Hx      Retinal detachment Neg Hx      Macular degeneration Neg Hx      Glaucoma Neg Hx      Blindness Neg Hx      Amblyopia Neg Hx          Review of patient's allergies indicates:   Allergen Reactions    Celebrex [celecoxib]      Lip swelling      Venom-wasp Swelling        Environmental History: Pets in the home: none.  Tobacco Smoke in Home: no  Review of Systems   Constitutional:  Negative for chills and fever.   HENT:  Negative for congestion and rhinorrhea.    Eyes:  Negative for discharge and " itching.   Skin:  Positive for rash. Negative for wound.   Allergic/Immunologic: Negative for environmental allergies and food allergies.   Neurological:  Negative for facial asymmetry and speech difficulty.       Objective:   Physical Exam  Constitutional:       General: He is not in acute distress.     Appearance: Normal appearance. He is not ill-appearing or toxic-appearing.   HENT:      Head: Normocephalic and atraumatic.      Right Ear: Tympanic membrane, ear canal and external ear normal. There is no impacted cerumen.      Left Ear: Tympanic membrane, ear canal and external ear normal. There is no impacted cerumen.      Nose: Nose normal. No congestion or rhinorrhea.      Mouth/Throat:      Mouth: Mucous membranes are moist.      Pharynx: No oropharyngeal exudate or posterior oropharyngeal erythema.   Eyes:      General: No scleral icterus.        Right eye: No discharge.         Left eye: No discharge.      Pupils: Pupils are equal, round, and reactive to light.   Neck:      Thyroid: No thyromegaly.   Cardiovascular:      Rate and Rhythm: Normal rate and regular rhythm.      Heart sounds: Normal heart sounds. No murmur heard.     No friction rub. No gallop.   Pulmonary:      Effort: Pulmonary effort is normal. No respiratory distress.      Breath sounds: Normal breath sounds. No stridor. No wheezing or rales.   Musculoskeletal:         General: Normal range of motion.      Cervical back: Normal range of motion and neck supple. No rigidity or tenderness.   Lymphadenopathy:      Cervical: No cervical adenopathy.   Skin:     General: Skin is warm and dry.      Findings: Rash present.      Comments: Legs venous status changes   Neurological:      General: No focal deficit present.      Mental Status: He is alert and oriented to person, place, and time.      Motor: No weakness.   Psychiatric:         Mood and Affect: Mood normal.         Behavior: Behavior normal.         Thought Content: Thought content normal.          Judgment: Judgment normal.           Assessment:      1. Urticaria    2. Drug allergy    3. Abnormal laboratory test result          Plan:         Urticaria    Drug allergy    Abnormal laboratory test result      He denies symptoms.  Discussed lab results - referred to Heme/Onc due to abnormal serum protein electrophoresis    Visit today included increased complexity associated with the care of the episodic problem  Urticaria  addressed and managing the longitudinal care of the patient due to the serious and/or complex managed problem(s)  Urticaria.       RTC prn    DOYLE PICKERING spent a total of 40 minutes on the day of the visit.This includes face to face time and non-face to face time preparing to see the patient (eg, review of tests), obtaining and/or reviewing separately obtained history, documenting clinical information in the electronic or other health record, independently interpreting results and communicating results to the patient/family/caregiver, or care coordinator.     CC: Dr. Paris

## 2025-07-13 NOTE — TELEPHONE ENCOUNTER
Care Due:                  Date            Visit Type   Department     Provider  --------------------------------------------------------------------------------                                MYCHART                              FOLLOWUP/OF  [facility] INTER[facility]  Last Visit: 03-      [facility] VISIT   MEDICINE       Carter Paris                              EP -                              PRIMARY      [facility]  Next Visit: 07-      CARE ([facility])   MEDICINE       Carter Paris                                                            Last  Test          Frequency    Reason                     Performed    Due Date  --------------------------------------------------------------------------------    CMP.........  12 months..  losartan, pravastatin....  08-   08-    [facility] Embedded Care Due Messages. Reference number: [id number].   7/13/2025 1:37:06 PM CDT

## 2025-07-14 RX ORDER — AMLODIPINE BESYLATE 5 MG/1
5 TABLET ORAL
Qty: 90 TABLET | Refills: 2 | Status: SHIPPED | OUTPATIENT
Start: 2025-07-14

## 2025-07-15 ENCOUNTER — PATIENT OUTREACH (OUTPATIENT)
Dept: ADMINISTRATIVE | Facility: HOSPITAL | Age: 75
End: 2025-07-15
Payer: MEDICARE

## 2025-07-15 NOTE — TELEPHONE ENCOUNTER
Refill Decision Note   Edin Zhangus  is requesting a refill authorization.  Brief Assessment and Rationale for Refill:  Approve     Medication Therapy Plan:  FLOS      Comments:     Note composed:7:42 PM 07/14/2025

## 2025-07-17 ENCOUNTER — LAB VISIT (OUTPATIENT)
Dept: LAB | Facility: HOSPITAL | Age: 75
End: 2025-07-17
Attending: PHYSICIAN ASSISTANT
Payer: MEDICARE

## 2025-07-17 DIAGNOSIS — N18.31 TYPE 2 DIABETES MELLITUS WITH STAGE 3A CHRONIC KIDNEY DISEASE, WITHOUT LONG-TERM CURRENT USE OF INSULIN: ICD-10-CM

## 2025-07-17 DIAGNOSIS — E05.90 HYPERTHYROIDISM: ICD-10-CM

## 2025-07-17 DIAGNOSIS — E11.9 TYPE 2 DIABETES MELLITUS WITHOUT COMPLICATION: ICD-10-CM

## 2025-07-17 DIAGNOSIS — E11.22 TYPE 2 DIABETES MELLITUS WITH STAGE 3A CHRONIC KIDNEY DISEASE, WITHOUT LONG-TERM CURRENT USE OF INSULIN: ICD-10-CM

## 2025-07-17 LAB
ALBUMIN SERPL BCP-MCNC: 3.6 G/DL (ref 3.5–5.2)
ALBUMIN/CREAT UR: 2.6 UG/MG
ALP SERPL-CCNC: 80 UNIT/L (ref 40–150)
ALT SERPL W/O P-5'-P-CCNC: 10 UNIT/L (ref 10–44)
ANION GAP (OHS): 8 MMOL/L (ref 8–16)
AST SERPL-CCNC: 14 UNIT/L (ref 11–45)
BILIRUB SERPL-MCNC: 0.7 MG/DL (ref 0.1–1)
BUN SERPL-MCNC: 14 MG/DL (ref 8–23)
CALCIUM SERPL-MCNC: 9 MG/DL (ref 8.7–10.5)
CHLORIDE SERPL-SCNC: 107 MMOL/L (ref 95–110)
CO2 SERPL-SCNC: 27 MMOL/L (ref 23–29)
CREAT SERPL-MCNC: 1.2 MG/DL (ref 0.5–1.4)
CREAT UR-MCNC: 228 MG/DL (ref 23–375)
EAG (OHS): 108 MG/DL (ref 68–131)
GFR SERPLBLD CREATININE-BSD FMLA CKD-EPI: >60 ML/MIN/1.73/M2
GLUCOSE SERPL-MCNC: 107 MG/DL (ref 70–110)
HBA1C MFR BLD: 5.4 % (ref 4–5.6)
MICROALBUMIN UR-MCNC: 6 UG/ML (ref ?–5000)
POTASSIUM SERPL-SCNC: 4.2 MMOL/L (ref 3.5–5.1)
PROT SERPL-MCNC: 7.6 GM/DL (ref 6–8.4)
SODIUM SERPL-SCNC: 142 MMOL/L (ref 136–145)
TSH SERPL-ACNC: 0.94 UIU/ML (ref 0.4–4)

## 2025-07-17 PROCEDURE — 36415 COLL VENOUS BLD VENIPUNCTURE: CPT | Mod: HCNC

## 2025-07-17 PROCEDURE — 80053 COMPREHEN METABOLIC PANEL: CPT | Mod: HCNC

## 2025-07-17 PROCEDURE — 83036 HEMOGLOBIN GLYCOSYLATED A1C: CPT | Mod: HCNC

## 2025-07-17 PROCEDURE — 82043 UR ALBUMIN QUANTITATIVE: CPT | Mod: HCNC

## 2025-07-17 PROCEDURE — 84443 ASSAY THYROID STIM HORMONE: CPT | Mod: HCNC

## 2025-07-21 ENCOUNTER — OFFICE VISIT (OUTPATIENT)
Dept: INTERNAL MEDICINE | Facility: CLINIC | Age: 75
End: 2025-07-21
Payer: MEDICARE

## 2025-07-21 VITALS
HEART RATE: 78 BPM | DIASTOLIC BLOOD PRESSURE: 78 MMHG | SYSTOLIC BLOOD PRESSURE: 128 MMHG | BODY MASS INDEX: 23.8 KG/M2 | TEMPERATURE: 97 F | WEIGHT: 185.44 LBS | OXYGEN SATURATION: 96 % | HEIGHT: 74 IN

## 2025-07-21 DIAGNOSIS — E11.59 HYPERTENSION ASSOCIATED WITH DIABETES: Primary | ICD-10-CM

## 2025-07-21 DIAGNOSIS — C61 PROSTATE CANCER: ICD-10-CM

## 2025-07-21 DIAGNOSIS — I15.2 HYPERTENSION ASSOCIATED WITH DIABETES: Primary | ICD-10-CM

## 2025-07-21 DIAGNOSIS — R77.8 ABNORMAL SPEP: ICD-10-CM

## 2025-07-21 DIAGNOSIS — E05.90 HYPERTHYROIDISM: ICD-10-CM

## 2025-07-21 DIAGNOSIS — E11.43 TYPE II DIABETES MELLITUS WITH PERIPHERAL AUTONOMIC NEUROPATHY: ICD-10-CM

## 2025-07-21 PROCEDURE — 3044F HG A1C LEVEL LT 7.0%: CPT | Mod: CPTII,HCNC,S$GLB, | Performed by: FAMILY MEDICINE

## 2025-07-21 PROCEDURE — 1126F AMNT PAIN NOTED NONE PRSNT: CPT | Mod: CPTII,HCNC,S$GLB, | Performed by: FAMILY MEDICINE

## 2025-07-21 PROCEDURE — 1101F PT FALLS ASSESS-DOCD LE1/YR: CPT | Mod: CPTII,HCNC,S$GLB, | Performed by: FAMILY MEDICINE

## 2025-07-21 PROCEDURE — 3078F DIAST BP <80 MM HG: CPT | Mod: CPTII,HCNC,S$GLB, | Performed by: FAMILY MEDICINE

## 2025-07-21 PROCEDURE — 3288F FALL RISK ASSESSMENT DOCD: CPT | Mod: CPTII,HCNC,S$GLB, | Performed by: FAMILY MEDICINE

## 2025-07-21 PROCEDURE — 1159F MED LIST DOCD IN RCRD: CPT | Mod: CPTII,HCNC,S$GLB, | Performed by: FAMILY MEDICINE

## 2025-07-21 PROCEDURE — 3072F LOW RISK FOR RETINOPATHY: CPT | Mod: CPTII,HCNC,S$GLB, | Performed by: FAMILY MEDICINE

## 2025-07-21 PROCEDURE — 99214 OFFICE O/P EST MOD 30 MIN: CPT | Mod: HCNC,S$GLB,, | Performed by: FAMILY MEDICINE

## 2025-07-21 PROCEDURE — G2211 COMPLEX E/M VISIT ADD ON: HCPCS | Mod: HCNC,S$GLB,, | Performed by: FAMILY MEDICINE

## 2025-07-21 PROCEDURE — 3074F SYST BP LT 130 MM HG: CPT | Mod: CPTII,HCNC,S$GLB, | Performed by: FAMILY MEDICINE

## 2025-07-21 PROCEDURE — 99999 PR PBB SHADOW E&M-EST. PATIENT-LVL III: CPT | Mod: PBBFAC,HCNC,, | Performed by: FAMILY MEDICINE

## 2025-07-21 RX ORDER — BUPROPION HYDROCHLORIDE 75 MG/1
75 TABLET ORAL
COMMUNITY
Start: 2025-04-07 | End: 2025-07-21

## 2025-07-21 NOTE — PROGRESS NOTES
Chief Complaint: Follow-up    History of Present Illness    CHIEF COMPLAINT:  Mr. Green presents today for follow up of medication management.    PSYCHIATRIC HISTORY:  His last psychiatric appointment was in May, having seen three different psychiatrists within the past year. He has disengaged from psychiatric treatment and is currently refusing all psychiatric medications. He denies active suicidal ideation. He has a history of severe depression characterized by profound immobilization in the past, though no current active depression symptoms are reported.    MEDICAL HISTORY:  He has a history of diabetes. He discontinued diabetes medication approximately 1-1.5 months ago. Recent labs show normal blood sugar, A1C, and kidney function. Dr. Horowitz noted elevated protein levels in blood, which may require further investigation.    SOCIAL HISTORY:  He lives with his wife and son in the [facility].          Objective:   Physical Exam    Vitals: Reviewed. Nursing note reviewed.  Constitutional: Alert.  HENT: Normocephalic. Atraumatic. External ears normal. Nose normal. PERRL. Conjunctivae normal.  Neck: ROM normal. Supple.  Cardiovascular: Normal rate and regular rhythm. Normal heart sounds.  Pulmonary: Normal breath sounds. Effort normal.  Abdominal: Bowel sounds are normal. Soft.  Musculoskeletal: ROM normal.  Skin: Warm. Dry.  Neurological: Oriented x3.  Psychiatric: Behavior normal. Thought content normal. Judgment normal.       Assessment:       1. Hypertension associated with diabetes    2. Type II diabetes mellitus with peripheral autonomic neuropathy    3. Abnormal SPEP    4. Prostate cancer    5. Hyperthyroidism        Plan:   Assessment & Plan    Assessed medication adherence and mental health status.  Reviewed recent lab results, noting normal renal function, blood sugar, and cholesterol levels.  Evaluated BP without medication, finding it currently WNL.  Determined it is safe to discontinue BP medications  (amlodipine and losartan) for now, given normal readings.  Continued pravastatin despite normal cholesterol, due to its preventive benefits and importance for stroke prevention in patients with diabetes history.  Noted elevated protein level in blood, requiring follow-up with hematologist.  Considered potential connection between hives and elevated protein level, though likely unrelated.    PLAN SUMMARY:  Referred to hematologist for evaluation of elevated protein level  Re[facility]  Recommend resuming pravastatin for cholesterol management  Continued naproxen  Discontinued amlodipine and losartan  Follow-up in 3 months (October) to check BP and cholesterol levels  Follow-up lab work scheduled for October    TYPE 2 DIABETES MELLITUS WITH DIABETIC CHRONIC KIDNEY DISEASE:  Mr. Green's glucose level is normal with excellent A1C and kidney function appears good.  Discontinued amlodipine and losartan.  Recommend resuming pravastatin for cholesterol management as a preventive measure due to diabetes history.  Instructed patient to check BP at home periodically (few times per week) and contact office if readings reach 140s/90s or higher.  Follow-up scheduled in 3 months (October) to check BP and cholesterol levels, no later than first week of November due to patient's travel plans.    ABNORMALITIES OF PLASMA PROTEINS:  Blood test revealed slightly elevated protein level.  Will monitor this condition over time with follow-up lab work scheduled for October (3 months from now).  Dr. Horowitz performed the initial lab work.  Discussed potential risks with patient and referred to hematologist for further evaluation and to differentiate potential causes.    HISTORY OF MENTAL AND BEHAVIORAL DISORDERS:  Mr. Green has history of profound depression and has not been taking psychiatric medications for 1.5 months, currently refusing medication.  Discussed importance of resuming  psychiatric treatment and recommended [facility] for proper management.  Will check back in a few months to reassess mental health status.    OTHER MEDICAL MANAGEMENT:  Continued naproxen.

## 2025-07-22 ENCOUNTER — OFFICE VISIT (OUTPATIENT)
Dept: HEMATOLOGY/ONCOLOGY | Facility: CLINIC | Age: 75
End: 2025-07-22
Payer: MEDICARE

## 2025-07-22 ENCOUNTER — LAB VISIT (OUTPATIENT)
Dept: LAB | Facility: HOSPITAL | Age: 75
End: 2025-07-22
Attending: INTERNAL MEDICINE
Payer: MEDICARE

## 2025-07-22 VITALS
SYSTOLIC BLOOD PRESSURE: 140 MMHG | DIASTOLIC BLOOD PRESSURE: 80 MMHG | OXYGEN SATURATION: 98 % | RESPIRATION RATE: 20 BRPM | TEMPERATURE: 98 F | HEART RATE: 72 BPM | WEIGHT: 185.63 LBS | BODY MASS INDEX: 23.08 KG/M2 | HEIGHT: 75 IN

## 2025-07-22 DIAGNOSIS — R77.9 ABNORMALITY OF PLASMA PROTEIN, UNSPECIFIED: ICD-10-CM

## 2025-07-22 DIAGNOSIS — R89.9 ABNORMAL LABORATORY TEST RESULT: ICD-10-CM

## 2025-07-22 DIAGNOSIS — D89.2 PARAPROTEINEMIA: ICD-10-CM

## 2025-07-22 DIAGNOSIS — D89.2 PARAPROTEINEMIA: Primary | ICD-10-CM

## 2025-07-22 LAB
ABSOLUTE EOSINOPHIL (OHS): 0.24 K/UL
ABSOLUTE MONOCYTE (OHS): 0.3 K/UL (ref 0.3–1)
ABSOLUTE NEUTROPHIL COUNT (OHS): 2.14 K/UL (ref 1.8–7.7)
ALBUMIN SERPL BCP-MCNC: 3.7 G/DL (ref 3.5–5.2)
ALP SERPL-CCNC: 80 UNIT/L (ref 40–150)
ALT SERPL W/O P-5'-P-CCNC: 8 UNIT/L (ref 10–44)
ANION GAP (OHS): 9 MMOL/L (ref 8–16)
AST SERPL-CCNC: 11 UNIT/L (ref 11–45)
BASOPHILS # BLD AUTO: 0.05 K/UL
BASOPHILS NFR BLD AUTO: 1.2 %
BETA 2 MICROGLOBILIN (OHS): 1.7 UG/ML (ref 0–2.5)
BILIRUB SERPL-MCNC: 0.4 MG/DL (ref 0.1–1)
BUN SERPL-MCNC: 17 MG/DL (ref 8–23)
CALCIUM SERPL-MCNC: 8.8 MG/DL (ref 8.7–10.5)
CHLORIDE SERPL-SCNC: 108 MMOL/L (ref 95–110)
CO2 SERPL-SCNC: 24 MMOL/L (ref 23–29)
CREAT SERPL-MCNC: 1.1 MG/DL (ref 0.5–1.4)
ERYTHROCYTE [DISTWIDTH] IN BLOOD BY AUTOMATED COUNT: 13 % (ref 11.5–14.5)
GFR SERPLBLD CREATININE-BSD FMLA CKD-EPI: >60 ML/MIN/1.73/M2
GLUCOSE SERPL-MCNC: 94 MG/DL (ref 70–110)
HCT VFR BLD AUTO: 43.8 % (ref 40–54)
HGB BLD-MCNC: 13.8 GM/DL (ref 14–18)
IGA SERPL-MCNC: 120 MG/DL (ref 40–350)
IGG SERPL-MCNC: 2177 MG/DL (ref 650–1600)
IGM SERPL-MCNC: 37 MG/DL (ref 50–300)
IMM GRANULOCYTES # BLD AUTO: 0.01 K/UL (ref 0–0.04)
IMM GRANULOCYTES NFR BLD AUTO: 0.2 % (ref 0–0.5)
LDH SERPL-CCNC: 164 U/L (ref 110–260)
LYMPHOCYTES # BLD AUTO: 1.49 K/UL (ref 1–4.8)
MCH RBC QN AUTO: 28.9 PG (ref 27–31)
MCHC RBC AUTO-ENTMCNC: 31.5 G/DL (ref 32–36)
MCV RBC AUTO: 92 FL (ref 82–98)
NUCLEATED RBC (/100WBC) (OHS): 0 /100 WBC
PLATELET # BLD AUTO: 204 K/UL (ref 150–450)
PMV BLD AUTO: 11.5 FL (ref 9.2–12.9)
POTASSIUM SERPL-SCNC: 3.8 MMOL/L (ref 3.5–5.1)
PROT SERPL-MCNC: 7.4 GM/DL (ref 6–8.4)
RBC # BLD AUTO: 4.77 M/UL (ref 4.6–6.2)
RELATIVE EOSINOPHIL (OHS): 5.7 %
RELATIVE LYMPHOCYTE (OHS): 35.2 % (ref 18–48)
RELATIVE MONOCYTE (OHS): 7.1 % (ref 4–15)
RELATIVE NEUTROPHIL (OHS): 50.6 % (ref 38–73)
SODIUM SERPL-SCNC: 141 MMOL/L (ref 136–145)
WBC # BLD AUTO: 4.23 K/UL (ref 3.9–12.7)

## 2025-07-22 PROCEDURE — 99999 PR PBB SHADOW E&M-EST. PATIENT-LVL III: CPT | Mod: PBBFAC,,, | Performed by: INTERNAL MEDICINE

## 2025-07-22 PROCEDURE — 83521 IG LIGHT CHAINS FREE EACH: CPT | Mod: HCNC

## 2025-07-22 PROCEDURE — 1159F MED LIST DOCD IN RCRD: CPT | Mod: CPTII,S$GLB,, | Performed by: INTERNAL MEDICINE

## 2025-07-22 PROCEDURE — 80053 COMPREHEN METABOLIC PANEL: CPT | Mod: HCNC

## 2025-07-22 PROCEDURE — 36415 COLL VENOUS BLD VENIPUNCTURE: CPT | Mod: HCNC

## 2025-07-22 PROCEDURE — 1126F AMNT PAIN NOTED NONE PRSNT: CPT | Mod: CPTII,S$GLB,, | Performed by: INTERNAL MEDICINE

## 2025-07-22 PROCEDURE — 84165 PROTEIN E-PHORESIS SERUM: CPT | Mod: 26,,, | Performed by: PATHOLOGY

## 2025-07-22 PROCEDURE — 82232 ASSAY OF BETA-2 PROTEIN: CPT | Mod: HCNC

## 2025-07-22 PROCEDURE — 1101F PT FALLS ASSESS-DOCD LE1/YR: CPT | Mod: CPTII,S$GLB,, | Performed by: INTERNAL MEDICINE

## 2025-07-22 PROCEDURE — 99204 OFFICE O/P NEW MOD 45 MIN: CPT | Mod: S$GLB,,, | Performed by: INTERNAL MEDICINE

## 2025-07-22 PROCEDURE — 84165 PROTEIN E-PHORESIS SERUM: CPT

## 2025-07-22 PROCEDURE — G2211 COMPLEX E/M VISIT ADD ON: HCPCS | Mod: S$GLB,,, | Performed by: INTERNAL MEDICINE

## 2025-07-22 PROCEDURE — 83615 LACTATE (LD) (LDH) ENZYME: CPT | Mod: HCNC

## 2025-07-22 PROCEDURE — 3077F SYST BP >= 140 MM HG: CPT | Mod: CPTII,S$GLB,, | Performed by: INTERNAL MEDICINE

## 2025-07-22 PROCEDURE — 85025 COMPLETE CBC W/AUTO DIFF WBC: CPT | Mod: HCNC

## 2025-07-22 PROCEDURE — 82784 ASSAY IGA/IGD/IGG/IGM EACH: CPT | Mod: 59,HCNC

## 2025-07-22 PROCEDURE — 3044F HG A1C LEVEL LT 7.0%: CPT | Mod: CPTII,S$GLB,, | Performed by: INTERNAL MEDICINE

## 2025-07-22 PROCEDURE — 86334 IMMUNOFIX E-PHORESIS SERUM: CPT | Mod: 26,,, | Performed by: PATHOLOGY

## 2025-07-22 PROCEDURE — 3072F LOW RISK FOR RETINOPATHY: CPT | Mod: CPTII,S$GLB,, | Performed by: INTERNAL MEDICINE

## 2025-07-22 PROCEDURE — 3079F DIAST BP 80-89 MM HG: CPT | Mod: CPTII,S$GLB,, | Performed by: INTERNAL MEDICINE

## 2025-07-22 PROCEDURE — 3288F FALL RISK ASSESSMENT DOCD: CPT | Mod: CPTII,S$GLB,, | Performed by: INTERNAL MEDICINE

## 2025-07-22 PROCEDURE — 86334 IMMUNOFIX E-PHORESIS SERUM: CPT

## 2025-07-22 NOTE — PROGRESS NOTES
[facility] [place], [place]  Phone: [phone];  Fax: [phone]    Patient ID: Edin Green   Reason for Visit: Establish Care  MRN:  [id number]     Subjective   Edin Green is a 74 y.o. male with Mild intermittent asthma, Anxiety and Depression, MDD, HTN, DM II, HLD, Hypothyroidism - Graves Disease, LAURENCE and history of stage IIB prostate cancer who presents to clinic to establish care on referral for evaluation for paraproteinemia.  He is accompanied by his wife.      The patient reports that he has had some decreased appetite, fatigue and weight loss.  He may have lost 3 lbs in the last 3 months; his energy level has been lower.  I reviewed with him his labs in my recommendations as outlined below.  We also discussed depression at length as apparently he has suffered from depression for quite a long time in his wife reports that he is relatively inactive.  He does have a tremor in his right hand but states that he was told he does not have Parkinson's disease.  I encouraged him to continue to follow up and monitor this condition in the other typical symptoms of Parkinson's disease.  Otherwise, he has no acute complaints.    Socially, he is a never smoker, he drinks alcohol very rarely and denies illicit drug use.  His family history is significant for an maternal aunt with breast cancer and maternal GM with cancer..      Review of Systems   Constitutional:  Positive for appetite change, fatigue and unexpected weight change. Negative for activity change, chills, diaphoresis and fever.   Respiratory:  Negative for shortness of breath.    Cardiovascular:  Negative for chest pain.   Gastrointestinal:  Negative for abdominal pain, anal bleeding, blood in stool, constipation, diarrhea, nausea and vomiting.   Genitourinary:  Negative for difficulty urinating and hematuria.   Skin:  Negative for rash.   Neurological:  Negative for headaches.    Psychiatric/Behavioral:  The patient is not nervous/anxious.      History     Oncology History   Prostate cancer   7/15/2019 Initial Diagnosis    Prostate cancer     7/22/2019 Cancer Staged    Staging form: Prostate, AJCC 8th Edition  - Clinical stage from 7/22/2019: Stage IIB (cT1c, cN0, cM0, PSA: 5.7, Grade Group: 2) - Signed by Kiel Smith II, MD on 7/22/2019     9/10/2019 - 9/10/2019 Radiation Therapy    Palladium Prostate Brachytherapy 125Gy monotherapy           Past Medical History:   Diagnosis Date    Anemia     Anxiety     Asthma     Benign hematuria     Cataract OU    Colon polyp     dr strauss    Depression     dr garcia psychiatrist in     ED (erectile dysfunction)     Graves disease     story    HTN (hypertension)     Hypercholesteremia     Hypertensive retinopathy of both eyes     Hypogonadism     LAURENCE (obstructive sleep apnea)     Pneumonia     Prostate cancer 7/15/2019    Trouble in sleeping     Type 2 diabetes mellitus        Past Surgical History:   Procedure Laterality Date    BRACHYTHERAPY N/A 09/10/2019    Procedure: BRACHYTHERAPY;  Surgeon: Kiel Ochoa IV, MD;  Location: [facility] OR;  Service: Urology;  Laterality: N/A;    COLONOSCOPY N/A 6/7/2023    Procedure: COLONOSCOPY;  Surgeon: Marcie Funes MD;  Location: [facility] ENDO;  Service: Endoscopy;  Laterality: N/A;    PROSTATE SURGERY      RADIOACTIVE SEED IMPLANTATION OF PROSTATE N/A 09/10/2019    Procedure: INSERTION, RADIOACTIVE SEED, PROSTATE;  Surgeon: Kiel Ochoa IV, MD;  Location: [facility] OR;  Service: Urology;  Laterality: N/A;    ULTRASOUND GUIDANCE N/A 09/10/2019    Procedure: ULTRASOUND GUIDANCE;  Surgeon: Kiel Ochoa IV, MD;  Location: [facility] OR;  Service: Urology;  Laterality: N/A;       Family History   Problem Relation Name Age of Onset    Hypertension Other      Heart defect Mother Arianna     Heart disease Mother Arianna     Cancer Maternal Grandmother Julianna Hang     Heart disease Sister Radha Coello      "Vision loss Brother Kiel Martinez     Strabismus Neg Hx      Retinal detachment Neg Hx      Macular degeneration Neg Hx      Glaucoma Neg Hx      Blindness Neg Hx      Amblyopia Neg Hx         Review of patient's allergies indicates:   Allergen Reactions    Celebrex [celecoxib]      Lip swelling      Venom-wasp Swelling       Social History[1]    Physical Exam     ECOG SCORE    0 - Fully active-able to carry on all pre-disease performance without restriction, 1 - Restricted in strenuous activity-ambulatory and able to carry out work of a light nature          Vitals:  BP (!) 140/80   Pulse 72   Temp 98.4 °F (36.9 °C) (Temporal)   Resp 20   Ht 6' 3" (1.905 m)   Wt 84.2 kg (185 lb 10 oz)   SpO2 98%   BMI 23.20 kg/m²       Physical Exam  Constitutional:       General: He is not in acute distress.     Appearance: Normal appearance. He is not ill-appearing, toxic-appearing or diaphoretic.   HENT:      Head: Normocephalic and atraumatic.   Eyes:      Extraocular Movements: Extraocular movements intact.      Conjunctiva/sclera: Conjunctivae normal.   Cardiovascular:      Rate and Rhythm: Normal rate and regular rhythm.   Pulmonary:      Effort: Pulmonary effort is normal.   Neurological:      General: No focal deficit present.      Mental Status: He is alert and oriented to person, place, and time. Mental status is at baseline.   Psychiatric:         Mood and Affect: Mood normal.         Behavior: Behavior normal.        Labs   Labs:  No visits with results within 2 Day(s) from this visit.   Latest known visit with results is:   Lab Visit on 07/17/2025   Component Date Value Ref Range Status    Sodium 07/17/2025 142  136 - 145 mmol/L Final    Potassium 07/17/2025 4.2  3.5 - 5.1 mmol/L Final    Chloride 07/17/2025 107  95 - 110 mmol/L Final    CO2 07/17/2025 27  23 - 29 mmol/L Final    Glucose 07/17/2025 107  70 - 110 mg/dL Final    BUN 07/17/2025 14  8 - 23 mg/dL Final    Creatinine 07/17/2025 1.2  0.5 - 1.4 mg/dL " Final    Calcium 07/17/2025 9.0  8.7 - 10.5 mg/dL Final    Protein Total 07/17/2025 7.6  6.0 - 8.4 gm/dL Final    Albumin 07/17/2025 3.6  3.5 - 5.2 g/dL Final    Bilirubin Total 07/17/2025 0.7  0.1 - 1.0 mg/dL Final    For infants and newborns, interpretation of results should be based   on gestational age, weight and in agreement with clinical   observations.    Premature Infant recommended reference ranges:   0-24 hours:  <8.0 mg/dL   24-48 hours: <12.0 mg/dL   3-5 days:    <15.0 mg/dL   6-29 days:   <15.0 mg/dL    ALP 07/17/2025 80  40 - 150 unit/L Final    AST 07/17/2025 14  11 - 45 unit/L Final    ALT 07/17/2025 10  10 - 44 unit/L Final    Anion Gap 07/17/2025 8  8 - 16 mmol/L Final    eGFR 07/17/2025 >60  >60 mL/min/1.73/m2 Final    Estimated GFR calculated using the CKD-EPI creatinine (2021) equation.    Hemoglobin A1c 07/17/2025 5.4  4.0 - 5.6 % Final    ADA Screening Guidelines:  5.7-6.4%  Consistent with prediabetes  >=6.5%  Consistent with diabetes    High levels of fetal hemoglobin interfere with the HbA1C  assay. Heterozygous hemoglobin variants (HbS, HgC, etc)do  not significantly interfere with this assay.   However, presence of multiple variants may affect accuracy.    Estimated Average Glucose 07/17/2025 108  68 - 131 mg/dL Final    TSH 07/17/2025 0.944  0.400 - 4.000 uIU/mL Final    Urine Microalbumin 07/17/2025 6.0    ug/mL Final    Urine Creatinine 07/17/2025 228.0  23.0 - 375.0 mg/dL Final    Microalbumin/Creatinine Ratio Urine 07/17/2025 2.6  <=30.0 ug/mg Final        Imaging   Pending      Assessment and Plan   Paraproteinemia  The patient has had a low protein gap for some time.  He had an SPEP May 2025 which did show 2 adjacent paraprotein peaks in the gamma region.  I had an extensive discussion with he and his wife regarding the myeloma Spectrum disorders and that while I do suspect this may be MGUS due to his underlying medical conditions, I recommend he proceed with serum and urine  testing as well as PET scan for skeletal survey.  Bone marrow biopsy we will not be recommended based on these results.  He is in agreement with proceeding with the aforementioned workup.      Hx of Prostate Cancer  Diagnosed 07/22/2019.  Stage IIB (cT1c, cN0, cM0); PSA 5.7; Grade Group 2  PSA 12/13/2024 0.06  Continue follow up with urology and PCP      Cancer Screening  Colonoscopy 06/07/2023: Transverse colon TA      Chronic Medical Conditions  Mild intermittent asthma  Anxiety and Depression  MDD  HTN  DM II  HLD  Hypothyroidism - Graves Disease  LAURENCE        Med Onc Chart Routing      Follow up with physician 4 weeks. Review Results   Follow up with DERIK    Infusion scheduling note    Injection scheduling note    Labs    Imaging PET scan      Pharmacy appointment    Other referrals                    The patient was seen, interviewed and examined. Pertinent lab and radiologic studies were reviewed. Pt instructed to call should they develop concerning signs/symptoms or have further questions.        Portions of the record may have been created with voice recognition software. Occasional wrong-word or sound-a-like substitutions may have occurred due to the inherent limitations of voice recognition software. Read the chart carefully and recognize, using context, where substitutions have occurred.      Alyssa Mendez MD    Hematology/Oncology              [1]   Social History  Tobacco Use    Smoking status: Never    Smokeless tobacco: Never   Substance Use Topics    Alcohol use: Not Currently     Comment: No alcohol 72h prior to sx    Drug use: No

## 2025-07-23 LAB
ALBUMIN, SPE (OHS): 3.63 G/DL (ref 3.35–5.55)
ALPHA 1 GLOB (OHS): 0.25 GM/DL (ref 0.17–0.41)
ALPHA 2 GLOB (OHS): 0.66 GM/DL (ref 0.43–0.99)
BETA GLOB (OHS): 2.3 GM/DL (ref 0.5–1.1)
GAMMA GLOBULIN (OHS): 0.47 GM/DL (ref 0.67–1.58)
KAPPA LC FREE SER-MCNC: 0.07 MG/L (ref 0.26–1.65)
KAPPA LC FREE/LAMBDA FREE SER: 2.23 MG/DL (ref 0.33–1.94)
LAMBDA LC FREE SERPL-MCNC: 31.57 MG/DL (ref 0.57–2.63)
PROT SERPL-MCNC: 7.3 GM/DL (ref 6–8.4)

## 2025-07-24 LAB
PATHOLOGIST INTERPRETATION - IFE SERUM (OHS): NORMAL
PATHOLOGIST REVIEW - SPE (OHS): NORMAL

## 2025-07-26 NOTE — TELEPHONE ENCOUNTER
No care due was identified.  [facility] Embedded Care Due Messages. Reference number: [id number].   7/26/2025 2:20:01 AM CDT

## 2025-07-27 RX ORDER — LOSARTAN POTASSIUM 100 MG/1
100 TABLET ORAL
Qty: 90 TABLET | Refills: 3 | OUTPATIENT
Start: 2025-07-27

## 2025-07-27 NOTE — TELEPHONE ENCOUNTER
Refill Decision Note   Edin Green  is requesting a refill authorization.  Brief Assessment and Rationale for Refill:  Quick Discontinue     Medication Therapy Plan:  discontinued on 7/21/2025 by Carter Paris MD      Comments:     Note composed:11:15 AM 07/27/2025

## 2025-08-12 ENCOUNTER — LAB VISIT (OUTPATIENT)
Dept: LAB | Facility: HOSPITAL | Age: 75
End: 2025-08-12
Attending: INTERNAL MEDICINE
Payer: MEDICARE

## 2025-08-12 DIAGNOSIS — R77.9 ABNORMALITY OF PLASMA PROTEIN, UNSPECIFIED: ICD-10-CM

## 2025-08-12 DIAGNOSIS — D89.2 PARAPROTEINEMIA: ICD-10-CM

## 2025-08-12 DIAGNOSIS — R89.9 ABNORMAL LABORATORY TEST RESULT: ICD-10-CM

## 2025-08-12 PROCEDURE — 84166 PROTEIN E-PHORESIS/URINE/CSF: CPT | Mod: HCNC

## 2025-08-12 PROCEDURE — 86335 IMMUNFIX E-PHORSIS/URINE/CSF: CPT | Mod: 26,HCNC,, | Performed by: PATHOLOGY

## 2025-08-12 PROCEDURE — 84156 ASSAY OF PROTEIN URINE: CPT | Mod: HCNC

## 2025-08-12 PROCEDURE — 86335 IMMUNFIX E-PHORSIS/URINE/CSF: CPT | Mod: HCNC

## 2025-08-13 ENCOUNTER — HOSPITAL ENCOUNTER (OUTPATIENT)
Dept: RADIOLOGY | Facility: HOSPITAL | Age: 75
Discharge: HOME | End: 2025-08-13
Attending: INTERNAL MEDICINE
Payer: MEDICARE

## 2025-08-13 DIAGNOSIS — D89.2 PARAPROTEINEMIA: ICD-10-CM

## 2025-08-13 DIAGNOSIS — R77.9 ABNORMALITY OF PLASMA PROTEIN, UNSPECIFIED: ICD-10-CM

## 2025-08-13 LAB
PATHOLOGIST INTERPRETATION - IFE URINE (OHS): NORMAL
PROT 24H UR-MRATE: 96 MG/SPEC
PROT UR-MCNC: 8 MG/DL
TOTAL HOURS OF COLLECTION (OHS): 24 HR
TOTAL VOLUME  (OHS): 1200 ML

## 2025-08-13 PROCEDURE — 78816 PET IMAGE W/CT FULL BODY: CPT | Mod: 26,HCNC,PI, | Performed by: RADIOLOGY

## 2025-08-13 PROCEDURE — 78816 PET IMAGE W/CT FULL BODY: CPT | Mod: TC,HCNC

## 2025-08-13 PROCEDURE — A9552 F18 FDG: HCPCS | Mod: HCNC | Performed by: INTERNAL MEDICINE

## 2025-08-13 RX ORDER — FLUDEOXYGLUCOSE F18 500 MCI/ML
11.9 INJECTION INTRAVENOUS
Status: COMPLETED | OUTPATIENT
Start: 2025-08-13 | End: 2025-08-13

## 2025-08-13 RX ADMIN — FLUDEOXYGLUCOSE F-18 11.9 MILLICURIE: 500 INJECTION INTRAVENOUS at 09:08

## 2025-08-15 LAB
PROT 24H UR-MRATE: 108 MG/SPEC
PROT UR-MCNC: 9 MG/DL
TOTAL HOURS OF COLLECTION (OHS): 24 HR
TOTAL VOLUME  (OHS): 1200 ML

## 2025-08-16 LAB — PATHOLOGIST INTERPRETATION - UPE (OHS): NORMAL

## 2025-08-20 ENCOUNTER — OFFICE VISIT (OUTPATIENT)
Dept: HEMATOLOGY/ONCOLOGY | Facility: CLINIC | Age: 75
End: 2025-08-20
Payer: MEDICARE

## 2025-08-20 VITALS
SYSTOLIC BLOOD PRESSURE: 132 MMHG | HEART RATE: 77 BPM | HEIGHT: 75 IN | WEIGHT: 185.19 LBS | BODY MASS INDEX: 23.03 KG/M2 | DIASTOLIC BLOOD PRESSURE: 77 MMHG

## 2025-08-20 DIAGNOSIS — D89.2 PARAPROTEINEMIA: Primary | ICD-10-CM

## 2025-08-20 PROCEDURE — 99999 PR PBB SHADOW E&M-EST. PATIENT-LVL III: CPT | Mod: PBBFAC,HCNC,, | Performed by: INTERNAL MEDICINE

## 2025-08-20 RX ORDER — SERTRALINE HYDROCHLORIDE 100 MG/1
100 TABLET, FILM COATED ORAL DAILY
COMMUNITY

## 2025-08-20 RX ORDER — OXCARBAZEPINE 150 MG/1
150 TABLET, FILM COATED ORAL DAILY
COMMUNITY

## (undated) DEVICE — CANISTER SUCTION MEDI-VAC 12L

## (undated) DEVICE — GOWN SMARTGOWN LVL4 X-LONG XL

## (undated) DEVICE — SOL WATER STRL IRR 1000ML

## (undated) DEVICE — SPACER SPACEOAR DELIVERY SYS

## (undated) DEVICE — SEE MEDLINE ITEM 152622

## (undated) DEVICE — SYR 30CC LUER LOCK

## (undated) DEVICE — SEE MEDLINE ITEM 152487

## (undated) DEVICE — SPONGE DERMACEA 4X4IN 12PLY

## (undated) DEVICE — UROVIEW 2600/2800

## (undated) DEVICE — SYR 50CC LL

## (undated) DEVICE — UNDERGLOVES BIOGEL PI SIZE 8

## (undated) DEVICE — SOL 9P NACL IRR PIC IL

## (undated) DEVICE — PACK DRAPE PERI/GYN TIBURON

## (undated) DEVICE — GLOVE SURG BIOGEL LATEX SZ 7.5

## (undated) DEVICE — SYR 10CC LUER LOCK

## (undated) DEVICE — BLLN ENDOCAVITY 2X14CM

## (undated) DEVICE — APPLICATOR CHLORAPREP ORN 26ML

## (undated) DEVICE — GLOVE SURGICAL LATEX SZ 8

## (undated) DEVICE — SYR 50ML CATH TIP

## (undated) DEVICE — Device

## (undated) DEVICE — CONTAINER SPECIMEN STRL 4OZ

## (undated) DEVICE — SEE L#152161